# Patient Record
Sex: MALE | Race: WHITE | NOT HISPANIC OR LATINO | Employment: FULL TIME | URBAN - METROPOLITAN AREA
[De-identification: names, ages, dates, MRNs, and addresses within clinical notes are randomized per-mention and may not be internally consistent; named-entity substitution may affect disease eponyms.]

---

## 2017-07-21 ENCOUNTER — THERAPY VISIT (OUTPATIENT)
Dept: PHYSICAL THERAPY | Facility: CLINIC | Age: 77
End: 2017-07-21
Payer: COMMERCIAL

## 2017-07-21 DIAGNOSIS — M25.512 LEFT SHOULDER PAIN, UNSPECIFIED CHRONICITY: Primary | ICD-10-CM

## 2017-07-21 PROCEDURE — 97161 PT EVAL LOW COMPLEX 20 MIN: CPT | Mod: GP | Performed by: PHYSICAL THERAPIST

## 2017-07-21 PROCEDURE — 97110 THERAPEUTIC EXERCISES: CPT | Mod: GP | Performed by: PHYSICAL THERAPIST

## 2017-07-21 PROCEDURE — 97140 MANUAL THERAPY 1/> REGIONS: CPT | Mod: GP | Performed by: PHYSICAL THERAPIST

## 2017-07-21 NOTE — PROGRESS NOTES
[facility] Initial Evaluation      Subjective:    Patient is a 76 year old male presenting with rehab left shoulder hpi.   Bon Michael is a 76 year old male with a left shoulder condition.  Condition occurred with:  Unknown cause.    This is a new condition  Patient saw MD on 7/14/17 for left shoulder pain that began ~2 months ago.    Patient reports pain:  Anterior.  Radiates to:  Upper arm.  Pain is described as sharp and is intermittent and reported as 4/10.  Associated symptoms:  Painful arc. Pain is the same all the time (activity dependent).  Symptoms are exacerbated by using arm overhead and lifting and relieved by rest.  Since onset symptoms are gradually improving.  Special testing: none.      General health as reported by patient is good.  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  Other surgeries include:  None reported.  Medication history: Calcium.  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    Barriers include:  Lives alone.    Red flags:  None as reported by the patient.                        Objective:    Standing Alignment:    Cervical/Thoracic:  Forward head and thoracic kyphosis increased  Shoulder/UE:  Rounded shoulders                  Flexibility/Screens:     Upper Extremity:    Decreased left upper extremity flexibility at:  Pectoralis Major and Pectoralis Minor                             Shoulder Evaluation:  ROM:  AROM:    Flexion:  Left:  170    Right:  170    Abduction:  Left: 160   Right:  165    Internal Rotation:  Left:  90    Right:  90  External Rotation:  Left:  82    Right:  90      Elbow Flexion:  Left:  WNL    Right:  WNL  Elbow Extension:  Left:  WNL   Right:  WNL    Extension/Internal Rotation:  Left:  To T9    Right:  To T7      Pain: free AROM in clinic today  Endfeel: soft  Strength:     Flexion: Left:4-/5    Pain: +    Right: 5/5      Pain:  -  Extension:  Left: 5/5     Pain:-    Right: 5/5     Pain:-  Abduction:  Left: 4+/5   Pain:-/+    Right: 5/5      Pain:-  Adduction:  Left: 5/5     Pain:-    Right: 5/5      Pain:-  Internal Rotation:  Left:5/5      Pain:-    Right: 5/5      Pain:-  External Rotation:   Left:4/5      Pain:-/+   Right:5/5      Pain:-      Horizontal Adduction:  Right:/5     Pain:-  Elbow Flexion:  Left:5/5      Pain:-    Right:5/5      Pain:-  Elbow Extension:  Left:5/5      Pain:-    Right:5/5      Pain:-    Special Tests:    Left shoulder positive for the following special tests:  Impingement  Left shoulder negative for the following special tests:  Rotator cuff tear    Palpation:    Left shoulder tenderness present at:  Biceps; Supraspinatus and Subscapularis    Mobility Tests:      Glenohumeral posterior left:  Hypomobile    Glenohumeral inferior left:  Hypomobile                                             General     ROS    Assessment/Plan:      Patient is a 76 year old male with left side shoulder complaints.    Patient has the following significant findings with corresponding treatment plan.                Diagnosis 1:  Left shoulder pain   Pain -  manual therapy, self management, education, directional preference exercise and home program  Decreased ROM/flexibility - manual therapy, therapeutic exercise and home program  Decreased joint mobility - manual therapy, therapeutic exercise and home program  Decreased strength - therapeutic exercise, therapeutic activities and home program  Decreased function - therapeutic activities and home program  Impaired posture - neuro re-education, therapeutic activities and home program    Therapy Evaluation Codes:   1) History comprised of:   Personal factors that impact the plan of care:      None.    Comorbidity factors that impact the plan of care are:      None.     Medications impacting care: None.  2) Examination of Body  Systems comprised of:   Body structures and functions that impact the plan of care:      Shoulder.   Activity limitations that impact the plan of care are:      Cooking, Lifting and exercising.  3) Clinical presentation characteristics are:   Stable/Uncomplicated.  4) Decision-Making    Low complexity using standardized patient assessment instrument and/or measureable assessment of functional outcome.  Cumulative Therapy Evaluation is: Low complexity.    Previous and current functional limitations:  (See Goal Flow Sheet for this information)    Short term and Long term goals: (See Goal Flow Sheet for this information)     Communication ability:  Patient appears to be able to clearly communicate and understand verbal and written communication and follow directions correctly.  Treatment Explanation - The following has been discussed with the patient:   RX ordered/plan of care  Anticipated outcomes  Possible risks and side effects  This patient would benefit from PT intervention to resume normal activities.   Rehab potential is good.    Frequency:  1 X week, once daily  Duration:  for 10 weeks  Discharge Plan:  Achieve all LTG.  Independent in home treatment program.  Reach maximal therapeutic benefit.    Please refer to the daily flowsheet for treatment today, total treatment time and time spent performing 1:1 timed codes.

## 2017-07-21 NOTE — MR AVS SNAPSHOT
After Visit Summary   7/21/2017    Bon Michael    MRN: [id number]           Patient Information     Date Of Birth          1940        Visit Information        Provider Department      7/21/2017 4:10 PM Barb Welch, PT [facility] Physical [facility]        Today's Diagnoses     Left shoulder pain, unspecified chronicity    -  1       Follow-ups after your visit        Your next 10 appointments already scheduled     Jul 27, 2017  8:20 AM CDT   TRACI Extremity with Barb Gonzalez PT   [facility] Physical Therapy ([facility])    [address] [place] [address] [address] [place] [address]   [phone]            Aug 01, 2017  3:30 PM CDT   TRACI Extremity with Barb Gonzalez PT   [facility] Physical Therapy ([facility])    [address] [place] [phone]   [phone]            Aug 08, 2017  3:30 PM CDT   TRACI Extremity with Barb Gonzalez PT   [facility] Physical Therapy ([facility])    [address] [place] [address]   [phone]            Aug 11, 2017  4:10 PM CDT   TRACI Extremity with Barb Gonzalez PT   [facility] Physical Therapy ([facility])    [address] [place] [address]   [phone]            Aug 15, 2017  3:30 PM CDT   TRACI Extremity with Barb Gonzalez PT   [facility] Therapy ([facility])    [address] [place] [id number]   [phone]            Aug 18, 2017  4:10 PM CDT   TRACI Extremity with Barb Gonzalez PT   [facility] Physical Therapy ([facility])  "   [address] [place] [address]   [phone]            Aug 23, 2017 11:40 AM CDT   TRACI Extremity with Barb Gonzalez PT   [facility] Physical [facility] ([facility])    [address] [place] [address]   [phone]            Aug 25, 2017  4:10 PM CDT   TRACI Extremity with Barb Gonzalez PT   [facility] Physical Therapy ([facility])    [address] [place] [address]   [phone]            Aug 30, 2017 11:40 AM CDT   TRACI Extremity with Barb Gonzalez PT   [facility] Physical [facility] ([facility])    [address] [place] [address]   [phone]              Who to contact     If you have questions or need follow up information about today's clinic visit or your schedule please contact [facility] PHYSICAL [facility] directly at [phone].  Normal or non-critical lab and imaging results will be communicated to you by MyChart, letter or phone within 4 business days after the clinic has received the results. If you do not hear from us within 7 days, please contact the clinic through [employer] or phone. If you have a critical or abnormal lab result, we will notify you by phone as soon as possible.  Submit refill requests through [employer] or call your pharmacy and they will forward the refill request to us. Please allow 3 business days for your refill to be completed.          Additional Information About Your Visit        [employer] Information     [employer] lets you send messages to your doctor, view your test results, renew your prescriptions, schedule appointments and more. To sign up, go to [email or web address] . Click on \"Log in\" on the left side of the screen, which will take you to " "the Welcome page. Then click on \"Sign up Now\" on the right side of the page.     You will be asked to enter the access code listed below, as well as some personal information. Please follow the directions to create your username and password.     Your access code is: [email or web address]  Expires: 10/22/2017 10:19 PM     Your access code will  in 90 days. If you need help or a new code, please call your [place] clinic or [phone].        Care EveryWhere ID     This is your Care EveryWhere ID. This could be used by other organizations to access your [place] medical records  [id number]         Blood Pressure from Last 3 Encounters:   No data found for BP    Weight from Last 3 Encounters:   No data found for Wt              We Performed the Following     HC PT EVAL, LOW COMPLEXITY     TRACI INITIAL EVAL REPORT     MANUAL THER TECH,1+REGIONS,EA 15 MIN     THERAPEUTIC EXERCISES        Primary Care Provider    None Specified       No primary provider on file.        Equal Access to Services     JASON EPSTEIN : Hadii rashad Raza, waaxda lucornelladaha, qaybta kaalmada adejose carlos, lucila hernandez . So [facility] [phone].    ATENCIÓN: Si habla español, tiene a rebolledo disposición servicios gratuitos de asistencia lingüística. Llame al [phone].    We comply with applicable federal civil rights laws and [place] laws. We do not discriminate on the basis of race, color, national origin, age, disability sex, sexual orientation or gender identity.            Thank you!     Thank you for choosing [facility] PHYSICAL THERAPY  for your care. Our goal is always to provide you with excellent care. Hearing back from our patients is one way we can continue to improve our services. Please take a few minutes to complete the written survey that you may receive in the mail after your visit with us. Thank you!             Your Updated Medication List - Protect others around " you: Learn how to safely use, store and throw away your medicines at www.disposemymeds.org.      Notice  As of 7/21/2017 11:59 PM    You have not been prescribed any medications.

## 2017-07-21 NOTE — LETTER
[facility] PHYSICAL THERAPY  [address]  [facility] [place] [address]  [phone]    2017    Re: Bon Michael   :   1940  MRN:  [id number]   REFERRING PHYSICIAN:   Robinson Lopez    [facility] PHYSICAL [facility]    Date of Initial Evaluation:  2017  Visits:  Rxs Used: 1  Reason for Referral:  Left shoulder pain, unspecified chronicity    EVALUATION SUMMARY    Subjective:  Patient is a 76 year old male presenting with rehab left shoulder hpi.   Bon Michael is a 76 year old male with a left shoulder condition.  Condition occurred with:  Unknown cause. This is a new condition  Patient saw MD on 17 for left shoulder pain that began ~2 months ago.    Patient reports pain:  Anterior.  Radiates to:  Upper arm.  Pain is described as sharp and is intermittent and reported as 4/10.  Associated symptoms:  Painful arc. Pain is the same all the time (activity dependent).  Symptoms are exacerbated by using arm overhead and lifting and relieved by rest.  Since onset symptoms are gradually improving.  Special testing: none. General health as reported by patient is good.  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  Other surgeries include:  None reported.  Medication history: Calcium.  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  Barriers include:  Lives alone.  Red flags:  None as reported by the patient.                  Objective:  Standing Alignment:    Cervical/Thoracic:  Forward head and thoracic kyphosis increased  Shoulder/UE:  Rounded shoulders  Flexibility/Screens:   Upper Extremity:    Decreased left upper extremity flexibility at:  Pectoralis Major and Pectoralis Minor    Shoulder Evaluation:  ROM:  AROM:    Flexion:  Left:  170     Right:  170  Abduction:  Left: 160   Right:  165  Internal Rotation:  Left:  90    Right:  90  External Rotation:  Left:  82    Right:  90  Elbow Flexion:  Left:  WNL    Right:  WNL  Re: Bon Michael   :   1940    Elbow Extension:  Left:  WNL   Right:  WNL  Extension/Internal Rotation:  Left:  To T9    Right:  To T7    Pain: free AROM in clinic today  Endfeel: soft  Strength:    Flexion: Left:4-/5    Pain: +    Right: 5/5      Pain:  -  Extension:  Left: 5/5     Pain:-    Right: 5/5     Pain:-  Abduction:  Left: 4+/5   Pain:-/+    Right: 5/5      Pain:-  Adduction:  Left: 5/5     Pain:-    Right: 5/5      Pain:-  Internal Rotation:  Left:5/5      Pain:-    Right: 5/5      Pain:-  External Rotation:   Left:4/5      Pain:-/+   Right:5/5      Pain:-    Horizontal Adduction:  Right:/5     Pain:-  Elbow Flexion:  Left:5/5      Pain:-    Right:5/5      Pain:-  Elbow Extension:  Left:5/5      Pain:-    Right:5/5      Pain:-  Special Tests:    Left shoulder positive for the following special tests:  Impingement  Left shoulder negative for the following special tests:  Rotator cuff tear  Palpation:    Left shoulder tenderness present at:  Biceps; Supraspinatus and Subscapularis  Mobility Tests:    Glenohumeral posterior left:  Hypomobile    Glenohumeral inferior left:  Hypomobile      Assessment/Plan:    Patient is a 76 year old male with left side shoulder complaints.    Patient has the following significant findings with corresponding treatment plan.                Diagnosis 1:  Left shoulder pain   Pain -  manual therapy, self management, education, directional preference exercise and home program  Decreased ROM/flexibility - manual therapy, therapeutic exercise and home program  Decreased joint mobility - manual therapy, therapeutic exercise and home program  Decreased strength - therapeutic exercise, therapeutic activities and home program  Decreased function - therapeutic activities and home  program  Impaired posture - neuro re-education, therapeutic activities and home program    Therapy Evaluation Codes:   1) History comprised of:   Personal factors that impact the plan of care:      None.    Comorbidity factors that impact the plan of care are:      None.     Medications impacting care: None.  2) Examination of Body Systems comprised of:   Body structures and functions that impact the plan of care:      Shoulder.   Activity limitations that impact the plan of care are:      Cooking, Lifting and exercising.  3) Clinical presentation characteristics are:   Stable/Uncomplicated.      Re: Bon Michael   :   1940    4) Decision-Making    Low complexity using standardized patient assessment instrument and/or measureable assessment of functional outcome.  Cumulative Therapy Evaluation is: Low complexity.    Previous and current functional limitations:  (See Goal Flow Sheet for this information)    Short term and Long term goals: (See Goal Flow Sheet for this information)     Communication ability:  Patient appears to be able to clearly communicate and understand verbal and written communication and follow directions correctly.  Treatment Explanation - The following has been discussed with the patient:   RX ordered/plan of care  Anticipated outcomes  Possible risks and side effects  This patient would benefit from PT intervention to resume normal activities.   Rehab potential is good.    Frequency:  1 X week, once daily  Duration:  for 10 weeks  Discharge Plan:  Achieve all LTG.  Independent in home treatment program.  Reach maximal therapeutic benefit.    Thank you for your referral.    INQUIRIES  Therapist: Barb Ojeda, PT   [facility]  [address] [address]  Phone: [phone]  Fax: [phone]

## 2017-07-27 ENCOUNTER — THERAPY VISIT (OUTPATIENT)
Dept: PHYSICAL THERAPY | Facility: CLINIC | Age: 77
End: 2017-07-27
Payer: COMMERCIAL

## 2017-07-27 DIAGNOSIS — M25.512 LEFT SHOULDER PAIN, UNSPECIFIED CHRONICITY: Primary | ICD-10-CM

## 2017-07-27 PROCEDURE — 97110 THERAPEUTIC EXERCISES: CPT | Mod: GP | Performed by: PHYSICAL THERAPIST

## 2017-07-27 PROCEDURE — 97112 NEUROMUSCULAR REEDUCATION: CPT | Mod: GP | Performed by: PHYSICAL THERAPIST

## 2017-08-01 ENCOUNTER — THERAPY VISIT (OUTPATIENT)
Dept: PHYSICAL THERAPY | Facility: CLINIC | Age: 77
End: 2017-08-01
Payer: COMMERCIAL

## 2017-08-01 DIAGNOSIS — M25.512 LEFT SHOULDER PAIN, UNSPECIFIED CHRONICITY: ICD-10-CM

## 2017-08-01 PROCEDURE — 97112 NEUROMUSCULAR REEDUCATION: CPT | Mod: GP | Performed by: PHYSICAL THERAPIST

## 2017-08-01 PROCEDURE — 97110 THERAPEUTIC EXERCISES: CPT | Mod: GP | Performed by: PHYSICAL THERAPIST

## 2017-08-17 ENCOUNTER — THERAPY VISIT (OUTPATIENT)
Dept: PHYSICAL THERAPY | Facility: CLINIC | Age: 77
End: 2017-08-17
Payer: COMMERCIAL

## 2017-08-17 DIAGNOSIS — M25.512 LEFT SHOULDER PAIN, UNSPECIFIED CHRONICITY: ICD-10-CM

## 2017-08-17 PROCEDURE — 97140 MANUAL THERAPY 1/> REGIONS: CPT | Mod: GP | Performed by: PHYSICAL THERAPIST

## 2017-08-17 PROCEDURE — 97110 THERAPEUTIC EXERCISES: CPT | Mod: GP | Performed by: PHYSICAL THERAPIST

## 2017-08-31 ENCOUNTER — THERAPY VISIT (OUTPATIENT)
Dept: PHYSICAL THERAPY | Facility: CLINIC | Age: 77
End: 2017-08-31
Payer: COMMERCIAL

## 2017-08-31 DIAGNOSIS — M25.512 LEFT SHOULDER PAIN, UNSPECIFIED CHRONICITY: ICD-10-CM

## 2017-08-31 PROCEDURE — 97110 THERAPEUTIC EXERCISES: CPT | Mod: GP | Performed by: PHYSICAL THERAPIST

## 2017-08-31 PROCEDURE — 97140 MANUAL THERAPY 1/> REGIONS: CPT | Mod: GP | Performed by: PHYSICAL THERAPIST

## 2017-09-21 ENCOUNTER — THERAPY VISIT (OUTPATIENT)
Dept: PHYSICAL THERAPY | Facility: CLINIC | Age: 77
End: 2017-09-21
Payer: COMMERCIAL

## 2017-09-21 DIAGNOSIS — M25.512 LEFT SHOULDER PAIN, UNSPECIFIED CHRONICITY: ICD-10-CM

## 2017-09-21 PROCEDURE — 97110 THERAPEUTIC EXERCISES: CPT | Mod: GP | Performed by: PHYSICAL THERAPIST

## 2017-09-21 NOTE — MR AVS SNAPSHOT
"              After Visit Summary   2017    Bon Michael    MRN: [id number]           Patient Information     Date Of Birth          1940        Visit Information        Provider Department      2017 9:40 AM [facility] PT [facility] Physical [facility]        Today's Diagnoses     Left shoulder pain, unspecified chronicity           Follow-ups after your visit        Who to contact     If you have questions or need follow up information about today's clinic visit or your schedule please contact [facility]ICAL [facility] directly at [phone].  Normal or non-critical lab and imaging results will be communicated to you by [employer], letter or phone within 4 business days after the clinic has received the results. If you do not hear from us within 7 days, please contact the clinic through [employer] or phone. If you have a critical or abnormal lab result, we will notify you by phone as soon as possible.  Submit refill requests through [employer] or call your pharmacy and they will forward the refill request to us. Please allow 3 business days for your refill to be completed.          Additional Information About Your Visit        MyChart Information     [employer] lets you send messages to your doctor, view your test results, renew your prescriptions, schedule appointments and more. To sign up, go to [email or web address] . Click on \"Log in\" on the left side of the screen, which will take you to the Welcome page. Then click on \"Sign up Now\" on the right side of the page.     You will be asked to enter the access code listed below, as well as some personal information. Please follow the directions to create your username and password.     Your access code is: [email or web address]  Expires: 10/22/2017 10:19 PM     Your access code will  in 90 days. If you need help or a new code, please call your [place] clinic or " [phone].        Care EveryWhere ID     This is your Care EveryWhere ID. This could be used by other organizations to access your [place] medical records  [id number]         Blood Pressure from Last 3 Encounters:   No data found for BP    Weight from Last 3 Encounters:   No data found for Wt              We Performed the Following     TRACI PROGRESS NOTES REPORT     THERAPEUTIC EXERCISES        Primary Care Provider    None Specified       No primary provider on file.        Equal Access to Services     [facility]: Hadii aad ku hadasho Soomaali, waaxda luqadaha, qaybta kaalmada adeegyada, waxay idiin hayaan adeeg rangelemiliaamanda ladamion . So [facility] [phone].    ATENCIÓN: Si habla español, tiene a rebolledo disposición servicios gratuitos de asistencia lingüística. Llame al [phone].    We comply with applicable federal civil rights laws and [place] laws. We do not discriminate on the basis of race, color, national origin, age, disability sex, sexual orientation or gender identity.            Thank you!     Thank you for choosing [facility] PHYSICAL THERAPY  for your care. Our goal is always to provide you with excellent care. Hearing back from our patients is one way we can continue to improve our services. Please take a few minutes to complete the written survey that you may receive in the mail after your visit with us. Thank you!             Your Updated Medication List - Protect others around you: Learn how to safely use, store and throw away your medicines at www.disposemymeds.org.      Notice  As of 9/21/2017 11:59 PM    You have not been prescribed any medications.

## 2017-09-21 NOTE — LETTER
[facility], located within [facility] PH[facility] THERAPY  [address]  [facility] [address]  [phone]    2017    Re: Bon Michael   :   1940  MRN:  [id number]   REFERRING PHYSICIAN:   Robinson Lopez    [facility] located [facility]AP[facility]    Date of Initial Evaluation:  2017  Visits:  Rxs Used: 6  Reason for Referral:  Left shoulder pain, unspecified chronicity    PROGRESS  REPORT  Progress reporting period is from 17 to 17.       SUBJECTIVE  Subjective: Today, pt feeling that his shoulder is 80% improved. In the past week, patient has been able to perform some work up at his cabin which involved changing as well as painting window storms and screens. He was able to complete the activity with only slight shoulder pain intermittently. Has been performing his HEP for left UE and continues to go to the health club for cardio and LE strengthening with machines. feeling like he may be able to resume some of the UE machines now after PT instruction of proper mechanics.     Current Pain level: 0/10.     Initial Pain level: 4/10.   Changes in function:  Yes (See Goal flowsheet attached for changes in current functional level)  Adverse reaction to treatment or activity: None    OBJECTIVE  Objective: Overall, poor to fair sitting posture continus to be demonstrated by patient(FH, RS< increased thoracic kyphosis). AROM of left shoulder: flexion 170(unchanged and symmetrical to R), abduction 170(improved from 165)and symmetrical to R), ER 90(improved from 82 and symmetrcal to R), IR/ext to T9(unchanged, R to T7). Crepitus noted during left shoulder abduction. Improved MMT of left shoulder reveals gross strength 5/5 and painfree with focal weakness of flexion 4/5 and ER 4+/5.  Middle(3+/5) and lower trap(3-/5) weakness.    ASSESSMENT/PLAN  Updated problem list and treatment plan: Diagnosis 1:  Left shoulder pain   Decreased ROM/flexibility - therapeutic exercise, therapeutic activity and home program  Decreased strength - therapeutic exercise, therapeutic activities and home program  Decreased function - therapeutic activities and home program  Impaired posture - neuro re-education, therapeutic activities and home program  STG/LTGs have been met or progress has been made towards goals:  Yes (See Goal flow sheet completed today.)  Assessment of Progress: The patient's condition is improving.  Re: Bon Michael   :   1940    The patient's condition has potential to improve.  Self Management Plans:  Patient has been instructed in a home treatment program.  Patient  has been instructed in self management of symptoms.  I have re-evaluated this patient and find that the nature, scope, duration and intensity of the therapy is appropriate for the medical condition of the patient.  Bon continues to require the following intervention to meet STG and LTG's:  PT    Recommendations:  This patient would benefit from continued therapy.     Frequency:  1 X week, once daily  Duration:  Every other week for up to 8 weeks addition(up to 4 more)     Thank you for your referral.    INQUIRIES  Therapist: Barb Ojeda, PT   [facility]  [address] [address]  Phone: [phone]  Fax: [phone]

## 2017-09-22 NOTE — PROGRESS NOTES
Subjective:    HPI                    Objective:    System    Physical Exam    General     ROS    Assessment/Plan:      PROGRESS  REPORT    Progress reporting period is from 7/21/17 to 9/21/17.       SUBJECTIVE  Subjective: Today, pt feeling that his shoulder is 80% improved. In the past week, patient has been able to perform some work up at his cabin which involved changing as well as painting window storms and screens. He was able to complete the activity with only slight shoulder pain intermittently. Has been performing his HEP for left UE and continues to go to the health club for cardio and LE strengthening with machines. feeling like he may be able to resume some of the UE machines now after PT instruction of proper mechanics.     Current Pain level: 0/10.     Initial Pain level: 4/10.   Changes in function:  Yes (See Goal flowsheet attached for changes in current functional level)  Adverse reaction to treatment or activity: None    OBJECTIVE    Objective: Overall, poor to fair sitting posture continus to be demonstrated by patient(FH, RS< increased thoracic kyphosis). AROM of left shoulder: flexion 170(unchanged and symmetrical to R), abduction 170(improved from 165)and symmetrical to R), ER 90(improved from 82 and symmetrcal to R), IR/ext to T9(unchanged, R to T7). Crepitus noted during left shoulder abduction. Improved MMT of left shoulder reveals gross strength 5/5 and painfree with focal weakness of flexion 4/5 and ER 4+/5.  Middle(3+/5) and lower trap(3-/5) weakness.    ASSESSMENT/PLAN  Updated problem list and treatment plan: Diagnosis 1:  Left shoulder pain  Decreased ROM/flexibility - therapeutic exercise, therapeutic activity and home program  Decreased strength - therapeutic exercise, therapeutic activities and home program  Decreased function - therapeutic activities and home program  Impaired posture - neuro re-education, therapeutic activities and home program  STG/LTGs have been met or progress  has been made towards goals:  Yes (See Goal flow sheet completed today.)  Assessment of Progress: The patient's condition is improving.  The patient's condition has potential to improve.  Self Management Plans:  Patient has been instructed in a home treatment program.  Patient  has been instructed in self management of symptoms.  I have re-evaluated this patient and find that the nature, scope, duration and intensity of the therapy is appropriate for the medical condition of the patient.  Bon continues to require the following intervention to meet STG and LTG's:  PT    Recommendations:  This patient would benefit from continued therapy.     Frequency:  1 X week, once daily  Duration:  Every other week for up to 8 weeks addition(up to 4 more)           Please refer to the daily flowsheet for treatment today, total treatment time and time spent performing 1:1 timed codes.

## 2019-07-18 ENCOUNTER — APPOINTMENT (OUTPATIENT)
Age: 79
Setting detail: DERMATOLOGY
End: 2019-07-18

## 2019-07-18 VITALS — HEIGHT: 67 IN | WEIGHT: 140 LBS

## 2019-07-18 DIAGNOSIS — L57.8 OTHER SKIN CHANGES DUE TO CHRONIC EXPOSURE TO NONIONIZING RADIATION: ICD-10-CM

## 2019-07-18 DIAGNOSIS — I78.8 OTHER DISEASES OF CAPILLARIES: ICD-10-CM

## 2019-07-18 DIAGNOSIS — D18.0 HEMANGIOMA: ICD-10-CM

## 2019-07-18 DIAGNOSIS — L82.0 INFLAMED SEBORRHEIC KERATOSIS: ICD-10-CM

## 2019-07-18 DIAGNOSIS — L82.1 OTHER SEBORRHEIC KERATOSIS: ICD-10-CM

## 2019-07-18 DIAGNOSIS — Z85.828 PERSONAL HISTORY OF OTHER MALIGNANT NEOPLASM OF SKIN: ICD-10-CM

## 2019-07-18 DIAGNOSIS — Z41.9 ENCOUNTER FOR PROCEDURE FOR PURPOSES OTHER THAN REMEDYING HEALTH STATE, UNSPECIFIED: ICD-10-CM

## 2019-07-18 PROBLEM — D18.01 HEMANGIOMA OF SKIN AND SUBCUTANEOUS TISSUE: Status: ACTIVE | Noted: 2019-07-18

## 2019-07-18 PROCEDURE — 99214 OFFICE O/P EST MOD 30 MIN: CPT | Mod: 25

## 2019-07-18 PROCEDURE — OTHER REASSURANCE: OTHER

## 2019-07-18 PROCEDURE — OTHER PULSED-DYE LASER: OTHER

## 2019-07-18 PROCEDURE — 17110 DESTRUCT B9 LESION 1-14: CPT

## 2019-07-18 PROCEDURE — OTHER LIQUID NITROGEN: OTHER

## 2019-07-18 PROCEDURE — OTHER SUNSCREEN RECOMMENDATIONS: OTHER

## 2019-07-18 PROCEDURE — OTHER COUNSELING: OTHER

## 2019-07-18 ASSESSMENT — LOCATION DETAILED DESCRIPTION DERM
LOCATION DETAILED: LEFT LATERAL INFERIOR CHEST
LOCATION DETAILED: RIGHT CHIN
LOCATION DETAILED: LEFT CENTRAL POSTAURICULAR SKIN
LOCATION DETAILED: LEFT CENTRAL LATERAL NECK
LOCATION DETAILED: LEFT LATERAL SUPERIOR CHEST
LOCATION DETAILED: RIGHT LATERAL UPPER BACK
LOCATION DETAILED: LEFT LATERAL DISTAL PRETIBIAL REGION
LOCATION DETAILED: NASAL SUPRATIP
LOCATION DETAILED: LEFT NASAL ALA

## 2019-07-18 ASSESSMENT — LOCATION SIMPLE DESCRIPTION DERM
LOCATION SIMPLE: LEFT PRETIBIAL REGION
LOCATION SIMPLE: RIGHT UPPER BACK
LOCATION SIMPLE: SCALP
LOCATION SIMPLE: LEFT NOSE
LOCATION SIMPLE: CHIN
LOCATION SIMPLE: NOSE
LOCATION SIMPLE: NECK
LOCATION SIMPLE: CHEST

## 2019-07-18 ASSESSMENT — LOCATION ZONE DERM
LOCATION ZONE: NOSE
LOCATION ZONE: FACE
LOCATION ZONE: LEG
LOCATION ZONE: SCALP
LOCATION ZONE: NECK
LOCATION ZONE: TRUNK

## 2019-07-18 NOTE — PROCEDURE: REASSURANCE
Hide Include Location In Plan Question?: No
Detail Level: Generalized
Detail Level: Detailed
Detail Level: Zone

## 2019-07-18 NOTE — PROCEDURE: PULSED-DYE LASER
Post-Procedure Care: TREATMENT PERFORMED TODAY - Pulse Dye Vbeam \\n\\nPositive Hx: none. \\n\\nNegative Hx / Denies: having a tan/sunburn today, autoimmune disorders; does not have a pacemaker / defibrillator; is not currently pregnant/breast feeding; HSV; recent h/o infections. \\n\\nPulse dye V-beam is a laser that treats red imperfections of the skin.\\nFor optimal results a series of 1-3 treatments, 3-4 weeks apart was recommended.\\nDiscussed pulse dye V beam, how it works, its benefits and what to expect during the treatment.\\nAdvised to avoid sun exposure and sunless tanners 2-4 weeks prior & advised on SPF / sunscreen post treatment.\\nImmediately after treatment the treated area may feel red, itch and swelling may occur.\\nNormal/expected healing process discussed: temporary and expected *bruising*, redness and or darkening of blood vessels, swelling / welting of skin x 0-5 days with possible bruising x 7-14 days.\\nOther Side Effects / Complications discussed: itching, scabbing/crusting of the skin, changes in pigment/scarring, blisters.\\n\\nPatient verbalized understanding of today’s consultation.\\nPatient had realistic expectations.\\Edie questions answered, patient verbalized understanding and an informed consent was signed.\\n\\nTreatment areas were cleansed.\\nProtective eye wear provided and worn.\\nTreated using the parameters below and documented on treatment diagram.\\n\\nV-Beam SETTINGS: 10ms  8J (forehead/cheeks/chin)  & 9J nose with 7 mm spot size. \\n\\nPatient tolerated the procedure well.\\nPhyto applied and Ice packs were provided.\\n\\nPatient instructed to wear SPF qd.\\nPost treatment advised on gentle skin care as the treated area heals, instructed not to exfoliate, that make-up may be worn and stressed the importance of a daily SPF (30 or higher).\\nExplained Vbeam and treatment of redness / broken blood vessels usually requires a series of 1-3 treatments (plus or minus) 3-4 weeks apart.\\n\\nCONSIDER: \\n\\nMedical Grade SKIN CARE also discussed:  \\n\\nRTC in 3-4 weeks for next treatment.

## 2019-07-18 NOTE — PROCEDURE: LIQUID NITROGEN
Include Z78.9 (Other Specified Conditions Influencing Health Status) As An Associated Diagnosis?: No
Render Post-Care Instructions In Note?: yes
Duration Of Freeze Thaw-Cycle (Seconds): 3
Medical Necessity Information: It is in your best interest to select a reason for this procedure from the list below. All of these items fulfill various CMS LCD requirements except the new and changing color options.
Post-Care Instructions: Avoid picking at any of the treated lesions.
Consent: Verbal and written consent was obtained, and risks were reviewed prior to procedure today. Risks discussed include but are not limited to pain, crusting, scabbing, blistering, scarring, temporary or permanent darker or lighter pigmentary change, recurrence, incomplete resolution, and infection.
Medical Necessity Clause: This procedure was medically necessary because the lesions that were treated were:
Number Of Freeze-Thaw Cycles: 2 freeze-thaw cycles
Detail Level: Detailed

## 2019-07-18 NOTE — PROCEDURE: PULSED-DYE LASER
Price (Use Numbers Only, No Special Characters Or $): 859 Price (Use Numbers Only, No Special Characters Or $): 821

## 2019-08-22 ENCOUNTER — APPOINTMENT (OUTPATIENT)
Age: 79
Setting detail: DERMATOLOGY
End: 2019-08-22

## 2019-08-22 DIAGNOSIS — Z41.9 ENCOUNTER FOR PROCEDURE FOR PURPOSES OTHER THAN REMEDYING HEALTH STATE, UNSPECIFIED: ICD-10-CM

## 2019-08-22 PROCEDURE — OTHER PULSED-DYE LASER: OTHER

## 2019-08-22 NOTE — PROCEDURE: PULSED-DYE LASER
Price (Use Numbers Only, No Special Characters Or $): 880 Price (Use Numbers Only, No Special Characters Or $): 890

## 2019-12-31 ENCOUNTER — APPOINTMENT (OUTPATIENT)
Age: 79
Setting detail: DERMATOLOGY
End: 2019-12-31

## 2019-12-31 VITALS — HEIGHT: 68 IN | WEIGHT: 141 LBS | RESPIRATION RATE: 16 BRPM

## 2019-12-31 DIAGNOSIS — K11.6 MUCOCELE OF SALIVARY GLAND: ICD-10-CM

## 2019-12-31 PROCEDURE — 99213 OFFICE O/P EST LOW 20 MIN: CPT | Mod: 25

## 2019-12-31 PROCEDURE — OTHER COUNSELING: OTHER

## 2019-12-31 PROCEDURE — 17110 DESTRUCT B9 LESION 1-14: CPT

## 2019-12-31 PROCEDURE — OTHER ADDITIONAL NOTES: OTHER

## 2019-12-31 PROCEDURE — OTHER BENIGN DESTRUCTION: OTHER

## 2019-12-31 ASSESSMENT — LOCATION DETAILED DESCRIPTION DERM
LOCATION DETAILED: RIGHT INFERIOR GINGIVAE
LOCATION DETAILED: RIGHT INFERIOR MUCOSAL LIP

## 2019-12-31 ASSESSMENT — LOCATION SIMPLE DESCRIPTION DERM
LOCATION SIMPLE: RIGHT INFERIOR MUCOSAL LIP
LOCATION SIMPLE: RIGHT INFERIOR GINGIVAE

## 2019-12-31 ASSESSMENT — LOCATION ZONE DERM
LOCATION ZONE: MUCOUS_MEMBRANE
LOCATION ZONE: LIP

## 2019-12-31 NOTE — PROCEDURE: BENIGN DESTRUCTION
Anesthesia Volume In Cc: 0.5
Render Note In Bullet Format When Appropriate: No
Post-Care Instructions: I reviewed with the patient in detail post-care instructions. Patient is to wear sunprotection, and avoid picking at any of the treated lesions. Pt may apply Vaseline to crusted or scabbing areas.
Treatment Number (Will Not Render If 0): 0
Medical Necessity Clause: This procedure was medically necessary because the lesions that were treated were:
Medical Necessity Information: It is in your best interest to select a reason for this procedure from the list below. All of these items fulfill various CMS LCD requirements except the new and changing color options.
Anesthesia Type: 2% lidocaine with epinephrine
Consent: The patient's consent was obtained including but not limited to risks of crusting, scabbing, blistering, scarring, darker or lighter pigmentary change, recurrence, incomplete removal and infection.
Detail Level: Detailed

## 2019-12-31 NOTE — PROCEDURE: ADDITIONAL NOTES
Additional Notes: Patient reports that this has been present for a few days. He states that it has decreased in size since he first noticed it.\\nOffered to remove the lesion with a punch biopsy, cautery, or leaving the lesion alone. Explained that the lesion is benign, but that if it is a nuisance to him it can be removed.
Detail Level: Simple

## 2020-01-07 ENCOUNTER — APPOINTMENT (OUTPATIENT)
Age: 80
Setting detail: DERMATOLOGY
End: 2020-01-07

## 2020-01-07 DIAGNOSIS — B00.1 HERPESVIRAL VESICULAR DERMATITIS: ICD-10-CM

## 2020-01-07 PROBLEM — L08.9 LOCAL INFECTION OF THE SKIN AND SUBCUTANEOUS TISSUE, UNSPECIFIED: Status: ACTIVE | Noted: 2020-01-07

## 2020-01-07 PROCEDURE — OTHER ADDITIONAL NOTES: OTHER

## 2020-01-07 PROCEDURE — 99213 OFFICE O/P EST LOW 20 MIN: CPT | Mod: 24

## 2020-01-07 PROCEDURE — OTHER PRESCRIPTION: OTHER

## 2020-01-07 PROCEDURE — OTHER ORDER TESTS: OTHER

## 2020-01-07 PROCEDURE — OTHER COUNSELING: OTHER

## 2020-01-07 ASSESSMENT — LOCATION ZONE DERM: LOCATION ZONE: LIP

## 2020-01-07 ASSESSMENT — LOCATION SIMPLE DESCRIPTION DERM: LOCATION SIMPLE: RIGHT LIP

## 2020-01-07 ASSESSMENT — LOCATION DETAILED DESCRIPTION DERM: LOCATION DETAILED: RIGHT INFERIOR VERMILION LIP

## 2020-01-07 NOTE — PROCEDURE: ADDITIONAL NOTES
Detail Level: Simple
Additional Notes: It appears that the destruction of the mucocele may have stimulated a cold sore.\\nPatient admits to having cold sores in the past.\\nA culture was taken to confirm if viral or not.\\nRecommend treating with anti-viral therapy to see if this resolves.

## 2020-02-06 ENCOUNTER — APPOINTMENT (OUTPATIENT)
Age: 80
Setting detail: DERMATOLOGY
End: 2020-02-06

## 2020-02-06 VITALS — RESPIRATION RATE: 14 BRPM | HEIGHT: 67.5 IN | WEIGHT: 140 LBS

## 2020-02-06 DIAGNOSIS — L81.4 OTHER MELANIN HYPERPIGMENTATION: ICD-10-CM

## 2020-02-06 DIAGNOSIS — Z85.828 PERSONAL HISTORY OF OTHER MALIGNANT NEOPLASM OF SKIN: ICD-10-CM

## 2020-02-06 DIAGNOSIS — L57.8 OTHER SKIN CHANGES DUE TO CHRONIC EXPOSURE TO NONIONIZING RADIATION: ICD-10-CM

## 2020-02-06 DIAGNOSIS — L82.1 OTHER SEBORRHEIC KERATOSIS: ICD-10-CM

## 2020-02-06 DIAGNOSIS — D18.0 HEMANGIOMA: ICD-10-CM

## 2020-02-06 DIAGNOSIS — L98.8 OTHER SPECIFIED DISORDERS OF THE SKIN AND SUBCUTANEOUS TISSUE: ICD-10-CM

## 2020-02-06 DIAGNOSIS — I78.8 OTHER DISEASES OF CAPILLARIES: ICD-10-CM

## 2020-02-06 PROBLEM — D18.01 HEMANGIOMA OF SKIN AND SUBCUTANEOUS TISSUE: Status: ACTIVE | Noted: 2020-02-06

## 2020-02-06 PROCEDURE — 99214 OFFICE O/P EST MOD 30 MIN: CPT

## 2020-02-06 PROCEDURE — OTHER REASSURANCE: OTHER

## 2020-02-06 PROCEDURE — OTHER COUNSELING: OTHER

## 2020-02-06 PROCEDURE — OTHER SUNSCREEN RECOMMENDATIONS: OTHER

## 2020-02-06 ASSESSMENT — LOCATION DETAILED DESCRIPTION DERM
LOCATION DETAILED: LEFT CENTRAL LATERAL NECK
LOCATION DETAILED: RIGHT LATERAL PLANTAR HEEL
LOCATION DETAILED: LEFT LATERAL INFERIOR CHEST
LOCATION DETAILED: LEFT LATERAL SUPERIOR CHEST
LOCATION DETAILED: RIGHT INFERIOR LATERAL UPPER BACK
LOCATION DETAILED: RIGHT CHIN
LOCATION DETAILED: LEFT NASAL ALA
LOCATION DETAILED: RIGHT LATERAL UPPER BACK
LOCATION DETAILED: LEFT MEDIAL PLANTAR HEEL
LOCATION DETAILED: NASAL SUPRATIP

## 2020-02-06 ASSESSMENT — LOCATION SIMPLE DESCRIPTION DERM
LOCATION SIMPLE: RIGHT UPPER BACK
LOCATION SIMPLE: LEFT NOSE
LOCATION SIMPLE: NOSE
LOCATION SIMPLE: CHIN
LOCATION SIMPLE: LEFT PLANTAR SURFACE
LOCATION SIMPLE: CHEST
LOCATION SIMPLE: RIGHT PLANTAR SURFACE
LOCATION SIMPLE: NECK

## 2020-02-06 ASSESSMENT — LOCATION ZONE DERM
LOCATION ZONE: NECK
LOCATION ZONE: FACE
LOCATION ZONE: NOSE
LOCATION ZONE: FEET
LOCATION ZONE: TRUNK

## 2020-09-01 ENCOUNTER — TRANSFERRED RECORDS (OUTPATIENT)
Dept: HEALTH INFORMATION MANAGEMENT | Facility: CLINIC | Age: 80
End: 2020-09-01

## 2020-09-04 ENCOUNTER — TRANSFERRED RECORDS (OUTPATIENT)
Dept: HEALTH INFORMATION MANAGEMENT | Facility: CLINIC | Age: 80
End: 2020-09-04

## 2020-09-09 ENCOUNTER — TELEPHONE (OUTPATIENT)
Dept: OPHTHALMOLOGY | Facility: CLINIC | Age: 80
End: 2020-09-09

## 2020-09-09 ENCOUNTER — OFFICE VISIT (OUTPATIENT)
Dept: OPHTHALMOLOGY | Facility: CLINIC | Age: 80
End: 2020-09-09
Attending: OPHTHALMOLOGY
Payer: MEDICARE

## 2020-09-09 DIAGNOSIS — H53.10 SUBJECTIVE VISUAL DISTURBANCE: ICD-10-CM

## 2020-09-09 DIAGNOSIS — H46.9 OPTIC NEUROPATHY: Primary | ICD-10-CM

## 2020-09-09 DIAGNOSIS — H53.40 VISUAL FIELD DEFECT: ICD-10-CM

## 2020-09-09 PROCEDURE — 92133 CPTRZD OPH DX IMG PST SGM ON: CPT | Mod: ZF | Performed by: OPHTHALMOLOGY

## 2020-09-09 PROCEDURE — G0463 HOSPITAL OUTPT CLINIC VISIT: HCPCS | Mod: ZF | Performed by: TECHNICIAN/TECHNOLOGIST

## 2020-09-09 PROCEDURE — 92083 EXTENDED VISUAL FIELD XM: CPT | Mod: ZF | Performed by: OPHTHALMOLOGY

## 2020-09-09 ASSESSMENT — EXTERNAL EXAM - RIGHT EYE: OD_EXAM: NORMAL

## 2020-09-09 ASSESSMENT — VISUAL ACUITY
OD_CC: 20/20
OD_CC+: -1
METHOD: SNELLEN - LINEAR
OS_CC: 20/25
CORRECTION_TYPE: GLASSES
OS_CC+: -1

## 2020-09-09 ASSESSMENT — SLIT LAMP EXAM - LIDS
COMMENTS: NORMAL
COMMENTS: NORMAL

## 2020-09-09 ASSESSMENT — TONOMETRY
IOP_METHOD: TONOPEN
OS_IOP_MMHG: 17
OD_IOP_MMHG: 15

## 2020-09-09 ASSESSMENT — CONF VISUAL FIELD
METHOD: COUNTING FINGERS
OD_NORMAL: 1
OS_INFERIOR_NASAL_RESTRICTION: 3
OS_INFERIOR_TEMPORAL_RESTRICTION: 3

## 2020-09-09 ASSESSMENT — REFRACTION_WEARINGRX
SPECS_TYPE: PAL
OS_SPHERE: +6.25
OD_AXIS: 134
OD_CYLINDER: +1.25
OS_CYLINDER: +0.50
OS_AXIS: 059
OD_SPHERE: +3.25

## 2020-09-09 ASSESSMENT — CUP TO DISC RATIO
OD_RATIO: 0.3
OS_RATIO: 0.3

## 2020-09-09 ASSESSMENT — EXTERNAL EXAM - LEFT EYE: OS_EXAM: NORMAL

## 2020-09-09 NOTE — NURSING NOTE
Chief Complaint(s) and History of Present Illness(es)     New Patient     In left eye (Consult for Ischemic optic neuropathy).              Comments     Patient states peripheral vision is getting poor LE>RE. Onset since August 23, 2020 - something did not feel right. The following day patient noticed he was not getting much light from his left peripheral visual field. Saw Dr. Lewis at [facility] and ordered MRI/A and blood tests.     ANNETTE Goss 9/9/2020 7:58 AM

## 2020-09-09 NOTE — LETTER
September 10, 2020    RE: Bon Michael  : 1940  MRN: [id number]    Dear Dr. Andino,    Thank you for referring your patient, Bno Michael, to my neuro-ophthalmology clinic recently.  After a thorough neuro-ophthalmic history and examination, I came to the following conclusions:   1. 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  My primary 2 concerns in this case are giant cell arteritis considering the pallid discedema, the patient's age, and the choroidal hypoperfusion seen on fluorescein angiography per Dr. Andino.  Arguing against this diagnosis are findings of an orbital infiltrative process with optic perineural enhancement, relatively intact visual acuity which is unusual in the setting of artery anterior ischemic optic neuropathy, negative elevation of acute phase reactants, and lack of other constitutional symptoms to suggest giant cell arteritis.  Nevertheless the diagnosis must be excluded in this setting.  I will keep the patient on prednisone 60 mg daily and obtain a stat left temporal artery biopsy.  If negative I will taper the patient off steroids.  I will repeat the MRI orbits to look for confirmation that the findings from his prior MR were real and not artifact related to incomplete fat sat on the orbital protocols of the MRI.  If the MRI findings persist then I will proceed with a work-up for orbital inflammation as well as a large-volume lumbar puncture looking for evidence of leukemic cells in the CSF.     Addendum (post visit) 20:  I contacted my colleagues in oculoplastics and requested that we move up the plan for the left temporal artery biopsy so that we can obtain a conclusive answer regarding the diagnosis in this case and expedite treatment for leukemic infiltration of the nerves if that is the diagnosis.  The patient will undergo temporal artery biopsy tomorrow with Dr. Carrington.  I will keep the patient on  "prednisone pending the results.  I also spoke with his oncologist Dr. Wall at [facility].  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  I expressed my concern that if this is leukemic infiltration that I would suggest fractionated orbital radiation in addition to chemotherapy given the extent of vision loss and my concern that it would take chemotherapy too long to take effect.  We will be repeating the MRI of the orbits tomorrow which should provide additional information about whether the orbital findings from the initial MRI were truly present versus artifact.    Bon Michael is a 79 year old male who presents to neuro-ophthalmology clinic today for consultation from Jose Andino MD at [facility] for visual disturbance of left eye. He describes it as \"his left eye not getting enough light\". He describes noticing the change in his left eye's temporal visual field, inferior > superior. He notices it more first thing in the morning. These symptoms began on 8/22/2020 and he describes it as worsening - he is unclear if it the deficit has gotten larger or darker however. He first noticed it while driving at the periphery of his vision.  Patient saw Dr. Lewis on August 28.  He underwent esr / crp testing as well as mri (see below).    Labs: 9/2/20- WBC- 50.2, ESR- 28, CRP < 5    On August 31 he developed symptoms of waviness in his peripheral vision in the right eye.  He went to Dr. Andino on 9/4/2020 who wanted him to go to the ED but he was unable.  Dr. Andino reviewed the patient's fluorescein angiography and felt there was choroidal filling delay in the right eye and pallid edema in the right eye concerning for giant cell arteritis.  We discussed the case via phone. "     Patient received 100 mg prednisone starting 20 and has been on 80 mg prednisone daily since. When he started prednisone, he stopped taking daily baby aspirin out of concern for potential GI upset side effects after discussing this with the pharmacist. He has no history of GI upset or ulcers.    Denies headache, jaw pain, temporal tenderness, issues with chewing, swallowing, eye pain, sleep apnea,  He denies polymyalgia rheumatica symptoms but has had 6-8 lbs of fluctuation in the past couple of months.    PRIOR IMAGING:  BRAIN MRI WITH/WO GADOLINIUM, MRA OF THE [place] AND MRA OF THE CAROTID ARTERIES - [facility] (2020)  IMPRESSION:    1.  No acute intracranial abnormality.  2.  No evidence of an orbital abnormalities.  3.  Mild generalized cerebral atrophy.  4.  Unremarkable MRA of the [place] of [place].  5.  Unremarkable MRA of the cervical vessels.    On my re-review I disagree with the radiology read.  There is evidence of patchy enhancement diffusely within the bilateral orbits and along the bilateral intraorbital optic nerve sheaths.    POH: None  PMH: chronic lymphocytic leukemia- diagnosed 3 years ago, Deviated septum surgery 20+ years ago  Follows with oncology for CLL.  No hypertension.  No diabetes mellitus. No obstructive sleep apnea.   No erectile dysfunction medication use    FH: Brother - 2 Strokes starting from age 75  Father -  from CHF    Meds:   Prednisone (started 2020)  Baby aspirin  Sudafed for chronic nasal congestion  Nasacort (Triamcinolone)  Calcium/Magnesium supplement  Vitamin B  Omega III Fish Oil supplement    No bisphosphonates (no osteoporosis medications for 7-8 years per patient).     Visual acuity is 20/20 -1 right eye and 20/25 -1 left eye. Intraocular pressure is 15/17. Pupils brisk and no rAPD. Color plates 11/11 each eye. Confrontational visual fields full right eye with inferior altitudinal defect left eye. Motility full each eye.  "Slit lamp exam unremarakble with no cell or flare in anterior chamber. Dilated fundus exam right eye demonstrates nasal pallid edema of optic discs bilaterally with peripapillary hemorrhage observed in both eyes.    Octopus automated 30 degree visual fields reveal right eye inferior arcuate scotoma and left eye generalized VF constrictino with inferior more than superior arcuate scotomas.  OCT rNFL demonstrates thickening of the retinal nerve fiber layer superonasally in the right eye and nasally and superiorly in the left eye.    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 Additional consideration for non-arteritic anterior ischemic optic neuropathy (NAION) or orbital inflammation / optic perineuritis of numerous potential etiologies. Index of suspicion for GCA is slightly lowered given the lack of systemic signs and symptoms such as polymyalgia rheumatica (proximal muscle ache, stiffness, and arthralgias), headache, scalp tenderness, jaw claudication when chewing or talking, fever, or malaise; although patient did endorse weight fluctuation. However, given that roughly a quarter of patients with visual complaints due to GCA may present with an \"occult\" form with ocular complaints but no systemic symptoms, further workup is necessary to definitively rule it out. Patients with \"occult\" temporal arteritis will typically have elevated ESR/CRP but previous ESR/CRP labs done on 9/2/2020 were found to be normal (ESR = 28, CRP <5). Due to the emergent time sensitivity of vision loss, STAT temporal artery biopsy of LEFT side ordered, as this was the eye " "which initially developed symptoms. Will also repeat MRI Orbits STAT to assess whether the prior orbital findings were truly present versus artifact. In the interim, have recommended patient restart aspirin 81mg daily and decrease Prednisone from 80mg to 60mg daily pending results of the biopsy.    If biopsy comes back negative and repeat MRI Orbit confirms the presence of patchy enhancement diffusely within the bilateral orbits and along the optic nerve sheaths, index of suspicion would be raised for leukemic optic neuropathy as direct infiltration of optic nerves via the pial septae is known to cause significant swelling and can cause associated retinal infiltration with hemorrhages, which would be supported by the bilateral peripapillary hemorrhages observed on today's fundus exam. Emergent fractionated orbital radiation would be the mainstay treatment for progressive vision loss due to leukemic optic nerve involvement, in addition to chemotherapy given the extent of vision loss and concern for chemotherapy taking too long to take effect as monotherapy.    NAION is less likely as patient's vision loss is not monocular, was not sudden in onset and was not maximal at onset, but has progressed. Lack of dyschromatopsia, afferent pupillary defect, or signs of sectoral swelling, splinter hemorrhages and attenuation of peripapillary arterioles on fundus exam also lower index of suspicion.    Restart aspirin 81 mg daily.  Obtain stat temporal artery biopsy on left side- moderate risk  Repeat MRI orbits stat  May need lumbar puncture   Prednisone 60 mg daily (decrease from 80) pending results of temporal artery biopsy.    Oncologist name is:   Dr. Wall.    Most recent note from Dr. Wall was 6/2/20:  \"1) Chronic lymphocytic leukemia, Stage IA, del (13q)     - He does not have any B symptoms and no new abn on exam.  - Stable small cervical adenopathy and no palpable hepatosplenomegaly.  - His LDH is normal and his ALC " "is stable.  -The indications to treat would be worsening cytopenias, onset of B symptoms, autoimmune hemolysis, hyperviscosity (rarely seen below WBC count of 200K), recurrent sinopulmonary infns and adenopathy/organomegaly threatening the function of an adjacent organ.  - Observation alone is recommended at this time.  - He will RTC in 6 mths time for continued monitoring.\"       Addendum (post visit) 9/9/20:  I contacted my colleagues in oculoplastics and requested that we move up the plan for the left temporal artery biopsy so that we can obtain a conclusive answer regarding the diagnosis in this case and expedite treatment for leukemic infiltration of the nerves if that is the diagnosis.  The patient will undergo temporal artery biopsy tomorrow with Dr. Carrington.  I will keep the patient on prednisone pending the results.  I also spoke with his oncologist Dr. Wall at [facility].  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  I expressed my concern that if this is leukemic infiltration that I would suggest fractionated orbital radiation in addition to chemotherapy given the extent of vision loss and my concern that it would take chemotherapy too long to take effect.  We will be repeating the MRI of the orbits tomorrow which should provide additional information about whether the orbital findings from the initial MRI were truly present versus artifact.      Again, thank you for trusting me with the care of your patient.  For further exam details, please feel free to contact our office for additional records.  If you wish to contact me regarding this patient please email me at [email or web address] or give my clinic a call to arrange a phone conversation.    Sincerely,    Guille " MD Radha  , Neuro-Ophthalmology and Adult Strabismus Surgery  [facility] and Roxanne Castano Chair in Neuro-Ophthalmology  Department of Ophthalmology and Visual Neurosciences  [facility]    DX: bilateral optic neuropathy

## 2020-09-10 ENCOUNTER — OFFICE VISIT (OUTPATIENT)
Dept: OPHTHALMOLOGY | Facility: CLINIC | Age: 80
End: 2020-09-10

## 2020-09-10 ENCOUNTER — TELEPHONE (OUTPATIENT)
Dept: OPHTHALMOLOGY | Facility: CLINIC | Age: 80
End: 2020-09-10

## 2020-09-10 ENCOUNTER — HOSPITAL ENCOUNTER (OUTPATIENT)
Dept: MRI IMAGING | Facility: CLINIC | Age: 80
Discharge: HOME | End: 2020-09-10
Attending: OPHTHALMOLOGY | Admitting: OPHTHALMOLOGY
Payer: MEDICARE

## 2020-09-10 DIAGNOSIS — M31.6 GIANT CELL ARTERITIS (H): Primary | ICD-10-CM

## 2020-09-10 DIAGNOSIS — H46.9 OPTIC NEUROPATHY: Primary | ICD-10-CM

## 2020-09-10 DIAGNOSIS — H46.9 OPTIC NEUROPATHY: ICD-10-CM

## 2020-09-10 PROCEDURE — 70553 MRI BRAIN STEM W/O & W/DYE: CPT

## 2020-09-10 PROCEDURE — 25500064 ZZH RX 255 OP 636: Performed by: OPHTHALMOLOGY

## 2020-09-10 PROCEDURE — A9585 GADOBUTROL INJECTION: HCPCS | Performed by: OPHTHALMOLOGY

## 2020-09-10 RX ORDER — GADOBUTROL 604.72 MG/ML
7.5 INJECTION INTRAVENOUS ONCE
Status: COMPLETED | OUTPATIENT
Start: 2020-09-10 | End: 2020-09-10

## 2020-09-10 RX ORDER — LIDOCAINE HYDROCHLORIDE AND EPINEPHRINE 10; 10 MG/ML; UG/ML
1 INJECTION, SOLUTION INFILTRATION; PERINEURAL ONCE
Status: COMPLETED | OUTPATIENT
Start: 2020-09-10 | End: 2020-09-10

## 2020-09-10 RX ADMIN — GADOBUTROL 6 ML: 604.72 INJECTION INTRAVENOUS at 19:24

## 2020-09-10 RX ADMIN — LIDOCAINE HYDROCHLORIDE AND EPINEPHRINE 1 ML: 10; 10 INJECTION, SOLUTION INFILTRATION; PERINEURAL at 13:31

## 2020-09-10 NOTE — PROGRESS NOTES
1. 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  My primary 2 concerns in this case are giant cell arteritis considering the pallid discedema, the patient's age, and the choroidal hypoperfusion seen on fluorescein angiography per Dr. Andino.  Arguing against this diagnosis are findings of an orbital infiltrative process with optic perineural enhancement, relatively intact visual acuity which is unusual in the setting of artery anterior ischemic optic neuropathy, negative elevation of acute phase reactants, and lack of other constitutional symptoms to suggest giant cell arteritis.  Nevertheless the diagnosis must be excluded in this setting.  I will keep the patient on prednisone 60 mg daily and obtain a stat left temporal artery biopsy.  If negative I will taper the patient off steroids.  I will repeat the MRI orbits to look for confirmation that the findings from his prior MR were real and not artifact related to incomplete fat sat on the orbital protocols of the MRI.  If the MRI findings persist then I will proceed with a work-up for orbital inflammation as well as a large-volume lumbar puncture looking for evidence of leukemic cells in the CSF.     Addendum (post visit) 9/9/20:  I contacted my colleagues in oculoplastics and requested that we move up the plan for the left temporal artery biopsy so that we can obtain a conclusive answer regarding the diagnosis in this case and expedite treatment for leukemic infiltration of the nerves if that is the diagnosis.  The patient will undergo temporal artery biopsy tomorrow with Dr. Carrington.  I will keep the patient on prednisone pending the results.  I also spoke with his oncologist Dr. Wall at [facility].  He was very helpful and informed me that if we have excluded alternative etiologies such as giant cell arteritis and the diagnosis appears to be leukemic infiltration of the orbits and optic nerves  "that he would be happy to take over treatment of the patient and any additional work-up.  I expressed my concern that if this is leukemic infiltration that I would suggest fractionated orbital radiation in addition to chemotherapy given the extent of vision loss and my concern that it would take chemotherapy too long to take effect.  We will be repeating the MRI of the orbits tomorrow which should provide additional information about whether the orbital findings from the initial MRI were truly present versus artifact.    Bon Michael is a 79 year old male who presents to neuro-ophthalmology clinic today for consultation from Jose Andino MD at [facility] for visual disturbance of left eye. He describes it as \"his left eye not getting enough light\". He describes noticing the change in his left eye's temporal visual field, inferior > superior. He notices it more first thing in the morning. These symptoms began on 8/22/2020 and he describes it as worsening - he is unclear if it the deficit has gotten larger or darker however. He first noticed it while driving at the periphery of his vision.  Patient saw Dr. Lewis on August 28.  He underwent esr / crp testing as well as mri (see below).    Labs: 9/2/20- WBC- 50.2, ESR- 28, CRP < 5    On August 31 he developed symptoms of waviness in his peripheral vision in the right eye.  He went to Dr. Andino on 9/4/2020 who wanted him to go to the ED but he was unable.  Dr. Andino reviewed the patient's fluorescein angiography and felt there was choroidal filling delay in the right eye and pallid edema in the right eye concerning for giant cell arteritis.  We discussed the case via phone.     Patient received 100 mg prednisone starting 9/4/20 and has been on 80 mg prednisone daily since. When he started prednisone, he stopped taking daily baby aspirin out of concern for potential GI upset side effects after discussing this with the pharmacist. He has no history of GI upset or " ulcers.    Denies headache, jaw pain, temporal tenderness, issues with chewing, swallowing, eye pain, sleep apnea,  He denies polymyalgia rheumatica symptoms but has had 6-8 lbs of fluctuation in the past couple of months.    PRIOR IMAGING:  BRAIN MRI WITH/WO GADOLINIUM, MRA OF THE [place] OF [place] AND MRA OF THE CAROTID ARTERIES - [facility] (2020)  IMPRESSION:    1.  No acute intracranial abnormality.  2.  No evidence of an orbital abnormalities.  3.  Mild generalized cerebral atrophy.  4.  Unremarkable MRA of the [place].  5.  Unremarkable MRA of the cervical vessels.    On my re-review I disagree with the radiology read.  There is evidence of patchy enhancement diffusely within the bilateral orbits and along the bilateral intraorbital optic nerve sheaths.    POH: None  PMH: chronic lymphocytic leukemia- diagnosed 3 years ago, Deviated septum surgery 20+ years ago  Follows with oncology for CLL.  No hypertension.  No diabetes mellitus. No obstructive sleep apnea.   No erectile dysfunction medication use    FH: Brother - 2 Strokes starting from age 75  Father -  from CHF    Meds:   Prednisone (started 2020)  Baby aspirin  Sudafed for chronic nasal congestion  Nasacort (Triamcinolone)  Calcium/Magnesium supplement  Vitamin B  Omega III Fish Oil supplement    No bisphosphonates (no osteoporosis medications for 7-8 years per patient).     Visual acuity is 20/20 -1 right eye and 20/25 -1 left eye. Intraocular pressure is 15/17. Pupils brisk and no rAPD. Color plates 11/11 each eye. Confrontational visual fields full right eye with inferior altitudinal defect left eye. Motility full each eye. Slit lamp exam unremarakble with no cell or flare in anterior chamber. Dilated fundus exam right eye demonstrates nasal pallid edema of optic discs bilaterally with peripapillary hemorrhage observed in both eyes.    Octopus automated 30 degree visual fields reveal right eye inferior arcuate  "scotoma and left eye generalized VF constrictino with inferior more than superior arcuate scotomas.  OCT rNFL demonstrates thickening of the retinal nerve fiber layer superonasally in the right eye and nasally and superiorly in the left eye.    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 Additional consideration for non-arteritic anterior ischemic optic neuropathy (NAION) or orbital inflammation / optic perineuritis of numerous potential etiologies. Index of suspicion for GCA is slightly lowered given the lack of systemic signs and symptoms such as polymyalgia rheumatica (proximal muscle ache, stiffness, and arthralgias), headache, scalp tenderness, jaw claudication when chewing or talking, fever, or malaise; although patient did endorse weight fluctuation. However, given that roughly a quarter of patients with visual complaints due to GCA may present with an \"occult\" form with ocular complaints but no systemic symptoms, further workup is necessary to definitively rule it out. Patients with \"occult\" temporal arteritis will typically have elevated ESR/CRP but previous ESR/CRP labs done on 9/2/2020 were found to be normal (ESR = 28, CRP <5). Due to the emergent time sensitivity of vision loss, STAT temporal artery biopsy of LEFT side ordered, as this was the eye which initially developed symptoms. Will also repeat MRI Orbits STAT to assess whether the prior orbital findings were truly present versus artifact. In the interim, have recommended patient restart aspirin 81mg daily and decrease Prednisone from 80mg to 60mg daily pending results of the " "biopsy.    If biopsy comes back negative and repeat MRI Orbit confirms the presence of patchy enhancement diffusely within the bilateral orbits and along the optic nerve sheaths, index of suspicion would be raised for leukemic optic neuropathy as direct infiltration of optic nerves via the pial septae is known to cause significant swelling and can cause associated retinal infiltration with hemorrhages, which would be supported by the bilateral peripapillary hemorrhages observed on today's fundus exam. Emergent fractionated orbital radiation would be the mainstay treatment for progressive vision loss due to leukemic optic nerve involvement, in addition to chemotherapy given the extent of vision loss and concern for chemotherapy taking too long to take effect as monotherapy.    NAION is less likely as patient's vision loss is not monocular, was not sudden in onset and was not maximal at onset, but has progressed. Lack of dyschromatopsia, afferent pupillary defect, or signs of sectoral swelling, splinter hemorrhages and attenuation of peripapillary arterioles on fundus exam also lower index of suspicion.    Restart aspirin 81 mg daily.  Obtain stat temporal artery biopsy on left side- moderate risk  Repeat MRI orbits stat  May need lumbar puncture   Prednisone 60 mg daily (decrease from 80) pending results of temporal artery biopsy.    Oncologist name is:   Dr. Wall.    Most recent note from Dr. Wall was 6/2/20:  \"1) Chronic lymphocytic leukemia, Stage IA, del (13q)     - He does not have any B symptoms and no new abn on exam.  - Stable small cervical adenopathy and no palpable hepatosplenomegaly.  - His LDH is normal and his ALC is stable.  -The indications to treat would be worsening cytopenias, onset of B symptoms, autoimmune hemolysis, hyperviscosity (rarely seen below WBC count of 200K), recurrent sinopulmonary infns and adenopathy/organomegaly threatening the function of an adjacent organ.  - Observation " "alone is recommended at this time.  - He will RTC in 6 mths time for continued monitoring.\"       Addendum (post visit) 9/9/20:  I contacted my colleagues in oculoplastics and requested that we move up the plan for the left temporal artery biopsy so that we can obtain a conclusive answer regarding the diagnosis in this case and expedite treatment for leukemic infiltration of the nerves if that is the diagnosis.  The patient will undergo temporal artery biopsy tomorrow with Dr. Carrington.  I will keep the patient on prednisone pending the results.  I also spoke with his oncologist Dr. Wall at [facility].  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  I expressed my concern that if this is leukemic infiltration that I would suggest fractionated orbital radiation in addition to chemotherapy given the extent of vision loss and my concern that it would take chemotherapy too long to take effect.  We will be repeating the MRI of the orbits tomorrow which should provide additional information about whether the orbital findings from the initial MRI were truly present versus artifact.         I spent a total of 60 minutes face to face with Bon Michael during today's office visit.  Over 50% of this time was spent counseling the patient and/or coordinating care regarding  His bilateral optic neuropathy.    Complete documentation of historical and exam elements from today's encounter can be found in the full encounter summary report (not reduplicated in this progress note).  I personally re-obtained the chief complaint(s) and history of present illness.  I confirmed and edited as necessary the review of systems, past medical/surgical history, family history, social history, and " examination findings as documented by others; and I examined the patient myself.  I personally reviewed the relevant tests, images, and reports as documented above.  I formulated and edited as necessary the assessment and plan and discussed the findings and management plan with the patient and family     A medical student was involved in the care of the patient. I was present with the medical student who participated in the service and in the documentation of the note. I have  verified the history and personally performed the physical exam and medical decision making. I extensively reviewed and edited when necessary the assessment and plan. I agree with the assessment and plan of care as documented in the note    MD Stone Gomez, MS4  [facility] [employer]

## 2020-09-10 NOTE — PROGRESS NOTES
PREOPERATIVE DIAGNOSIS:  ischemic optic neuropathy, rule out temporal arteritis.      POSTOPERATIVE DIAGNOSIS: ischemic optic neuropathy, rule out temporal arteritis.      PROCEDURE:  Left temporal artery biopsy.      SURGEON:  Valerie Carrington MD, MD     ASSISTANTS:  Catie Donovan MD     ANESTHESIA:  1% lidocaine with epinephrine.      COMPLICATIONS:  None.      ESTIMATED BLOOD LOSS:  Less than 5 mL.     SPECIMEN: Temporal artery in formalin - left     HISTORY OF PRESENT ILLNESS:  Bon Michael  presented with bilateral sequential ischemic optic neuropathy and was found to have choroidal hypoperfusion on fluorescein angiography. He was referred for a temporal artery biopsy to rule out temporal arteritis.  After the risks, benefits and alternatives were explained, informed consent was obtained.      DESCRIPTION OF PROCEDURE:  The patient was brought to the minor room and placed supine on the operating table. The temporal artery was palpated on the left side, a marking made extending from the ear superotemporally over the artery. The area was infiltrated with local anesthetic and the area was prepped and draped in the typical sterile fashion for oculoplastic surgery.  Attention was directed to the left side.  An incision was made with a 15 blade through the skin.  Subcutaneous tissue was dissected with the Bess scissors. The artery was identified.  I dissected out the artery and  tagged the proximal and distal ends with 4-0 silk suture. The artery was cut with the Tayla scissors.  Hemostasis was obtained with monopolar cautery.  The incision was closed with interrupted buried 5-0 Vicryl sutures deep and 6-0 chromic sutures on the skin edges. Antibiotic ointment was applied. The specimen was placed in formalin and sent for pathologic evaluation.  A 3.8-cm in situ biopsy was obtained.   Valerie Carrington MD

## 2020-09-10 NOTE — TELEPHONE ENCOUNTER
09/10/2020 01:29 PM Phone (Outgoing) Bon Michael (Self) [phone] (H)    Talked with Patient - Spoke with patient who forgot to mention previous dianogses - records are with [facility]. Patient wanting to make sure that we received those records.        Iritis - has had this year, reoccurs every so often ([facility] has records)

## 2020-09-10 NOTE — PATIENT INSTRUCTIONS
Post-operative Instructions  Ophthalmic Plastic and Reconstructive Surgery    Valerie Carrington M.D.     All instructions apply to the operated eye(s) or eyelid(s).    Wound care and personal care    ? Apply ice compresses 15 minutes of every hour while awake for 2 days. As long as there is no further bleeding, after two days, switch to warm water compresses for five minutes, four times a day until seen by your physician.   ? You may shower or wash your hair the day after surgery. Do not go swimming for at least 2 weeks to prevent contamination of your wounds.  ? You may go for walks and light activity is ok, but no heavy (over 15 pounds) lifting, bending or excessive straining for one week.   ? Do not apply make-up to the eyes or eyelids for 2 weeks after surgery.  ? Expect some swelling, bruising, black eye (even into the lower eyelids and cheeks). Also expect serum caking, crusting and discharge from the eye and/or incisions. A small amount of surface bleeding, and depending on the type of surgery, bleeding from the inside of the eyelid, is normal for the first 48 hours.  ? Avoid straining, bending at the waist, or lifting more than 15 pounds for 10 days. Activities that raise your blood pressure can worsen swelling, cause bleeding, and breaking of sutures. Like wise, sleeping with your head slightly elevated for the first several days can help swelling resolve more quickly.   ? Do continue to ambulate (walk) as you normally would - being sedentary after surgery can cause blood clots.   ? Your eye(s) and eyelid(s) may be painful and tender. This is normal after surgery.      Contact information and follow-up  ? Return to the Eye Clinic for a follow-up appointment with your physician as scheduled. If no appointment has been scheduled:   - [facility] eye clinic: [phone] for an appointment with Dr. Carrington within 1 to 2 weeks from your date of surgery. If you are scheduled for a phone or video  visit for your first postoperative appointment, please e-mail pictures to [email or web address] 1-2 days before your appointment.   -  [facility] eye clinic: [phone] for an appointment with Dr. Carrington within 1 to 2 weeks from your date of surgery.     ? For severe pain, bleeding, or loss of vision, call the [facility] at [phone] or [facility] at [phone].     After hours or on weekends and holidays, call [phone] and ask to speak with the ophthalmologist on call.    An on call person can be reached after hours for concerns. The on call doctor should not call in medication refill requests after hours or on weekends, so please plan accordingly. An effort has been made to provide adequate pain medications following every surgery, and refills will not be provided in most instances.     Activity restrictions and driving  ? Avoid heavy lifting, bending, exercise or strenuous activity for 1 week after surgery.  You may resume other activities and return to work as tolerated.  ? You may not resume driving if you are using narcotic pain medications (such as Oxycodone, Norco, Percocet, Tylenol #3).    Medications  ? Restart all regular home medications and eye drops. If you take Plavix or  Aspirin on a regular basis, wait for 72 hours after your surgery before restarting these in order to decrease the risk of bleeding complications.  ? Avoid aspirin and aspirin-like medications (Motrin, Aleve, Ibuprofen, [place] etc) for 72 hours to reduce the risk of bleeding. You may take Tylenol (acetaminophen) for pain.  ? In addition to your home medications, take the following post-operative medications as prescribed by your physician.    ? Apply antibiotic ointment to all sutures three times a day for 2 weeks.

## 2020-09-11 ENCOUNTER — TELEPHONE (OUTPATIENT)
Dept: OPHTHALMOLOGY | Facility: CLINIC | Age: 80
End: 2020-09-11

## 2020-09-15 ENCOUNTER — TELEPHONE (OUTPATIENT)
Dept: OPHTHALMOLOGY | Facility: CLINIC | Age: 80
End: 2020-09-15

## 2020-09-15 DIAGNOSIS — M31.6 GIANT CELL ARTERITIS (H): Primary | ICD-10-CM

## 2020-09-15 RX ORDER — PREDNISONE 20 MG/1
60 TABLET ORAL DAILY
Qty: 90 TABLET | Refills: 6 | Status: SHIPPED | OUTPATIENT
Start: 2020-09-15 | End: 2023-11-02

## 2020-09-15 NOTE — TELEPHONE ENCOUNTER
I called this patient this morning and we discussed updates in his clinical care.  His temporal artery biopsy came back showing definitive evidence of giant cell arteritis including the presence of giant cells.  The pathologist is going to order additional stains to look for any evidence of leukemia however she states that the findings on the biopsy are typical of giant cell arteritis with no indication of an alternative diagnosis.  I advised the patient to continue taking prednisone 60 mg daily.  I recommended that he take a once daily over-the-counter stomach protection antacid pill such as Prilosec, Nexium, or Zantac.  The patient reports he is never had a bleeding stomach ulcer in the past.  He said he will start this pill.  He indicated that he is about to run out of prednisone and I will give him a new prescription for 60 mg daily sent to [facility] [place].  I indicated to the patient that I will refer him onto rheumatology and I will request that he be seen in the next month.  I recommended the patient to follow-up soon with his primary care provider to discuss monitoring of blood sugar and prevention of osteoporosis in the context of his new diagnosis of giant cell arteritis and the likelihood that he will require at least 6 months of prednisone but potentially a year or longer.  The patient also will keep his appointment with me next week.    Approximately 30 minutes before speaking to the patient I called his oncologist Dr. Wall.  I informed his oncologist of our confirmation of his diagnosis.  I discussed with the oncologist the findings of his MRI showing bone marrow changes within the skull.  His oncologist indicated to me that these are typical findings of chronic lymphocytic leukemia and are present indefinitely in those patients.  This is not an indication of transformation of his leukemia to an acute form for of any additional pathologic state beyond his known chronic  leukemia.  Because the patient's clinical presentation is entirely compatible with giant cell arteritis there is no need to further evaluate for leukemic infiltration.

## 2020-09-16 LAB — COPATH REPORT: NORMAL

## 2020-09-23 ENCOUNTER — OFFICE VISIT (OUTPATIENT)
Dept: OPHTHALMOLOGY | Facility: CLINIC | Age: 80
End: 2020-09-23
Attending: OPHTHALMOLOGY
Payer: MEDICARE

## 2020-09-23 ENCOUNTER — TELEPHONE (OUTPATIENT)
Dept: OPHTHALMOLOGY | Facility: CLINIC | Age: 80
End: 2020-09-23

## 2020-09-23 DIAGNOSIS — M31.6 GIANT CELL ARTERITIS (H): Primary | ICD-10-CM

## 2020-09-23 DIAGNOSIS — H53.10 SUBJECTIVE VISUAL DISTURBANCE: ICD-10-CM

## 2020-09-23 DIAGNOSIS — H53.40 VISUAL FIELD DEFECT: ICD-10-CM

## 2020-09-23 PROCEDURE — G0463 HOSPITAL OUTPT CLINIC VISIT: HCPCS | Mod: ZF | Performed by: TECHNICIAN/TECHNOLOGIST

## 2020-09-23 PROCEDURE — 92083 EXTENDED VISUAL FIELD XM: CPT | Mod: ZF | Performed by: OPHTHALMOLOGY

## 2020-09-23 PROCEDURE — 92133 CPTRZD OPH DX IMG PST SGM ON: CPT | Mod: ZF | Performed by: OPHTHALMOLOGY

## 2020-09-23 ASSESSMENT — TONOMETRY
OS_IOP_MMHG: 12
OD_IOP_MMHG: 15
IOP_METHOD: ICARE

## 2020-09-23 ASSESSMENT — VISUAL ACUITY
OS_CC: 20/20
CORRECTION_TYPE: GLASSES
OD_CC: 20/20
METHOD: SNELLEN - LINEAR
OS_CC+: -1

## 2020-09-23 ASSESSMENT — REFRACTION_WEARINGRX
OD_CYLINDER: +1.25
OD_AXIS: 134
OS_AXIS: 059
OS_SPHERE: +6.25
OS_CYLINDER: +0.50
SPECS_TYPE: PAL
OD_SPHERE: +3.25

## 2020-09-23 ASSESSMENT — CUP TO DISC RATIO
OS_RATIO: 0.3
OD_RATIO: 0.3

## 2020-09-23 ASSESSMENT — SLIT LAMP EXAM - LIDS
COMMENTS: NORMAL
COMMENTS: NORMAL

## 2020-09-23 ASSESSMENT — CONF VISUAL FIELD
OD_NORMAL: 1
OS_INFERIOR_NASAL_RESTRICTION: 3
METHOD: COUNTING FINGERS

## 2020-09-23 ASSESSMENT — EXTERNAL EXAM - RIGHT EYE: OD_EXAM: NORMAL

## 2020-09-23 ASSESSMENT — EXTERNAL EXAM - LEFT EYE: OS_EXAM: NORMAL

## 2020-09-23 NOTE — TELEPHONE ENCOUNTER
----- Message from Guille Garcia MD sent at 9/23/2020 12:53 PM CDT -----  Got it.  Thank you for the message about the drug.    He should continue 60 mg of Prednisone daily and then follow-up with me in 1 month.  Rheumatology may change his dose once they see him.  Otherwise I would like him to reduce to 50 mg daily (2.5 tablets daily) in 2 weeks from today.  I will chart this in his note.    Thank you. - Guille   ----- Message -----  From: Angle Aljeandro  Sent: 9/23/2020  12:25 PM CDT  To: Guille Garcia MD    Patient calling with the drug information . He spelled the drug twice and spelled it differently during both spellings - The drug: Alendronate 70mg Alendroisate 70 mg  Drug for Osteopenia   (has not taken yet, but physician did prescribe).     Patient has questions: You had mentioned dosage of prednisone needing to be lowered. He thought you said 15 or 20 days. He wanted to clarify. Is there a specific date he is supposed to reduce his Prednisone TID schedule? Wondering when he should reduce or if he should change dosage?

## 2020-09-23 NOTE — PROGRESS NOTES
"     1. 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  visual acuity and color vision today remains normal however his peripheral vision loss is essentially unchanged from diagnosis.  Patient on 60 mg prednisone (started 9/4/20) until establishing care with rheumatology at which time they will manage prednisone as well as consideration for Actemra as a corticosteroid-sparing medication.  If rheumatology appointment delayed then patient instructed to reduce to 50 mg daily of prednisone in 2 weeks.  Patient to check blood sugars (arranged through primary care physician) and has started a bisphosphonate.  Patient on GI prophylaxis given corticosteroids.    2. Chronic lymphocytic leukemia- after some initial concern for possible leukemic infiltration of the skull / optic nerves / orbit, his giant cell arteritis explains all of his clinical sequelae and discussions with neuro-radiology and oncology (Dr. Wall) indicate that his radiographic findings in the skull marrow are simply indications of his untreated (and asymptomatic) chronic lymphocytic leukemia. Patient has follow-up with oncology in November.    - For a more thorough description of the disease presentation, please see my letter from the patient's initial visit with me    Presenting history (9/9/2020 visit )    Bon Michael is a 79 year old male who presents to neuro-ophthalmology clinic today for consultation from Jose Andino MD at [facility] for visual disturbance of left eye. He describes it as \"his left eye not getting enough light\". He describes noticing the change in his left eye's temporal visual field, inferior > superior. He notices it more first thing in the morning. These symptoms began on 8/22/2020 and he describes it as worsening - he is unclear if it the deficit has gotten larger or darker however. He first noticed it while driving at the periphery of his vision.  Patient saw Dr. Lewis on August 28.  He " underwent esr / crp testing as well as mri (see below).    Labs: 9/2/20- WBC- 50.2, ESR- 28, CRP < 5    On August 31 he developed symptoms of waviness in his peripheral vision in the right eye.  He went to Dr. Andino on 9/4/2020 who wanted him to go to the ED but he was unable.  Dr. Andino reviewed the patient's fluorescein angiography and felt there was choroidal filling delay in the right eye and pallid edema in the right eye concerning for giant cell arteritis.  We discussed the case via phone.     Patient received 100 mg prednisone starting 9/4/20 and has been on 80 mg prednisone daily since. When he started prednisone, he stopped taking daily baby aspirin out of concern for potential GI upset side effects after discussing this with the pharmacist. He has no history of GI upset or ulcers.    Denies headache, jaw pain, temporal tenderness, issues with chewing, swallowing, eye pain, sleep apnea,  He denies polymyalgia rheumatica symptoms but has had 6-8 lbs of fluctuation in the past couple of months.    PRIOR IMAGING:  BRAIN MRI WITH/WO GADOLINIUM, MRA OF THE [place] OF [place] AND MRA OF THE CAROTID ARTERIES - [facility] (9/1/2020)  IMPRESSION:    1.  No acute intracranial abnormality.  2.  No evidence of an orbital abnormalities.  3.  Mild generalized cerebral atrophy.  4.  Unremarkable MRA of the [place] of [place].  5.  Unremarkable MRA of the cervical vessels.    On my re-review I disagree with the radiology read.  There is evidence of patchy enhancement diffusely within the bilateral orbits and along the bilateral intraorbital optic nerve sheaths.  Will discuss with neuro-radiology regarding finding and repeat scan.    No bisphosphonates in the past (no osteoporosis medications for 7-8 years per patient).     INTERIM HISTORY  Bon Michael is a pleasant 79 year old White male who presents to neuro-ophthalmology clinic today in follow-up for biopsy-confirmed giant cell temporal arteritis of the  left eye. Since we last saw the patient on 9/9/2020, we confirmed his diagnosis with a positive left temporal artery biopsy.    he has been taking his prednisone as prescribed 60 mg daily).  Patient taking prilosec once daily.  Patient recently started on alendronate for osteopenia and because of longterm steroids required.    Patient has not been contacted by rheumatology.     Patient feels that his visual field is improving in both eyes.    Patient denies any side-effects from the Prednisone.    Meds:   Prednisone (started 9/4/2020)  Baby aspirin (recently stopped)  Sudafed for chronic nasal congestion  Nasacort (Triamcinolone)  Calcium/Magnesium supplement  Vitamin B  Omega III Fish Oil supplement    Visual acuity is 20/20 each eye. Intraocular pressure is 15/12. Pupils brisk with no afferent pupillary defect. Color plates full each eye. Confrontational visual fields full. Motility full. Slit lamp exam unremarakble with no cell or flare in anterior chamber. Dilated fundus exam right eye demonstrates trace nasal pallid edema in both eyes- there is still peripapillary hemorrhage at 7 oclock in the left eye.    Octopus automated 30 degree visual field today show stable peripheral constriction. OCT rNFL reveals resolution of prior general thickening with measurements of 114, decreased from 135 RIGHT eye and 104, decrease from 142.    Plan 9/23/20:  1. Check ANCA panel (his orbital MRI findings are reported to occur with giant cell arteritis but are unusual as were other features of his presentation).  ANCA vasculitides can mimic giant cell arteritis and have identical temporal artery biopsy histopathological findings so will check titres.  I suspect these will be negative but if positive may alter rheum's choice of long-term treatment.  2. Patient will remain on Prednisone 60 mg daily for 2 more weeks then decrease to 50 mg daily.  In the future (once established) rheumatology will manage his prednisone and evaluate  for candidacy for Actemra  3. Start enteric coated 81 mg aspirin (patient had stopped recently)  4. Patient working with primary care physician to monitor blood sugar and to avoid osteoporosis.  He was started on bisphosphonate.   5. We will check again on [facility] rheumatology referral.  We have contacted them twice now but patient still not scheduled.    Letter to Oncologist   Dr. Wall.    Follow-up in 1 month with neuro-ophthalmology     I spent a total of 45 minutes face to face with Bon Michael during today's office visit.  Over 50% of this time was spent counseling the patient and/or coordinating care regarding his giant cell arteritis and vision loss.    Complete documentation of historical and exam elements from today's encounter can be found in the full encounter summary report (not reduplicated in this progress note).  I personally re-obtained the chief complaint(s) and history of present illness.  I confirmed and edited as necessary the review of systems, past medical/surgical history, family history, social history, and examination findings as documented by others; and I examined the patient myself.  I personally reviewed the relevant tests, images, and reports as documented above.  I formulated and edited as necessary the assessment and plan and discussed the findings and management plan with the patient and family     A medical student was involved in the care of the patient. I was present with the medical student who participated in the service and in the documentation of the note. I have  verified the history and personally performed the physical exam and medical decision making. I extensively reviewed and edited when necessary the assessment and plan. I agree with the assessment and plan of care as documented in the note    MD Stoen Gomez, MS4  [facility]

## 2020-09-23 NOTE — NURSING NOTE
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              Comments     Currently on prednisone 60 mg daily.    No vision changes each eye. No eye pain or headaches.   No double vision.     ANNETTE Goss 9/23/2020 7:54 AM

## 2020-09-23 NOTE — LETTER
"2020    RE: Bon Michael  : 1940  MRN: [id number]    Dear Providers,    I saw our mutual patient, Bon Michael, in follow-up in my clinic recently.  After a thorough neuro-ophthalmic history and examination, I came to the following conclusions:     1. 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  visual acuity and color vision today remains normal however his peripheral vision loss is essentially unchanged from diagnosis.  Patient on 60 mg prednisone (started 20) until establishing care with rheumatology at which time they will manage prednisone as well as consideration for Actemra as a corticosteroid-sparing medication.  If rheumatology appointment delayed then patient instructed to reduce to 50 mg daily of prednisone in 2 weeks.  Patient to check blood sugars (arranged through primary care physician) and has started a bisphosphonate.  Patient on GI prophylaxis given corticosteroids.    2. Chronic lymphocytic leukemia- after some initial concern for possible leukemic infiltration of the skull / optic nerves / orbit, his giant cell arteritis explains all of his clinical sequelae and discussions with neuro-radiology and oncology (Dr. Wall) indicate that his radiographic findings in the skull marrow are simply indications of his untreated (and asymptomatic) chronic lymphocytic leukemia. Patient has follow-up with oncology in November.    - For a more thorough description of the disease presentation, please see my letter from the patient's initial visit with me    Presenting history (2020 visit )    Bon Michael is a 79 year old male who presents to neuro-ophthalmology clinic today for consultation from Jose Andino MD at [facility] for visual disturbance of left eye. He describes it as \"his left eye not getting enough light\". He describes noticing the change in his left eye's temporal visual field, inferior > superior. He notices it more first " thing in the morning. These symptoms began on 8/22/2020 and he describes it as worsening - he is unclear if it the deficit has gotten larger or darker however. He first noticed it while driving at the periphery of his vision.  Patient saw Dr. Lewis on August 28.  He underwent esr / crp testing as well as mri (see below).    Labs: 9/2/20- WBC- 50.2, ESR- 28, CRP < 5    On August 31 he developed symptoms of waviness in his peripheral vision in the right eye.  He went to Dr. Andino on 9/4/2020 who wanted him to go to the ED but he was unable.  Dr. Andino reviewed the patient's fluorescein angiography and felt there was choroidal filling delay in the right eye and pallid edema in the right eye concerning for giant cell arteritis.  We discussed the case via phone.     Patient received 100 mg prednisone starting 9/4/20 and has been on 80 mg prednisone daily since. When he started prednisone, he stopped taking daily baby aspirin out of concern for potential GI upset side effects after discussing this with the pharmacist. He has no history of GI upset or ulcers.    Denies headache, jaw pain, temporal tenderness, issues with chewing, swallowing, eye pain, sleep apnea,  He denies polymyalgia rheumatica symptoms but has had 6-8 lbs of fluctuation in the past couple of months.    PRIOR IMAGING:  BRAIN MRI WITH/WO GADOLINIUM, MRA OF THE [place] OF [place] AND MRA OF THE CAROTID ARTERIES - [facility] (9/1/2020)  IMPRESSION:    1.  No acute intracranial abnormality.  2.  No evidence of an orbital abnormalities.  3.  Mild generalized cerebral atrophy.  4.  Unremarkable MRA of the [place] of [place].  5.  Unremarkable MRA of the cervical vessels.    On my re-review I disagree with the radiology read.  There is evidence of patchy enhancement diffusely within the bilateral orbits and along the bilateral intraorbital optic nerve sheaths.  Will discuss with neuro-radiology regarding finding and repeat scan.    No bisphosphonates  in the past (no osteoporosis medications for 7-8 years per patient).     INTERIM HISTORY  Bon Michael is a pleasant 79 year old White male who presents to neuro-ophthalmology clinic today in follow-up for biopsy-confirmed giant cell temporal arteritis of the left eye. Since we last saw the patient on 9/9/2020, we confirmed his diagnosis with a positive left temporal artery biopsy.    he has been taking his prednisone as prescribed 60 mg daily).  Patient taking prilosec once daily.  Patient recently started on alendronate for osteopenia and because of longterm steroids required.    Patient has not been contacted by rheumatology.     Patient feels that his visual field is improving in both eyes.    Patient denies any side-effects from the Prednisone.    Meds:   Prednisone (started 9/4/2020)  Baby aspirin (recently stopped)  Sudafed for chronic nasal congestion  Nasacort (Triamcinolone)  Calcium/Magnesium supplement  Vitamin B  Omega III Fish Oil supplement    Visual acuity is 20/20 each eye. Intraocular pressure is 15/12. Pupils brisk with no afferent pupillary defect. Color plates full each eye. Confrontational visual fields full. Motility full. Slit lamp exam unremarakble with no cell or flare in anterior chamber. Dilated fundus exam right eye demonstrates trace nasal pallid edema in both eyes- there is still peripapillary hemorrhage at 7 oclock in the left eye.    Octopus automated 30 degree visual field today show stable peripheral constriction. OCT rNFL reveals resolution of prior general thickening with measurements of 114, decreased from 135 RIGHT eye and 104, decrease from 142.    Plan 9/23/20:  1. Check ANCA panel (his orbital MRI findings are reported to occur with giant cell arteritis but are unusual as were other features of his presentation).  ANCA vasculitides can mimic giant cell arteritis and have identical temporal artery biopsy histopathological findings so will check titres.  I suspect these will  be negative but if positive may alter rheum's choice of long-term treatment.  2. Patient will remain on Prednisone 60 mg daily for 2 more weeks then decrease to 50 mg daily.  In the future (once established) rheumatology will manage his prednisone and evaluate for candidacy for Actemra  3. Start enteric coated 81 mg aspirin (patient had stopped recently)  4. Patient working with primary care physician to monitor blood sugar and to avoid osteoporosis.  He was started on bisphosphonate.   5. We will check again on [facility] rheumatology referral.  We have contacted them twice now but patient still not scheduled.    Letter to Oncologist   Dr. Wall.    Follow-up in 1 month with neuro-ophthalmology      For further exam details, please feel free to contact our office for additional records.  If you wish to contact me regarding this patient please email me at [email or web address] or give my clinic a call to arrange a phone conversation.    Sincerely,    Guille Garcia MD  , Neuro-Ophthalmology and Adult Strabismus Surgery  The Lucy Castano Chair in Neuro-Ophthalmology  Department of Ophthalmology and Visual Neurosciences  [facility]       Adjustment disorder

## 2020-09-24 ENCOUNTER — TELEPHONE (OUTPATIENT)
Dept: RHEUMATOLOGY | Facility: CLINIC | Age: 80
End: 2020-09-24

## 2020-09-24 NOTE — TELEPHONE ENCOUNTER
----- Message from Angle Alejandro sent at 9/15/2020  1:47 PM CDT -----  Regarding: FW: GCA  Hello,     We are referring a patient to adult rheumatology for an evaluation (biopsy-proven Giant Cell Arteritis with bilateral vision loss). The clinician was hoping this appointment could be within a month. We weren't sure who to contact regarding this referral. Please let me know if I should send this referral message to an alternate pool for scheduling.   Angle Talavera  Neuro-Ophthalmology Clinical Facilitator  [phone]  1:51 PM September 15, 2020   ----- Message -----  From: Guille Garcia MD  Sent: 9/15/2020   1:35 PM CDT  To: Angle Muro  Subject: GCA                                              Keith Hernadez / Karina,    This patient has a new diagnosis of biopsy-proven giant cell arteritis with bilateral vision loss.  I have called the patient and updated him.  There is a detailed telephone note in EPIC.  I want to refer him to [facility] rheumatology.  I would like to request that they see him within 30 days.  I have placed a referral in epic.    Can you please reach out to their schedulers and request that he obtain an appointment?  I think a virtual visit in this situation would be completely fine if that is preferable to them.    Thank you. - Guille

## 2020-09-24 NOTE — TELEPHONE ENCOUNTER
Spoke with patient and offered an appointment 9/25/2020 at 8am with Dr. Alcantara, patient accepted. Patient is scheduled.     Aster Segovia CMA   9/24/2020 3:49 PM

## 2020-09-25 ENCOUNTER — TELEPHONE (OUTPATIENT)
Dept: RHEUMATOLOGY | Facility: CLINIC | Age: 80
End: 2020-09-25

## 2020-09-25 ENCOUNTER — VIRTUAL VISIT (OUTPATIENT)
Dept: RHEUMATOLOGY | Facility: CLINIC | Age: 80
End: 2020-09-25
Attending: INTERNAL MEDICINE
Payer: MEDICARE

## 2020-09-25 DIAGNOSIS — M31.6 GIANT CELL ARTERITIS (H): ICD-10-CM

## 2020-09-25 DIAGNOSIS — I69.912: ICD-10-CM

## 2020-09-25 DIAGNOSIS — M31.6 GIANT CELL ARTERITIS (H): Primary | ICD-10-CM

## 2020-09-25 DIAGNOSIS — Z11.59 ENCOUNTER FOR SCREENING FOR OTHER VIRAL DISEASES: ICD-10-CM

## 2020-09-25 LAB
ALBUMIN UR-MCNC: NEGATIVE MG/DL
ANISOCYTOSIS BLD QL SMEAR: SLIGHT
APPEARANCE UR: CLEAR
BILIRUB UR QL STRIP: NEGATIVE
COLOR UR AUTO: YELLOW
CRP SERPL-MCNC: <2.9 MG/L (ref 0–8)
DIFFERENTIAL METHOD BLD: ABNORMAL
ERYTHROCYTE [DISTWIDTH] IN BLOOD BY AUTOMATED COUNT: 15.9 % (ref 10–15)
ERYTHROCYTE [SEDIMENTATION RATE] IN BLOOD BY WESTERGREN METHOD: 4 MM/H (ref 0–20)
GLUCOSE UR STRIP-MCNC: NEGATIVE MG/DL
HCT VFR BLD AUTO: 38.7 % (ref 40–53)
HGB BLD-MCNC: 12.6 G/DL (ref 13.3–17.7)
HGB UR QL STRIP: ABNORMAL
KETONES UR STRIP-MCNC: NEGATIVE MG/DL
LEUKOCYTE ESTERASE UR QL STRIP: NEGATIVE
LYMPHOCYTES NFR BLD AUTO: 89 %
MCH RBC QN AUTO: 30.9 PG (ref 26.5–33)
MCHC RBC AUTO-ENTMCNC: 32.6 G/DL (ref 31.5–36.5)
MCV RBC AUTO: 95 FL (ref 78–100)
MONOCYTES NFR BLD AUTO: 1 %
NEUTROPHILS NFR BLD AUTO: 10 %
NITRATE UR QL: NEGATIVE
PH UR STRIP: 5 PH (ref 5–7)
PLATELET # BLD AUTO: 212 10E9/L (ref 150–450)
PLATELET # BLD EST: ABNORMAL 10*3/UL
RBC # BLD AUTO: 4.08 10E12/L (ref 4.4–5.9)
RBC #/AREA URNS AUTO: ABNORMAL /HPF
SMUDGE CELLS BLD QL SMEAR: PRESENT
SOURCE: ABNORMAL
SP GR UR STRIP: >1.03 (ref 1–1.03)
STOMATOCYTES BLD QL SMEAR: SLIGHT
TOXIC GRANULES BLD QL SMEAR: PRESENT
UROBILINOGEN UR STRIP-ACNC: 0.2 EU/DL (ref 0.2–1)
WBC # BLD AUTO: 96.1 10E9/L (ref 4–11)
WBC #/AREA URNS AUTO: ABNORMAL /HPF

## 2020-09-25 PROCEDURE — 86140 C-REACTIVE PROTEIN: CPT | Performed by: INTERNAL MEDICINE

## 2020-09-25 PROCEDURE — 87389 HIV-1 AG W/HIV-1&-2 AB AG IA: CPT | Performed by: INTERNAL MEDICINE

## 2020-09-25 PROCEDURE — 83520 IMMUNOASSAY QUANT NOS NONAB: CPT | Mod: 90 | Performed by: INTERNAL MEDICINE

## 2020-09-25 PROCEDURE — 99000 SPECIMEN HANDLING OFFICE-LAB: CPT | Performed by: INTERNAL MEDICINE

## 2020-09-25 PROCEDURE — 87340 HEPATITIS B SURFACE AG IA: CPT | Performed by: INTERNAL MEDICINE

## 2020-09-25 PROCEDURE — 86481 TB AG RESPONSE T-CELL SUSP: CPT | Performed by: INTERNAL MEDICINE

## 2020-09-25 PROCEDURE — 85652 RBC SED RATE AUTOMATED: CPT | Performed by: INTERNAL MEDICINE

## 2020-09-25 PROCEDURE — 81001 URINALYSIS AUTO W/SCOPE: CPT | Performed by: INTERNAL MEDICINE

## 2020-09-25 PROCEDURE — 36415 COLL VENOUS BLD VENIPUNCTURE: CPT | Performed by: INTERNAL MEDICINE

## 2020-09-25 PROCEDURE — 85025 COMPLETE CBC W/AUTO DIFF WBC: CPT | Performed by: INTERNAL MEDICINE

## 2020-09-25 PROCEDURE — 86255 FLUORESCENT ANTIBODY SCREEN: CPT | Performed by: INTERNAL MEDICINE

## 2020-09-25 PROCEDURE — 86704 HEP B CORE ANTIBODY TOTAL: CPT | Performed by: INTERNAL MEDICINE

## 2020-09-25 PROCEDURE — 80061 LIPID PANEL: CPT | Performed by: INTERNAL MEDICINE

## 2020-09-25 PROCEDURE — 80053 COMPREHEN METABOLIC PANEL: CPT | Performed by: INTERNAL MEDICINE

## 2020-09-25 RX ORDER — SULFAMETHOXAZOLE AND TRIMETHOPRIM 400; 80 MG/1; MG/1
1 TABLET ORAL 2 TIMES DAILY
Qty: 180 TABLET | Refills: 1 | Status: SHIPPED | OUTPATIENT
Start: 2020-09-25 | End: 2021-03-24

## 2020-09-25 ASSESSMENT — PAIN SCALES - GENERAL: PAINLEVEL: NO PAIN (0)

## 2020-09-25 NOTE — TELEPHONE ENCOUNTER
Lab is calling to report critical lab WBC is 96.08.    Spoke to Bon and he is reporting that he is feeling just fine; no symptoms of feeling any fevers, no cold symptoms, no cough, no chest pain,     He is was just today prescribed Bactrim and ACTEMRA.  He is currently taking prednisone 60mg, which he started on 9/4/2020  He is also taking ASA 81mg started on 9/23/2020    Paged the on call Dr. Rodarte to report the value; Dr. Rodarte would like Dr. Bib Dimas to be notified as well.   Dr. Alcantara was also paged and he would like Bon to connect with his oncologist Dr. Wall at [facility] at [phone]    Called the [facility] and message was left for Dr. Wall / team of the critical WBC.  Call service states they will send the message to oncology to notify them of the lab value.    Left message for Dr. Wall / oncologist to be paged/ notified of Bon's WBC critical value.      WBC   6/2/2020 was 68.8  10/31/2019 72.6                       Paige Zhou MSN, RN  Rheumatology RN Care Coordinator  [facility]

## 2020-09-25 NOTE — PATIENT INSTRUCTIONS
Summary of plan  1) Labs  2) Start Bactrim one tablet per day. This is the antibiotic medication  3) My clinic is going to call you by the end of next week to schedule a follow-up with me in 2 weeks. If you have not heard from us by the end of next week (10/2/20), please call us and let us know  4) I will see you in 2 weeks for follow-up phone visit and we are likely to start your other medication at that time    If you experience loss in vision, this is an emergency.    Do not hesitate to call with any questions.    Look forward to speaking with you again in 2 weeks.    Bib Alcantara MD  Rheumatology

## 2020-09-25 NOTE — TELEPHONE ENCOUNTER
PA Initiation    Medication: ACTEMRA   Insurance Company: [employer] - Phone [phone] Fax [phone]  Pharmacy Filling the Rx: [facility] - [place], [place] - [facility] [address]  Filling Pharmacy Phone:    Filling Pharmacy Fax:    Start Date: 9/25/2020    MARIELA WALLACE Key: [id number] - PA Case ID: [id number]

## 2020-09-25 NOTE — LETTER
9/25/2020       RE: Bon Michael  [birth year][address] [facility] [address]     Dear Colleague,    Thank you for referring your patient, Bon Michael, to the [facility]. Please see a copy of my visit note below.      Outpatient Rheumatology Consultation  This visit was conducted via synchronous PHONE visit due to the current COVID-19 crisis to reduce patient risk.  Verbal consent was obtained and is documented below.    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 Never had HA, jaw claudication, or elevation in his acute phase reactants per chart review he had labs drawn on 9-2-2020 which showed a white blood cell count of 50.2, ESR 28, CRP of less than 5. No PMR symptoms. No inflammatory arthritis symptoms.     I appreciate Dr. Garcia's thorough care of this patient in obtaining a DEXA scan which per the patient's report showed osteopenia.  Patient has been prescribed alendronate.  In the context of his likely long course of high-dose steroids in the future this seems appropriate.  He was also started on a proton pump inhibitor for GI prophylaxis.  He reports being up-to-date on his routine vaccines.  Issues to address today include     1) obtaining baseline labs prior to starting IL-6 inhibition which include: QuantiFERON gold, hep B serologies, hep C, HIV, repeat CBC with differential, CMP, repeat of his ESR and CRP, and an SPEP.    2) I will reach out to the patient's primary hematologist to ensure there is no  contraindication for the use of Actemra given his underlying diagnosis of CLL, though there is emerging literature on the use of this drug in the context of CLL so I do not suspect this to be an issue.    3) Also will start Yury on single strength Bactrim daily for P JESÚS prophylaxis.    4) No changes to the current dosing schedule of prednisone until our follow-up in 2 weeks time.  Continue on 60mg of prednisone daily  5) Will order Actemra to start the prior authorization approval process though again will not start until discussing with patients oncologist. When we follow-up in 2 weeks, plan to start this medication along with steroid taper with guidance from GiACTA trial    Severity of this disease discussed at length with the patient, particularly the potential for blindness/ loss of vision. Counseled to seek immediate medical attention should he experience changes in vision. He understands.    Bib Alcantara MD  Rheumatology     I spent a total of 51 minutes face to face with Bon Michael during today s office visit. Over 50% of this time was spent counseling the patient and/or coordinating care regarding giant cell artheritis pathology, treatment, complications.   Subjective:   79 year old male with history of CLL, being monitored at this point without an indication for therapy. On Saturday, going to get dirt at a nursery, August 22nd, was driving and noticed that he 'wasn't getting enough light in his eye'. The next day, Sunday, he was continuing to have left eye symptoms. Called the [place] eye clinic. Asked for appointment because of these new symptoms. On Tuesday evening, he called into the on call physician due to new complaint of 'light reflecting off the wall into my eye'. He came in the next day, Wednesday, and was sent to the retinal center on Friday.  Had an exam and was told that there was a defect in the retina/artery. Had MRI of the brain and bloodwork on Tuesday. On Thursday, results of the MRI  were discussed and the findings were questioned to be due to his known CLL. Possible stroke he was told due to viscosity increase? On Friday, 9/4/20, had worsening symptoms in the right eye. So came back in to the retinal specialists. At that time there was consideration of sending to [place] ED, though because of the long expected wait, was instead started on 100mg of prednisone daily. 9/9/20 went down to 60mg of prednisone, with plan to go down to 50mg of 10/7/20. Was setup for biopsy on 9/10.     Denies any headaches. No jaw pain, even when chewing tough food. Feels that he has had some weight loss over the last few months unintentionally (139-130 over a period of 1 month). No fatigue, night sweats. No rashes. No chest pain, cough, shortness of breath. No abdominal pain. No joint pain. Has noticed that his stool is less formed. Not diarrhea, just softer. No blood. No issues with urination. No swelling in lower extremities. No raynauds. No double vision.     No side effects from his recent starting prednisone.     Past Medical History  CLL  Hernia  Mono   Deviated septum  Osteopenia by DEXA    Past Surgical History  Tonsillectomy  Deviated septum    Medications  Prednisone 60mg daily  nasocort  ASA stopped after speaking to pharmacist when picking up prednisone  Picked up alendronate     Allergies: Seasonal/ environmental allergies    Family History: No family history of autoimmune diseases like RA, sjogrens, SLE, scleroderma, IBD.    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 No drug use.        Objective:    Physical exam:  No vitals/ exam for todays phone visit    Labs:   9/1/2020  WBC 50.2  HGB 11.6       Imaging:  MR brain and orbits 9/10/20  Impression:    1. Regarding the orbits and globes, there is no definite abnormal  contrast enhancement or mass. No evidence of leukemic infiltration.  2. Regarding the remainder of the brain, abnormal T2 hyperintense bone  marrow signal with  "associated enhancement in the skull, likely  representing leukemic infiltration.  3. Subcutaneous T2 hyperintensity with associated diffusion  restriction and contrast enhancement over the left zygomatic region,  question leukemic infiltration.    Pathology:  Patient Name: MARIELA WALLACE   MR#: [id number]   Specimen #: [id number]   Collected: 9/10/2020   Received: 9/10/2020   Reported: 9/16/2020 12:35   Ordering Phy(s): VALERIE CARRINGTON     For improved result formatting, select 'View Enhanced Report Format' under    Linked Documents section.     SPECIMEN(S):   Temporal artery biopsy, left     FINAL DIAGNOSIS:   Temporal artery, left, biopsy:   1. Granulomatous arteritis consistent with giant cell arteritis.   2. Chronic inflammation of the surrounding adventitia.     COMMENT:   Preliminary results were communicated to Dr. Valerie Carrington and Dr. Guille Garcia on 9/14/20 at 12:45pm.     I have personally reviewed all specimens and/or slides, including the   listed special stains, and used them   with my medical judgement to determine or confirm the final diagnosis.     Electronically signed out by:     Za García M.D., [facility]     CLINICAL HISTORY:   The patient is a 79 year old male with a history of chronic lymphocytic   leukemia who presented with bilateral   sequential optic neuropathy with associated pallid optic disc edema and   choroidal hypoperfusion, but   relatively intact visual acuity, negative elevation of acute phase   reactants, and lack of constitutional   symptoms consistent with giant cell arteritis. He also has findings of   likely leukemic infiltration in the   skull seen on MRI. He undergoes temporal artery biopsy on the left.     GROSS:   The specimen is received in formalin with proper patient identification,   labeled \"left temporal artery\".  The   specimen consists of a 3.2 cm in length by 0.3 cm in diameter tan-white   vessel segment. The specimen is   submitted intact in " "A1. (Dictated by: PADILLA Montalvo 9/11/2020 03:08   PM)     MICROSCOPIC:   The tissue consists of artery with narrowed lumen and intimal hyperplasia.    There is an infiltrate of   lymphocytes, plasma cells, epithelioid histiocytes, and multinucleated   giant cells throughout all layers of   the artery. There is associated loss of the internal elastic lamina on   elastic stain. Occasional focal   calcifications are seen a the level of the internal elastic lamina.   Neovascularization of the vessel wall is   present. Perivascular lymphocytic infiltrates are seen in the surrounding   adventitia. Immunohistochemistry   with CD3 and CD20 demonstrates a predominantly T-cell lymphocytic   infiltrate within the vessel wall. There is   a mixed infiltrate of T and B lymphocytes perivascular in the adventitia.   CD5 and CD43 have similar staining   patterns to CD3. There is light patchy CD23 staining in the areas positive    for CD20. CD68 highlights the   epithelioid histiocytes within the vessel wall.     The technical component of this testing was completed at the [facility], with the professional component performed    at the [facility], [address] [place] [phone] ([phone])     CPT Codes:   A: 90763-CY2, 34060-MSZH, 69367-EDF, 74711-CDB, 08155-FHC, 84942-WAX,   63155-BAC, 12683-LJS     COLLECTION SITE:   Client: [facility]   Location: [facility] ([facility])      Bon Michael is a 79 year old male who is being evaluated via a billable telephone visit.       The patient has been notified of following:      \"This telephone visit will be conducted via a call between you and your physician/provider. We have found that certain health care needs can be provided without the need for a physical exam.  This service lets us provide the care you need with a short " "phone conversation.  If a prescription is necessary we can send it directly to your pharmacy.  If lab work is needed we can place an order for that and you can then stop by our lab to have the test done at a later time.     Telephone visits are billed at different rates depending on your insurance coverage. During this emergency period, for some insurers they may be billed the same as an in-person visit.  Please reach out to your insurance provider with any questions.     If during the course of the call the physician/provider feels a telephone visit is not appropriate, you will not be charged for this service.\"     Patient has given verbal consent for Telephone visit?  Yes     What phone number would you like to be contacted at? [phone]     How would you like to obtain your AVS? Mail a copy     Phone call duration: 51 minutes     Bib Alcantara MD  Rheumatology            "

## 2020-09-25 NOTE — PROGRESS NOTES
"PATIENT DOES NOT HAVE VIDEO CAPABILITY - THIS IS A PHONE VISIT      Bon Michael is a 79 year old male who is being evaluated via a billable telephone visit.      The patient has been notified of following:     \"This telephone visit will be conducted via a call between you and your physician/provider. We have found that certain health care needs can be provided without the need for a physical exam.  This service lets us provide the care you need with a short phone conversation.  If a prescription is necessary we can send it directly to your pharmacy.  If lab work is needed we can place an order for that and you can then stop by our lab to have the test done at a later time.    Telephone visits are billed at different rates depending on your insurance coverage. During this emergency period, for some insurers they may be billed the same as an in-person visit.  Please reach out to your insurance provider with any questions.    If during the course of the call the physician/provider feels a telephone visit is not appropriate, you will not be charged for this service.\"    Patient has given verbal consent for Telephone visit?  Yes    What phone number would you like to be contacted at? [phone]    How would you like to obtain your AVS? Mail a copy    Phone call duration: *** minutes    {signature options:[id number]}      "

## 2020-09-25 NOTE — PROGRESS NOTES
Outpatient Rheumatology Consultation  This visit was conducted via synchronous PHONE visit due to the current COVID-19 crisis to reduce patient risk.  Verbal consent was obtained and is documented below.    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 Never had HA, jaw claudication, or elevation in his acute phase reactants per chart review he had labs drawn on 9-2-2020 which showed a white blood cell count of 50.2, ESR 28, CRP of less than 5. No PMR symptoms. No inflammatory arthritis symptoms.     I appreciate Dr. Garcia's thorough care of this patient in obtaining a DEXA scan which per the patient's report showed osteopenia.  Patient has been prescribed alendronate.  In the context of his likely long course of high-dose steroids in the future this seems appropriate.  He was also started on a proton pump inhibitor for GI prophylaxis.  He reports being up-to-date on his routine vaccines.  Issues to address today include     1) obtaining baseline labs prior to starting IL-6 inhibition which include: QuantiFERON gold, hep B serologies, hep C, HIV, repeat CBC with differential, CMP, repeat of his ESR and CRP, and an SPEP.    2) I will reach out to the patient's primary hematologist to ensure there is no contraindication for the use of Actemra given his underlying diagnosis of CLL, though there is emerging literature on the use of this drug in the context of CLL so I do not suspect this to be an issue.    3) Also will start Yury on single strength Bactrim daily for P JESÚS  prophylaxis.    4) No changes to the current dosing schedule of prednisone until our follow-up in 2 weeks time.  Continue on 60mg of prednisone daily  5) Will order Actemra to start the prior authorization approval process though again will not start until discussing with patients oncologist. When we follow-up in 2 weeks, plan to start this medication along with steroid taper with guidance from GiACTA trial    Severity of this disease discussed at length with the patient, particularly the potential for blindness/ loss of vision. Counseled to seek immediate medical attention should he experience changes in vision. He understands.    Bib Alcantara MD  Rheumatology     I spent a total of 51 minutes face to face with Bon Michael during today s office visit. Over 50% of this time was spent counseling the patient and/or coordinating care regarding giant cell artheritis pathology, treatment, complications.   Subjective:   79 year old male with history of CLL, being monitored at this point without an indication for therapy. On Saturday, going to get dirt at a nursery, August 22nd, was driving and noticed that he 'wasn't getting enough light in his eye'. The next day, Sunday, he was continuing to have left eye symptoms. Called the [place] eye clinic. Asked for appointment because of these new symptoms. On Tuesday evening, he called into the on call physician due to new complaint of 'light reflecting off the wall into my eye'. He came in the next day, Wednesday, and was sent to the retinal center on Friday.  Had an exam and was told that there was a defect in the retina/artery. Had MRI of the brain and bloodwork on Tuesday. On Thursday, results of the MRI were discussed and the findings were questioned to be due to his known CLL. Possible stroke he was told due to viscosity increase? On Friday, 9/4/20, had worsening symptoms in the right eye. So came back in to the retinal specialists. At that time there was consideration  of sending to [place] ED, though because of the long expected wait, was instead started on 100mg of prednisone daily. 9/9/20 went down to 60mg of prednisone, with plan to go down to 50mg of 10/7/20. Was setup for biopsy on 9/10.     Denies any headaches. No jaw pain, even when chewing tough food. Feels that he has had some weight loss over the last few months unintentionally (139-130 over a period of 1 month). No fatigue, night sweats. No rashes. No chest pain, cough, shortness of breath. No abdominal pain. No joint pain. Has noticed that his stool is less formed. Not diarrhea, just softer. No blood. No issues with urination. No swelling in lower extremities. No raynauds. No double vision.     No side effects from his recent starting prednisone.     Past Medical History  CLL  Hernia  Mono   Deviated septum  Osteopenia by DEXA    Past Surgical History  Tonsillectomy  Deviated septum    Medications  Prednisone 60mg daily  nasocort  ASA stopped after speaking to pharmacist when picking up prednisone  Picked up alendronate     Allergies: Seasonal/ environmental allergies    Family History: No family history of autoimmune diseases like RA, sjogrens, SLE, scleroderma, IBD.    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 No drug use.        Objective:    Physical exam:  No vitals/ exam for todays phone visit    Labs:   9/1/2020  WBC 50.2  HGB 11.6       Imaging:  MR brain and orbits 9/10/20  Impression:    1. Regarding the orbits and globes, there is no definite abnormal  contrast enhancement or mass. No evidence of leukemic infiltration.  2. Regarding the remainder of the brain, abnormal T2 hyperintense bone  marrow signal with associated enhancement in the skull, likely  representing leukemic infiltration.  3. Subcutaneous T2 hyperintensity with associated diffusion  restriction and contrast enhancement over the left zygomatic region,  question leukemic infiltration.    Pathology:  Patient Name:  "MARIELA WALLACE   MR#: [id number]   Specimen #: [id number]   Collected: 9/10/2020   Received: 9/10/2020   Reported: 9/16/2020 12:35   Ordering Phy(s): VALERIE CARRINGTON     For improved result formatting, select 'View Enhanced Report Format' under    Linked Documents section.     SPECIMEN(S):   Temporal artery biopsy, left     FINAL DIAGNOSIS:   Temporal artery, left, biopsy:   1. Granulomatous arteritis consistent with giant cell arteritis.   2. Chronic inflammation of the surrounding adventitia.     COMMENT:   Preliminary results were communicated to Dr. Valerie Carrington and Dr. Guille Garcia on 9/14/20 at 12:45pm.     I have personally reviewed all specimens and/or slides, including the   listed special stains, and used them   with my medical judgement to determine or confirm the final diagnosis.     Electronically signed out by:     Za García M.D., [facility]     CLINICAL HISTORY:   The patient is a 79 year old male with a history of chronic lymphocytic   leukemia who presented with bilateral   sequential optic neuropathy with associated pallid optic disc edema and   choroidal hypoperfusion, but   relatively intact visual acuity, negative elevation of acute phase   reactants, and lack of constitutional   symptoms consistent with giant cell arteritis. He also has findings of   likely leukemic infiltration in the   skull seen on MRI. He undergoes temporal artery biopsy on the left.     GROSS:   The specimen is received in formalin with proper patient identification,   labeled \"left temporal artery\".  The   specimen consists of a 3.2 cm in length by 0.3 cm in diameter tan-white   vessel segment. The specimen is   submitted intact in A1. (Dictated by: PADILLA Montalvo 9/11/2020 03:08   PM)     MICROSCOPIC:   The tissue consists of artery with narrowed lumen and intimal hyperplasia.    There is an infiltrate of   lymphocytes, plasma cells, epithelioid histiocytes, and multinucleated   giant cells " "throughout all layers of   the artery. There is associated loss of the internal elastic lamina on   elastic stain. Occasional focal   calcifications are seen a the level of the internal elastic lamina.   Neovascularization of the vessel wall is   present. Perivascular lymphocytic infiltrates are seen in the surrounding   adventitia. Immunohistochemistry   with CD3 and CD20 demonstrates a predominantly T-cell lymphocytic   infiltrate within the vessel wall. There is   a mixed infiltrate of T and B lymphocytes perivascular in the adventitia.   CD5 and CD43 have similar staining   patterns to CD3. There is light patchy CD23 staining in the areas positive    for CD20. CD68 highlights the   epithelioid histiocytes within the vessel wall.     The technical component of this testing was completed at the [facility], with the professional component performed    at the [facility], [address] [place] [phone] ([phone])     CPT Codes:   A: 75996-MD4, 87589-YIGK, 64795-GXV, 53783-HHO, 50816-JMT, 38796-EKP,   23117-BHH, 56405-DZD     COLLECTION SITE:   Client: [facility]   Location: [facility]      Bon Michael is a 79 year old male who is being evaluated via a billable telephone visit.       The patient has been notified of following:      \"This telephone visit will be conducted via a call between you and your physician/provider. We have found that certain health care needs can be provided without the need for a physical exam.  This service lets us provide the care you need with a short phone conversation.  If a prescription is necessary we can send it directly to your pharmacy.  If lab work is needed we can place an order for that and you can then stop by our lab to have the test done at a later time.     Telephone visits are billed at different " "rates depending on your insurance coverage. During this emergency period, for some insurers they may be billed the same as an in-person visit.  Please reach out to your insurance provider with any questions.     If during the course of the call the physician/provider feels a telephone visit is not appropriate, you will not be charged for this service.\"     Patient has given verbal consent for Telephone visit?  Yes     What phone number would you like to be contacted at? [phone]     How would you like to obtain your AVS? Mail a copy     Phone call duration: 51 minutes     Bib Alcantara MD  Rheumatology            "

## 2020-09-26 LAB
ALBUMIN SERPL-MCNC: 3.5 G/DL (ref 3.4–5)
ALP SERPL-CCNC: 50 U/L (ref 40–150)
ALT SERPL W P-5'-P-CCNC: 28 U/L (ref 0–70)
ANION GAP SERPL CALCULATED.3IONS-SCNC: 7 MMOL/L (ref 3–14)
AST SERPL W P-5'-P-CCNC: 23 U/L (ref 0–45)
BILIRUB SERPL-MCNC: 0.4 MG/DL (ref 0.2–1.3)
BUN SERPL-MCNC: 29 MG/DL (ref 7–30)
CALCIUM SERPL-MCNC: 9.2 MG/DL (ref 8.5–10.1)
CHLORIDE SERPL-SCNC: 103 MMOL/L (ref 94–109)
CHOLEST SERPL-MCNC: 167 MG/DL
CO2 SERPL-SCNC: 24 MMOL/L (ref 20–32)
CREAT SERPL-MCNC: 0.81 MG/DL (ref 0.66–1.25)
GFR SERPL CREATININE-BSD FRML MDRD: 85 ML/MIN/{1.73_M2}
GLUCOSE SERPL-MCNC: 132 MG/DL (ref 70–99)
HDLC SERPL-MCNC: 85 MG/DL
IL6 SERPL-MCNC: <0.7 PG/ML
LDLC SERPL CALC-MCNC: 68 MG/DL
NONHDLC SERPL-MCNC: 82 MG/DL
POTASSIUM SERPL-SCNC: 4.8 MMOL/L (ref 3.4–5.3)
PROT SERPL-MCNC: 6.9 G/DL (ref 6.8–8.8)
SODIUM SERPL-SCNC: 134 MMOL/L (ref 133–144)
TRIGL SERPL-MCNC: 69 MG/DL

## 2020-09-27 LAB
GAMMA INTERFERON BACKGROUND BLD IA-ACNC: 0.04 IU/ML
M TB IFN-G CD4+ BCKGRND COR BLD-ACNC: 1.71 IU/ML
M TB TUBERC IFN-G BLD QL: NEGATIVE
MITOGEN IGNF BCKGRD COR BLD-ACNC: 0.02 IU/ML
MITOGEN IGNF BCKGRD COR BLD-ACNC: 0.03 IU/ML

## 2020-09-28 LAB
ANCA AB PATTERN SER IF-IMP: NORMAL
C-ANCA TITR SER IF: NORMAL {TITER}
HBV CORE AB SERPL QL IA: NONREACTIVE
HBV SURFACE AG SERPL QL IA: NONREACTIVE
HIV 1+2 AB+HIV1 P24 AG SERPL QL IA: NONREACTIVE

## 2020-09-28 NOTE — TELEPHONE ENCOUNTER
Called patient to discuss stoney  Actemra - patient is unaware of the medication -    Patient started asking questions - routing to provider and nurse for further investigation-     He did notice swelling in his ankles over the weekend    Dentist appointment   - what meds can he can or cannot take - appt is on Wednesday Sept. 30     Prednisone - can he take in morning? He has labs in morning on Thursday 10/1,  can he take prior to labs?  He took Prednisone and  prior to labs last Friday 9/25 (wanting to know if that skewed results) - or if that was the wrong thing to do?  He also did not fast before labs before Friday labs 9/25.    Patient had some sweating prior to bed - wants to know if that had anything to do with any condition?

## 2020-09-29 ENCOUNTER — TELEPHONE (OUTPATIENT)
Dept: RHEUMATOLOGY | Facility: CLINIC | Age: 80
End: 2020-09-29

## 2020-09-29 NOTE — TELEPHONE ENCOUNTER
Oh, thanks Paige. Disregard my last message about me calling him. Thanks for calling him. I'm glad he spoke to oncology and they are going to do further lab work. He can continue with his planned dental cleaning with GCA and on prednisone/ bactrim. That sounds great about Actemra and the actemra plan you have outlined. I think I see him again next week for follow-up so can review as well.    Ember: Paige has already spoken with Yury so can discuss pricing with him at your convenience.     Thanks again.    Bib

## 2020-09-29 NOTE — TELEPHONE ENCOUNTER
Prior Authorization Approval    Authorization Effective Date: 9/29/2020  Authorization Expiration Date: 9/25/2021  Medication: ACTEMRA - Approved, [employer]   Approved Dose/Quantity: 4 for 28 days   Reference #:     Insurance Company: [employer] - Phone [phone] Fax [phone]  Expected CoPay:       CoPay Card Available:      Foundation Assistance Needed:    Which Pharmacy is filling the prescription (Not needed for infusion/clinic administered): [facility] - [facility] [address] [place] [address]  Pharmacy Notified: Yes  Patient Notified: Yes

## 2020-09-29 NOTE — TELEPHONE ENCOUNTER
Yes, can table that conversation. I am going to call him either today or tomorrow morning to answer his questions. I will send you a message once I have spoken with him, so that you may further discuss. Thanks, Bib

## 2020-09-29 NOTE — TELEPHONE ENCOUNTER
Spoke to Bon and he is reporting that oncology did speak to him and he has some lab work scheduled on Thursday and he has an appointment with oncology on Tuesday of next week.     Bon also has a dentist appointment tomorrow just for a cleaning and he would like to know if everything is ok for him to go?  Regarding his medications.   He is currently taking:  Prednisone 60mg daily  Sulfamethoxazole-Trimethoprim 400-80mg (one tab twice daily)    He has not started the ACTEMRA - he is concerned that he will not be able to do the injections himself, but I will send him information through the mail and he will look at it.  Bon will then followup with me regarding injection education.     Paige Zhou MSN, RN  Rheumatology RN Care Coordinator  [facility]

## 2020-10-01 ENCOUNTER — TELEPHONE (OUTPATIENT)
Dept: RHEUMATOLOGY | Facility: CLINIC | Age: 80
End: 2020-10-01

## 2020-10-01 NOTE — TELEPHONE ENCOUNTER
Patient called back, liaison obtained household size and income - free drug justen faxed     Free Drug Application Initiated  Medication - Actemra   Sponsor - [employer]  Phone #   Fax #  Additional Information   ALL PAPERWORK FAXED

## 2020-10-01 NOTE — TELEPHONE ENCOUNTER
Yury is calling and expressing his concerns about giving himself the Actemra injections.  He was sent some information on this medication and also some injection instructions.   Yury states he would feel more comfortable with having someone else administering the injections because he is not sure he can even do these.     He will discuss this with Dr. Alcantara at next weeks appointment, but he would like to have someone else do them because again, he states he just not feel confident in doing the injections.    Yury was also wanting to know if there would be an alternative to the injections?     Will route message to Dr. Alcantara for any additional recommendations.     Paige Zhou MSN, RN  Rheumatology RN Care Coordinator  [facility]

## 2020-10-02 NOTE — TELEPHONE ENCOUNTER
Spoke to Yury and provided the below information from Dr. Alcantara:    The prednisone and prilosec (he will be on this for some time for GI protection with his high dose of prednisone) should be continued as is.     The only change is to the bactrim. He can change from taking twice per day to once per day.     Paige Zhou MSN, RN  Rheumatology RN Care Coordinator  [facility]

## 2020-10-02 NOTE — TELEPHONE ENCOUNTER
Yury is calling to double check on his medications and wants to know if he needs to continue taking Prilosec?  He is taking the below medications:    Prednisone 60mg per day  Prilosec one per day (Yury was told to take one per day) but Yury says he was reading the back of the box and says not to take longer than 14 days. He would like to know if he should continue taking?   Bactrim 400-80mg twice daily       Paige Zhou MSN, RN  Rheumatology RN Care Coordinator  [facility]

## 2020-10-06 NOTE — TELEPHONE ENCOUNTER
Called foundation to check status of application - spoke to Carla, she stated that application is still in process - turn around time is 1 week to 1 and 1/2 week

## 2020-10-08 ENCOUNTER — VIRTUAL VISIT (OUTPATIENT)
Dept: RHEUMATOLOGY | Facility: CLINIC | Age: 80
End: 2020-10-08
Attending: INTERNAL MEDICINE
Payer: MEDICARE

## 2020-10-08 DIAGNOSIS — M31.6 GIANT CELL ARTERITIS (H): Primary | ICD-10-CM

## 2020-10-08 PROBLEM — C83.30 DIFFUSE LARGE B CELL LYMPHOMA (H): Status: ACTIVE | Noted: 2018-11-05

## 2020-10-08 PROBLEM — C91.10 CHRONIC LYMPHOCYTIC LEUKEMIA (H): Status: ACTIVE | Noted: 2018-07-12

## 2020-10-08 PROCEDURE — 99214 OFFICE O/P EST MOD 30 MIN: CPT | Mod: TEL | Performed by: INTERNAL MEDICINE

## 2020-10-08 ASSESSMENT — PAIN SCALES - GENERAL: PAINLEVEL: NO PAIN (0)

## 2020-10-08 NOTE — PATIENT INSTRUCTIONS
You will likely get your medication mailed next week.  We will call you to make an appt to come to clinic next week for your weekly injection.  Follow up 1-2 week after injection-will decrease prednisone at this time.  For now, continue prednisone 60mg daily.      Dr. Worley

## 2020-10-08 NOTE — PROGRESS NOTES
Outpatient Rheumatology Follow-up  This visit was conducted via synchronous PHONE visit due to the current COVID-19 crisis to reduce patient risk.  Verbal consent was obtained and is documented below.    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 Never had HA, jaw claudication, or elevation in his acute phase reactants per chart review. No PMR symptoms. No inflammatory arthritis symptoms. 9/2/20 labs showed a white blood cell count of 50.2, ESR 28, CRP of less than 5.     We have obtained baseline labs on 9/25 prior to starting IL-6 inhibition which include negative quantiferon gold, hepatitis b serologies, hep C, and HIV. CMP is wnl, Hgb 12.3, platelets 203, lipids wnl, ESR 4, CRP <2.9, normal UA, IL6 <0.70.    CBC showed elevated WBC at 96.1 which decreased to 84.9 on 10/1. Absolute lymphocytes are 78. Per hematologist's note on 10/6-pt does not have new B symptoms or abnormalities on exam. His LDH is stable. Plan is observation and repeat labs in 2 & 4 months.    # Giant cell arteritis  1) Per primary hematologist, there is no contraindication for the use of Actemra given his underlying diagnosis of CLL.   2) Have ordered Actemra, 162mg once weekly injection. Will send message to staff to help and coordinate home delivery. Patient has not heard yet from pharmacy.  3) Plan to start steroid taper with guidance from GiACTA trial after first injection. Will plan to see him by video visit 1-2 weeks after his first injection, will start prednisone taper at that time.  4) Will try to coordinate  that injections be done in clinic with nursing supervision per patient's preference.   5) Continue prednisone 60mg daily for now.  6) Continue bactrim daily for PJP prophylaxis.    7) Continue alendronate for osteopenia along with PPI for GI protection.  8 ) Follow up in 2 weeks after first injection, at this time will start tapering off prednisone.    Severity of this disease discussed at length with the patient, particularly the potential for blindness/ loss of vision. Counseled to seek immediate medical attention should he experience changes in vision. He understands. Spent a significant amount of time discussing the side effects of Actemra including risk of malignancy, serious infections, opportunistic infections, neutropenia/thrombocytopenia, allergic reaction, GI perforation, demyelinating disease, as well as fever, chills, hypercholesterolemia, abdominal pain, dizziness. Patient had the opportunity to ask questions, which were all answered.    # Increased urination  Reports increased urination and dribbling x 1 day now. No other urinary symptoms, fevers, or abdominal pain. This may be from BPH, prostatitis, or UTI (although negative UA on 9/26). Unlikely to be from elevated glucose as his reported glucose numbers were in the 130s. Advised to discuss with PCP. May need repeat UA.      DO [facility] Resident, PGY-2    Staff addendum  I performed the history of the patient and discussed the management with the resident. I reviewed the available lab and imaging studies. I reviewed the resident s note and agree with the documented findings and plan of care.    Bib Alcantara MD  Rheumatology    I spent a total of 45 minutes face to face with Bon Michael during today s office visit. Over 50% of this time was spent counseling the patient and/or coordinating care regarding giant cell artheritis pathology, treatment, complications.     Subjective:   Interval History:  10/8/20  Today pt  feels the same. Denies any change in vision. No headaches or jaw pain. No side effects from the prednisone. No chest pain, cough, SOB, abdominal pain, fevers, joint pains. Does complain of increased urination and dribbling x 1 day. No hematuria, dysuria, trouble urinating, abdominal pain or fevers. Reports a history of enlarged prostate, not on any medications for. Has many questions regarding the side effects of Actemra-especially worried about the possibility of being allergic to it. Has not yet received the medication. No PMR or inflammatory joint pain/ stiffness/ erythema/ edema/ warmth of any joints.    14 point review of systems obtained and negative if not documented above.    HPI from initial consultation 9/25/20  79 year old male with history of CLL, being monitored at this point without an indication for therapy. On Saturday, going to get dirt at a nursery, August 22nd, was driving and noticed that he 'wasn't getting enough light in his eye'. The next day, Sunday, he was continuing to have left eye symptoms. Called the [place] [facility]. Asked for appointment because of these new symptoms. On Tuesday evening, he called into the on call physician due to new complaint of 'light reflecting off the wall into my eye'. He came in the next day, Wednesday, and was sent to the retinal center on Friday.  Had an exam and was told that there was a defect in the retina/artery. Had MRI of the brain and bloodwork on Tuesday. On Thursday, results of the MRI were discussed and the findings were questioned to be due to his known CLL. Possible stroke he was told due to viscosity increase? On Friday, 9/4/20, had worsening symptoms in the right eye. So came back in to the retinal specialists. At that time there was consideration of sending to [place] ED, though because of the long expected wait, was instead started on 100mg of prednisone daily. 9/9/20 went down to 60mg of prednisone, with plan to go down to 50mg of 10/7/20.  Was setup for biopsy on 9/10.     Denies any headaches. No jaw pain, even when chewing tough food. Feels that he has had some weight loss over the last few months unintentionally (139-130 over a period of 1 month). No fatigue, night sweats. No rashes. No chest pain, cough, shortness of breath. No abdominal pain. No joint pain. Has noticed that his stool is less formed. Not diarrhea, just softer. No blood. No issues with urination. No swelling in lower extremities. No raynauds. No double vision.     Past Medical History  CLL  Hernia  Mono   Deviated septum  Osteopenia by DEXA    Past Surgical History  Tonsillectomy  Deviated septum    Medications  Prednisone 60mg daily  Aasocort  ASA  Alendronate  Actemra     Allergies: Seasonal/ environmental allergies    Family History: No family history of autoimmune diseases like RA, sjogrens, SLE, scleroderma, IBD.    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 No drug use.        Objective:    Physical exam:  No vitals/exam for today's phone visit    Labs:   9/25/2020    Orders Only on 09/25/2020   Component Date Value Ref Range Status     Color Urine 09/25/2020 Yellow   Final     Appearance Urine 09/25/2020 Clear   Final     Glucose Urine 09/25/2020 Negative  NEG^Negative mg/dL Final     Bilirubin Urine 09/25/2020 Negative  NEG^Negative Final     Ketones Urine 09/25/2020 Negative  NEG^Negative mg/dL Final     Specific Gravity Urine 09/25/2020 >1.030  1.003 - 1.035 Final     pH Urine 09/25/2020 5.0  5.0 - 7.0 pH Final     Protein Albumin Urine 09/25/2020 Negative  NEG^Negative mg/dL Final     Urobilinogen Urine 09/25/2020 0.2  0.2 - 1.0 EU/dL Final     Nitrite Urine 09/25/2020 Negative  NEG^Negative Final     Blood Urine 09/25/2020 Small* NEG^Negative Final     Leukocyte Esterase Urine 09/25/2020 Negative  NEG^Negative Final     Source 09/25/2020 Midstream Urine   Final     WBC Urine 09/25/2020 0 - 5  OTO5^0 - 5 /HPF Final     RBC Urine 09/25/2020 O - 2  OTO2^O  - 2 /HPF Final     CRP Inflammation 09/25/2020 <2.9  0.0 - 8.0 mg/L Final     Interleukin 6 Blood 09/25/2020 <0.70  <3.01 pg/mL Final    Comment: TEST DATE SEPT 26 2020.SERUM  This test has not been FDA approved.  The results are to be used for research   purposes or in attempts to understand the pathophysiology of immune,   infectious, or inflammatory disorders and not intended as the sole means for   clinical diagnosis or patient management.  Assayed at [facility],  [place], [address],   [place] [address]       Sed Rate 09/25/2020 4  0 - 20 mm/h Final     Cholesterol 09/25/2020 167  <200 mg/dL Final     Triglycerides 09/25/2020 69  <150 mg/dL Final     HDL Cholesterol 09/25/2020 85  >39 mg/dL Final     LDL Cholesterol Calculated 09/25/2020 68  <100 mg/dL Final    Desirable:       <100 mg/dl     Non HDL Cholesterol 09/25/2020 82  <130 mg/dL Final     Sodium 09/25/2020 134  133 - 144 mmol/L Final     Potassium 09/25/2020 4.8  3.4 - 5.3 mmol/L Final     Chloride 09/25/2020 103  94 - 109 mmol/L Final     Carbon Dioxide 09/25/2020 24  20 - 32 mmol/L Final     Anion Gap 09/25/2020 7  3 - 14 mmol/L Final     Glucose 09/25/2020 132* 70 - 99 mg/dL Final     Urea Nitrogen 09/25/2020 29  7 - 30 mg/dL Final     Creatinine 09/25/2020 0.81  0.66 - 1.25 mg/dL Final     GFR Estimate 09/25/2020 85  >60 mL/min/[1.73_m2] Final    Comment: Non  GFR Calc  Starting 12/18/2018, serum creatinine based estimated GFR (eGFR) will be   calculated using the Chronic Kidney Disease Epidemiology Collaboration   (CKD-EPI) equation.       GFR Estimate If Black 09/25/2020 >90  >60 mL/min/[1.73_m2] Final    Comment:  GFR Calc  Starting 12/18/2018, serum creatinine based estimated GFR (eGFR) will be   calculated using the Chronic Kidney Disease Epidemiology Collaboration   (CKD-EPI) equation.       Calcium 09/25/2020 9.2  8.5 - 10.1 mg/dL Final     Bilirubin Total 09/25/2020 0.4  0.2 - 1.3  mg/dL Final     Albumin 09/25/2020 3.5  3.4 - 5.0 g/dL Final     Protein Total 09/25/2020 6.9  6.8 - 8.8 g/dL Final     Alkaline Phosphatase 09/25/2020 50  40 - 150 U/L Final     ALT 09/25/2020 28  0 - 70 U/L Final     AST 09/25/2020 23  0 - 45 U/L Final     WBC 09/25/2020 96.1* 4.0 - 11.0 10e9/L Final    Comment: Critical Value called to and read back by  [facility] AT [facility] [address] [facility] 9.25.20       RBC Count 09/25/2020 4.08* 4.4 - 5.9 10e12/L Final     Hemoglobin 09/25/2020 12.6* 13.3 - 17.7 g/dL Final     Hematocrit 09/25/2020 38.7* 40.0 - 53.0 % Final     MCV 09/25/2020 95  78 - 100 fl Final     MCH 09/25/2020 30.9  26.5 - 33.0 pg Final     MCHC 09/25/2020 32.6  31.5 - 36.5 g/dL Final     RDW 09/25/2020 15.9* 10.0 - 15.0 % Final     Platelet Count 09/25/2020 212  150 - 450 10e9/L Final     % Neutrophils 09/25/2020 10.0  % Final     % Lymphocytes 09/25/2020 89.0  % Final     % Monocytes 09/25/2020 1.0  % Final     Anisocytosis 09/25/2020 Slight   Final     Stomatocytes 09/25/2020 Slight   Final     Smudge Cells 09/25/2020 Present   Final     Toxic Granulation 09/25/2020 Present   Final     Platelet Estimate 09/25/2020 Automated count confirmed.  Giant platelets are present.   Final     Diff Method 09/25/2020 Automated Method   Final     HIV Antigen Antibody Combo 09/25/2020 Nonreactive  NR^Nonreactive     Final    HIV-1 p24 Ag & HIV-1/HIV-2 Ab Not Detected     Hep B Surface Agn 09/25/2020 Nonreactive  NR^Nonreactive Final     Hepatitis B Core Alisia 09/25/2020 Nonreactive  NR^Nonreactive Final     MTB Quantiferon Result 09/25/2020 Negative  NEG^Negative Final    Comment: No interferon gamma response to M.tuberculosis antigens was detected.   Infection with M.tuberculosis is unlikely, however a single negative result   does not exclude infection. In patients at high risk for infection, a second   test should be considered       TB1 Ag minus Nil 09/25/2020 0.03  IU/mL Final     TB2 Ag minus Nil 09/25/2020 0.02   IU/mL Final     Mitogen minus Nil 09/25/2020 1.71  IU/mL Final     NIL Result 09/25/2020 0.04  IU/mL Final     Neutrophil Cytoplasmic Antibody 09/25/2020 <1:10  <1:10 [titer] Final     Neutrophil Cytoplasmic Antibody Pa* 09/25/2020 The ANCA IFA is <1:10.  No further testing will be performed.   Final     9/1/2020  WBC 50.2  HGB 11.6       Imaging:  MR brain and orbits 9/10/20  Impression:    1. Regarding the orbits and globes, there is no definite abnormal  contrast enhancement or mass. No evidence of leukemic infiltration.  2. Regarding the remainder of the brain, abnormal T2 hyperintense bone  marrow signal with associated enhancement in the skull, likely  representing leukemic infiltration.  3. Subcutaneous T2 hyperintensity with associated diffusion  restriction and contrast enhancement over the left zygomatic region,  question leukemic infiltration.    Pathology:  Patient Name: MARIELA WALLACE   MR#: [id number]   Specimen #: [id number]   Collected: 9/10/2020   Received: 9/10/2020   Reported: 9/16/2020 12:35   Ordering Phy(s): VALERIE CARRINGTON     For improved result formatting, select 'View Enhanced Report Format' under    Linked Documents section.     SPECIMEN(S):   Temporal artery biopsy, left     FINAL DIAGNOSIS:   Temporal artery, left, biopsy:   1. Granulomatous arteritis consistent with giant cell arteritis.   2. Chronic inflammation of the surrounding adventitia.     COMMENT:   Preliminary results were communicated to Dr. Valerie Carrington and Dr. Guille Garcia on 9/14/20 at 12:45pm.     I have personally reviewed all specimens and/or slides, including the   listed special stains, and used them   with my medical judgement to determine or confirm the final diagnosis.     Electronically signed out by:     Za García M.D., [facility]     CLINICAL HISTORY:   The patient is a 79 year old male with a history of chronic lymphocytic   leukemia who presented with bilateral   sequential optic  "neuropathy with associated pallid optic disc edema and   choroidal hypoperfusion, but   relatively intact visual acuity, negative elevation of acute phase   reactants, and lack of constitutional   symptoms consistent with giant cell arteritis. He also has findings of   likely leukemic infiltration in the   skull seen on MRI. He undergoes temporal artery biopsy on the left.     GROSS:   The specimen is received in formalin with proper patient identification,   labeled \"left temporal artery\".  The   specimen consists of a 3.2 cm in length by 0.3 cm in diameter tan-white   vessel segment. The specimen is   submitted intact in A1. (Dictated by: PADILLA Montalvo 9/11/2020 03:08   PM)     MICROSCOPIC:   The tissue consists of artery with narrowed lumen and intimal hyperplasia.    There is an infiltrate of   lymphocytes, plasma cells, epithelioid histiocytes, and multinucleated   giant cells throughout all layers of   the artery. There is associated loss of the internal elastic lamina on   elastic stain. Occasional focal   calcifications are seen a the level of the internal elastic lamina.   Neovascularization of the vessel wall is   present. Perivascular lymphocytic infiltrates are seen in the surrounding   adventitia. Immunohistochemistry   with CD3 and CD20 demonstrates a predominantly T-cell lymphocytic   infiltrate within the vessel wall. There is   a mixed infiltrate of T and B lymphocytes perivascular in the adventitia.   CD5 and CD43 have similar staining   patterns to CD3. There is light patchy CD23 staining in the areas positive    for CD20. CD68 highlights the   epithelioid histiocytes within the vessel wall.     The technical component of this testing was completed at the [facility], with the professional component performed    at the [facility], [address] [place] [address],   [place] [address] " [phone])     CPT Codes:   A: 86444-ML4, 08488-WFFS, 43057-CFE, 06287-EBW, 40616-FOA, 56825-DNC,   25089-FTL, 78126-WKU     COLLECTION SITE:   Client: [facility] [place]   Location: [place] ([place])      Bon Michael is a 79 year old male who is being evaluated via a billable telephone visit.       The patient has been notified of following:      \"This telephone visit will be conducted via a call between you and your physician/provider. We have found that certain health care needs can be provided without the need for a physical exam.  This service lets us provide the care you need with a short phone conversation.  If a prescription is necessary we can send it directly to your pharmacy.  If lab work is needed we can place an order for that and you can then stop by our lab to have the test done at a later time.     Telephone visits are billed at different rates depending on your insurance coverage. During this emergency period, for some insurers they may be billed the same as an in-person visit.  Please reach out to your insurance provider with any questions.     If during the course of the call the physician/provider feels a telephone visit is not appropriate, you will not be charged for this service.\"     Patient has given verbal consent for Telephone visit?  Yes     What phone number would you like to be contacted at? [phone]     How would you like to obtain your AVS? Mail a copy     Phone call duration: 45 minutes in total                 "

## 2020-10-08 NOTE — LETTER
10/8/2020       RE: Bon Michael  1923 [facility] [address]     Dear Colleague,    Thank you for referring your patient, Bon Michael, to the [facility]OLOG[facility] [place] at [facility]. Please see a copy of my visit note below.      Outpatient Rheumatology Follow-up  This visit was conducted via synchronous PHONE visit due to the current COVID-19 crisis to reduce patient risk.  Verbal consent was obtained and is documented below.    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 Never had HA, jaw claudication, or elevation in his acute phase reactants per chart review. No PMR symptoms. No inflammatory arthritis symptoms. 9/2/20 labs showed a white blood cell count of 50.2, ESR 28, CRP of less than 5.     We have obtained baseline labs on 9/25 prior to starting IL-6 inhibition which include negative quantiferon gold, hepatitis b serologies, hep C, and HIV. CMP is wnl, Hgb 12.3, platelets 203, lipids wnl, ESR 4, CRP <2.9, normal UA, IL6 <0.70.    CBC showed elevated WBC at 96.1 which decreased to 84.9 on 10/1. Absolute lymphocytes are 78. Per hematologist's note on 10/6-pt does not have new B symptoms or abnormalities on exam. His LDH is stable. Plan is observation and repeat labs in 2 & 4 months.    # Giant cell arteritis  1) Per primary hematologist, there is no contraindication for the use of Actemra given his underlying diagnosis of CLL.   2) Have ordered Actemra, 162mg once weekly injection. Will send message to staff  to help and coordinate home delivery. Patient has not heard yet from pharmacy.  3) Plan to start steroid taper with guidance from GiACTA trial after first injection. Will plan to see him by video visit 1-2 weeks after his first injection, will start prednisone taper at that time.  4) Will try to coordinate that injections be done in clinic with nursing supervision per patient's preference.   5) Continue prednisone 60mg daily for now.  6) Continue bactrim daily for PJP prophylaxis.    7) Continue alendronate for osteopenia along with PPI for GI protection.  8 ) Follow up in 2 weeks after first injection, at this time will start tapering off prednisone.    Severity of this disease discussed at length with the patient, particularly the potential for blindness/ loss of vision. Counseled to seek immediate medical attention should he experience changes in vision. He understands. Spent a significant amount of time discussing the side effects of Actemra including risk of malignancy, serious infections, opportunistic infections, neutropenia/thrombocytopenia, allergic reaction, GI perforation, demyelinating disease, as well as fever, chills, hypercholesterolemia, abdominal pain, dizziness. Patient had the opportunity to ask questions, which were all answered.    # Increased urination  Reports increased urination and dribbling x 1 day now. No other urinary symptoms, fevers, or abdominal pain. This may be from BPH, prostatitis, or UTI (although negative UA on 9/26). Unlikely to be from elevated glucose as his reported glucose numbers were in the 130s. Advised to discuss with PCP. May need repeat UA.      DO [facility] Resident, PGY-2    Staff addendum  I performed the history of the patient and discussed the management with the resident. I reviewed the available lab and imaging studies. I reviewed the resident s note and agree with the documented findings and plan of care.    Bib Alcantara  MD  Rheumatology    I spent a total of 45 minutes face to face with Bon Michael during today s office visit. Over 50% of this time was spent counseling the patient and/or coordinating care regarding giant cell artheritis pathology, treatment, complications.     Subjective:   Interval History:  10/8/20  Today pt feels the same. Denies any change in vision. No headaches or jaw pain. No side effects from the prednisone. No chest pain, cough, SOB, abdominal pain, fevers, joint pains. Does complain of increased urination and dribbling x 1 day. No hematuria, dysuria, trouble urinating, abdominal pain or fevers. Reports a history of enlarged prostate, not on any medications for. Has many questions regarding the side effects of Actemra-especially worried about the possibility of being allergic to it. Has not yet received the medication. No PMR or inflammatory joint pain/ stiffness/ erythema/ edema/ warmth of any joints.    14 point review of systems obtained and negative if not documented above.    HPI from initial consultation 9/25/20  79 year old male with history of CLL, being monitored at this point without an indication for therapy. On Saturday, going to get dirt at a nursery, August 22nd, was driving and noticed that he 'wasn't getting enough light in his eye'. The next day, Sunday, he was continuing to have left eye symptoms. Called the [place] [facility]. Asked for appointment because of these new symptoms. On Tuesday evening, he called into the on call physician due to new complaint of 'light reflecting off the wall into my eye'. He came in the next day, Wednesday, and was sent to the retinal center on Friday.  Had an exam and was told that there was a defect in the retina/artery. Had MRI of the brain and bloodwork on Tuesday. On Thursday, results of the MRI were discussed and the findings were questioned to be due to his known CLL. Possible stroke he was told due to viscosity increase? On Friday, 9/4/20, had  worsening symptoms in the right eye. So came back in to the retinal specialists. At that time there was consideration of sending to [facility], though because of the long expected wait, was instead started on 100mg of prednisone daily. 9/9/20 went down to 60mg of prednisone, with plan to go down to 50mg of 10/7/20. Was setup for biopsy on 9/10.     Denies any headaches. No jaw pain, even when chewing tough food. Feels that he has had some weight loss over the last few months unintentionally (139-130 over a period of 1 month). No fatigue, night sweats. No rashes. No chest pain, cough, shortness of breath. No abdominal pain. No joint pain. Has noticed that his stool is less formed. Not diarrhea, just softer. No blood. No issues with urination. No swelling in lower extremities. No raynauds. No double vision.     Past Medical History  CLL  Hernia  Mono   Deviated septum  Osteopenia by DEXA    Past Surgical History  Tonsillectomy  Deviated septum    Medications  Prednisone 60mg daily  Aasocort  ASA  Alendronate  Actemra     Allergies: Seasonal/ environmental allergies    Family History: No family history of autoimmune diseases like RA, sjogrens, SLE, scleroderma, IBD.    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 No drug use.        Objective:    Physical exam:  No vitals/exam for today's phone visit    Labs:   9/25/2020    Orders Only on 09/25/2020   Component Date Value Ref Range Status     Color Urine 09/25/2020 Yellow   Final     Appearance Urine 09/25/2020 Clear   Final     Glucose Urine 09/25/2020 Negative  NEG^Negative mg/dL Final     Bilirubin Urine 09/25/2020 Negative  NEG^Negative Final     Ketones Urine 09/25/2020 Negative  NEG^Negative mg/dL Final     Specific Gravity Urine 09/25/2020 >1.030  1.003 - 1.035 Final     pH Urine 09/25/2020 5.0  5.0 - 7.0 pH Final     Protein Albumin Urine 09/25/2020 Negative  NEG^Negative mg/dL Final     Urobilinogen Urine 09/25/2020 0.2  0.2 - 1.0 EU/dL Final      Nitrite Urine 09/25/2020 Negative  NEG^Negative Final     Blood Urine 09/25/2020 Small* NEG^Negative Final     Leukocyte Esterase Urine 09/25/2020 Negative  NEG^Negative Final     Source 09/25/2020 Midstream Urine   Final     WBC Urine 09/25/2020 0 - 5  OTO5^0 - 5 /HPF Final     RBC Urine 09/25/2020 O - 2  OTO2^O - 2 /HPF Final     CRP Inflammation 09/25/2020 <2.9  0.0 - 8.0 mg/L Final     Interleukin 6 Blood 09/25/2020 <0.70  <3.01 pg/mL Final    Comment: TEST DATE SEPT 26 2020.SERUM  This test has not been FDA approved.  The results are to be used for research   purposes or in attempts to understand the pathophysiology of immune,   infectious, or inflammatory disorders and not intended as the sole means for   clinical diagnosis or patient management.  Assayed at [facility]  [facility], [address] [place] [address],   [place] [address]       Sed Rate 09/25/2020 4  0 - 20 mm/h Final     Cholesterol 09/25/2020 167  <200 mg/dL Final     Triglycerides 09/25/2020 69  <150 mg/dL Final     HDL Cholesterol 09/25/2020 85  >39 mg/dL Final     LDL Cholesterol Calculated 09/25/2020 68  <100 mg/dL Final    Desirable:       <100 mg/dl     Non HDL Cholesterol 09/25/2020 82  <130 mg/dL Final     Sodium 09/25/2020 134  133 - 144 mmol/L Final     Potassium 09/25/2020 4.8  3.4 - 5.3 mmol/L Final     Chloride 09/25/2020 103  94 - 109 mmol/L Final     Carbon Dioxide 09/25/2020 24  20 - 32 mmol/L Final     Anion Gap 09/25/2020 7  3 - 14 mmol/L Final     Glucose 09/25/2020 132* 70 - 99 mg/dL Final     Urea Nitrogen 09/25/2020 29  7 - 30 mg/dL Final     Creatinine 09/25/2020 0.81  0.66 - 1.25 mg/dL Final     GFR Estimate 09/25/2020 85  >60 mL/min/[1.73_m2] Final    Comment: Non  GFR Calc  Starting 12/18/2018, serum creatinine based estimated GFR (eGFR) will be   calculated using the Chronic Kidney Disease Epidemiology Collaboration   (CKD-EPI) equation.       GFR Estimate If Black 09/25/2020 >90  >60  mL/min/[1.73_m2] Final    Comment:  GFR Calc  Starting 12/18/2018, serum creatinine based estimated GFR (eGFR) will be   calculated using the Chronic Kidney Disease Epidemiology Collaboration   (CKD-EPI) equation.       Calcium 09/25/2020 9.2  8.5 - 10.1 mg/dL Final     Bilirubin Total 09/25/2020 0.4  0.2 - 1.3 mg/dL Final     Albumin 09/25/2020 3.5  3.4 - 5.0 g/dL Final     Protein Total 09/25/2020 6.9  6.8 - 8.8 g/dL Final     Alkaline Phosphatase 09/25/2020 50  40 - 150 U/L Final     ALT 09/25/2020 28  0 - 70 U/L Final     AST 09/25/2020 23  0 - 45 U/L Final     WBC 09/25/2020 96.1* 4.0 - 11.0 10e9/L Final    Comment: Critical Value [facility] to and read back [facility]  [facility] 9.25.20       RBC Count 09/25/2020 4.08* 4.4 - 5.9 10e12/L Final     Hemoglobin 09/25/2020 12.6* 13.3 - 17.7 g/dL Final     Hematocrit 09/25/2020 38.7* 40.0 - 53.0 % Final     MCV 09/25/2020 95  78 - 100 fl Final     MCH 09/25/2020 30.9  26.5 - 33.0 pg Final     MCHC 09/25/2020 32.6  31.5 - 36.5 g/dL Final     RDW 09/25/2020 15.9* 10.0 - 15.0 % Final     Platelet Count 09/25/2020 212  150 - 450 10e9/L Final     % Neutrophils 09/25/2020 10.0  % Final     % Lymphocytes 09/25/2020 89.0  % Final     % Monocytes 09/25/2020 1.0  % Final     Anisocytosis 09/25/2020 Slight   Final     Stomatocytes 09/25/2020 Slight   Final     Smudge Cells 09/25/2020 Present   Final     Toxic Granulation 09/25/2020 Present   Final     Platelet Estimate 09/25/2020 Automated count confirmed.  Giant platelets are present.   Final     Diff Method 09/25/2020 Automated Method   Final     HIV Antigen Antibody Combo 09/25/2020 Nonreactive  NR^Nonreactive     Final    HIV-1 p24 Ag & HIV-1/HIV-2 Ab Not Detected     Hep B Surface Agn 09/25/2020 Nonreactive  NR^Nonreactive Final     Hepatitis B Core Alisia 09/25/2020 Nonreactive  NR^Nonreactive Final     MTB Quantiferon Result 09/25/2020 Negative  NEG^Negative Final    Comment: No interferon gamma  response to M.tuberculosis antigens was detected.   Infection with M.tuberculosis is unlikely, however a single negative result   does not exclude infection. In patients at high risk for infection, a second   test should be considered       TB1 Ag minus Nil 09/25/2020 0.03  IU/mL Final     TB2 Ag minus Nil 09/25/2020 0.02  IU/mL Final     Mitogen minus Nil 09/25/2020 1.71  IU/mL Final     NIL Result 09/25/2020 0.04  IU/mL Final     Neutrophil Cytoplasmic Antibody 09/25/2020 <1:10  <1:10 [titer] Final     Neutrophil Cytoplasmic Antibody Pa* 09/25/2020 The ANCA IFA is <1:10.  No further testing will be performed.   Final     9/1/2020  WBC 50.2  HGB 11.6       Imaging:  MR brain and orbits 9/10/20  Impression:    1. Regarding the orbits and globes, there is no definite abnormal  contrast enhancement or mass. No evidence of leukemic infiltration.  2. Regarding the remainder of the brain, abnormal T2 hyperintense bone  marrow signal with associated enhancement in the skull, likely  representing leukemic infiltration.  3. Subcutaneous T2 hyperintensity with associated diffusion  restriction and contrast enhancement over the left zygomatic region,  question leukemic infiltration.    Pathology:  Patient Name: MARIELA WALLACE   MR#: [id number]   Specimen #: [id number]   Collected: 9/10/2020   Received: 9/10/2020   Reported: 9/16/2020 12:35   Ordering Phy(s): AGUS RETANA     For improved result formatting, select 'View Enhanced Report Format' under    Linked Documents section.     SPECIMEN(S):   Temporal artery biopsy, left     FINAL DIAGNOSIS:   Temporal artery, left, biopsy:   1. Granulomatous arteritis consistent with giant cell arteritis.   2. Chronic inflammation of the surrounding adventitia.     COMMENT:   Preliminary results were communicated to Dr. Agus Retana and Dr. Guille Garcia on 9/14/20 at 12:45pm.     I have personally reviewed all specimens and/or slides, including the   listed special  "stains, and used them   with my medical judgement to determine or confirm the final diagnosis.     Electronically signed out by:     Za García M.D., [facility]     CLINICAL HISTORY:   The patient is a 79 year old male with a history of chronic lymphocytic   leukemia who presented with bilateral   sequential optic neuropathy with associated pallid optic disc edema and   choroidal hypoperfusion, but   relatively intact visual acuity, negative elevation of acute phase   reactants, and lack of constitutional   symptoms consistent with giant cell arteritis. He also has findings of   likely leukemic infiltration in the   skull seen on MRI. He undergoes temporal artery biopsy on the left.     GROSS:   The specimen is received in formalin with proper patient identification,   labeled \"left temporal artery\".  The   specimen consists of a 3.2 cm in length by 0.3 cm in diameter tan-white   vessel segment. The specimen is   submitted intact in A1. (Dictated by: PADILLA Montalvo 9/11/2020 03:08   PM)     MICROSCOPIC:   The tissue consists of artery with narrowed lumen and intimal hyperplasia.    There is an infiltrate of   lymphocytes, plasma cells, epithelioid histiocytes, and multinucleated   giant cells throughout all layers of   the artery. There is associated loss of the internal elastic lamina on   elastic stain. Occasional focal   calcifications are seen a the level of the internal elastic lamina.   Neovascularization of the vessel wall is   present. Perivascular lymphocytic infiltrates are seen in the surrounding   adventitia. Immunohistochemistry   with CD3 and CD20 demonstrates a predominantly T-cell lymphocytic   infiltrate within the vessel wall. There is   a mixed infiltrate of T and B lymphocytes perivascular in the adventitia.   CD5 and CD43 have similar staining   patterns to CD3. There is light patchy CD23 staining in the areas positive    for CD20. CD68 highlights the   epithelioid histiocytes within " "the vessel wall.     The technical component of this testing was completed at the [facility], with the professional component performed    at the [facility], [address] [place] [phone] ([phone])     CPT Codes:   A: 31873-LA1, 58597-XWQH, 29516-PTI, 95484-WVT, 31451-HIQ, 13272-YJO,   22831-DOO, 52163-FRA     COLLECTION SITE:   Client: [facility]   Location: [facility]      Bon Michael is a 79 year old male who is being evaluated via a billable telephone visit.       The patient has been notified of following:      \"This telephone visit will be conducted via a call between you and your physician/provider. We have found that certain health care needs can be provided without the need for a physical exam.  This service lets us provide the care you need with a short phone conversation.  If a prescription is necessary we can send it directly to your pharmacy.  If lab work is needed we can place an order for that and you can then stop by our lab to have the test done at a later time.     Telephone visits are billed at different rates depending on your insurance coverage. During this emergency period, for some insurers they may be billed the same as an in-person visit.  Please reach out to your insurance provider with any questions.     If during the course of the call the physician/provider feels a telephone visit is not appropriate, you will not be charged for this service.\"     Patient has given verbal consent for Telephone visit?  Yes     What phone number would you like to be contacted at? [phone]     How would you like to obtain your AVS? Mail a copy     Phone call duration: 45 minutes in total         "

## 2020-10-08 NOTE — PROGRESS NOTES
"Patient will try DOXIMITY, however he is not confident he will be able to do this.    Text to [phone]      Bon Michael is a 79 year old male who is being evaluated via a billable video visit.      The patient has been notified of following:     \"This video visit will be conducted via a call between you and your physician/provider. We have found that certain health care needs can be provided without the need for an in-person physical exam.  This service lets us provide the care you need with a video conversation.  If a prescription is necessary we can send it directly to your pharmacy.  If lab work is needed we can place an order for that and you can then stop by our lab to have the test done at a later time.    Video visits are billed at different rates depending on your insurance coverage.  Please reach out to your insurance provider with any questions.    If during the course of the call the physician/provider feels a video visit is not appropriate, you will not be charged for this service.\"    Patient has given verbal consent for Video visit? Yes  How would you like to obtain your AVS? MyChart    Will anyone else be joining your video visit? No  {If patient encounters technical issues they should call [phone] :[phone]}      Video-Visit Details    Type of service:  Video Visit    Video Start Time: 830am  Video End Time: 9:04 AM    Originating Location (pt. Location): Home    Distant Location (provider location):  [facility] [place]     Platform used for Video Visit: Doximity    Silverio Worley, DO        "

## 2020-10-09 ENCOUNTER — TELEPHONE (OUTPATIENT)
Dept: RHEUMATOLOGY | Facility: CLINIC | Age: 80
End: 2020-10-09

## 2020-10-09 NOTE — TELEPHONE ENCOUNTER
Bon states he still has not heard if Actemra has been approved.  Had questions regarding Actemra / antibiotics.    Told Bon if or when he has to start taking any antibiotics he would need to let us know.    Paige Zhou MSN, RN  Rheumatology RN Care Coordinator   [facility]

## 2020-10-14 NOTE — TELEPHONE ENCOUNTER
Free Drug Application Approved  Effective Dates - until further notice  Patient notified? - Yes  Additional Information See below

## 2020-10-21 ENCOUNTER — OFFICE VISIT (OUTPATIENT)
Dept: OPHTHALMOLOGY | Facility: CLINIC | Age: 80
End: 2020-10-21
Attending: OPHTHALMOLOGY
Payer: MEDICARE

## 2020-10-21 DIAGNOSIS — H53.40 VISUAL FIELD DEFECT: ICD-10-CM

## 2020-10-21 DIAGNOSIS — H46.9 OPTIC NEUROPATHY: Primary | ICD-10-CM

## 2020-10-21 PROCEDURE — 92083 EXTENDED VISUAL FIELD XM: CPT | Performed by: OPHTHALMOLOGY

## 2020-10-21 PROCEDURE — 92014 COMPRE OPH EXAM EST PT 1/>: CPT | Performed by: OPHTHALMOLOGY

## 2020-10-21 PROCEDURE — G0463 HOSPITAL OUTPT CLINIC VISIT: HCPCS | Performed by: TECHNICIAN/TECHNOLOGIST

## 2020-10-21 PROCEDURE — 92133 CPTRZD OPH DX IMG PST SGM ON: CPT | Performed by: OPHTHALMOLOGY

## 2020-10-21 ASSESSMENT — EXTERNAL EXAM - LEFT EYE: OS_EXAM: NORMAL

## 2020-10-21 ASSESSMENT — REFRACTION_WEARINGRX
OD_SPHERE: +3.25
OD_AXIS: 134
OD_CYLINDER: +1.25
OS_SPHERE: +6.25
OS_CYLINDER: +0.50
SPECS_TYPE: PAL
OS_AXIS: 059

## 2020-10-21 ASSESSMENT — TONOMETRY
OS_IOP_MMHG: 15
OD_IOP_MMHG: 17
IOP_METHOD: ICARE

## 2020-10-21 ASSESSMENT — VISUAL ACUITY
CORRECTION_TYPE: GLASSES
OS_CC: 20/20
OD_CC+: -1
METHOD: SNELLEN - LINEAR
OD_CC: 20/20
OS_CC+: -1

## 2020-10-21 ASSESSMENT — CONF VISUAL FIELD
OS_INFERIOR_NASAL_RESTRICTION: 3
OD_INFERIOR_TEMPORAL_RESTRICTION: 3
METHOD: COUNTING FINGERS

## 2020-10-21 ASSESSMENT — CUP TO DISC RATIO
OS_RATIO: 0.3
OD_RATIO: 0.3

## 2020-10-21 ASSESSMENT — SLIT LAMP EXAM - LIDS
COMMENTS: NORMAL
COMMENTS: NORMAL

## 2020-10-21 ASSESSMENT — EXTERNAL EXAM - RIGHT EYE: OD_EXAM: NORMAL

## 2020-10-21 NOTE — LETTER
"2020    RE: Bon Michael  : 1940  MRN: [id number]    Dear Providers,    I saw our mutual patient, Bon Michael, in follow-up in my clinic recently.  After a thorough neuro-ophthalmic history and examination, I came to the following conclusions:     1. 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  visual acuity and color vision today remains normal however his peripheral vision loss is improved but persistent and probably permanent.  Optic disc edema transitioning to mild optic atrophy now as attack resolved.  Immunosuppression now managed by rheumatology.  Patient to start Actemra soon.  Patient is being treated for osteopenia as well as having blood sugars monitored daily.  Follow-up with me in 2 months.  When his dose of prednisone is reduced below 20 mg daily then I will see him more frequently.    2. Chronic lymphocytic leukemia- after some initial concern for possible leukemic infiltration of the skull / optic nerves / orbit, his giant cell arteritis explains all of his clinical sequelae and discussions with neuro-radiology and oncology (Dr. Wall) indicate that his radiographic findings in the skull marrow are simply indications of his untreated (and asymptomatic) chronic lymphocytic leukemia.  At recent virtual follow-up with his oncologist the patient was not felt to require treatment for his asymptomatic chronic lymphocytic leukemia.     - For a more thorough description of the disease presentation, please see my letter from the patient's initial visit with me    Presenting history (2020 visit )    Bon Michael is a 79 year old male who presents to neuro-ophthalmology   clinic today for consultation from Jose Andino MD at [facility] for   visual disturbance of left eye. He describes it as \"his left eye not   getting enough light\". He describes noticing the change in his left eye's   temporal visual field, inferior > superior. He notices " it more first thing   in the morning. These symptoms began on 8/22/2020 and he describes it as   worsening - he is unclear if it the deficit has gotten larger or darker   however. He first noticed it while driving at the periphery of his vision.    Patient saw Dr. Lewis on August 28.  He underwent esr / crp testing as   well as mri (see below).    Labs: 9/2/20- WBC- 50.2, ESR- 28, CRP < 5    On August 31 he developed symptoms of waviness in his peripheral vision in   the right eye.  He went to Dr. Andino on 9/4/2020 who wanted him to go to   the ED but he was unable.  Dr. Andino reviewed the patient's fluorescein   angiography and felt there was choroidal filling delay in the right eye   and pallid edema in the right eye concerning for giant cell arteritis.  We   discussed the case via phone.     Patient received 100 mg prednisone starting 9/4/20 and has been on 80 mg   prednisone daily since. When he started prednisone, he stopped taking   daily baby aspirin out of concern for potential GI upset side effects   after discussing this with the pharmacist. He has no history of GI upset   or ulcers.    Denies headache, jaw pain, temporal tenderness, issues with chewing,   swallowing, eye pain, sleep apnea,  He denies polymyalgia rheumatica   symptoms but has had 6-8 lbs of fluctuation in the past couple of months.    PRIOR IMAGING:  BRAIN MRI WITH/WO GADOLINIUM, MRA OF THE [place] OF [place]ANCHE[place] AND MRA OF THE   CAROTID ARTERIES - [facility] (9/1/2020)  IMPRESSION:    1.  No acute intracranial abnormality.  2.  No evidence of an orbital abnormalities.  3.  Mild generalized cerebral atrophy.  4.  Unremarkable MRA of the [place] of [place].  5.  Unremarkable MRA of the cervical vessels.    On my re-review I disagree with the radiology read.  There is evidence of   patchy enhancement diffusely within the bilateral orbits and along the   bilateral intraorbital optic nerve sheaths.  Will discuss with   neuro-radiology  regarding finding and repeat scan.    No bisphosphonates in the past (no osteoporosis medications for 7-8 years   per patient).     Left temporal artery biopsy 9/9/20- positive for giant cell arteritis   Prednisone (started 9/4/2020)    INTERIM HISTORY    Last seen- 9/23/2020     Medications: Prednisone   , aspirin 81 mg daily    ANCA panel negative 9/25/20.    Patient saw Dr. Ingram with rheumatology virtually on 10/8/20.  Started on Actemra after confirmation with oncology that not contraindicated in CLL. Started 162 mg once weekly injection.  Prednisone continued on 60 mg daily for now.  Started on bactrim for Pjp prophylaxis.  On alendronate for osteopenia and ppi for GI protection.     Saw Dr. Wall with oncology on 10/6/20 who felt that his CLL still did not require treatment.  He had no concerns with using Actemra.      Patient to start first infusion of Actemra next Wednesday.    Today the patient reports vision is the same.    Visual function today centrally (color vision and visual acuity) remains normal in both eyes.  Octopus automated 30 degree visual fields today show persistent but slightly improved field defects as compared to last visit (more improvement in the left eye than in the right eye).  OCT of the optic nerve head revealed interval resolution of thickening with development of retinal nerve fiber layer thinning / atrophy more in the left eye than in the right eye.    Plan:  1. Continue prednisone as per rheumatology  2. Start Actemra as per rheumatology  3. Follow-up with me in 2 months or sooner as needed  4.  I will follow the patient more closely when his daily dose of prednisone is reduced below 20 mg.    Letter to Oncologist and rheumatologist  Dr. Wall and Dr. Alcantara      For further exam details, please feel free to contact our office for additional records.  If you wish to contact me regarding this patient please email me at [email or web address] or give my clinic a call to arrange a  phone conversation.    Sincerely,    Guille Garcia MD  , Neuro-Ophthalmology and Adult Strabismus Surgery  The Lucy Castano Chair in Neuro-Ophthalmology  Department of Ophthalmology and Visual Neurosciences  [facility]

## 2020-10-21 NOTE — PROGRESS NOTES
"     1. 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  visual acuity and color vision today remains normal however his peripheral vision loss is improved but persistent and probably permanent.  Optic disc edema transitioning to mild optic atrophy now as attack resolved.  Immunosuppression now managed by rheumatology.  Patient to start Actemra soon.  Patient is being treated for osteopenia as well as having blood sugars monitored daily.  Follow-up with me in 2 months.  When his dose of prednisone is reduced below 20 mg daily then I will see him more frequently.    2. Chronic lymphocytic leukemia- after some initial concern for possible leukemic infiltration of the skull / optic nerves / orbit, his giant cell arteritis explains all of his clinical sequelae and discussions with neuro-radiology and oncology (Dr. Wall) indicate that his radiographic findings in the skull marrow are simply indications of his untreated (and asymptomatic) chronic lymphocytic leukemia.  At recent virtual follow-up with his oncologist the patient was not felt to require treatment for his asymptomatic chronic lymphocytic leukemia.     - For a more thorough description of the disease presentation, please see my letter from the patient's initial visit with me    Presenting history (9/9/2020 visit )    Bon Michael is a 79 year old male who presents to neuro-ophthalmology   clinic today for consultation from Jose Andino MD at [facility] for   visual disturbance of left eye. He describes it as \"his left eye not   getting enough light\". He describes noticing the change in his left eye's   temporal visual field, inferior > superior. He notices it more first thing   in the morning. These symptoms began on 8/22/2020 and he describes it as   worsening - he is unclear if it the deficit has gotten larger or darker   however. He first noticed it while driving at the periphery of his vision.    Patient saw Dr. Lewis " on August 28.  He underwent esr / crp testing as   well as mri (see below).    Labs: 9/2/20- WBC- 50.2, ESR- 28, CRP < 5    On August 31 he developed symptoms of waviness in his peripheral vision in   the right eye.  He went to Dr. Andino on 9/4/2020 who wanted him to go to   the ED but he was unable.  Dr. Andino reviewed the patient's fluorescein   angiography and felt there was choroidal filling delay in the right eye   and pallid edema in the right eye concerning for giant cell arteritis.  We   discussed the case via phone.     Patient received 100 mg prednisone starting 9/4/20 and has been on 80 mg   prednisone daily since. When he started prednisone, he stopped taking   daily baby aspirin out of concern for potential GI upset side effects   after discussing this with the pharmacist. He has no history of GI upset   or ulcers.    Denies headache, jaw pain, temporal tenderness, issues with chewing,   swallowing, eye pain, sleep apnea,  He denies polymyalgia rheumatica   symptoms but has had 6-8 lbs of fluctuation in the past couple of months.    PRIOR IMAGING:  BRAIN MRI WITH/WO GADOLINIUM, MRA OF THE [place] OF [place]ANCHE[place] AND MRA OF THE   CAROTID ARTERIES - [facility] (9/1/2020)  IMPRESSION:    1.  No acute intracranial abnormality.  2.  No evidence of an orbital abnormalities.  3.  Mild generalized cerebral atrophy.  4.  Unremarkable MRA of the [place] of [place].  5.  Unremarkable MRA of the cervical vessels.    On my re-review I disagree with the radiology read.  There is evidence of   patchy enhancement diffusely within the bilateral orbits and along the   bilateral intraorbital optic nerve sheaths.  Will discuss with   neuro-radiology regarding finding and repeat scan.    No bisphosphonates in the past (no osteoporosis medications for 7-8 years   per patient).     Left temporal artery biopsy 9/9/20- positive for giant cell arteritis   Prednisone (started 9/4/2020)    INTERIM HISTORY    Last seen-  9/23/2020     Medications: Prednisone   , aspirin 81 mg daily    ANCA panel negative 9/25/20.    Patient saw Dr. Ingram with rheumatology virtually on 10/8/20.  Started on Actemra after confirmation with oncology that not contraindicated in CLL. Started 162 mg once weekly injection.  Prednisone continued on 60 mg daily for now.  Started on bactrim for Pjp prophylaxis.  On alendronate for osteopenia and ppi for GI protection.     Saw Dr. Wall with oncology on 10/6/20 who felt that his CLL still did not require treatment.  He had no concerns with using Actemra.      Patient to start first infusion of Actemra next Wednesday.    Today the patient reports vision is the same.    Visual function today centrally (color vision and visual acuity) remains normal in both eyes.  Octopus automated 30 degree visual fields today show persistent but slightly improved field defects as compared to last visit (more improvement in the left eye than in the right eye).  OCT of the optic nerve head revealed interval resolution of thickening with development of retinal nerve fiber layer thinning / atrophy more in the left eye than in the right eye.    Plan:  1. Continue prednisone as per rheumatology  2. Start Actemra as per rheumatology  3. Follow-up with me in 2 months or sooner as needed  4.  I will follow the patient more closely when his daily dose of prednisone is reduced below 20 mg.    Letter to Oncologist and rheumatologist  Dr. Wall and Dr. Alcantara       Complete documentation of historical and exam elements from today's encounter can be found in the full encounter summary report (not reduplicated in this progress note).  I personally obtained the chief complaint(s) and history of present illness.  I confirmed and edited as necessary the review of systems, past medical/surgical history, family history, social history, and examination findings as documented by others; and I examined the patient myself.  I personally  reviewed the relevant tests, images, and reports as documented above.  I formulated and edited as necessary the assessment and plan and discussed the findings and management plan with the patient and family     Guille Garcia MD

## 2020-10-28 ENCOUNTER — ALLIED HEALTH/NURSE VISIT (OUTPATIENT)
Dept: TRANSPLANT | Facility: CLINIC | Age: 80
End: 2020-10-28
Payer: MEDICARE

## 2020-10-28 VITALS
SYSTOLIC BLOOD PRESSURE: 146 MMHG | OXYGEN SATURATION: 98 % | HEART RATE: 92 BPM | DIASTOLIC BLOOD PRESSURE: 79 MMHG | TEMPERATURE: 98.4 F

## 2020-10-28 DIAGNOSIS — M31.6 GIANT CELL ARTERITIS (H): Primary | ICD-10-CM

## 2020-10-28 ASSESSMENT — PAIN SCALES - GENERAL: PAINLEVEL: NO PAIN (0)

## 2020-10-28 NOTE — PROGRESS NOTES
Relevant Diagnosis:  GCA    Teaching Topic:  Self Injection of Actemra    Person(s) involved in teaching:  Patient    Motivation Level:   Asks Questions:   Yes  Eager to Learn:  Yes  Cooperative:  Yes  Receptive (willing/able to accept information):  Yes  Comments: Reviewed injection protocol with patient    Patient demonstrates understanding of the following:  Reason for the appointment, diagnosis and treatment plan:  Yes  Knowledge of proper use of medications and conditions for which they are ordered (with special attention to potential side effects or drug interactions):  Yes  Which situations necessitate calling provider and whom to contact:  Yes (signs of infection, temp > 101, recurrent bouts of illness or infection or if patient has been put on an antibiotic)    Teaching Concerns:  No.  Patient completed self injection of Actemra using good technique with minimal coaching using his own supply of medication.     Proper use and care of Medication:  Yes, Syringes:  Yes and Sharps container disposal:  Yes  Nutritional needs and diet plan:  N/A  Pain management techniques:  N/A  Patient instructed on hand hygiene:  Yes  How and/when to access community resources:  Yes      Infection Prevention:  Patient demonstrates understanding of the following:  Signs and symptoms of infection taught:  Yes      Instructional Materials Used/Given: None      Time spent teaching with patient:  Spent 60 minutes with patient teaching proper technique and observation of patient performing self injection. Answered all of the patient s questions to his satisfaction.    Candis Montero RN  Rheumatology Clinic

## 2020-10-30 ENCOUNTER — TELEPHONE (OUTPATIENT)
Dept: RHEUMATOLOGY | Facility: CLINIC | Age: 80
End: 2020-10-30

## 2020-10-30 NOTE — TELEPHONE ENCOUNTER
Pt called into clinic and left message to report that he does not have an injection site reaction. He is slightly concerned due to this.  He also is reporting that he has a history or iritis.  He has some other aches and pains that he has not had before, reports that they are slight.      Called and left message letting pt know that he should not have any injection site reaction and the fact that he has nothing is good.      Will update Dr. Alcantara that he has a history of iritis.    Also can discuss aches on Monday, should not be from Actemra.    Candis Montero RN  Rheumatology Clinic

## 2020-11-04 ENCOUNTER — OFFICE VISIT (OUTPATIENT)
Dept: RHEUMATOLOGY | Facility: CLINIC | Age: 80
End: 2020-11-04
Attending: INTERNAL MEDICINE
Payer: MEDICARE

## 2020-11-04 VITALS
DIASTOLIC BLOOD PRESSURE: 78 MMHG | HEIGHT: 67 IN | WEIGHT: 146.1 LBS | HEART RATE: 96 BPM | BODY MASS INDEX: 22.93 KG/M2 | SYSTOLIC BLOOD PRESSURE: 148 MMHG | TEMPERATURE: 98.2 F | OXYGEN SATURATION: 97 %

## 2020-11-04 DIAGNOSIS — M31.6 GIANT CELL ARTERITIS (H): ICD-10-CM

## 2020-11-04 DIAGNOSIS — R79.9 ABNORMAL FINDING OF BLOOD CHEMISTRY, UNSPECIFIED: ICD-10-CM

## 2020-11-04 DIAGNOSIS — M31.6 GIANT CELL ARTERITIS (H): Primary | ICD-10-CM

## 2020-11-04 LAB
ALBUMIN SERPL-MCNC: 3.4 G/DL (ref 3.4–5)
ALBUMIN UR-MCNC: NEGATIVE MG/DL
ALP SERPL-CCNC: 48 U/L (ref 40–150)
ALT SERPL W P-5'-P-CCNC: 33 U/L (ref 0–70)
ANION GAP SERPL CALCULATED.3IONS-SCNC: 3 MMOL/L (ref 3–14)
APPEARANCE UR: ABNORMAL
AST SERPL W P-5'-P-CCNC: 24 U/L (ref 0–45)
BASOPHILS # BLD AUTO: 0 10E9/L (ref 0–0.2)
BASOPHILS NFR BLD AUTO: 0 %
BILIRUB SERPL-MCNC: 0.4 MG/DL (ref 0.2–1.3)
BILIRUB UR QL STRIP: NEGATIVE
BUN SERPL-MCNC: 26 MG/DL (ref 7–30)
CALCIUM SERPL-MCNC: 8.8 MG/DL (ref 8.5–10.1)
CAOX CRY #/AREA URNS HPF: ABNORMAL /HPF
CHLORIDE SERPL-SCNC: 102 MMOL/L (ref 94–109)
CHOLEST SERPL-MCNC: 165 MG/DL
CO2 SERPL-SCNC: 30 MMOL/L (ref 20–32)
COLOR UR AUTO: YELLOW
CREAT SERPL-MCNC: 0.94 MG/DL (ref 0.66–1.25)
CRP SERPL-MCNC: <2.9 MG/L (ref 0–8)
DIFFERENTIAL METHOD BLD: ABNORMAL
EOSINOPHIL # BLD AUTO: 0 10E9/L (ref 0–0.7)
EOSINOPHIL NFR BLD AUTO: 0 %
ERYTHROCYTE [DISTWIDTH] IN BLOOD BY AUTOMATED COUNT: 14.3 % (ref 10–15)
ERYTHROCYTE [SEDIMENTATION RATE] IN BLOOD BY WESTERGREN METHOD: 4 MM/H (ref 0–20)
GFR SERPL CREATININE-BSD FRML MDRD: 77 ML/MIN/{1.73_M2}
GLUCOSE SERPL-MCNC: 122 MG/DL (ref 70–99)
GLUCOSE UR STRIP-MCNC: NEGATIVE MG/DL
HCT VFR BLD AUTO: 39.2 % (ref 40–53)
HDLC SERPL-MCNC: 100 MG/DL
HGB BLD-MCNC: 12.1 G/DL (ref 13.3–17.7)
HGB UR QL STRIP: ABNORMAL
KETONES UR STRIP-MCNC: NEGATIVE MG/DL
LDLC SERPL CALC-MCNC: 55 MG/DL
LEUKOCYTE ESTERASE UR QL STRIP: NEGATIVE
LYMPHOCYTES # BLD AUTO: 53.5 10E9/L (ref 0.8–5.3)
LYMPHOCYTES NFR BLD AUTO: 81 %
MCH RBC QN AUTO: 29 PG (ref 26.5–33)
MCHC RBC AUTO-ENTMCNC: 30.9 G/DL (ref 31.5–36.5)
MCV RBC AUTO: 94 FL (ref 78–100)
MONOCYTES # BLD AUTO: 0 10E9/L (ref 0–1.3)
MONOCYTES NFR BLD AUTO: 0 %
MUCOUS THREADS #/AREA URNS LPF: PRESENT /LPF
NEUTROPHILS # BLD AUTO: 12.6 10E9/L (ref 1.6–8.3)
NEUTROPHILS NFR BLD AUTO: 19 %
NITRATE UR QL: NEGATIVE
NONHDLC SERPL-MCNC: 65 MG/DL
PH UR STRIP: 5 PH (ref 5–7)
PLATELET # BLD AUTO: 220 10E9/L (ref 150–450)
POTASSIUM SERPL-SCNC: 5 MMOL/L (ref 3.4–5.3)
PROT SERPL-MCNC: 6.2 G/DL (ref 6.8–8.8)
RBC # BLD AUTO: 4.17 10E12/L (ref 4.4–5.9)
RBC #/AREA URNS AUTO: 7 /HPF (ref 0–2)
SODIUM SERPL-SCNC: 136 MMOL/L (ref 133–144)
SOURCE: ABNORMAL
SP GR UR STRIP: 1.02 (ref 1–1.03)
SQUAMOUS #/AREA URNS AUTO: <1 /HPF (ref 0–1)
TRIGL SERPL-MCNC: 52 MG/DL
UROBILINOGEN UR STRIP-MCNC: 0 MG/DL (ref 0–2)
WBC # BLD AUTO: 66.1 10E9/L (ref 4–11)
WBC #/AREA URNS AUTO: 2 /HPF (ref 0–5)

## 2020-11-04 PROCEDURE — 80061 LIPID PANEL: CPT | Performed by: PATHOLOGY

## 2020-11-04 PROCEDURE — 80053 COMPREHEN METABOLIC PANEL: CPT | Performed by: PATHOLOGY

## 2020-11-04 PROCEDURE — 85025 COMPLETE CBC W/AUTO DIFF WBC: CPT | Performed by: PATHOLOGY

## 2020-11-04 PROCEDURE — G0463 HOSPITAL OUTPT CLINIC VISIT: HCPCS

## 2020-11-04 PROCEDURE — 85652 RBC SED RATE AUTOMATED: CPT | Performed by: PATHOLOGY

## 2020-11-04 PROCEDURE — 86140 C-REACTIVE PROTEIN: CPT | Performed by: PATHOLOGY

## 2020-11-04 PROCEDURE — 81001 URINALYSIS AUTO W/SCOPE: CPT | Performed by: PATHOLOGY

## 2020-11-04 PROCEDURE — 99214 OFFICE O/P EST MOD 30 MIN: CPT | Performed by: INTERNAL MEDICINE

## 2020-11-04 PROCEDURE — 36415 COLL VENOUS BLD VENIPUNCTURE: CPT | Performed by: PATHOLOGY

## 2020-11-04 RX ORDER — PREDNISONE 10 MG/1
TABLET ORAL
Qty: 154 TABLET | Refills: 0 | Status: SHIPPED | OUTPATIENT
Start: 2020-11-04 | End: 2020-12-23

## 2020-11-04 RX ORDER — ALENDRONATE SODIUM 70 MG/1
TABLET ORAL
COMMUNITY
Start: 2020-09-22 | End: 2023-11-02

## 2020-11-04 ASSESSMENT — PAIN SCALES - GENERAL: PAINLEVEL: NO PAIN (0)

## 2020-11-04 ASSESSMENT — MIFFLIN-ST. JEOR: SCORE: 1346.34

## 2020-11-04 NOTE — PATIENT INSTRUCTIONS
1) Continue on actemra once weekly  2) this is an outline of the prednisone plan  Weeks Daily prednisone dose (mg) 26-week taper Weekly 162mg Actemra      1 60    2 50    3 40    4 35    5 30    6 25    7 20    8 15    9 12.5    10 12.5    11 10    12 9    13 8    14 7    15 6    16 6    17 5    18 5    19 4    20 4    21 3    22 3    23 2    24 2    25 1    26 1      Continue bactrim once per day    Labs today    I will see you for phone follow-up in 3 weeks. Please have labs done a few days prior.    Do not hesitate to reach out with any questions.    Thank you  Bib Alcantara MD  Rheumatology

## 2020-11-04 NOTE — LETTER
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 Never had HA, jaw claudication, PMR symptoms. No inflammatory arthritis symptoms. Disease currently in remission by history, exam, labs on 60mg prednisone and actemra x2 injections. Will start taper with close phone and lab follow-up.    # Giant cell arteritis  1) continue 162mg once weekly injection  2) Prednisone plan:  -Decrease to 50mg daily starting tomorrow. Continue on this dose x1 week, then decrease to 40mg daily x1 week, then decrease to 35mg daily x1 week, then 30mg daily x1 week, then 25mg daily x1 week, then 20mg daily. Further taper plan to follow  3) Continue bactrim daily for PJP prophylaxis.    4) Continue alendronate for osteopenia along with PPI for GI protection  5) Labs today. Follow-up in 3 weeks by phone visit with labs a few days prior    Severity of this disease discussed at length with the patient, particularly the potential for blindness/ loss of vision. Counseled to seek immediate medical attention should he experience changes in vision. He understands. Spent a significant amount of time discussing the side effects of Actemra including risk of malignancy, serious infections resulting in death, opportunistic infections, neutropenia/thrombocytopenia, allergic reaction, GI perforation, demyelinating disease, as well as fever, chills, hypercholesterolemia, abdominal pain, dizziness. Patient had the  opportunity to ask questions, which were all answered.    Bib Alcantara MD  Rheumatology    I spent a total of 30 minutes face to face with Bon Michael during today s office visit. Over 50% of this time was spent counseling the patient and/or coordinating care regarding giant cell artheritis pathology, treatment, complications.     Subjective:   Interval History:  11/4/2020  No interval change in vision from his last appointment. No HA or jaw claudication symptoms. No stiffness or pain in the proximal muscle groups. No jaw pain/erythema/edema/warmth. Tolerating the prednisone without recognizable side effects. Has been checking his glucose at home which has been <150 on all values shown to me. No fever, chills, weight loss. No interval infection. No injection site reactions. Has had injection done under nurse supervision today and last week, though plan on doing I himself moving forward as he has not had any issues. No new rash. No chest pain, shortness of breath. No abdominal pain.     14 point review of systems obtained and negative if not documented above.    HPI from initial consultation 9/25/20  79 year old male with history of CLL, being monitored at this point without an indication for therapy. On Saturday, going to get dirt at a nursery, August 22nd, was driving and noticed that he 'wasn't getting enough light in his eye'. The next day, Sunday, he was continuing to have left eye symptoms. Called the [facility]. Asked for appointment because of these new symptoms. On Tuesday evening, he called into the on call physician due to new complaint of 'light reflecting off the wall into my eye'. He came in the next day, Wednesday, and was sent to the retinal center on Friday.  Had an exam and was told that there was a defect in the retina/artery. Had MRI of the brain and bloodwork on Tuesday. On Thursday, results of the MRI were discussed and the findings were questioned to be due to his known CLL.  "Possible stroke he was told due to viscosity increase? On Friday, 9/4/20, had worsening symptoms in the right eye. So came back in to the retinal specialists. At that time there was consideration of sending to [place] ED, though because of the long expected wait, was instead started on 100mg of prednisone daily. 9/9/20 went down to 60mg of prednisone, with plan to go down to 50mg of 10/7/20. Was setup for biopsy on 9/10.     Denies any headaches. No jaw pain, even when chewing tough food. Feels that he has had some weight loss over the last few months unintentionally (139-130 over a period of 1 month). No fatigue, night sweats. No rashes. No chest pain, cough, shortness of breath. No abdominal pain. No joint pain. Has noticed that his stool is less formed. Not diarrhea, just softer. No blood. No issues with urination. No swelling in lower extremities. No raynauds. No double vision.     Past Medical History  CLL  Hernia  Mono   Deviated septum  Osteopenia by DEXA    Past Surgical History  Tonsillectomy  Deviated septum    Medications  Prednisone 60mg daily  Aasocort  ASA  Alendronate  Actemra  PPI    Weeks Daily prednisone dose (mg) 26-week taper Weekly 162mg Actemra      1 60    2 50 11/5/2020    3 40    4 35    5 30    6 25    7 20    8 15    9 12.5    10 12.5    11 10    12 9    13 8    14 7    15 6    16 6    17 5    18 5    19 4    20 4    21 3    22 3    23 2    24 2    25 1    26 1      Allergies: Seasonal/ environmental allergies    Family History: No family history of autoimmune diseases like RA, sjogrens, SLE, scleroderma, IBD.    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 No drug use.        Objective:    Physical exam:  BP (!) 148/78   Pulse 96   Temp 98.2  F (36.8  C) (Oral)   Ht 1.702 m (5' 7\")   Wt 66.3 kg (146 lb 1.6 oz)   SpO2 97%   BMI 22.88 kg/m    GEN: NAD sitting up unassisted  HEENT: no facial rash, sclera clear, wearing N95 and glasses. No temporal tenderness. No jaw " pain with opening/ closing mouth  CV: rrr no m/r/g  Pulm: CTAB no crackles wheezing rhonchi  Abdomen: no tenderness to palpation  MSK: full active and passive ROM without pain. No synovitis appreciated.    Labs:   10/1/2020  WBC 84.9  HGB 12.3    IgM 36 low  IgA 124 normal  IgG 625    Cr 0.98    9/25/2020  Orders Only on 09/25/2020   Component Date Value Ref Range Status     Color Urine 09/25/2020 Yellow   Final     Appearance Urine 09/25/2020 Clear   Final     Glucose Urine 09/25/2020 Negative  NEG^Negative mg/dL Final     Bilirubin Urine 09/25/2020 Negative  NEG^Negative Final     Ketones Urine 09/25/2020 Negative  NEG^Negative mg/dL Final     Specific Gravity Urine 09/25/2020 >1.030  1.003 - 1.035 Final     pH Urine 09/25/2020 5.0  5.0 - 7.0 pH Final     Protein Albumin Urine 09/25/2020 Negative  NEG^Negative mg/dL Final     Urobilinogen Urine 09/25/2020 0.2  0.2 - 1.0 EU/dL Final     Nitrite Urine 09/25/2020 Negative  NEG^Negative Final     Blood Urine 09/25/2020 Small* NEG^Negative Final     Leukocyte Esterase Urine 09/25/2020 Negative  NEG^Negative Final     Source 09/25/2020 Midstream Urine   Final     WBC Urine 09/25/2020 0 - 5  OTO5^0 - 5 /HPF Final     RBC Urine 09/25/2020 O - 2  OTO2^O - 2 /HPF Final     CRP Inflammation 09/25/2020 <2.9  0.0 - 8.0 mg/L Final     Interleukin 6 Blood 09/25/2020 <0.70  <3.01 pg/mL Final    Comment: TEST DATE SEPT 26 2020.SERUM  This test has not been FDA approved.  The results are to be used for research   purposes or in attempts to understand the pathophysiology of immune,   infectious, or inflammatory disorders and not intended as the sole means for   clinical diagnosis or patient management.  Assayed at [facility],  [place], [address],   [place] [address]       Sed Rate 09/25/2020 4  0 - 20 mm/h Final     Cholesterol 09/25/2020 167  <200 mg/dL Final     Triglycerides 09/25/2020 69  <150 mg/dL Final     HDL Cholesterol 09/25/2020 85   >39 mg/dL Final     LDL Cholesterol Calculated 09/25/2020 68  <100 mg/dL Final    Desirable:       <100 mg/dl     Non HDL Cholesterol 09/25/2020 82  <130 mg/dL Final     Sodium 09/25/2020 134  133 - 144 mmol/L Final     Potassium 09/25/2020 4.8  3.4 - 5.3 mmol/L Final     Chloride 09/25/2020 103  94 - 109 mmol/L Final     Carbon Dioxide 09/25/2020 24  20 - 32 mmol/L Final     Anion Gap 09/25/2020 7  3 - 14 mmol/L Final     Glucose 09/25/2020 132* 70 - 99 mg/dL Final     Urea Nitrogen 09/25/2020 29  7 - 30 mg/dL Final     Creatinine 09/25/2020 0.81  0.66 - 1.25 mg/dL Final     GFR Estimate 09/25/2020 85  >60 mL/min/[1.73_m2] Final    Comment: Non  GFR Calc  Starting 12/18/2018, serum creatinine based estimated GFR (eGFR) will be   calculated using the Chronic Kidney Disease Epidemiology Collaboration   (CKD-EPI) equation.       GFR Estimate If Black 09/25/2020 >90  >60 mL/min/[1.73_m2] Final    Comment:  GFR Calc  Starting 12/18/2018, serum creatinine based estimated GFR (eGFR) will be   calculated using the Chronic Kidney Disease Epidemiology Collaboration   (CKD-EPI) equation.       Calcium 09/25/2020 9.2  8.5 - 10.1 mg/dL Final     Bilirubin Total 09/25/2020 0.4  0.2 - 1.3 mg/dL Final     Albumin 09/25/2020 3.5  3.4 - 5.0 g/dL Final     Protein Total 09/25/2020 6.9  6.8 - 8.8 g/dL Final     Alkaline Phosphatase 09/25/2020 50  40 - 150 U/L Final     ALT 09/25/2020 28  0 - 70 U/L Final     AST 09/25/2020 23  0 - 45 U/L Final     WBC 09/25/2020 96.1* 4.0 - 11.0 10e9/L Final    Comment: Critical Value called to and read back by  TRAVIS AT [facility] [address] 9.25.20       RBC Count 09/25/2020 4.08* 4.4 - 5.9 10e12/L Final     Hemoglobin 09/25/2020 12.6* 13.3 - 17.7 g/dL Final     Hematocrit 09/25/2020 38.7* 40.0 - 53.0 % Final     MCV 09/25/2020 95  78 - 100 fl Final     MCH 09/25/2020 30.9  26.5 - 33.0 pg Final     MCHC 09/25/2020 32.6  31.5 - 36.5 g/dL Final     RDW 09/25/2020  15.9* 10.0 - 15.0 % Final     Platelet Count 09/25/2020 212  150 - 450 10e9/L Final     % Neutrophils 09/25/2020 10.0  % Final     % Lymphocytes 09/25/2020 89.0  % Final     % Monocytes 09/25/2020 1.0  % Final     Anisocytosis 09/25/2020 Slight   Final     Stomatocytes 09/25/2020 Slight   Final     Smudge Cells 09/25/2020 Present   Final     Toxic Granulation 09/25/2020 Present   Final     Platelet Estimate 09/25/2020 Automated count confirmed.  Giant platelets are present.   Final     Diff Method 09/25/2020 Automated Method   Final     HIV Antigen Antibody Combo 09/25/2020 Nonreactive  NR^Nonreactive     Final    HIV-1 p24 Ag & HIV-1/HIV-2 Ab Not Detected     Hep B Surface Agn 09/25/2020 Nonreactive  NR^Nonreactive Final     Hepatitis B Core Alisia 09/25/2020 Nonreactive  NR^Nonreactive Final     MTB Quantiferon Result 09/25/2020 Negative  NEG^Negative Final    Comment: No interferon gamma response to M.tuberculosis antigens was detected.   Infection with M.tuberculosis is unlikely, however a single negative result   does not exclude infection. In patients at high risk for infection, a second   test should be considered       TB1 Ag minus Nil 09/25/2020 0.03  IU/mL Final     TB2 Ag minus Nil 09/25/2020 0.02  IU/mL Final     Mitogen minus Nil 09/25/2020 1.71  IU/mL Final     NIL Result 09/25/2020 0.04  IU/mL Final     Neutrophil Cytoplasmic Antibody 09/25/2020 <1:10  <1:10 [titer] Final     Neutrophil Cytoplasmic Antibody Pa* 09/25/2020 The ANCA IFA is <1:10.  No further testing will be performed.   Final     9/1/2020  WBC 50.2  HGB 11.6       Imaging:  MR brain and orbits 9/10/20  Impression:    1. Regarding the orbits and globes, there is no definite abnormal  contrast enhancement or mass. No evidence of leukemic infiltration.  2. Regarding the remainder of the brain, abnormal T2 hyperintense bone  marrow signal with associated enhancement in the skull, likely  representing leukemic infiltration.  3.  "Subcutaneous T2 hyperintensity with associated diffusion  restriction and contrast enhancement over the left zygomatic region,  question leukemic infiltration.    Pathology:  Patient Name: MARIELA WALLACE   MR#: [id number]   Specimen #: [id number]   Collected: 9/10/2020   Received: 9/10/2020   Reported: 9/16/2020 12:35   Ordering Phy(s): VALERIE CARRINGTON     For improved result formatting, select 'View Enhanced Report Format' under    Linked Documents section.     SPECIMEN(S):   Temporal artery biopsy, left     FINAL DIAGNOSIS:   Temporal artery, left, biopsy:   1. Granulomatous arteritis consistent with giant cell arteritis.   2. Chronic inflammation of the surrounding adventitia.     COMMENT:   Preliminary results were communicated to Dr. Valerie Carrington and Dr. Guille Garcia on 9/14/20 at 12:45pm.     I have personally reviewed all specimens and/or slides, including the   listed special stains, and used them   with my medical judgement to determine or confirm the final diagnosis.     Electronically signed out by:     Za García M.D., [facility]     CLINICAL HISTORY:   The patient is a 79 year old male with a history of chronic lymphocytic   leukemia who presented with bilateral   sequential optic neuropathy with associated pallid optic disc edema and   choroidal hypoperfusion, but   relatively intact visual acuity, negative elevation of acute phase   reactants, and lack of constitutional   symptoms consistent with giant cell arteritis. He also has findings of   likely leukemic infiltration in the   skull seen on MRI. He undergoes temporal artery biopsy on the left.     GROSS:   The specimen is received in formalin with proper patient identification,   labeled \"left temporal artery\".  The   specimen consists of a 3.2 cm in length by 0.3 cm in diameter tan-white   vessel segment. The specimen is   submitted intact in A1. (Dictated by: PADILLA Montalvo 9/11/2020 03:08   PM)     MICROSCOPIC:   The " tissue consists of artery with narrowed lumen and intimal hyperplasia.    There is an infiltrate of   lymphocytes, plasma cells, epithelioid histiocytes, and multinucleated   giant cells throughout all layers of   the artery. There is associated loss of the internal elastic lamina on   elastic stain. Occasional focal   calcifications are seen a the level of the internal elastic lamina.   Neovascularization of the vessel wall is   present. Perivascular lymphocytic infiltrates are seen in the surrounding   adventitia. Immunohistochemistry   with CD3 and CD20 demonstrates a predominantly T-cell lymphocytic   infiltrate within the vessel wall. There is   a mixed infiltrate of T and B lymphocytes perivascular in the adventitia.   CD5 and CD43 have similar staining   patterns to CD3. There is light patchy CD23 staining in the areas positive    for CD20. CD68 highlights the   epithelioid histiocytes within the vessel wall.     The technical component of this testing was completed at the [facility] with the professional component performed    at the [facility], [address] [place] [phone] ([phone])     CPT Codes:   A: 90854-FM3, 30499-VMWR, 94463-DEG, 07738-DWL, 01195-ZUK, 29715-VSP,   06560-BFC, 38386-NHR     COLLECTION SITE:   Client: [facility]   Location: [facility] (TOY)          Bib Alcantara MD

## 2020-11-04 NOTE — TELEPHONE ENCOUNTER
Pt was in clinic today for visit with Dr. Alcantara. Second injection done in clinic by pt, minimal coaching by me.  Pt will do next injection from home.    Candis Montero RN  Rheumatology Clinic

## 2020-11-04 NOTE — LETTER
11/4/2020       RE: Bon Michael  1923 [facility] [address]     Dear Colleague,    Thank you for referring your patient, Bon Michael, to the [facility] RHEUMATOLOGY [facility]INIC [place] at [facility]. Please see a copy of my visit note below.      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 Never had HA, jaw claudication, PMR symptoms. No inflammatory arthritis symptoms. Disease currently in remission by history, exam, labs on 60mg prednisone and actemra x2 injections. Will start taper with close phone and lab follow-up.    # Giant cell arteritis  1) continue 162mg once weekly injection  2) Prednisone plan:  -Decrease to 50mg daily starting tomorrow. Continue on this dose x1 week, then decrease to 40mg daily x1 week, then decrease to 35mg daily x1 week, then 30mg daily x1 week, then 25mg daily x1 week, then 20mg daily. Further taper plan to follow  3) Continue bactrim daily for PJP prophylaxis.    4) Continue alendronate for osteopenia along with PPI for GI protection  5) Labs today. Follow-up in 3 weeks by phone visit with labs a few days prior    Severity of this disease discussed at length with the patient, particularly the potential for blindness/ loss of vision. Counseled to seek immediate medical attention should he experience changes in vision. He understands. Spent a significant amount of time discussing the side effects of Actemra including risk of malignancy, serious  infections resulting in death, opportunistic infections, neutropenia/thrombocytopenia, allergic reaction, GI perforation, demyelinating disease, as well as fever, chills, hypercholesterolemia, abdominal pain, dizziness. Patient had the opportunity to ask questions, which were all answered.    Bib Alcantara MD  Rheumatology    I spent a total of 30 minutes face to face with Bon Michael during today s office visit. Over 50% of this time was spent counseling the patient and/or coordinating care regarding giant cell artheritis pathology, treatment, complications.     Subjective:   Interval History:  11/4/2020  No interval change in vision from his last appointment. No HA or jaw claudication symptoms. No stiffness or pain in the proximal muscle groups. No jaw pain/erythema/edema/warmth. Tolerating the prednisone without recognizable side effects. Has been checking his glucose at home which has been <150 on all values shown to me. No fever, chills, weight loss. No interval infection. No injection site reactions. Has had injection done under nurse supervision today and last week, though plan on doing I himself moving forward as he has not had any issues. No new rash. No chest pain, shortness of breath. No abdominal pain.     14 point review of systems obtained and negative if not documented above.    HPI from initial consultation 9/25/20  79 year old male with history of CLL, being monitored at this point without an indication for therapy. On Saturday, going to get dirt at a nursery, August 22nd, was driving and noticed that he 'wasn't getting enough light in his eye'. The next day, Sunday, he was continuing to have left eye symptoms. Called the [place] [facility]. Asked for appointment because of these new symptoms. On Tuesday evening, he called into the on call physician due to new complaint of 'light reflecting off the wall into my eye'. He came in the next day, Wednesday, and was sent to the retinal center on  Friday.  Had an exam and was told that there was a defect in the retina/artery. Had MRI of the brain and bloodwork on Tuesday. On Thursday, results of the MRI were discussed and the findings were questioned to be due to his known CLL. Possible stroke he was told due to viscosity increase? On Friday, 9/4/20, had worsening symptoms in the right eye. So came back in to the retinal specialists. At that time there was consideration of sending to [place] ED, though because of the long expected wait, was instead started on 100mg of prednisone daily. 9/9/20 went down to 60mg of prednisone, with plan to go down to 50mg of 10/7/20. Was setup for biopsy on 9/10.     Denies any headaches. No jaw pain, even when chewing tough food. Feels that he has had some weight loss over the last few months unintentionally (139-130 over a period of 1 month). No fatigue, night sweats. No rashes. No chest pain, cough, shortness of breath. No abdominal pain. No joint pain. Has noticed that his stool is less formed. Not diarrhea, just softer. No blood. No issues with urination. No swelling in lower extremities. No raynauds. No double vision.     Past Medical History  CLL  Hernia  Mono   Deviated septum  Osteopenia by DEXA    Past Surgical History  Tonsillectomy  Deviated septum    Medications  Prednisone 60mg daily  Aasocort  ASA  Alendronate  Actemra  PPI    Weeks Daily prednisone dose (mg) 26-week taper Weekly 162mg Actemra      1 60    2 50 11/5/2020    3 40    4 35    5 30    6 25    7 20    8 15    9 12.5    10 12.5    11 10    12 9    13 8    14 7    15 6    16 6    17 5    18 5    19 4    20 4    21 3    22 3    23 2    24 2    25 1    26 1      Allergies: Seasonal/ environmental allergies    Family History: No family history of autoimmune diseases like RA, sjogrens, SLE, scleroderma, IBD.    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 No drug use.        Objective:    Physical exam:  BP (!) 148/78   Pulse 96   Temp  "98.2  F (36.8  C) (Oral)   Ht 1.702 m (5' 7\")   Wt 66.3 kg (146 lb 1.6 oz)   SpO2 97%   BMI 22.88 kg/m    GEN: NAD sitting up unassisted  HEENT: no facial rash, sclera clear, wearing N95 and glasses. No temporal tenderness. No jaw pain with opening/ closing mouth  CV: rrr no m/r/g  Pulm: CTAB no crackles wheezing rhonchi  Abdomen: no tenderness to palpation  MSK: full active and passive ROM without pain. No synovitis appreciated.    Labs:   10/1/2020  WBC 84.9  HGB 12.3    IgM 36 low  IgA 124 normal  IgG 625    Cr 0.98    9/25/2020  Orders Only on 09/25/2020   Component Date Value Ref Range Status     Color Urine 09/25/2020 Yellow   Final     Appearance Urine 09/25/2020 Clear   Final     Glucose Urine 09/25/2020 Negative  NEG^Negative mg/dL Final     Bilirubin Urine 09/25/2020 Negative  NEG^Negative Final     Ketones Urine 09/25/2020 Negative  NEG^Negative mg/dL Final     Specific Gravity Urine 09/25/2020 >1.030  1.003 - 1.035 Final     pH Urine 09/25/2020 5.0  5.0 - 7.0 pH Final     Protein Albumin Urine 09/25/2020 Negative  NEG^Negative mg/dL Final     Urobilinogen Urine 09/25/2020 0.2  0.2 - 1.0 EU/dL Final     Nitrite Urine 09/25/2020 Negative  NEG^Negative Final     Blood Urine 09/25/2020 Small* NEG^Negative Final     Leukocyte Esterase Urine 09/25/2020 Negative  NEG^Negative Final     Source 09/25/2020 Midstream Urine   Final     WBC Urine 09/25/2020 0 - 5  OTO5^0 - 5 /HPF Final     RBC Urine 09/25/2020 O - 2  OTO2^O - 2 /HPF Final     CRP Inflammation 09/25/2020 <2.9  0.0 - 8.0 mg/L Final     Interleukin 6 Blood 09/25/2020 <0.70  <3.01 pg/mL Final    Comment: TEST DATE SEPT 26 2020.SERUM  This test has not been FDA approved.  The results are to be used for research   purposes or in attempts to understand the pathophysiology of immune,   infectious, or inflammatory disorders and not intended as the sole means for   clinical diagnosis or patient management.  Assayed at [facility],  [place], [address] [address],   [place] [address]       Sed Rate 09/25/2020 4  0 - 20 mm/h Final     Cholesterol 09/25/2020 167  <200 mg/dL Final     Triglycerides 09/25/2020 69  <150 mg/dL Final     HDL Cholesterol 09/25/2020 85  >39 mg/dL Final     LDL Cholesterol Calculated 09/25/2020 68  <100 mg/dL Final    Desirable:       <100 mg/dl     Non HDL Cholesterol 09/25/2020 82  <130 mg/dL Final     Sodium 09/25/2020 134  133 - 144 mmol/L Final     Potassium 09/25/2020 4.8  3.4 - 5.3 mmol/L Final     Chloride 09/25/2020 103  94 - 109 mmol/L Final     Carbon Dioxide 09/25/2020 24  20 - 32 mmol/L Final     Anion Gap 09/25/2020 7  3 - 14 mmol/L Final     Glucose 09/25/2020 132* 70 - 99 mg/dL Final     Urea Nitrogen 09/25/2020 29  7 - 30 mg/dL Final     Creatinine 09/25/2020 0.81  0.66 - 1.25 mg/dL Final     GFR Estimate 09/25/2020 85  >60 mL/min/[1.73_m2] Final    Comment: Non  GFR Calc  Starting 12/18/2018, serum creatinine based estimated GFR (eGFR) will be   calculated using the Chronic Kidney Disease Epidemiology Collaboration   (CKD-EPI) equation.       GFR Estimate If Black 09/25/2020 >90  >60 mL/min/[1.73_m2] Final    Comment:  GFR Calc  Starting 12/18/2018, serum creatinine based estimated GFR (eGFR) will be   calculated using the Chronic Kidney Disease Epidemiology Collaboration   (CKD-EPI) equation.       Calcium 09/25/2020 9.2  8.5 - 10.1 mg/dL Final     Bilirubin Total 09/25/2020 0.4  0.2 - 1.3 mg/dL Final     Albumin 09/25/2020 3.5  3.4 - 5.0 g/dL Final     Protein Total 09/25/2020 6.9  6.8 - 8.8 g/dL Final     Alkaline Phosphatase 09/25/2020 50  40 - 150 U/L Final     ALT 09/25/2020 28  0 - 70 U/L Final     AST 09/25/2020 23  0 - 45 U/L Final     WBC 09/25/2020 96.1* 4.0 - 11.0 10e9/L Final    Comment: [facility] and [facility] back [facility]  [facility] BY AKBENJI 9.25.20       RBC Count 09/25/2020 4.08* 4.4 - 5.9 10e12/L Final     Hemoglobin  09/25/2020 12.6* 13.3 - 17.7 g/dL Final     Hematocrit 09/25/2020 38.7* 40.0 - 53.0 % Final     MCV 09/25/2020 95  78 - 100 fl Final     MCH 09/25/2020 30.9  26.5 - 33.0 pg Final     MCHC 09/25/2020 32.6  31.5 - 36.5 g/dL Final     RDW 09/25/2020 15.9* 10.0 - 15.0 % Final     Platelet Count 09/25/2020 212  150 - 450 10e9/L Final     % Neutrophils 09/25/2020 10.0  % Final     % Lymphocytes 09/25/2020 89.0  % Final     % Monocytes 09/25/2020 1.0  % Final     Anisocytosis 09/25/2020 Slight   Final     Stomatocytes 09/25/2020 Slight   Final     Smudge Cells 09/25/2020 Present   Final     Toxic Granulation 09/25/2020 Present   Final     Platelet Estimate 09/25/2020 Automated count confirmed.  Giant platelets are present.   Final     Diff Method 09/25/2020 Automated Method   Final     HIV Antigen Antibody Combo 09/25/2020 Nonreactive  NR^Nonreactive     Final    HIV-1 p24 Ag & HIV-1/HIV-2 Ab Not Detected     Hep B Surface Agn 09/25/2020 Nonreactive  NR^Nonreactive Final     Hepatitis B Core Alisia 09/25/2020 Nonreactive  NR^Nonreactive Final     MTB Quantiferon Result 09/25/2020 Negative  NEG^Negative Final    Comment: No interferon gamma response to M.tuberculosis antigens was detected.   Infection with M.tuberculosis is unlikely, however a single negative result   does not exclude infection. In patients at high risk for infection, a second   test should be considered       TB1 Ag minus Nil 09/25/2020 0.03  IU/mL Final     TB2 Ag minus Nil 09/25/2020 0.02  IU/mL Final     Mitogen minus Nil 09/25/2020 1.71  IU/mL Final     NIL Result 09/25/2020 0.04  IU/mL Final     Neutrophil Cytoplasmic Antibody 09/25/2020 <1:10  <1:10 [titer] Final     Neutrophil Cytoplasmic Antibody Pa* 09/25/2020 The ANCA IFA is <1:10.  No further testing will be performed.   Final     9/1/2020  WBC 50.2  HGB 11.6       Imaging:  MR brain and orbits 9/10/20  Impression:    1. Regarding the orbits and globes, there is no definite  abnormal  contrast enhancement or mass. No evidence of leukemic infiltration.  2. Regarding the remainder of the brain, abnormal T2 hyperintense bone  marrow signal with associated enhancement in the skull, likely  representing leukemic infiltration.  3. Subcutaneous T2 hyperintensity with associated diffusion  restriction and contrast enhancement over the left zygomatic region,  question leukemic infiltration.    Pathology:  Patient Name: MARIELA WALLACE   MR#: [id number]   Specimen #: [id number]   Collected: 9/10/2020   Received: 9/10/2020   Reported: 9/16/2020 12:35   Ordering Phy(s): VALERIE CARRINGTON     For improved result formatting, select 'View Enhanced Report Format' under    Linked Documents section.     SPECIMEN(S):   Temporal artery biopsy, left     FINAL DIAGNOSIS:   Temporal artery, left, biopsy:   1. Granulomatous arteritis consistent with giant cell arteritis.   2. Chronic inflammation of the surrounding adventitia.     COMMENT:   Preliminary results were communicated to Dr. Valerie Carrington and Dr. Guille Garcia on 9/14/20 at 12:45pm.     I have personally reviewed all specimens and/or slides, including the   listed special stains, and used them   with my medical judgement to determine or confirm the final diagnosis.     Electronically signed out by:     Za García M.D., [facility]     CLINICAL HISTORY:   The patient is a 79 year old male with a history of chronic lymphocytic   leukemia who presented with bilateral   sequential optic neuropathy with associated pallid optic disc edema and   choroidal hypoperfusion, but   relatively intact visual acuity, negative elevation of acute phase   reactants, and lack of constitutional   symptoms consistent with giant cell arteritis. He also has findings of   likely leukemic infiltration in the   skull seen on MRI. He undergoes temporal artery biopsy on the left.     GROSS:   The specimen is received in formalin with proper patient identification,  "  labeled \"left temporal artery\".  The   specimen consists of a 3.2 cm in length by 0.3 cm in diameter tan-white   vessel segment. The specimen is   submitted intact in A1. (Dictated by: PADILLA Montalvo 9/11/2020 03:08   PM)     MICROSCOPIC:   The tissue consists of artery with narrowed lumen and intimal hyperplasia.    There is an infiltrate of   lymphocytes, plasma cells, epithelioid histiocytes, and multinucleated   giant cells throughout all layers of   the artery. There is associated loss of the internal elastic lamina on   elastic stain. Occasional focal   calcifications are seen a the level of the internal elastic lamina.   Neovascularization of the vessel wall is   present. Perivascular lymphocytic infiltrates are seen in the surrounding   adventitia. Immunohistochemistry   with CD3 and CD20 demonstrates a predominantly T-cell lymphocytic   infiltrate within the vessel wall. There is   a mixed infiltrate of T and B lymphocytes perivascular in the adventitia.   CD5 and CD43 have similar staining   patterns to CD3. There is light patchy CD23 staining in the areas positive    for CD20. CD68 highlights the   epithelioid histiocytes within the vessel wall.     The technical component of this testing was completed at the [facility], with the professional component performed    at the [facility], [address] [place] [id number] ([phone])     CPT Codes:   A: 67740-JA9, 44285-MEFM, 48927-PTN, 86476-PDS, 39667-TBC, 86992-SZT,   18833-SKB, 28389-LMH     COLLECTION SITE:   Client: [facility]   [place] [facility] [place]        Bib Alcantara MD      "

## 2020-11-04 NOTE — PROGRESS NOTES
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 Never had HA, jaw claudication, PMR symptoms. No inflammatory arthritis symptoms. Disease currently in remission by history, exam, labs on 60mg prednisone and actemra x2 injections. Will start taper with close phone and lab follow-up.    # Giant cell arteritis  1) continue 162mg once weekly injection  2) Prednisone plan:  -Decrease to 50mg daily starting tomorrow. Continue on this dose x1 week, then decrease to 40mg daily x1 week, then decrease to 35mg daily x1 week, then 30mg daily x1 week, then 25mg daily x1 week, then 20mg daily. Further taper plan to follow  3) Continue bactrim daily for PJP prophylaxis.    4) Continue alendronate for osteopenia along with PPI for GI protection  5) Labs today. Follow-up in 3 weeks by phone visit with labs a few days prior    Severity of this disease discussed at length with the patient, particularly the potential for blindness/ loss of vision. Counseled to seek immediate medical attention should he experience changes in vision. He understands. Spent a significant amount of time discussing the side effects of Actemra including risk of malignancy, serious infections resulting in death, opportunistic infections, neutropenia/thrombocytopenia, allergic reaction, GI perforation, demyelinating disease, as well as fever, chills, hypercholesterolemia, abdominal pain, dizziness. Patient had the opportunity to ask questions, which were all answered.    Bib Alcantara  MD  Rheumatology    I spent a total of 30 minutes face to face with Bon Michael during today s office visit. Over 50% of this time was spent counseling the patient and/or coordinating care regarding giant cell artheritis pathology, treatment, complications.     Subjective:   Interval History:  11/4/2020  No interval change in vision from his last appointment. No HA or jaw claudication symptoms. No stiffness or pain in the proximal muscle groups. No jaw pain/erythema/edema/warmth. Tolerating the prednisone without recognizable side effects. Has been checking his glucose at home which has been <150 on all values shown to me. No fever, chills, weight loss. No interval infection. No injection site reactions. Has had injection done under nurse supervision today and last week, though plan on doing I himself moving forward as he has not had any issues. No new rash. No chest pain, shortness of breath. No abdominal pain.     14 point review of systems obtained and negative if not documented above.    HPI from initial consultation 9/25/20  79 year old male with history of CLL, being monitored at this point without an indication for therapy. On Saturday, going to get dirt at a nursery, August 22nd, was driving and noticed that he 'wasn't getting enough light in his eye'. The next day, Sunday, he was continuing to have left eye symptoms. Called the [facility]. Asked for appointment because of these new symptoms. On Tuesday evening, he called into the on call physician due to new complaint of 'light reflecting off the wall into my eye'. He came in the next day, Wednesday, and was sent to the retinal center on Friday.  Had an exam and was told that there was a defect in the retina/artery. Had MRI of the brain and bloodwork on Tuesday. On Thursday, results of the MRI were discussed and the findings were questioned to be due to his known CLL. Possible stroke he was told due to viscosity increase? On Friday, 9/4/20, had  "worsening symptoms in the right eye. So came back in to the retinal specialists. At that time there was consideration of sending to [facility], though because of the long expected wait, was instead started on 100mg of prednisone daily. 9/9/20 went down to 60mg of prednisone, with plan to go down to 50mg of 10/7/20. Was setup for biopsy on 9/10.     Denies any headaches. No jaw pain, even when chewing tough food. Feels that he has had some weight loss over the last few months unintentionally (139-130 over a period of 1 month). No fatigue, night sweats. No rashes. No chest pain, cough, shortness of breath. No abdominal pain. No joint pain. Has noticed that his stool is less formed. Not diarrhea, just softer. No blood. No issues with urination. No swelling in lower extremities. No raynauds. No double vision.     Past Medical History  CLL  Hernia  Mono   Deviated septum  Osteopenia by DEXA    Past Surgical History  Tonsillectomy  Deviated septum    Medications  Prednisone 60mg daily  Aasocort  ASA  Alendronate  Actemra  PPI    Weeks Daily prednisone dose (mg) 26-week taper Weekly 162mg Actemra      1 60    2 50 11/5/2020    3 40    4 35    5 30    6 25    7 20    8 15    9 12.5    10 12.5    11 10    12 9    13 8    14 7    15 6    16 6    17 5    18 5    19 4    20 4    21 3    22 3    23 2    24 2    25 1    26 1      Allergies: Seasonal/ environmental allergies    Family History: No family history of autoimmune diseases like RA, sjogrens, SLE, scleroderma, IBD.    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 No drug use.        Objective:    Physical exam:  BP (!) 148/78   Pulse 96   Temp 98.2  F (36.8  C) (Oral)   Ht 1.702 m (5' 7\")   Wt 66.3 kg (146 lb 1.6 oz)   SpO2 97%   BMI 22.88 kg/m    GEN: NAD sitting up unassisted  HEENT: no facial rash, sclera clear, wearing N95 and glasses. No temporal tenderness. No jaw pain with opening/ closing mouth  CV: rrr no m/r/g  Pulm: CTAB no crackles " wheezing rhonchi  Abdomen: no tenderness to palpation  MSK: full active and passive ROM without pain. No synovitis appreciated.    Labs:   10/1/2020  WBC 84.9  HGB 12.3    IgM 36 low  IgA 124 normal  IgG 625    Cr 0.98    9/25/2020  Orders Only on 09/25/2020   Component Date Value Ref Range Status     Color Urine 09/25/2020 Yellow   Final     Appearance Urine 09/25/2020 Clear   Final     Glucose Urine 09/25/2020 Negative  NEG^Negative mg/dL Final     Bilirubin Urine 09/25/2020 Negative  NEG^Negative Final     Ketones Urine 09/25/2020 Negative  NEG^Negative mg/dL Final     Specific Gravity Urine 09/25/2020 >1.030  1.003 - 1.035 Final     pH Urine 09/25/2020 5.0  5.0 - 7.0 pH Final     Protein Albumin Urine 09/25/2020 Negative  NEG^Negative mg/dL Final     Urobilinogen Urine 09/25/2020 0.2  0.2 - 1.0 EU/dL Final     Nitrite Urine 09/25/2020 Negative  NEG^Negative Final     Blood Urine 09/25/2020 Small* NEG^Negative Final     Leukocyte Esterase Urine 09/25/2020 Negative  NEG^Negative Final     Source 09/25/2020 Midstream Urine   Final     WBC Urine 09/25/2020 0 - 5  OTO5^0 - 5 /HPF Final     RBC Urine 09/25/2020 O - 2  OTO2^O - 2 /HPF Final     CRP Inflammation 09/25/2020 <2.9  0.0 - 8.0 mg/L Final     Interleukin 6 Blood 09/25/2020 <0.70  <3.01 pg/mL Final    Comment: TEST DATE SEPT 26 2020.SERUM  This test has not been FDA approved.  The results are to be used for research   purposes or in attempts to understand the pathophysiology of immune,   infectious, or inflammatory disorders and not intended as the sole means for   clinical diagnosis or patient management.  Assayed at [facility],  [facility], [address] ,   [place] [address]       Sed Rate 09/25/2020 4  0 - 20 mm/h Final     Cholesterol 09/25/2020 167  <200 mg/dL Final     Triglycerides 09/25/2020 69  <150 mg/dL Final     HDL Cholesterol 09/25/2020 85  >39 mg/dL Final     LDL Cholesterol Calculated 09/25/2020 68  <100 mg/dL  Final    Desirable:       <100 mg/dl     Non HDL Cholesterol 09/25/2020 82  <130 mg/dL Final     Sodium 09/25/2020 134  133 - 144 mmol/L Final     Potassium 09/25/2020 4.8  3.4 - 5.3 mmol/L Final     Chloride 09/25/2020 103  94 - 109 mmol/L Final     Carbon Dioxide 09/25/2020 24  20 - 32 mmol/L Final     Anion Gap 09/25/2020 7  3 - 14 mmol/L Final     Glucose 09/25/2020 132* 70 - 99 mg/dL Final     Urea Nitrogen 09/25/2020 29  7 - 30 mg/dL Final     Creatinine 09/25/2020 0.81  0.66 - 1.25 mg/dL Final     GFR Estimate 09/25/2020 85  >60 mL/min/[1.73_m2] Final    Comment: Non  GFR Calc  Starting 12/18/2018, serum creatinine based estimated GFR (eGFR) will be   calculated using the Chronic Kidney Disease Epidemiology Collaboration   (CKD-EPI) equation.       GFR Estimate If Black 09/25/2020 >90  >60 mL/min/[1.73_m2] Final    Comment:  GFR Calc  Starting 12/18/2018, serum creatinine based estimated GFR (eGFR) will be   calculated using the Chronic Kidney Disease Epidemiology Collaboration   (CKD-EPI) equation.       Calcium 09/25/2020 9.2  8.5 - 10.1 mg/dL Final     Bilirubin Total 09/25/2020 0.4  0.2 - 1.3 mg/dL Final     Albumin 09/25/2020 3.5  3.4 - 5.0 g/dL Final     Protein Total 09/25/2020 6.9  6.8 - 8.8 g/dL Final     Alkaline Phosphatase 09/25/2020 50  40 - 150 U/L Final     ALT 09/25/2020 28  0 - 70 U/L Final     AST 09/25/2020 23  0 - 45 U/L Final     WBC 09/25/2020 96.1* 4.0 - 11.0 10e9/L Final    Comment: Critical Value called to and read back by  TRAVIS AT [facility] [address] 9.25.20       RBC Count 09/25/2020 4.08* 4.4 - 5.9 10e12/L Final     Hemoglobin 09/25/2020 12.6* 13.3 - 17.7 g/dL Final     Hematocrit 09/25/2020 38.7* 40.0 - 53.0 % Final     MCV 09/25/2020 95  78 - 100 fl Final     MCH 09/25/2020 30.9  26.5 - 33.0 pg Final     MCHC 09/25/2020 32.6  31.5 - 36.5 g/dL Final     RDW 09/25/2020 15.9* 10.0 - 15.0 % Final     Platelet Count 09/25/2020 212  150 - 450  10e9/L Final     % Neutrophils 09/25/2020 10.0  % Final     % Lymphocytes 09/25/2020 89.0  % Final     % Monocytes 09/25/2020 1.0  % Final     Anisocytosis 09/25/2020 Slight   Final     Stomatocytes 09/25/2020 Slight   Final     Smudge Cells 09/25/2020 Present   Final     Toxic Granulation 09/25/2020 Present   Final     Platelet Estimate 09/25/2020 Automated count confirmed.  Giant platelets are present.   Final     Diff Method 09/25/2020 Automated Method   Final     HIV Antigen Antibody Combo 09/25/2020 Nonreactive  NR^Nonreactive     Final    HIV-1 p24 Ag & HIV-1/HIV-2 Ab Not Detected     Hep B Surface Agn 09/25/2020 Nonreactive  NR^Nonreactive Final     Hepatitis B Core Alisia 09/25/2020 Nonreactive  NR^Nonreactive Final     MTB Quantiferon Result 09/25/2020 Negative  NEG^Negative Final    Comment: No interferon gamma response to M.tuberculosis antigens was detected.   Infection with M.tuberculosis is unlikely, however a single negative result   does not exclude infection. In patients at high risk for infection, a second   test should be considered       TB1 Ag minus Nil 09/25/2020 0.03  IU/mL Final     TB2 Ag minus Nil 09/25/2020 0.02  IU/mL Final     Mitogen minus Nil 09/25/2020 1.71  IU/mL Final     NIL Result 09/25/2020 0.04  IU/mL Final     Neutrophil Cytoplasmic Antibody 09/25/2020 <1:10  <1:10 [titer] Final     Neutrophil Cytoplasmic Antibody Pa* 09/25/2020 The ANCA IFA is <1:10.  No further testing will be performed.   Final     9/1/2020  WBC 50.2  HGB 11.6       Imaging:  MR brain and orbits 9/10/20  Impression:    1. Regarding the orbits and globes, there is no definite abnormal  contrast enhancement or mass. No evidence of leukemic infiltration.  2. Regarding the remainder of the brain, abnormal T2 hyperintense bone  marrow signal with associated enhancement in the skull, likely  representing leukemic infiltration.  3. Subcutaneous T2 hyperintensity with associated diffusion  restriction and  "contrast enhancement over the left zygomatic region,  question leukemic infiltration.    Pathology:  Patient Name: MARIELA WALLACE   MR#: [id number]   Specimen #: [id number]   Collected: 9/10/2020   Received: 9/10/2020   Reported: 9/16/2020 12:35   Ordering Phy(s): VALERIE CARRINGTON     For improved result formatting, select 'View Enhanced Report Format' under    Linked Documents section.     SPECIMEN(S):   Temporal artery biopsy, left     FINAL DIAGNOSIS:   Temporal artery, left, biopsy:   1. Granulomatous arteritis consistent with giant cell arteritis.   2. Chronic inflammation of the surrounding adventitia.     COMMENT:   Preliminary results were communicated to Dr. Valerie Carrington and Dr. Guille Garcia on 9/14/20 at 12:45pm.     I have personally reviewed all specimens and/or slides, including the   listed special stains, and used them   with my medical judgement to determine or confirm the final diagnosis.     Electronically signed out by:     Za García M.D., [facility]     CLINICAL HISTORY:   The patient is a 79 year old male with a history of chronic lymphocytic   leukemia who presented with bilateral   sequential optic neuropathy with associated pallid optic disc edema and   choroidal hypoperfusion, but   relatively intact visual acuity, negative elevation of acute phase   reactants, and lack of constitutional   symptoms consistent with giant cell arteritis. He also has findings of   likely leukemic infiltration in the   skull seen on MRI. He undergoes temporal artery biopsy on the left.     GROSS:   The specimen is received in formalin with proper patient identification,   labeled \"left temporal artery\".  The   specimen consists of a 3.2 cm in length by 0.3 cm in diameter tan-white   vessel segment. The specimen is   submitted intact in A1. (Dictated by: PADILLA Montalvo 9/11/2020 03:08   PM)     MICROSCOPIC:   The tissue consists of artery with narrowed lumen and intimal hyperplasia.    There " is an infiltrate of   lymphocytes, plasma cells, epithelioid histiocytes, and multinucleated   giant cells throughout all layers of   the artery. There is associated loss of the internal elastic lamina on   elastic stain. Occasional focal   calcifications are seen a the level of the internal elastic lamina.   Neovascularization of the vessel wall is   present. Perivascular lymphocytic infiltrates are seen in the surrounding   adventitia. Immunohistochemistry   with CD3 and CD20 demonstrates a predominantly T-cell lymphocytic   infiltrate within the vessel wall. There is   a mixed infiltrate of T and B lymphocytes perivascular in the adventitia.   CD5 and CD43 have similar staining   patterns to CD3. There is light patchy CD23 staining in the areas positive    for CD20. CD68 highlights the   epithelioid histiocytes within the vessel wall.     The technical component of this testing was completed at the [facility], with the professional component performed    at the [facility], [address] [place] [phone] ([phone])     CPT Codes:   A: 79437-IH9, 09690-TJSP, 37150-HGZ, 64288-BJE, 33702-CJT, 72823-PNY,   37848-YKF, 36483-RGV     COLLECTION SITE:   Client: [facility]   Location: BERNADETTE FUENTES)

## 2020-11-10 ENCOUNTER — TELEPHONE (OUTPATIENT)
Dept: RHEUMATOLOGY | Facility: CLINIC | Age: 80
End: 2020-11-10

## 2020-11-10 NOTE — TELEPHONE ENCOUNTER
Pt called in with questions about prednisone taper, omeprazole, bactrim. He had some concerns about the sun appearing brighter than normal. He was concerned about vision, as he has not noticed any differences in vision otherwise.     All questions answered to pt's satisfaction.    Candis Montero RN  Rheumatology Clinic

## 2020-11-12 ENCOUNTER — TELEPHONE (OUTPATIENT)
Dept: OPHTHALMOLOGY | Facility: CLINIC | Age: 80
End: 2020-11-12

## 2020-11-12 NOTE — TELEPHONE ENCOUNTER
Spoke to pt at 1500    Reviewed by Dr. Haines    Ok to schedule week of December 7th     Scheduled December 9th at 0800 at B location    Pt aware of date/time/location at [facility] and aware to call for any sudden blurring or vision/vision loss/symptoms    Pt verbally demonstrated understanding and has [phone] option 4 for information to page on call provider if would occur on weekend    Samy Wolf RN 3:10 PM 11/13/20    ------      Spoke to pt at 0800    Pt recently tapered to 40 mg of oral prednisone yesterday and will tapering to 35 mg next week (Rx with taper instructions in epic medication list)     Pt states was recommended to come in to recheck after when gets to around 20 mg of oral prednisone for 2 weeks-- currently scheduled December 23rd when will likely be at 20 mg since 12-    No new vision changes/symptoms  Pt had episode on Sunday morning while looking at sun/visual disturbance.    Pt states would like to be monitored more frequently during the taper to provide assurance everything ok-- states would be able to come anytime and frequently and ok with another provider to see to assist in monitoring during the taper.    Stated would forward to Dr. Haines to review plan for f/u care during taper and call pt after review    Samy Wolf RN 8:28 AM 11/13/20            [facility] Call Center    Phone Message    May a detailed message be left on voicemail: yes     Reason for Call: Patient states he has been calling [phone] and not reaching anyone and not getting a call back from messages left. Patient asking for a call back to discuss monitoring that Dr. Tanner is doing for predniSONE medication. Patient is currently scheduled on 12/23/2020 to see Dr. Haines, but states that he needs to be seen sooner. I wasn't able to schedule anything sooner. Please call patient to discuss.     Action Taken: Message routed to:  [facility]    Travel Screening: Not  Applicable

## 2020-11-13 ENCOUNTER — TELEPHONE (OUTPATIENT)
Dept: OPHTHALMOLOGY | Facility: CLINIC | Age: 80
End: 2020-11-13

## 2020-11-13 NOTE — TELEPHONE ENCOUNTER
Called patient to discuss his predisone taper as well as his my chart. Asked patient to return call.     Karina Rob

## 2020-11-17 ENCOUNTER — NURSE TRIAGE (OUTPATIENT)
Dept: NURSING | Facility: CLINIC | Age: 80
End: 2020-11-17

## 2020-11-17 DIAGNOSIS — R79.9 ABNORMAL FINDING OF BLOOD CHEMISTRY, UNSPECIFIED: ICD-10-CM

## 2020-11-17 DIAGNOSIS — M31.6 GIANT CELL ARTERITIS (H): ICD-10-CM

## 2020-11-17 DIAGNOSIS — R82.90 NONSPECIFIC FINDING ON EXAMINATION OF URINE: Primary | ICD-10-CM

## 2020-11-17 LAB
ALBUMIN UR-MCNC: 30 MG/DL
APPEARANCE UR: CLEAR
BACTERIA #/AREA URNS HPF: ABNORMAL /HPF
BILIRUB UR QL STRIP: NEGATIVE
COLOR UR AUTO: YELLOW
CRP SERPL-MCNC: <2.9 MG/L (ref 0–8)
ERYTHROCYTE [SEDIMENTATION RATE] IN BLOOD BY WESTERGREN METHOD: 3 MM/H (ref 0–20)
GLUCOSE UR STRIP-MCNC: NEGATIVE MG/DL
HGB UR QL STRIP: ABNORMAL
KETONES UR STRIP-MCNC: NEGATIVE MG/DL
LEUKOCYTE ESTERASE UR QL STRIP: NEGATIVE
NITRATE UR QL: NEGATIVE
PH UR STRIP: 5.5 PH (ref 5–7)
RBC #/AREA URNS AUTO: ABNORMAL /HPF
SOURCE: ABNORMAL
SP GR UR STRIP: >1.03 (ref 1–1.03)
UROBILINOGEN UR STRIP-ACNC: 0.2 EU/DL (ref 0.2–1)
WBC #/AREA URNS AUTO: ABNORMAL /HPF

## 2020-11-17 PROCEDURE — 80053 COMPREHEN METABOLIC PANEL: CPT | Performed by: INTERNAL MEDICINE

## 2020-11-17 PROCEDURE — 81001 URINALYSIS AUTO W/SCOPE: CPT | Performed by: INTERNAL MEDICINE

## 2020-11-17 PROCEDURE — 85025 COMPLETE CBC W/AUTO DIFF WBC: CPT | Performed by: INTERNAL MEDICINE

## 2020-11-17 PROCEDURE — 86140 C-REACTIVE PROTEIN: CPT | Performed by: INTERNAL MEDICINE

## 2020-11-17 PROCEDURE — 36415 COLL VENOUS BLD VENIPUNCTURE: CPT | Performed by: INTERNAL MEDICINE

## 2020-11-17 PROCEDURE — 85652 RBC SED RATE AUTOMATED: CPT | Performed by: INTERNAL MEDICINE

## 2020-11-17 PROCEDURE — 80061 LIPID PANEL: CPT | Performed by: INTERNAL MEDICINE

## 2020-11-17 NOTE — TELEPHONE ENCOUNTER
Hi Jo, thanks for the message. This is consistent with his know malignancy. No additional work-up required at this time.    Bib Alcantara  Rheumatology

## 2020-11-17 NOTE — TELEPHONE ENCOUNTER
Today 11/17/2020 the WBC count was 63.6 down from 66.1 on 11/4/2020.      Reason for Disposition    Lab or radiology calling with CRITICAL test results    Protocols used: PCP CALL - NO TRIAGE-A-

## 2020-11-18 LAB
ALBUMIN SERPL-MCNC: 3.4 G/DL (ref 3.4–5)
ALP SERPL-CCNC: 48 U/L (ref 40–150)
ALT SERPL W P-5'-P-CCNC: 31 U/L (ref 0–70)
ANION GAP SERPL CALCULATED.3IONS-SCNC: 5 MMOL/L (ref 3–14)
AST SERPL W P-5'-P-CCNC: 30 U/L (ref 0–45)
BASOPHILS # BLD AUTO: 0 10E9/L (ref 0–0.2)
BASOPHILS NFR BLD AUTO: 0 %
BILIRUB SERPL-MCNC: 0.3 MG/DL (ref 0.2–1.3)
BUN SERPL-MCNC: 33 MG/DL (ref 7–30)
CALCIUM SERPL-MCNC: 9.1 MG/DL (ref 8.5–10.1)
CHLORIDE SERPL-SCNC: 103 MMOL/L (ref 94–109)
CHOLEST SERPL-MCNC: 169 MG/DL
CO2 SERPL-SCNC: 28 MMOL/L (ref 20–32)
CREAT SERPL-MCNC: 0.98 MG/DL (ref 0.66–1.25)
DIFFERENTIAL METHOD BLD: ABNORMAL
EOSINOPHIL # BLD AUTO: 0 10E9/L (ref 0–0.7)
EOSINOPHIL NFR BLD AUTO: 0 %
ERYTHROCYTE [DISTWIDTH] IN BLOOD BY AUTOMATED COUNT: 15 % (ref 10–15)
GFR SERPL CREATININE-BSD FRML MDRD: 74 ML/MIN/{1.73_M2}
GLUCOSE SERPL-MCNC: 137 MG/DL (ref 70–99)
HCT VFR BLD AUTO: 38 % (ref 40–53)
HDLC SERPL-MCNC: 83 MG/DL
HGB BLD-MCNC: 12 G/DL (ref 13.3–17.7)
LDLC SERPL CALC-MCNC: 77 MG/DL
LYMPHOCYTES # BLD AUTO: 55.1 10E9/L (ref 0.8–5.3)
LYMPHOCYTES NFR BLD AUTO: 87 %
MCH RBC QN AUTO: 29.9 PG (ref 26.5–33)
MCHC RBC AUTO-ENTMCNC: 31.6 G/DL (ref 31.5–36.5)
MCV RBC AUTO: 95 FL (ref 78–100)
NEUTROPHILS # BLD AUTO: 8.2 10E9/L (ref 1.6–8.3)
NEUTROPHILS NFR BLD AUTO: 13 %
NONHDLC SERPL-MCNC: 86 MG/DL
PLATELET # BLD AUTO: 181 10E9/L (ref 150–450)
PLATELET # BLD EST: ABNORMAL 10*3/UL
POTASSIUM SERPL-SCNC: 4.6 MMOL/L (ref 3.4–5.3)
PROT SERPL-MCNC: 5.9 G/DL (ref 6.8–8.8)
RBC # BLD AUTO: 4.02 10E12/L (ref 4.4–5.9)
RBC MORPH BLD: NORMAL
SODIUM SERPL-SCNC: 136 MMOL/L (ref 133–144)
TRIGL SERPL-MCNC: 45 MG/DL
WBC # BLD AUTO: 63.3 10E9/L (ref 4–11)

## 2020-11-20 ENCOUNTER — VIRTUAL VISIT (OUTPATIENT)
Dept: RHEUMATOLOGY | Facility: CLINIC | Age: 80
End: 2020-11-20
Attending: INTERNAL MEDICINE
Payer: MEDICARE

## 2020-11-20 DIAGNOSIS — M31.6 GIANT CELL ARTERITIS (H): Primary | ICD-10-CM

## 2020-11-20 PROCEDURE — 99442 PR PHYSICIAN TELEPHONE EVALUATION 11-20 MIN: CPT | Mod: 95 | Performed by: INTERNAL MEDICINE

## 2020-11-20 ASSESSMENT — PAIN SCALES - GENERAL: PAINLEVEL: NO PAIN (0)

## 2020-11-20 NOTE — PATIENT INSTRUCTIONS
1) continue Actemra once weekly injection  2) Prednisone plan:  - 35mg daily x1 week, then 30mg daily x1 week, then 25mg daily x1 week, then 20mg daily x1 week, then 15mg daily until follow-up with me. Further taper plan to follow    I will see you for follow-up in 1 month. Please have labs done a few days prior to this appointment.     Do not hesitate to reach out if anything comes up prior to that appointment.    Thanks  Bib Alcantara MD  Rheumatology

## 2020-11-20 NOTE — LETTER
11/20/2020       RE: Bon Michael  [birth year][address] [facility] [address]     Dear Colleague,    Thank you for referring your patient, Bon Michael, to the [facility] RHEUMATOLOGY [facility]INIC [place] at [facility]. Please see a copy of my visit note below.      TELEPHONE Rheumatology Follow-up    Name: Bon Michael    MRN [id number]   Today's date: 11/20/2020          Reason for Follow-up:  GCA   Requesting physician: Guille Garcia MD        Assessment & Plan:   Assessment:  80 year old male with history of CLL referred to rheumatology for evaluation and treatment of biopsy proven giant cell arteritis with bilateral ischemic optic neuropathy clinically manifested by now stable peripheral vision loss in left>right eye. Never had HA, jaw claudication, PMR symptoms. No inflammatory arthritis symptoms. Disease currently in remission by history, exam, labs on 35mg prednisone and actemra 162mg weekly. Taper ongoing guided by GiACTA trial.    # Giant cell arteritis  1) continue 162mg once weekly injection  2) Prednisone plan:  - 35mg daily x1 week (dropped down to this dose on 11/19/2020), then 30mg daily x1 week, then 25mg daily x1 week, then 20mg daily x1 week, then 15mg daily until follow-up with me. Further taper plan to follow  3) Continue bactrim daily for PJP prophylaxis until less than 15mg prednisone daily  4) Continue alendronate for osteopenia along with PPI for GI protection  5) Follow-up in 4 weeks by phone visit with labs a few days prior  6) Has ophthalmology appointment scheduled for 12/9    Severity of this disease discussed at length again with the patient today, particularly the potential for blindness/ loss of vision. Counseled to seek immediate medical attention should he experience changes in vision. He understands. Spent a significant amount of time discussing the side effects of Actemra including risk of malignancy, serious  infections resulting in death, opportunistic infections, neutropenia/thrombocytopenia, allergic reaction, GI perforation, demyelinating disease, as well as fever, chills, hypercholesterolemia, abdominal pain, dizziness. Patient had the opportunity to ask questions, which were all answered.    Bib Alcantara MD  Rheumatology     Subjective:   Interval history:  11/20/2020  Has been tolerating injections without issues. No injection site reactions, which he experienced during his first injection of some burning and redness at the site. No systemic side effects. No new HA. No new change in his vision. No Jaw pain. No stiffness in shoulders or hips. No difficulty putting shirt on in the morning or getting up out of a chair. Has chronic swelling in his feet that is unchanged. Has been doing exercises, which he continues. Walks 1-2 times per week. Has cut down to 35mg daily of prednisone. No return of any symptoms, though has noticed some interruption of sleep which is new since started. Continues to check his finger sticks which he says are in the range he was told. Checking his BP daily and is in the normal range now.     14 point review of systems obtained and negative if not documented above.    HPI from initial consultation 9/25/20  79 year old male with history of CLL, being monitored at this point without an indication for therapy. On Saturday, going to get dirt at a nursery, August 22nd, was driving and noticed that he 'wasn't getting enough light in his eye'. The next day, Sunday, he was continuing to have left eye symptoms. Called the [place] eye clinic. Asked for appointment because of these new symptoms. On Tuesday evening, he called into the on call physician due to new complaint of 'light reflecting off the wall into my eye'. He came in the next day, Wednesday, and was sent to the retinal center on Friday.  Had an exam and was told that there was a defect in the retina/artery. Had MRI of the brain and  bloodwork on Tuesday. On Thursday, results of the MRI were discussed and the findings were questioned to be due to his known CLL. Possible stroke he was told due to viscosity increase? On Friday, 9/4/20, had worsening symptoms in the right eye. So came back in to the retinal specialists. At that time there was consideration of sending to [facility] ED, though because of the long expected wait, was instead started on 100mg of prednisone daily. 9/9/20 went down to 60mg of prednisone, with plan to go down to 50mg of 10/7/20. Was setup for biopsy on 9/10.     Denies any headaches. No jaw pain, even when chewing tough food. Feels that he has had some weight loss over the last few months unintentionally (139-130 over a period of 1 month). No fatigue, night sweats. No rashes. No chest pain, cough, shortness of breath. No abdominal pain. No joint pain. Has noticed that his stool is less formed. Not diarrhea, just softer. No blood. No issues with urination. No swelling in lower extremities. No raynauds. No double vision.     Past Medical History  CLL  Hernia  Mono   Deviated septum  Osteopenia by DEXA    Past Surgical History  Tonsillectomy  Deviated septum    Medications  Prednisone 35mg daily  Aasocort  ASA  Alendronate  Actemra  PPI    Weeks Daily prednisone dose (mg) 26-week taper Weekly 162mg Actemra      1 60    2 50 11/5/2020    3 40    4 35 11/20/2020   5 30    6 25    7 20    8 15    9 12.5    10 12.5    11 10    12 9    13 8    14 7    15 6    16 6    17 5    18 5    19 4    20 4    21 3    22 3    23 2    24 2    25 1    26 1      Allergies: Seasonal/ environmental allergies    Family History: No family history of autoimmune diseases like RA, sjogrens, SLE, scleroderma, IBD.    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 No drug use.        Objective:    Physical exam:  No vitals or exam for telephone visit    Labs:   11-  ESR 3  CRP less than 2.9  WBC 63.3  Hemoglobin 12  Platelets  181  Creatinine 0.98  Total protein low at 5.9  Alk phos 48  ALT 31  AST 30  UA with 30 of albumin  Cholesterol 169  Triglycerides 45  HDL 83  LDL 77    10/1/2020  WBC 84.9  HGB 12.3    IgM 36 low  IgA 124 normal  IgG 625    Cr 0.98    9/1/2020  WBC 50.2  HGB 11.6       Imaging:  MR brain and orbits 9/10/20  Impression:    1. Regarding the orbits and globes, there is no definite abnormal  contrast enhancement or mass. No evidence of leukemic infiltration.  2. Regarding the remainder of the brain, abnormal T2 hyperintense bone  marrow signal with associated enhancement in the skull, likely  representing leukemic infiltration.  3. Subcutaneous T2 hyperintensity with associated diffusion  restriction and contrast enhancement over the left zygomatic region,  question leukemic infiltration.    Pathology:  Patient Name: MARIELA WALLACE   MR#: [id number]   Specimen #: [id number]   Collected: 9/10/2020   Received: 9/10/2020   Reported: 9/16/2020 12:35   Ordering Phy(s): VALERIE CARRINGTON     For improved result formatting, select 'View Enhanced Report Format' under    Linked Documents section.     SPECIMEN(S):   Temporal artery biopsy, left     FINAL DIAGNOSIS:   Temporal artery, left, biopsy:   1. Granulomatous arteritis consistent with giant cell arteritis.   2. Chronic inflammation of the surrounding adventitia.     COMMENT:   Preliminary results were communicated to Dr. Valerie Carrington and Dr. Guille Garcia on 9/14/20 at 12:45pm.     I have personally reviewed all specimens and/or slides, including the   listed special stains, and used them   with my medical judgement to determine or confirm the final diagnosis.     Electronically signed out by:     Za García M.D., [facility]     CLINICAL HISTORY:   The patient is a 79 year old male with a history of chronic lymphocytic   leukemia who presented with bilateral   sequential optic neuropathy with associated pallid optic disc edema and  "  choroidal hypoperfusion, but   relatively intact visual acuity, negative elevation of acute phase   reactants, and lack of constitutional   symptoms consistent with giant cell arteritis. He also has findings of   likely leukemic infiltration in the   skull seen on MRI. He undergoes temporal artery biopsy on the left.     GROSS:   The specimen is received in formalin with proper patient identification,   labeled \"left temporal artery\".  The   specimen consists of a 3.2 cm in length by 0.3 cm in diameter tan-white   vessel segment. The specimen is   submitted intact in A1. (Dictated by: PADILLA Montalvo 9/11/2020 03:08   PM)     MICROSCOPIC:   The tissue consists of artery with narrowed lumen and intimal hyperplasia.    There is an infiltrate of   lymphocytes, plasma cells, epithelioid histiocytes, and multinucleated   giant cells throughout all layers of   the artery. There is associated loss of the internal elastic lamina on   elastic stain. Occasional focal   calcifications are seen a the level of the internal elastic lamina.   Neovascularization of the vessel wall is   present. Perivascular lymphocytic infiltrates are seen in the surrounding   adventitia. Immunohistochemistry   with CD3 and CD20 demonstrates a predominantly T-cell lymphocytic   infiltrate within the vessel wall. There is   a mixed infiltrate of T and B lymphocytes perivascular in the adventitia.   CD5 and CD43 have similar staining   patterns to CD3. There is light patchy CD23 staining in the areas positive    for CD20. CD68 highlights the   epithelioid histiocytes within the vessel wall.     The technical component of this testing was completed at the [facility], with the professional component performed    at the [facility], [address] [place] [phone] ([phone])     CPT Codes:   A: 92553-PH1, " "70048-GCVW, 22195-TRN, 43175-UAE, 16132-OSE, 89050-MVH,   18189-UMV, 72660-LSO     COLLECTION SITE:   Client: [facility], [place]   Location: BERNADETTE Michael (B) is a 80 year old male who is being evaluated via a billable telephone visit.      The patient has been notified of following:     \"This telephone visit will be conducted via a call between you and your physician/provider. We have found that certain health care needs can be provided without the need for a physical exam.  This service lets us provide the care you need with a short phone conversation.  If a prescription is necessary we can send it directly to your pharmacy.  If lab work is needed we can place an order for that and you can then stop by our lab to have the test done at a later time.    Telephone visits are billed at different rates depending on your insurance coverage. During this emergency period, for some insurers they may be billed the same as an in-person visit.  Please reach out to your insurance provider with any questions.    If during the course of the call the physician/provider feels a telephone visit is not appropriate, you will not be charged for this service.\"    Patient has given verbal consent for Telephone visit?  Yes    What phone number would you like to be contacted at? [phone]    How would you like to obtain your AVS? Mail a copy    Phone call duration: 17 minutes  Bib Alcantara MD  Rheumatology          "

## 2020-11-20 NOTE — PROGRESS NOTES
TELEPHONE Rheumatology Follow-up    Name: Bon Michael    MRN [id number]   Today's date: 11/20/2020          Reason for Follow-up:  GCA   Requesting physician: Guille Garcia MD        Assessment & Plan:   Assessment:  80 year old male with history of CLL referred to rheumatology for evaluation and treatment of biopsy proven giant cell arteritis with bilateral ischemic optic neuropathy clinically manifested by now stable peripheral vision loss in left>right eye. Never had HA, jaw claudication, PMR symptoms. No inflammatory arthritis symptoms. Disease currently in remission by history, exam, labs on 35mg prednisone and actemra 162mg weekly. Taper ongoing guided by GiACTA trial.    # Giant cell arteritis  1) continue 162mg once weekly injection  2) Prednisone plan:  - 35mg daily x1 week (dropped down to this dose on 11/19/2020), then 30mg daily x1 week, then 25mg daily x1 week, then 20mg daily x1 week, then 15mg daily until follow-up with me. Further taper plan to follow  3) Continue bactrim daily for PJP prophylaxis until less than 15mg prednisone daily  4) Continue alendronate for osteopenia along with PPI for GI protection  5) Follow-up in 4 weeks by phone visit with labs a few days prior  6) Has ophthalmology appointment scheduled for 12/9    Severity of this disease discussed at length again with the patient today, particularly the potential for blindness/ loss of vision. Counseled to seek immediate medical attention should he experience changes in vision. He understands. Spent a significant amount of time discussing the side effects of Actemra including risk of malignancy, serious infections resulting in death, opportunistic infections, neutropenia/thrombocytopenia, allergic reaction, GI perforation, demyelinating disease, as well as fever, chills, hypercholesterolemia, abdominal pain, dizziness. Patient had the opportunity to ask questions, which were all answered.    Bib Alcantara  MD  Rheumatology     Subjective:   Interval history:  11/20/2020  Has been tolerating injections without issues. No injection site reactions, which he experienced during his first injection of some burning and redness at the site. No systemic side effects. No new HA. No new change in his vision. No Jaw pain. No stiffness in shoulders or hips. No difficulty putting shirt on in the morning or getting up out of a chair. Has chronic swelling in his feet that is unchanged. Has been doing exercises, which he continues. Walks 1-2 times per week. Has cut down to 35mg daily of prednisone. No return of any symptoms, though has noticed some interruption of sleep which is new since started. Continues to check his finger sticks which he says are in the range he was told. Checking his BP daily and is in the normal range now.     14 point review of systems obtained and negative if not documented above.    HPI from initial consultation 9/25/20  79 year old male with history of CLL, being monitored at this point without an indication for therapy. On Saturday, going to get dirt at a nursery, August 22nd, was driving and noticed that he 'wasn't getting enough light in his eye'. The next day, Sunday, he was continuing to have left eye symptoms. Called the [facility] eye clinic. Asked for appointment because of these new symptoms. On Tuesday evening, he called into the on call physician due to new complaint of 'light reflecting off the wall into my eye'. He came in the next day, Wednesday, and was sent to the retinal center on Friday.  Had an exam and was told that there was a defect in the retina/artery. Had MRI of the brain and bloodwork on Tuesday. On Thursday, results of the MRI were discussed and the findings were questioned to be due to his known CLL. Possible stroke he was told due to viscosity increase? On Friday, 9/4/20, had worsening symptoms in the right eye. So came back in to the retinal specialists. At that time there was  consideration of sending to [facility] ED, though because of the long expected wait, was instead started on 100mg of prednisone daily. 9/9/20 went down to 60mg of prednisone, with plan to go down to 50mg of 10/7/20. Was setup for biopsy on 9/10.     Denies any headaches. No jaw pain, even when chewing tough food. Feels that he has had some weight loss over the last few months unintentionally (139-130 over a period of 1 month). No fatigue, night sweats. No rashes. No chest pain, cough, shortness of breath. No abdominal pain. No joint pain. Has noticed that his stool is less formed. Not diarrhea, just softer. No blood. No issues with urination. No swelling in lower extremities. No raynauds. No double vision.     Past Medical History  CLL  Hernia  Mono   Deviated septum  Osteopenia by DEXA    Past Surgical History  Tonsillectomy  Deviated septum    Medications  Prednisone 35mg daily  Aasocort  ASA  Alendronate  Actemra  PPI    Weeks Daily prednisone dose (mg) 26-week taper Weekly 162mg Actemra      1 60    2 50 11/5/2020    3 40    4 35 11/20/2020   5 30    6 25    7 20    8 15    9 12.5    10 12.5    11 10    12 9    13 8    14 7    15 6    16 6    17 5    18 5    19 4    20 4    21 3    22 3    23 2    24 2    25 1    26 1      Allergies: Seasonal/ environmental allergies    Family History: No family history of autoimmune diseases like RA, sjogrens, SLE, scleroderma, IBD.    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 No drug use.        Objective:    Physical exam:  No vitals or exam for telephone visit    Labs:   11-  ESR 3  CRP less than 2.9  WBC 63.3  Hemoglobin 12  Platelets 181  Creatinine 0.98  Total protein low at 5.9  Alk phos 48  ALT 31  AST 30  UA with 30 of albumin  Cholesterol 169  Triglycerides 45  HDL 83  LDL 77    10/1/2020  WBC 84.9  HGB 12.3    IgM 36 low  IgA 124 normal  IgG 625    Cr 0.98    9/1/2020  WBC 50.2  HGB 11.6       Imaging:  MR brain and orbits  9/10/20  Impression:    1. Regarding the orbits and globes, there is no definite abnormal  contrast enhancement or mass. No evidence of leukemic infiltration.  2. Regarding the remainder of the brain, abnormal T2 hyperintense bone  marrow signal with associated enhancement in the skull, likely  representing leukemic infiltration.  3. Subcutaneous T2 hyperintensity with associated diffusion  restriction and contrast enhancement over the left zygomatic region,  question leukemic infiltration.    Pathology:  Patient Name: MARIELA WALLACE   MR#: [id number]   Specimen #: [id number]   Collected: 9/10/2020   Received: 9/10/2020   Reported: 9/16/2020 12:35   Ordering Phy(s): VALERIE CARRINGTON     For improved result formatting, select 'View Enhanced Report Format' under    Linked Documents section.     SPECIMEN(S):   Temporal artery biopsy, left     FINAL DIAGNOSIS:   Temporal artery, left, biopsy:   1. Granulomatous arteritis consistent with giant cell arteritis.   2. Chronic inflammation of the surrounding adventitia.     COMMENT:   Preliminary results were communicated to Dr. Valerie Carrington and Dr. Guille Garcia on 9/14/20 at 12:45pm.     I have personally reviewed all specimens and/or slides, including the   listed special stains, and used them   with my medical judgement to determine or confirm the final diagnosis.     Electronically signed out by:     Za García M.D., [facility]     CLINICAL HISTORY:   The patient is a 79 year old male with a history of chronic lymphocytic   leukemia who presented with bilateral   sequential optic neuropathy with associated pallid optic disc edema and   choroidal hypoperfusion, but   relatively intact visual acuity, negative elevation of acute phase   reactants, and lack of constitutional   symptoms consistent with giant cell arteritis. He also has findings of   likely leukemic infiltration in the   skull seen on MRI. He undergoes temporal artery biopsy on the left.  "    GROSS:   The specimen is received in formalin with proper patient identification,   labeled \"left temporal artery\".  The   specimen consists of a 3.2 cm in length by 0.3 cm in diameter tan-white   vessel segment. The specimen is   submitted intact in A1. (Dictated by: PADILLA Montalvo 9/11/2020 03:08   PM)     MICROSCOPIC:   The tissue consists of artery with narrowed lumen and intimal hyperplasia.    There is an infiltrate of   lymphocytes, plasma cells, epithelioid histiocytes, and multinucleated   giant cells throughout all layers of   the artery. There is associated loss of the internal elastic lamina on   elastic stain. Occasional focal   calcifications are seen a the level of the internal elastic lamina.   Neovascularization of the vessel wall is   present. Perivascular lymphocytic infiltrates are seen in the surrounding   adventitia. Immunohistochemistry   with CD3 and CD20 demonstrates a predominantly T-cell lymphocytic   infiltrate within the vessel wall. There is   a mixed infiltrate of T and B lymphocytes perivascular in the adventitia.   CD5 and CD43 have similar staining   patterns to CD3. There is light patchy CD23 staining in the areas positive    for CD20. CD68 highlights the   epithelioid histiocytes within the vessel wall.     The technical component of this testing was completed at the [facility], with the professional component performed    at the [facility], [address] [place] [phone] ([phone])     CPT Codes:   A: 86192-HY3, 83457-TDLK, 91515-HFL, 25355-ZJE, 92802-MWH, 53307-YNQ,   04217-QSN, 03116-PFG     COLLECTION SITE:   Client: [facility]   Location: MARY ANNE Michael (B) is a 80 year old male who is being evaluated via a billable telephone visit.      The patient has been notified of following: " "    \"This telephone visit will be conducted via a call between you and your physician/provider. We have found that certain health care needs can be provided without the need for a physical exam.  This service lets us provide the care you need with a short phone conversation.  If a prescription is necessary we can send it directly to your pharmacy.  If lab work is needed we can place an order for that and you can then stop by our lab to have the test done at a later time.    Telephone visits are billed at different rates depending on your insurance coverage. During this emergency period, for some insurers they may be billed the same as an in-person visit.  Please reach out to your insurance provider with any questions.    If during the course of the call the physician/provider feels a telephone visit is not appropriate, you will not be charged for this service.\"    Patient has given verbal consent for Telephone visit?  Yes    What phone number would you like to be contacted at? [phone]    How would you like to obtain your AVS? Mail a copy    Phone call duration: 17 minutes  Bib Alcantara MD  Rheumatology      "

## 2020-11-23 ENCOUNTER — TELEPHONE (OUTPATIENT)
Dept: RHEUMATOLOGY | Facility: CLINIC | Age: 80
End: 2020-11-23

## 2020-11-23 NOTE — TELEPHONE ENCOUNTER
Pain was in heel, after walking. Discussed what he can do with ice and heat.  Discussed not using NSAIDs due to prednisone use. He is going to see PCP tomorrow to have his foot evaluated and make sure it is not serious. Discussed Tylenol, no kidney issues, ok to use otc dose, up to 3 grams per day, should not be on a regular basis. Pt understands.    Candis Montero RN  Rheumatology Clinic

## 2020-11-30 ENCOUNTER — TELEPHONE (OUTPATIENT)
Dept: OPHTHALMOLOGY | Facility: CLINIC | Age: 80
End: 2020-11-30
Payer: MEDICARE

## 2020-11-30 NOTE — TELEPHONE ENCOUNTER
The patient called and notes he has vision change that he notices it in sunlight.    He states the sun if very bright and his vision is bothersome.  He notes that his normal vision seems fine like for reading and seeing indoors.    He has an appointment next week.  I told him I would review with the neurophthalmology team.

## 2020-12-01 ENCOUNTER — TELEPHONE (OUTPATIENT)
Dept: OPHTHALMOLOGY | Facility: CLINIC | Age: 80
End: 2020-12-01

## 2020-12-01 NOTE — TELEPHONE ENCOUNTER
Patient called and left a voicemail to call him back because he had questions. I spoke to him and it looks like he also was able to leave a message with someone yesterday as well.  Patient wants to know if his vision is changing or not. When he goes out into the sun, he states that his vision is brilliant in the sun. I told him we will double check with Dr. Garcia and reach out to him once I find out.       PREMA MILLER COT on 12/1/2020 at 3:32 PM

## 2020-12-02 NOTE — TELEPHONE ENCOUNTER
Per Dr. Garcia:   Please call the patient and tell him that perceived increased brightness in the sun is not an expected symptom of giant cell arteritis.  If he had giant cell arteritis vision loss I would expect his vision to be down at all times not just in certain lighting conditions.     His symptoms are however consistent with progression of cataracts.  Cataracts can evolve / develop more rapidly when on corticosteroids (like the prednisone he is on).  I will discuss with him in more detail at our upcoming visit in about 1 week.     Thank you. - Guille     I spoke with patient and informed him of the above.     PREMA MILLER, COT on 12/2/2020 at 11:34 AM

## 2020-12-09 ENCOUNTER — OFFICE VISIT (OUTPATIENT)
Dept: OPHTHALMOLOGY | Facility: CLINIC | Age: 80
End: 2020-12-09
Attending: OPHTHALMOLOGY
Payer: MEDICARE

## 2020-12-09 DIAGNOSIS — H53.40 VISUAL FIELD DEFECT: ICD-10-CM

## 2020-12-09 DIAGNOSIS — H46.9 OPTIC NEUROPATHY: ICD-10-CM

## 2020-12-09 PROCEDURE — 92014 COMPRE OPH EXAM EST PT 1/>: CPT | Performed by: OPHTHALMOLOGY

## 2020-12-09 PROCEDURE — G0463 HOSPITAL OUTPT CLINIC VISIT: HCPCS

## 2020-12-09 PROCEDURE — 92083 EXTENDED VISUAL FIELD XM: CPT | Performed by: OPHTHALMOLOGY

## 2020-12-09 PROCEDURE — 92133 CPTRZD OPH DX IMG PST SGM ON: CPT | Performed by: OPHTHALMOLOGY

## 2020-12-09 ASSESSMENT — TONOMETRY
OD_IOP_MMHG: 16
OS_IOP_MMHG: 13
IOP_METHOD: ICARE

## 2020-12-09 ASSESSMENT — CUP TO DISC RATIO
OD_RATIO: 0.3
OS_RATIO: 0.3

## 2020-12-09 ASSESSMENT — REFRACTION_WEARINGRX
OS_AXIS: 059
OD_SPHERE: +3.25
OD_AXIS: 134
SPECS_TYPE: PAL
OD_CYLINDER: +1.25
OS_SPHERE: +6.25
OS_CYLINDER: +0.50

## 2020-12-09 ASSESSMENT — VISUAL ACUITY
OS_CC: 20/20
CORRECTION_TYPE: GLASSES
OD_CC: 20/20
METHOD: SNELLEN - LINEAR

## 2020-12-09 ASSESSMENT — CONF VISUAL FIELD
OS_NORMAL: 1
OD_NORMAL: 1

## 2020-12-09 ASSESSMENT — SLIT LAMP EXAM - LIDS
COMMENTS: NORMAL
COMMENTS: NORMAL

## 2020-12-09 ASSESSMENT — EXTERNAL EXAM - LEFT EYE: OS_EXAM: NORMAL

## 2020-12-09 ASSESSMENT — EXTERNAL EXAM - RIGHT EYE: OD_EXAM: NORMAL

## 2020-12-09 NOTE — NURSING NOTE
Chief Complaints and History of Present Illnesses   Patient presents with     Blurred Vision Follow-Up     Chief Complaint(s) and History of Present Illness(es)     Blurred Vision Follow-Up               Comments     Bon Michael is a 80 year old male who presents today for    1. 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  2. Chronic lymphocytic leukemia    No significant change in vision.

## 2020-12-09 NOTE — PROGRESS NOTES
"     1. 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  Vision stable in both eyes with intact / normal central vision but persistent (probably permanent) peripheral field defects in both eyes.  No exam or history indicators to suggest recurrence.  Of note patient had none of the typical giant cell arteritis symptoms at diagnosis.  His recent symptoms of increasing trouble seeing in bright lights I believe is related to increasing cataract progression on corticosteroids.  We can address this surgically in the future but for now his visual acuity remains 20/20.    Continue prednisone taper as per rheumatology and actemra.    Follow-up with me in 1 month.    2. Chronic lymphocytic leukemia- after some initial concern for possible leukemic infiltration of the skull / optic nerves / orbit, his giant cell arteritis explains all of his clinical sequelae and discussions with neuro-radiology and oncology (Dr. Wall) indicate that his radiographic   findings in the skull marrow are simply indications of his untreated (and asymptomatic) chronic lymphocytic leukemia.  Patient will continue following with Dr. Wall.    - For a more thorough description of the disease presentation, please see my letter from the patient's initial visit with me     history from presentation (9/9/2020 visit)    Bon Michael is a 79 year old male who presents to neuro-ophthalmology   clinic today for consultation from Jose Andino MD at [facility] for   visual disturbance of left eye. He describes it as \"his left eye not   getting enough light\". He describes noticing the change in his left eye's   temporal visual field, inferior > superior. He notices it more first thing     in the morning. These symptoms began on 8/22/2020 and he describes it as   worsening - he is unclear if it the deficit has gotten larger or darker   however. He first noticed it while driving at the periphery of his vision.      Patient saw " Dr. Lewis on August 28.  He underwent esr / crp testing as   well as mri (see below).    Labs: 9/2/20- WBC- 50.2, ESR- 28, CRP < 5    On August 31 he developed symptoms of waviness in his peripheral vision in     the right eye.  He went to Dr. Andino on 9/4/2020 who wanted him to go to     the ED but he was unable.  Dr. Andino reviewed the patient's fluorescein   angiography and felt there was choroidal filling delay in the right eye   and pallid edema in the right eye concerning for giant cell arteritis.  We     discussed the case via phone.     Patient received 100 mg prednisone starting 9/4/20 and has been on 80 mg   prednisone daily since. When he started prednisone, he stopped taking   daily baby aspirin out of concern for potential GI upset side effects   after discussing this with the pharmacist. He has no history of GI upset   or ulcers.    Denies headache, jaw pain, temporal tenderness, issues with chewing,   swallowing, eye pain, sleep apnea,  He denies polymyalgia rheumatica   symptoms but has had 6-8 lbs of fluctuation in the past couple of months.    PRIOR IMAGING:  BRAIN MRI WITH/WO GADOLINIUM, MRA OF THE [place] OF [place]ANCHE[place] AND MRA OF THE   CAROTID ARTERIES - [facility] (9/1/2020)  IMPRESSION:    1.  No acute intracranial abnormality.  2.  No evidence of an orbital abnormalities.  3.  Mild generalized cerebral atrophy.  4.  Unremarkable MRA of the [place] of [place].  5.  Unremarkable MRA of the cervical vessels.    On my re-review I disagreed with the radiology read.  There is evidence of   patchy enhancement diffusely within the bilateral orbits and along the   bilateral intraorbital optic nerve sheaths.  Will discuss with   neuro-radiology regarding finding and repeat scan.    No bisphosphonates in the past (no osteoporosis medications for 7-8 years   per patient).     Left temporal artery biopsy 9/9/20- positive for giant cell arteritis   Prednisone (started 9/4/2020)    Patient started  Actemra 10/28/20    INTERIM HISTORY    Last seen 10/21/2020.    Patient started Actemra 10/28/20.  His 7th infusion was today.  Patient currenlty on 25 mg of prednisone and reducing to 20 mg tomorrow.       Last saw rheumatology on 11/20/20- Dr. Alcantara.  The plan outlined was:    1) continue 162mg once weekly injection  2) Prednisone plan:  - 35mg daily x1 week (dropped down to this dose on 11/19/2020), then 30mg daily x1 week, then 25mg daily x1 week, then 20mg daily x1 week, then 15mg daily until follow-up with me. Further taper plan to follow  3) Continue bactrim daily for PJP prophylaxis until less than 15mg prednisone daily  4) Continue alendronate for osteopenia along with PPI for GI protection  5) Follow-up in 4 weeks by phone visit with labs a few days prior  6) Has ophthalmology appointment scheduled for 12/9    Patient reports that in last several weeks having increasing difficulty with vision in bright lights. Patient has not noticed any peripheral vision worsening.     Today the patient's exam is stable.  No optic disc edema.  No indication of giant cell arteritis recurrence.  I suspect his recent trouble in bright light environment due to evolving cataracts.    Octopus automated 30 degree visual fields show constriction which is stable from prior fields.  OCT of the optic nerve head revealed resolving edema with revelation of underlying atrophy.    Follow-up in 1 month.  Continue taper.       Complete documentation of historical and exam elements from today's encounter can be found in the full encounter summary report (not reduplicated in this progress note).  I personally obtained the chief complaint(s) and history of present illness.  I confirmed and edited as necessary the review of systems, past medical/surgical history, family history, social history, and examination findings as documented by others; and I examined the patient myself.  I personally reviewed the relevant tests, images, and  reports as documented above.  I formulated and edited as necessary the assessment and plan and discussed the findings and management plan with the patient and family     Guille Garcia MD

## 2020-12-14 ENCOUNTER — TELEPHONE (OUTPATIENT)
Dept: RHEUMATOLOGY | Facility: CLINIC | Age: 80
End: 2020-12-14

## 2020-12-14 DIAGNOSIS — R79.9 ABNORMAL FINDING OF BLOOD CHEMISTRY, UNSPECIFIED: ICD-10-CM

## 2020-12-14 DIAGNOSIS — M31.6 GIANT CELL ARTERITIS (H): ICD-10-CM

## 2020-12-14 LAB
ALBUMIN UR-MCNC: NEGATIVE MG/DL
APPEARANCE UR: CLEAR
BASOPHILS # BLD AUTO: 0 10E9/L (ref 0–0.2)
BASOPHILS NFR BLD AUTO: 0 %
BILIRUB UR QL STRIP: NEGATIVE
COLOR UR AUTO: YELLOW
CRP SERPL-MCNC: <2.9 MG/L (ref 0–8)
DIFFERENTIAL METHOD BLD: ABNORMAL
EOSINOPHIL # BLD AUTO: 0 10E9/L (ref 0–0.7)
EOSINOPHIL NFR BLD AUTO: 0 %
ERYTHROCYTE [DISTWIDTH] IN BLOOD BY AUTOMATED COUNT: 14.9 % (ref 10–15)
ERYTHROCYTE [SEDIMENTATION RATE] IN BLOOD BY WESTERGREN METHOD: 4 MM/H (ref 0–20)
GLUCOSE UR STRIP-MCNC: NEGATIVE MG/DL
HCT VFR BLD AUTO: 38.1 % (ref 40–53)
HGB BLD-MCNC: 11.8 G/DL (ref 13.3–17.7)
HGB UR QL STRIP: NEGATIVE
KETONES UR STRIP-MCNC: NEGATIVE MG/DL
LEUKOCYTE ESTERASE UR QL STRIP: NEGATIVE
LYMPHOCYTES # BLD AUTO: 44.3 10E9/L (ref 0.8–5.3)
LYMPHOCYTES NFR BLD AUTO: 82 %
MCH RBC QN AUTO: 29 PG (ref 26.5–33)
MCHC RBC AUTO-ENTMCNC: 31 G/DL (ref 31.5–36.5)
MCV RBC AUTO: 94 FL (ref 78–100)
MONOCYTES # BLD AUTO: 0.5 10E9/L (ref 0–1.3)
MONOCYTES NFR BLD AUTO: 1 %
NEUTROPHILS # BLD AUTO: 9.2 10E9/L (ref 1.6–8.3)
NEUTROPHILS NFR BLD AUTO: 17 %
NITRATE UR QL: NEGATIVE
PH UR STRIP: 5 PH (ref 5–7)
PLATELET # BLD AUTO: 212 10E9/L (ref 150–450)
PLATELET # BLD EST: ABNORMAL 10*3/UL
RBC # BLD AUTO: 4.07 10E12/L (ref 4.4–5.9)
RBC #/AREA URNS AUTO: NORMAL /HPF
RBC MORPH BLD: NORMAL
SOURCE: NORMAL
SP GR UR STRIP: 1.01 (ref 1–1.03)
UROBILINOGEN UR STRIP-ACNC: 0.2 EU/DL (ref 0.2–1)
WBC # BLD AUTO: 54 10E9/L (ref 4–11)
WBC #/AREA URNS AUTO: NORMAL /HPF

## 2020-12-14 PROCEDURE — 36415 COLL VENOUS BLD VENIPUNCTURE: CPT | Performed by: INTERNAL MEDICINE

## 2020-12-14 PROCEDURE — 85652 RBC SED RATE AUTOMATED: CPT | Performed by: INTERNAL MEDICINE

## 2020-12-14 PROCEDURE — 86140 C-REACTIVE PROTEIN: CPT | Performed by: INTERNAL MEDICINE

## 2020-12-14 PROCEDURE — 81001 URINALYSIS AUTO W/SCOPE: CPT | Performed by: INTERNAL MEDICINE

## 2020-12-14 PROCEDURE — 80053 COMPREHEN METABOLIC PANEL: CPT | Performed by: INTERNAL MEDICINE

## 2020-12-14 PROCEDURE — 85025 COMPLETE CBC W/AUTO DIFF WBC: CPT | Performed by: INTERNAL MEDICINE

## 2020-12-14 PROCEDURE — 80061 LIPID PANEL: CPT | Performed by: INTERNAL MEDICINE

## 2020-12-14 NOTE — TELEPHONE ENCOUNTER
Lab is calling to report a critical lab with WBC which is 54.03.  Spoke to Yury and he is feeling just fine; no SOB, no CP or any difficulties breathing.    Yury states that this level is not alarming to him because it has been in the 50's in the past and that is about what is called normal for him    He is currently taking Prednisone 20mg.  Oncology told Yury that they could start seeing the WBC start to decrease as his prednisone dosing starts to decrease.    Dr. Alcantara was reported the critical value and he responded as nothing else to do at this time.     Paige Zhou MSN, RN  Rheumatology RN Care Coordinator  [facility]

## 2020-12-15 LAB
ALBUMIN SERPL-MCNC: 3.6 G/DL (ref 3.4–5)
ALP SERPL-CCNC: 49 U/L (ref 40–150)
ALT SERPL W P-5'-P-CCNC: 29 U/L (ref 0–70)
ANION GAP SERPL CALCULATED.3IONS-SCNC: 7 MMOL/L (ref 3–14)
AST SERPL W P-5'-P-CCNC: 29 U/L (ref 0–45)
BILIRUB SERPL-MCNC: 0.4 MG/DL (ref 0.2–1.3)
BUN SERPL-MCNC: 28 MG/DL (ref 7–30)
CALCIUM SERPL-MCNC: 9.1 MG/DL (ref 8.5–10.1)
CHLORIDE SERPL-SCNC: 105 MMOL/L (ref 94–109)
CHOLEST SERPL-MCNC: 168 MG/DL
CO2 SERPL-SCNC: 25 MMOL/L (ref 20–32)
CREAT SERPL-MCNC: 0.78 MG/DL (ref 0.66–1.25)
GFR SERPL CREATININE-BSD FRML MDRD: 87 ML/MIN/{1.73_M2}
GLUCOSE SERPL-MCNC: 157 MG/DL (ref 70–99)
HDLC SERPL-MCNC: 70 MG/DL
LDLC SERPL CALC-MCNC: 82 MG/DL
NONHDLC SERPL-MCNC: 98 MG/DL
POTASSIUM SERPL-SCNC: 4.9 MMOL/L (ref 3.4–5.3)
PROT SERPL-MCNC: 6.2 G/DL (ref 6.8–8.8)
SODIUM SERPL-SCNC: 137 MMOL/L (ref 133–144)
TRIGL SERPL-MCNC: 82 MG/DL

## 2020-12-17 ENCOUNTER — VIRTUAL VISIT (OUTPATIENT)
Dept: RHEUMATOLOGY | Facility: CLINIC | Age: 80
End: 2020-12-17
Attending: INTERNAL MEDICINE
Payer: MEDICARE

## 2020-12-17 DIAGNOSIS — M31.6 GIANT CELL ARTERITIS (H): Primary | ICD-10-CM

## 2020-12-17 PROCEDURE — 99443 PR PHYSICIAN TELEPHONE EVALUATION 21-30 MIN: CPT | Mod: 95 | Performed by: INTERNAL MEDICINE

## 2020-12-17 RX ORDER — PREDNISONE 5 MG/1
TABLET ORAL
Qty: 90 TABLET | Refills: 0 | Status: SHIPPED | OUTPATIENT
Start: 2020-12-17 | End: 2021-02-16

## 2020-12-17 RX ORDER — PREDNISONE 1 MG/1
TABLET ORAL
Qty: 90 TABLET | Refills: 0 | Status: SHIPPED | OUTPATIENT
Start: 2020-12-17 | End: 2021-01-11

## 2020-12-17 RX ORDER — PREDNISONE 2.5 MG/1
TABLET ORAL
Qty: 90 TABLET | Refills: 0 | Status: SHIPPED | OUTPATIENT
Start: 2020-12-17 | End: 2023-11-02

## 2020-12-17 ASSESSMENT — PAIN SCALES - GENERAL: PAINLEVEL: NO PAIN (0)

## 2020-12-17 NOTE — PROGRESS NOTES
"Bon Michael is a 80 year old male who is being evaluated via a billable telephone visit.      The patient has been notified of following:     \"This telephone visit will be conducted via a call between you and your physician/provider. We have found that certain health care needs can be provided without the need for a physical exam.  This service lets us provide the care you need with a short phone conversation.  If a prescription is necessary we can send it directly to your pharmacy.  If lab work is needed we can place an order for that and you can then stop by our lab to have the test done at a later time.    Telephone visits are billed at different rates depending on your insurance coverage. During this emergency period, for some insurers they may be billed the same as an in-person visit.  Please reach out to your insurance provider with any questions.    If during the course of the call the physician/provider feels a telephone visit is not appropriate, you will not be charged for this service.\"    Patient has given verbal consent for Telephone visit?  Yes    What phone number would you like to be contacted at? [phone]    How would you like to obtain your AVS? Mail a copy    Phone call duration: 26 minutes    Bib Alcantara MD  Rheumatology      TELEPHONE Rheumatology Follow-up    Name: Bon Michael    MRN [id number]   Today's date: 11/20/2020          Reason for Follow-up:  GCA   Requesting physician: Guille Garcia MD        Assessment & Plan:   Assessment:  80 year old male with history of CLL referred to rheumatology for evaluation and treatment of biopsy proven giant cell arteritis with bilateral ischemic optic neuropathy clinically manifested by now stable peripheral vision loss in left>right eye. Never had HA, jaw claudication, PMR symptoms. No inflammatory arthritis symptoms. Disease currently in remission by history, exam, labs on 20mg prednisone and actemra 162mg weekly. Taper " ongoing guided by GiACTA trial.    # Giant cell arteritis  1) continue 162mg once weekly injection  2) Prednisone plan:  - starting today decrease to 15mg x1 week, then 12.5mg daily x2 weeks, then 10mg daily x1 week. Further taper plan to follow. 5mg, 2.5mg, and 1mg tabs ordered  3) Stop bactrim daily and omeprazole  4) Continue alendronate for osteopenia   5) Follow-up in 3 weeks by phone visit with labs a few days prior  6) Has ophthalmology appointment scheduled for 1/14/2021    Severity of this disease discussed at length again with the patient today, particularly the potential for blindness/ loss of vision. Counseled to seek immediate medical attention should he experience changes in vision. He understands. Spent a significant amount of time discussing the side effects of Actemra including risk of malignancy, serious infections resulting in death, opportunistic infections, neutropenia/thrombocytopenia, allergic reaction, GI perforation, demyelinating disease, as well as fever, chills, hypercholesterolemia, abdominal pain, dizziness. Patient had the opportunity to ask questions, which were all answered.    Bib Alcantara MD  Rheumatology     Subjective:   Interval history  12/17/2020  No HA, no jaw pain, no new change in vision. Has had continued sensitivity to sun, which is chronic. Brought up with ophthalmology, not issue from GCA, possible cataract. No joint pain except for chronic back discomfort. No stiffness in shoulders or hips. No issues with giving himself the injection, no rash or burning. Wednesday morning takes the injection. Last week Thursday decreased the dose from 25mg daily down to 20mg daily. Has continued on this. No ongoing side effects from prednisone. No weight gain. Feels that he is having nocturnal bowel movements, which is new. He thinks this is from having bowl of oatmeal. Not increased in frequency, previously just in the AM. Now just at night. Thinks that it is for oatmeal. Seeing  ophthalmology regularly. No interval infections or fevers.     14 point review of systems collected and negative if not documented above.    11/20/2020  Has been tolerating injections without issues. No injection site reactions, which he experienced during his first injection of some burning and redness at the site. No systemic side effects. No new HA. No new change in his vision. No Jaw pain. No stiffness in shoulders or hips. No difficulty putting shirt on in the morning or getting up out of a chair. Has chronic swelling in his feet that is unchanged. Has been doing exercises, which he continues. Walks 1-2 times per week. Has cut down to 35mg daily of prednisone. No return of any symptoms, though has noticed some interruption of sleep which is new since started. Continues to check his finger sticks which he says are in the range he was told. Checking his BP daily and is in the normal range now.     14 point review of systems obtained and negative if not documented above.    HPI from initial consultation 9/25/20  79 year old male with history of CLL, being monitored at this point without an indication for therapy. On Saturday, going to get dirt at a nursery, August 22nd, was driving and noticed that he 'wasn't getting enough light in his eye'. The next day, Sunday, he was continuing to have left eye symptoms. Called the [facility]. Asked for appointment because of these new symptoms. On Tuesday evening, he called into the on call physician due to new complaint of 'light reflecting off the wall into my eye'. He came in the next day, Wednesday, and was sent to the retinal center on Friday.  Had an exam and was told that there was a defect in the retina/artery. Had MRI of the brain and bloodwork on Tuesday. On Thursday, results of the MRI were discussed and the findings were questioned to be due to his known CLL. Possible stroke he was told due to viscosity increase? On Friday, 9/4/20, had worsening symptoms in  the right eye. So came back in to the retinal specialists. At that time there was consideration of sending to [facility] ED, though because of the long expected wait, was instead started on 100mg of prednisone daily. 9/9/20 went down to 60mg of prednisone, with plan to go down to 50mg of 10/7/20. Was setup for biopsy on 9/10.     Denies any headaches. No jaw pain, even when chewing tough food. Feels that he has had some weight loss over the last few months unintentionally (139-130 over a period of 1 month). No fatigue, night sweats. No rashes. No chest pain, cough, shortness of breath. No abdominal pain. No joint pain. Has noticed that his stool is less formed. Not diarrhea, just softer. No blood. No issues with urination. No swelling in lower extremities. No raynauds. No double vision.     Past Medical History  CLL  Hernia  Mono   Deviated septum  Osteopenia by DEXA    Past Surgical History  Tonsillectomy  Deviated septum    Medications  Prednisone 15mg daily  Current Outpatient Medications   Medication     alendronate (FOSAMAX) 70 MG tablet     Aspirin-Calcium Carbonate  MG TABS     omeprazole (PRILOSEC) 20 MG DR capsule     predniSONE (DELTASONE) 20 MG tablet     sulfamethoxazole-trimethoprim (BACTRIM) 400-80 MG tablet     tocilizumab (ACTEMRA) 162 MG/0.9ML subcutaneous injection     predniSONE (DELTASONE) 10 MG tablet     No current facility-administered medications for this visit.          Weeks Daily prednisone dose (mg) 26-week taper Weekly 162mg Actemra      1 60    2 50 11/5/2020    3 40    4 35 11/20/2020   5 30    6 25    7 20 12/10/2020   8 15 12/17/2020   9 12.5    10 12.5    11 10    12 9    13 8    14 7    15 6    16 6    17 5    18 5    19 4    20 4    21 3    22 3    23 2    24 2    25 1    26 1      Allergies: Seasonal/ environmental allergies    Family History: No family history of autoimmune diseases like RA, sjogrens, SLE, scleroderma, IBD.    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  No ETOH use. No drug use.        Objective:    Physical exam:  No vitals or exam for telephone visit    Labs:   12/14/2020   CRP <2.9  ESR 4  WBC 54  HGB 11.8    Cr 0.78  Ua unremarkable  Cholesterol 168  Trig 82  HDL 70  LDL 81    11-  ESR 3  CRP less than 2.9  WBC 63.3  Hemoglobin 12  Platelets 181  Creatinine 0.98  Total protein low at 5.9  Alk phos 48  ALT 31  AST 30  UA with 30 of albumin  Cholesterol 169  Triglycerides 45  HDL 83  LDL 77    10/1/2020  WBC 84.9  HGB 12.3    IgM 36 low  IgA 124 normal  IgG 625    Cr 0.98    9/1/2020  WBC 50.2  HGB 11.6       Imaging:  MR brain and orbits 9/10/20  Impression:    1. Regarding the orbits and globes, there is no definite abnormal  contrast enhancement or mass. No evidence of leukemic infiltration.  2. Regarding the remainder of the brain, abnormal T2 hyperintense bone  marrow signal with associated enhancement in the skull, likely  representing leukemic infiltration.  3. Subcutaneous T2 hyperintensity with associated diffusion  restriction and contrast enhancement over the left zygomatic region,  question leukemic infiltration.    Pathology:  Patient Name: MARIELA WALLACE   MR#: [id number]   Specimen #: [id number]   Collected: 9/10/2020   Received: 9/10/2020   Reported: 9/16/2020 12:35   Ordering Phy(s): AGUS RETANA     For improved result formatting, select 'View Enhanced Report Format' under    Linked Documents section.     SPECIMEN(S):   Temporal artery biopsy, left     FINAL DIAGNOSIS:   Temporal artery, left, biopsy:   1. Granulomatous arteritis consistent with giant cell arteritis.   2. Chronic inflammation of the surrounding adventitia.     COMMENT:   Preliminary results were communicated to Dr. Agus Retana and Dr. Guille Garcia on 9/14/20 at 12:45pm.     I have personally reviewed all specimens and/or slides, including the   listed special stains, and used them   with my medical judgement to determine or  "confirm the final diagnosis.     Electronically signed out by:     Za García M.D., [facility]     CLINICAL HISTORY:   The patient is a 79 year old male with a history of chronic lymphocytic   leukemia who presented with bilateral   sequential optic neuropathy with associated pallid optic disc edema and   choroidal hypoperfusion, but   relatively intact visual acuity, negative elevation of acute phase   reactants, and lack of constitutional   symptoms consistent with giant cell arteritis. He also has findings of   likely leukemic infiltration in the   skull seen on MRI. He undergoes temporal artery biopsy on the left.     GROSS:   The specimen is received in formalin with proper patient identification,   labeled \"left temporal artery\".  The   specimen consists of a 3.2 cm in length by 0.3 cm in diameter tan-white   vessel segment. The specimen is   submitted intact in A1. (Dictated by: PADILLA Montalvo 9/11/2020 03:08   PM)     MICROSCOPIC:   The tissue consists of artery with narrowed lumen and intimal hyperplasia.    There is an infiltrate of   lymphocytes, plasma cells, epithelioid histiocytes, and multinucleated   giant cells throughout all layers of   the artery. There is associated loss of the internal elastic lamina on   elastic stain. Occasional focal   calcifications are seen a the level of the internal elastic lamina.   Neovascularization of the vessel wall is   present. Perivascular lymphocytic infiltrates are seen in the surrounding   adventitia. Immunohistochemistry   with CD3 and CD20 demonstrates a predominantly T-cell lymphocytic   infiltrate within the vessel wall. There is   a mixed infiltrate of T and B lymphocytes perivascular in the adventitia.   CD5 and CD43 have similar staining   patterns to CD3. There is light patchy CD23 staining in the areas positive    for CD20. CD68 highlights the   epithelioid histiocytes within the vessel wall.     The technical component of this testing was " completed at the [facility], with the professional component performed    at the [facility], [address] [place] [address],   [place] [address] ([phone])     CPT Codes:   A: 99943-MN8, 67137-EADO, 61312-QLS, 60777-ZAA, 57669-FGG, 77278-SRZ,   98614-XDH, 78819-HSU     COLLECTION SITE:   Client: [facility]   Location: BERNADETTE FUENTES)

## 2020-12-17 NOTE — LETTER
"12/17/2020       RE: Bon Michael  [address] [facility] [address]     Dear Colleague,    Thank you for referring your patient, Bon Michael, to the [facility] [place] [facility]. Please see a copy of my visit note below.    Bon Michael is a 80 year old male who is being evaluated via a billable telephone visit.      The patient has been notified of following:     \"This telephone visit will be conducted via a call between you and your physician/provider. We have found that certain health care needs can be provided without the need for a physical exam.  This service lets us provide the care you need with a short phone conversation.  If a prescription is necessary we can send it directly to your pharmacy.  If lab work is needed we can place an order for that and you can then stop by our lab to have the test done at a later time.    Telephone visits are billed at different rates depending on your insurance coverage. During this emergency period, for some insurers they may be billed the same as an in-person visit.  Please reach out to your insurance provider with any questions.    If during the course of the call the physician/provider feels a telephone visit is not appropriate, you will not be charged for this service.\"    Patient has given verbal consent for Telephone visit?  Yes    What phone number would you like to be contacted at? [phone]    How would you like to obtain your AVS? 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 Never had HA, jaw claudication, PMR symptoms. No inflammatory arthritis symptoms. Disease currently in remission by history, exam, labs on 20mg prednisone and actemra 162mg weekly. Taper ongoing guided by GiACTA trial.    # Giant cell arteritis  1) continue 162mg once weekly injection  2) Prednisone plan:  - starting today decrease to 15mg x1 week, then 12.5mg daily x2 weeks, then 10mg daily x1 week. Further taper plan to follow. 5mg, 2.5mg, and 1mg tabs ordered  3) Stop bactrim daily and omeprazole  4) Continue alendronate for osteopenia   5) Follow-up in 3 weeks by phone visit with labs a few days prior  6) Has ophthalmology appointment scheduled for 1/14/2021    Severity of this disease discussed at length again with the patient today, particularly the potential for blindness/ loss of vision. Counseled to seek immediate medical attention should he experience changes in vision. He understands. Spent a significant amount of time discussing the side effects of Actemra including risk of malignancy, serious infections resulting in death, opportunistic infections, neutropenia/thrombocytopenia, allergic reaction, GI perforation, demyelinating disease, as well as fever, chills, hypercholesterolemia, abdominal pain, dizziness. Patient had the opportunity to ask questions, which were all answered.    Bib Alcantara MD  Rheumatology     Subjective:   Interval history  12/17/2020  No HA, no jaw pain, no new change in vision. Has had continued sensitivity to sun, which is chronic. Brought up with ophthalmology, not issue from GCA, possible cataract. No joint pain except for chronic back discomfort. No stiffness in shoulders or hips. No issues with giving himself the injection, no rash or burning. Wednesday morning takes the injection. Last week Thursday decreased the dose from 25mg daily down to  20mg daily. Has continued on this. No ongoing side effects from prednisone. No weight gain. Feels that he is having nocturnal bowel movements, which is new. He thinks this is from having bowl of oatmeal. Not increased in frequency, previously just in the AM. Now just at night. Thinks that it is for oatmeal. Seeing ophthalmology regularly. No interval infections or fevers.     14 point review of systems collected and negative if not documented above.    11/20/2020  Has been tolerating injections without issues. No injection site reactions, which he experienced during his first injection of some burning and redness at the site. No systemic side effects. No new HA. No new change in his vision. No Jaw pain. No stiffness in shoulders or hips. No difficulty putting shirt on in the morning or getting up out of a chair. Has chronic swelling in his feet that is unchanged. Has been doing exercises, which he continues. Walks 1-2 times per week. Has cut down to 35mg daily of prednisone. No return of any symptoms, though has noticed some interruption of sleep which is new since started. Continues to check his finger sticks which he says are in the range he was told. Checking his BP daily and is in the normal range now.     14 point review of systems obtained and negative if not documented above.    HPI from initial consultation 9/25/20  79 year old male with history of CLL, being monitored at this point without an indication for therapy. On Saturday, going to get dirt at a nursery, August 22nd, was driving and noticed that he 'wasn't getting enough light in his eye'. The next day, Sunday, he was continuing to have left eye symptoms. Called the [place] [facility]. Asked for appointment because of these new symptoms. On Tuesday evening, he called into the on call physician due to new complaint of 'light reflecting off the wall into my eye'. He came in the next day, Wednesday, and was sent to the retinal center on Friday.  Had an  exam and was told that there was a defect in the retina/artery. Had MRI of the brain and bloodwork on Tuesday. On Thursday, results of the MRI were discussed and the findings were questioned to be due to his known CLL. Possible stroke he was told due to viscosity increase? On Friday, 9/4/20, had worsening symptoms in the right eye. So came back in to the retinal specialists. At that time there was consideration of sending to [place] [facility], though because of the long expected wait, was instead started on 100mg of prednisone daily. 9/9/20 went down to 60mg of prednisone, with plan to go down to 50mg of 10/7/20. Was setup for biopsy on 9/10.     Denies any headaches. No jaw pain, even when chewing tough food. Feels that he has had some weight loss over the last few months unintentionally (139-130 over a period of 1 month). No fatigue, night sweats. No rashes. No chest pain, cough, shortness of breath. No abdominal pain. No joint pain. Has noticed that his stool is less formed. Not diarrhea, just softer. No blood. No issues with urination. No swelling in lower extremities. No raynauds. No double vision.     Past Medical History  CLL  Hernia  Mono   Deviated septum  Osteopenia by DEXA    Past Surgical History  Tonsillectomy  Deviated septum    Medications  Prednisone 15mg daily  Current Outpatient Medications   Medication     alendronate (FOSAMAX) 70 MG tablet     Aspirin-Calcium Carbonate  MG TABS     omeprazole (PRILOSEC) 20 MG DR capsule     predniSONE (DELTASONE) 20 MG tablet     sulfamethoxazole-trimethoprim (BACTRIM) 400-80 MG tablet     tocilizumab (ACTEMRA) 162 MG/0.9ML subcutaneous injection     predniSONE (DELTASONE) 10 MG tablet     No current facility-administered medications for this visit.          Weeks Daily prednisone dose (mg) 26-week taper Weekly 162mg Actemra      1 60    2 50 11/5/2020    3 40    4 35 11/20/2020   5 30    6 25    7 20 12/10/2020   8 15 12/17/2020   9 12.5    10 12.5    11 10     12 9    13 8    14 7    15 6    16 6    17 5    18 5    19 4    20 4    21 3    22 3    23 2    24 2    25 1    26 1      Allergies: Seasonal/ environmental allergies    Family History: No family history of autoimmune diseases like RA, sjogrens, SLE, scleroderma, IBD.    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 No drug use.        Objective:    Physical exam:  No vitals or exam for telephone visit    Labs:   12/14/2020   CRP <2.9  ESR 4  WBC 54  HGB 11.8    Cr 0.78  Ua unremarkable  Cholesterol 168  Trig 82  HDL 70  LDL 81    11-  ESR 3  CRP less than 2.9  WBC 63.3  Hemoglobin 12  Platelets 181  Creatinine 0.98  Total protein low at 5.9  Alk phos 48  ALT 31  AST 30  UA with 30 of albumin  Cholesterol 169  Triglycerides 45  HDL 83  LDL 77    10/1/2020  WBC 84.9  HGB 12.3    IgM 36 low  IgA 124 normal  IgG 625    Cr 0.98    9/1/2020  WBC 50.2  HGB 11.6       Imaging:  MR brain and orbits 9/10/20  Impression:    1. Regarding the orbits and globes, there is no definite abnormal  contrast enhancement or mass. No evidence of leukemic infiltration.  2. Regarding the remainder of the brain, abnormal T2 hyperintense bone  marrow signal with associated enhancement in the skull, likely  representing leukemic infiltration.  3. Subcutaneous T2 hyperintensity with associated diffusion  restriction and contrast enhancement over the left zygomatic region,  question leukemic infiltration.    Pathology:  Patient Name: MARIELA WALLACE   MR#: [id number]   Specimen #: [id number]   Collected: 9/10/2020   Received: 9/10/2020   Reported: 9/16/2020 12:35   Ordering Phy(s): AGUS RETANA     For improved result formatting, select 'View Enhanced Report Format' under    Linked Documents section.     SPECIMEN(S):   Temporal artery biopsy, left     FINAL DIAGNOSIS:   Temporal artery, left, biopsy:   1. Granulomatous arteritis consistent with giant cell arteritis.   2. Chronic inflammation  "of the surrounding adventitia.     COMMENT:   Preliminary results were communicated to Dr. Valerie Carrington and Dr. Guille Garcia on 9/14/20 at 12:45pm.     I have personally reviewed all specimens and/or slides, including the   listed special stains, and used them   with my medical judgement to determine or confirm the final diagnosis.     Electronically signed out by:     Za García M.D., [facility]     CLINICAL HISTORY:   The patient is a 79 year old male with a history of chronic lymphocytic   leukemia who presented with bilateral   sequential optic neuropathy with associated pallid optic disc edema and   choroidal hypoperfusion, but   relatively intact visual acuity, negative elevation of acute phase   reactants, and lack of constitutional   symptoms consistent with giant cell arteritis. He also has findings of   likely leukemic infiltration in the   skull seen on MRI. He undergoes temporal artery biopsy on the left.     GROSS:   The specimen is received in formalin with proper patient identification,   labeled \"left temporal artery\".  The   specimen consists of a 3.2 cm in length by 0.3 cm in diameter tan-white   vessel segment. The specimen is   submitted intact in A1. (Dictated by: PADILLA Montalvo 9/11/2020 03:08   PM)     MICROSCOPIC:   The tissue consists of artery with narrowed lumen and intimal hyperplasia.    There is an infiltrate of   lymphocytes, plasma cells, epithelioid histiocytes, and multinucleated   giant cells throughout all layers of   the artery. There is associated loss of the internal elastic lamina on   elastic stain. Occasional focal   calcifications are seen a the level of the internal elastic lamina.   Neovascularization of the vessel wall is   present. Perivascular lymphocytic infiltrates are seen in the surrounding   adventitia. Immunohistochemistry   with CD3 and CD20 demonstrates a predominantly T-cell lymphocytic   infiltrate within the vessel wall. There is   a " mixed infiltrate of T and B lymphocytes perivascular in the adventitia.   CD5 and CD43 have similar staining   patterns to CD3. There is light patchy CD23 staining in the areas positive    for CD20. CD68 highlights the   epithelioid histiocytes within the vessel wall.     The technical component of this testing was completed at the [facility], with the professional component performed    at the [facility], [address] [place] [phone] ([phone])     CPT Codes:   A: 61754-IO5, 08073-JIRX, 77481-BGA, 25941-GLA, 46462-GGZ, 01154-YHB,   11697-CHQ, 39720-JLM     COLLECTION SITE:   Client: [facility]   Location: [place])

## 2020-12-17 NOTE — PATIENT INSTRUCTIONS
PLAN:  1) continue 162mg once weekly injection  2) Prednisone plan:  - starting today decrease to 15mg x1 week, then 12.5mg daily x2 weeks, then 10mg daily x1 week. Further taper plan to follow. 5mg, 2.5mg, and 1mg tabs ordered  3) Stop bactrim and omeprazole  4) Continue alendronate for osteopenia   5) Follow-up in 3 weeks by phone visit with labs a few days prior

## 2021-01-05 ENCOUNTER — TELEPHONE (OUTPATIENT)
Dept: RHEUMATOLOGY | Facility: CLINIC | Age: 81
End: 2021-01-05

## 2021-01-05 DIAGNOSIS — M31.6 GIANT CELL ARTERITIS (H): ICD-10-CM

## 2021-01-05 DIAGNOSIS — R79.9 ABNORMAL FINDING OF BLOOD CHEMISTRY, UNSPECIFIED: ICD-10-CM

## 2021-01-05 LAB
ALBUMIN UR-MCNC: NEGATIVE MG/DL
APPEARANCE UR: CLEAR
BASOPHILS # BLD AUTO: 0 10E9/L (ref 0–0.2)
BASOPHILS NFR BLD AUTO: 0 %
BILIRUB UR QL STRIP: NEGATIVE
COLOR UR AUTO: YELLOW
CRP SERPL-MCNC: <2.9 MG/L (ref 0–8)
DIFFERENTIAL METHOD BLD: ABNORMAL
EOSINOPHIL # BLD AUTO: 0.4 10E9/L (ref 0–0.7)
ERYTHROCYTE [DISTWIDTH] IN BLOOD BY AUTOMATED COUNT: 15.5 % (ref 10–15)
ERYTHROCYTE [SEDIMENTATION RATE] IN BLOOD BY WESTERGREN METHOD: 5 MM/H (ref 0–20)
GLUCOSE UR STRIP-MCNC: NEGATIVE MG/DL
HCT VFR BLD AUTO: 38.1 % (ref 40–53)
HGB BLD-MCNC: 11.6 G/DL (ref 13.3–17.7)
HGB UR QL STRIP: NEGATIVE
KETONES UR STRIP-MCNC: NEGATIVE MG/DL
LEUKOCYTE ESTERASE UR QL STRIP: NEGATIVE
LYMPHOCYTES # BLD AUTO: 34.1 10E9/L (ref 0.8–5.3)
LYMPHOCYTES NFR BLD AUTO: 84 %
MCH RBC QN AUTO: 28.7 PG (ref 26.5–33)
MCHC RBC AUTO-ENTMCNC: 30.4 G/DL (ref 31.5–36.5)
MCV RBC AUTO: 94 FL (ref 78–100)
MONOCYTES # BLD AUTO: 0.8 10E9/L (ref 0–1.3)
MONOCYTES NFR BLD AUTO: 2 %
NEUTROPHILS # BLD AUTO: 5.3 10E9/L (ref 1.6–8.3)
NEUTROPHILS NFR BLD AUTO: 13 %
NITRATE UR QL: NEGATIVE
PH UR STRIP: 7 PH (ref 5–7)
PLATELET # BLD AUTO: 175 10E9/L (ref 150–450)
PLATELET # BLD EST: ABNORMAL 10*3/UL
RBC # BLD AUTO: 4.04 10E12/L (ref 4.4–5.9)
RBC #/AREA URNS AUTO: NORMAL /HPF
RBC MORPH BLD: NORMAL
SOURCE: NORMAL
SP GR UR STRIP: 1.01 (ref 1–1.03)
UROBILINOGEN UR STRIP-ACNC: 0.2 EU/DL (ref 0.2–1)
WBC # BLD AUTO: 40.6 10E9/L (ref 4–11)
WBC #/AREA URNS AUTO: NORMAL /HPF

## 2021-01-05 PROCEDURE — 85652 RBC SED RATE AUTOMATED: CPT | Performed by: INTERNAL MEDICINE

## 2021-01-05 PROCEDURE — 85025 COMPLETE CBC W/AUTO DIFF WBC: CPT | Performed by: INTERNAL MEDICINE

## 2021-01-05 PROCEDURE — 81001 URINALYSIS AUTO W/SCOPE: CPT | Performed by: INTERNAL MEDICINE

## 2021-01-05 PROCEDURE — 80061 LIPID PANEL: CPT | Performed by: INTERNAL MEDICINE

## 2021-01-05 PROCEDURE — 80053 COMPREHEN METABOLIC PANEL: CPT | Performed by: INTERNAL MEDICINE

## 2021-01-05 PROCEDURE — 86140 C-REACTIVE PROTEIN: CPT | Performed by: INTERNAL MEDICINE

## 2021-01-05 PROCEDURE — 36415 COLL VENOUS BLD VENIPUNCTURE: CPT | Performed by: INTERNAL MEDICINE

## 2021-01-05 NOTE — TELEPHONE ENCOUNTER
Time of Call: 1550    Person reporting results: Jo Ann    Depart/facility results are coming from:[facility]    Phone number: [phone]    CRITICAL RESULTS: WBC: 40.77     Results taken by: Candis Montero RN                Diagnosis: GCA/CLL     Ordering provider: Dr. Alcantara    Course of Action: Sent results to provider for review.    Candis Montero RN  Rheumatology Clinic

## 2021-01-06 LAB
ALBUMIN SERPL-MCNC: 3.5 G/DL (ref 3.4–5)
ALP SERPL-CCNC: 44 U/L (ref 40–150)
ALT SERPL W P-5'-P-CCNC: 29 U/L (ref 0–70)
ANION GAP SERPL CALCULATED.3IONS-SCNC: 3 MMOL/L (ref 3–14)
AST SERPL W P-5'-P-CCNC: 31 U/L (ref 0–45)
BILIRUB SERPL-MCNC: 0.4 MG/DL (ref 0.2–1.3)
BUN SERPL-MCNC: 24 MG/DL (ref 7–30)
CALCIUM SERPL-MCNC: 8.7 MG/DL (ref 8.5–10.1)
CHLORIDE SERPL-SCNC: 105 MMOL/L (ref 94–109)
CHOLEST SERPL-MCNC: 166 MG/DL
CO2 SERPL-SCNC: 30 MMOL/L (ref 20–32)
CREAT SERPL-MCNC: 0.81 MG/DL (ref 0.66–1.25)
GFR SERPL CREATININE-BSD FRML MDRD: 85 ML/MIN/{1.73_M2}
GLUCOSE SERPL-MCNC: 112 MG/DL (ref 70–99)
HDLC SERPL-MCNC: 68 MG/DL
LDLC SERPL CALC-MCNC: 81 MG/DL
NONHDLC SERPL-MCNC: 98 MG/DL
POTASSIUM SERPL-SCNC: 4.3 MMOL/L (ref 3.4–5.3)
PROT SERPL-MCNC: 6.3 G/DL (ref 6.8–8.8)
SODIUM SERPL-SCNC: 138 MMOL/L (ref 133–144)
TRIGL SERPL-MCNC: 86 MG/DL

## 2021-01-08 ENCOUNTER — VIRTUAL VISIT (OUTPATIENT)
Dept: RHEUMATOLOGY | Facility: CLINIC | Age: 81
End: 2021-01-08
Attending: INTERNAL MEDICINE
Payer: MEDICARE

## 2021-01-08 DIAGNOSIS — M85.80 OSTEOPENIA, UNSPECIFIED LOCATION: ICD-10-CM

## 2021-01-08 DIAGNOSIS — Z79.899 ENCOUNTER FOR LONG-TERM (CURRENT) USE OF HIGH-RISK MEDICATION: ICD-10-CM

## 2021-01-08 DIAGNOSIS — M31.6 GIANT CELL ARTERITIS (H): Primary | ICD-10-CM

## 2021-01-08 DIAGNOSIS — C91.10 CHRONIC LYMPHOCYTIC LEUKEMIA (H): ICD-10-CM

## 2021-01-08 PROCEDURE — 99443 PR PHYSICIAN TELEPHONE EVALUATION 21-30 MIN: CPT | Mod: 95 | Performed by: INTERNAL MEDICINE

## 2021-01-08 ASSESSMENT — PAIN SCALES - GENERAL: PAINLEVEL: NO PAIN (0)

## 2021-01-08 NOTE — PATIENT INSTRUCTIONS
1) continue 162mg once weekly injection  2) Prednisone plan:  - starting today decrease to 10mg daily x7 days, then 9mg daily x7 days, 8mg daily x7 days, 7mg daily x7days, then 6mg daily until follow-up. Further taper plan to follow  3) Follow-up in 3-4 weeks, with labs a few days before your appointment    Bib Alcantara MD  Rheumatology

## 2021-01-08 NOTE — PROGRESS NOTES
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 Never had HA, jaw claudication, PMR symptoms. No inflammatory arthritis symptoms. Disease currently in remission by history and labs on 10mg prednisone and actemra 162mg weekly. Taper ongoing guided by GiACTA trial.    # Giant cell arteritis  1) continue 162mg once weekly injection  2) Prednisone plan:  - starting today decrease to 10mg daily x7 days, then 9mg daily x7 days, 8mg daily x7 days, 7mg daily x7days, then 6mg daily until follow-up. Further taper plan to follow. 5mg, 2.5mg, and 1mg tabs will , ordered at the time of his last appointment.  3) Continue off bactrim daily and omeprazole  4) Follow-up in 4 weeks by phone visit with labs a few days prior  5) Has ophthalmology appointment scheduled for 1/14/2021    # Long term use of high risk medication  1) Prednisone  2) Actemra    # Osteopenia no interval fractures: Continue alendronate. Managed by PCP. Trying to minimize steroid use with actemra to minimize bone health effects    #CLL  Managed by oncology. Stable. WBC 40.6    Severity of this disease discussed at length again with the patient today, particularly the potential for blindness/ loss of vision. Counseled to seek immediate medical attention should he experience changes in vision. He understands. Spent a significant amount of time discussing the side effects of Actemra including risk of malignancy, serious infections resulting in death, opportunistic infections, neutropenia/thrombocytopenia, allergic reaction, GI  perforation, demyelinating disease, as well as fever, chills, hypercholesterolemia, abdominal pain, dizziness. Patient had the opportunity to ask questions, which were all answered.    Bib Alcantara MD   Rheumatology     Subjective:   1/8/2020  No HA, jaw pain, no vision since last visit. Sleep is improved back to baseline, sleeping 7-730 hours per night with decreasing dose of steroids. No injection site reaction with actemra. No weight gain despite steroids. No other side effects. No longer with bowel movements at night, has returned to morning only, which is baseline. No fevers, chills, infections since last visit. Current dose of prednisone is 10mg daily. No joint pain or stiffness. No difficulty getting in/our of car, climbing stairs, putting on shirt in the AM. No erythema/edema/warmth of any joints. ROS otherwise negative.      12/17/2020  No HA, no jaw pain, no new change in vision. Has had continued sensitivity to sun, which is chronic. Brought up with ophthalmology, not issue from GCA, possible cataract. No joint pain except for chronic back discomfort. No stiffness in shoulders or hips. No issues with giving himself the injection, no rash or burning. Wednesday morning takes the injection. Last week Thursday decreased the dose from 25mg daily down to 20mg daily. Has continued on this. No ongoing side effects from prednisone. No weight gain. Feels that he is having nocturnal bowel movements, which is new. He thinks this is from having bowl of oatmeal. Not increased in frequency, previously just in the AM. Now just at night. Thinks that it is for oatmeal. Seeing ophthalmology regularly. No interval infections or fevers.     14 point review of systems collected and negative if not documented above.    11/20/2020  Has been tolerating injections without issues. No injection site reactions, which he experienced during his first injection of some burning and redness at the site. No systemic side effects. No new  HA. No new change in his vision. No Jaw pain. No stiffness in shoulders or hips. No difficulty putting shirt on in the morning or getting up out of a chair. Has chronic swelling in his feet that is unchanged. Has been doing exercises, which he continues. Walks 1-2 times per week. Has cut down to 35mg daily of prednisone. No return of any symptoms, though has noticed some interruption of sleep which is new since started. Continues to check his finger sticks which he says are in the range he was told. Checking his BP daily and is in the normal range now.     14 point review of systems obtained and negative if not documented above.    HPI from initial consultation 9/25/20  79 year old male with history of CLL, being monitored at this point without an indication for therapy. On Saturday, going to get dirt at a nursery, August 22nd, was driving and noticed that he 'wasn't getting enough light in his eye'. The next day, Sunday, he was continuing to have left eye symptoms. Called the [facility]. Asked for appointment because of these new symptoms. On Tuesday evening, he called into the on call physician due to new complaint of 'light reflecting off the wall into my eye'. He came in the next day, Wednesday, and was sent to the retinal center on Friday.  Had an exam and was told that there was a defect in the retina/artery. Had MRI of the brain and bloodwork on Tuesday. On Thursday, results of the MRI were discussed and the findings were questioned to be due to his known CLL. Possible stroke he was told due to viscosity increase? On Friday, 9/4/20, had worsening symptoms in the right eye. So came back in to the retinal specialists. At that time there was consideration of sending to [facility], though because of the long expected wait, was instead started on 100mg of prednisone daily. 9/9/20 went down to 60mg of prednisone, with plan to go down to 50mg of 10/7/20. Was setup for biopsy on 9/10.     Denies any headaches.  No jaw pain, even when chewing tough food. Feels that he has had some weight loss over the last few months unintentionally (139-130 over a period of 1 month). No fatigue, night sweats. No rashes. No chest pain, cough, shortness of breath. No abdominal pain. No joint pain. Has noticed that his stool is less formed. Not diarrhea, just softer. No blood. No issues with urination. No swelling in lower extremities. No raynauds. No double vision.     Past Medical History  CLL  Hernia  Mono   Deviated septum  Osteopenia by DEXA    Past Surgical History  Tonsillectomy  Deviated septum    Medications  Prednisone 15mg daily  Current Outpatient Medications   Medication     alendronate (FOSAMAX) 70 MG tablet     Aspirin-Calcium Carbonate  MG TABS     omeprazole (PRILOSEC) 20 MG DR capsule     predniSONE (DELTASONE) 1 MG tablet     predniSONE (DELTASONE) 2.5 MG tablet     predniSONE (DELTASONE) 20 MG tablet     predniSONE (DELTASONE) 5 MG tablet     sulfamethoxazole-trimethoprim (BACTRIM) 400-80 MG tablet     tocilizumab (ACTEMRA) 162 MG/0.9ML subcutaneous injection     No current facility-administered medications for this visit.          Weeks Daily prednisone dose (mg) 26-week taper Weekly 162mg Actemra      1 60    2 50 11/5/2020    3 40    4 35 11/20/2020   5 30    6 25    7 20 12/10/2020   8 15 12/17/2020   9 12.5    10 12.5    11 10 1/8/2020   12 9    13 8    14 7    15 6    16 6    17 5    18 5    19 4    20 4    21 3    22 3    23 2    24 2    25 1    26 1      Allergies: Seasonal/ environmental allergies    Family History: No family history of autoimmune diseases like RA, sjogrens, SLE, scleroderma, IBD.    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 No drug use.        Objective:    Physical exam:  No vitals or exam for telephone visit    Labs:   UA without protein, cells or casts  ESR 5  CRP <2.9  WBC 40.6  HGB 11.6    Cr 0.81  Chol 166  Trig 86  HDL 68  LDL 81    12/14/2020   CRP  <2.9  ESR 4  WBC 54  HGB 11.8    Cr 0.78  Ua unremarkable  Cholesterol 168  Trig 82  HDL 70  LDL 81    11-  ESR 3  CRP less than 2.9  WBC 63.3  Hemoglobin 12  Platelets 181  Creatinine 0.98  Total protein low at 5.9  Alk phos 48  ALT 31  AST 30  UA with 30 of albumin  Cholesterol 169  Triglycerides 45  HDL 83  LDL 77    10/1/2020  WBC 84.9  HGB 12.3    IgM 36 low  IgA 124 normal  IgG 625    Cr 0.98    9/1/2020  WBC 50.2  HGB 11.6       Imaging:  MR brain and orbits 9/10/20  Impression:    1. Regarding the orbits and globes, there is no definite abnormal  contrast enhancement or mass. No evidence of leukemic infiltration.  2. Regarding the remainder of the brain, abnormal T2 hyperintense bone  marrow signal with associated enhancement in the skull, likely  representing leukemic infiltration.  3. Subcutaneous T2 hyperintensity with associated diffusion  restriction and contrast enhancement over the left zygomatic region,  question leukemic infiltration.    Pathology:  Patient Name: MARIELA WALLACE   MR#: [id number]   Specimen #: [id number]   Collected: 9/10/2020   Received: 9/10/2020   Reported: 9/16/2020 12:35   Ordering Phy(s): VALERIE CARRINGTON     For improved result formatting, select 'View Enhanced Report Format' under    Linked Documents section.     SPECIMEN(S):   Temporal artery biopsy, left     FINAL DIAGNOSIS:   Temporal artery, left, biopsy:   1. Granulomatous arteritis consistent with giant cell arteritis.   2. Chronic inflammation of the surrounding adventitia.     COMMENT:   Preliminary results were communicated to Dr. Valerie Carrington and Dr. Guille Garcia on 9/14/20 at 12:45pm.     I have personally reviewed all specimens and/or slides, including the   listed special stains, and used them   with my medical judgement to determine or confirm the final diagnosis.     Electronically signed out by:     Za García M.D., [facility]     CLINICAL HISTORY:   The  "patient is a 79 year old male with a history of chronic lymphocytic   leukemia who presented with bilateral   sequential optic neuropathy with associated pallid optic disc edema and   choroidal hypoperfusion, but   relatively intact visual acuity, negative elevation of acute phase   reactants, and lack of constitutional   symptoms consistent with giant cell arteritis. He also has findings of   likely leukemic infiltration in the   skull seen on MRI. He undergoes temporal artery biopsy on the left.     GROSS:   The specimen is received in formalin with proper patient identification,   labeled \"left temporal artery\".  The   specimen consists of a 3.2 cm in length by 0.3 cm in diameter tan-white   vessel segment. The specimen is   submitted intact in A1. (Dictated by: PADILLA Montalvo 9/11/2020 03:08   PM)     MICROSCOPIC:   The tissue consists of artery with narrowed lumen and intimal hyperplasia.    There is an infiltrate of   lymphocytes, plasma cells, epithelioid histiocytes, and multinucleated   giant cells throughout all layers of   the artery. There is associated loss of the internal elastic lamina on   elastic stain. Occasional focal   calcifications are seen a the level of the internal elastic lamina.   Neovascularization of the vessel wall is   present. Perivascular lymphocytic infiltrates are seen in the surrounding   adventitia. Immunohistochemistry   with CD3 and CD20 demonstrates a predominantly T-cell lymphocytic   infiltrate within the vessel wall. There is   a mixed infiltrate of T and B lymphocytes perivascular in the adventitia.   CD5 and CD43 have similar staining   patterns to CD3. There is light patchy CD23 staining in the areas positive    for CD20. CD68 highlights the   epithelioid histiocytes within the vessel wall.     The technical component of this testing was completed at the [facility], with the professional component performed "    at the [facility], [address],   [place] [address] ([phone])     CPT Codes:   A: 65636-AB1, 30998-BDSR, 90178-FMX, 06505-NPA, 51798-CIN, 46146-PTG,   11926-DYK, 26597-WJI     COLLECTION SITE:   Client: [facility]   Location: BERNADETTE Crockett (B) is a 80 year old who is being evaluated via a billable telephone visit.      What phone number would you like to be contacted at? [phone]  How would you like to obtain your AVS? Mail a copy    Phone call duration: 20 minutes    I spent a total of 40 minutes on chart review, patient telephone encounter, documentation.    Bib Alcantara MD  Rheumatology

## 2021-01-08 NOTE — LETTER
1/8/2021       RE: Bon Michael  1923 [facility] [address]     Dear Colleague,    Thank you for referring your patient, Bon Michael, to the [facility] RHEUMATOLOGY CLINIC [place] at [facility]. Please see a copy of my visit note below.        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 Never had HA, jaw claudication, PMR symptoms. No inflammatory arthritis symptoms. Disease currently in remission by history and labs on 10mg prednisone and actemra 162mg weekly. Taper ongoing guided by GiACTA trial.    # Giant cell arteritis  1) continue 162mg once weekly injection  2) Prednisone plan:  - starting today decrease to 10mg daily x7 days, then 9mg daily x7 days, 8mg daily x7 days, 7mg daily x7days, then 6mg daily until follow-up. Further taper plan to follow. 5mg, 2.5mg, and 1mg tabs will , ordered at the time of his last appointment.  3) Continue off bactrim daily and omeprazole  4) Follow-up in 4 weeks by phone visit with labs a few days prior  5) Has ophthalmology appointment scheduled for 1/14/2021    # Long term use of high risk medication  1) Prednisone  2) Actemra    # Osteopenia no interval fractures: Continue alendronate. Managed by PCP. Trying to minimize steroid use with actemra to minimize bone health effects    #CLL  Managed by oncology. Stable. WBC 40.6    Severity of this disease discussed at length again with the patient today, particularly the potential for blindness/ loss of vision. Counseled to seek  immediate medical attention should he experience changes in vision. He understands. Spent a significant amount of time discussing the side effects of Actemra including risk of malignancy, serious infections resulting in death, opportunistic infections, neutropenia/thrombocytopenia, allergic reaction, GI perforation, demyelinating disease, as well as fever, chills, hypercholesterolemia, abdominal pain, dizziness. Patient had the opportunity to ask questions, which were all answered.    Bib Alcantara MD   Rheumatology     Subjective:   1/8/2020  No HA, jaw pain, no vision since last visit. Sleep is improved back to baseline, sleeping 7-730 hours per night with decreasing dose of steroids. No injection site reaction with actemra. No weight gain despite steroids. No other side effects. No longer with bowel movements at night, has returned to morning only, which is baseline. No fevers, chills, infections since last visit. Current dose of prednisone is 10mg daily. No joint pain or stiffness. No difficulty getting in/our of car, climbing stairs, putting on shirt in the AM. No erythema/edema/warmth of any joints. ROS otherwise negative.      12/17/2020  No HA, no jaw pain, no new change in vision. Has had continued sensitivity to sun, which is chronic. Brought up with ophthalmology, not issue from GCA, possible cataract. No joint pain except for chronic back discomfort. No stiffness in shoulders or hips. No issues with giving himself the injection, no rash or burning. Wednesday morning takes the injection. Last week Thursday decreased the dose from 25mg daily down to 20mg daily. Has continued on this. No ongoing side effects from prednisone. No weight gain. Feels that he is having nocturnal bowel movements, which is new. He thinks this is from having bowl of oatmeal. Not increased in frequency, previously just in the AM. Now just at night. Thinks that it is for oatmeal. Seeing ophthalmology regularly. No interval  infections or fevers.     14 point review of systems collected and negative if not documented above.    11/20/2020  Has been tolerating injections without issues. No injection site reactions, which he experienced during his first injection of some burning and redness at the site. No systemic side effects. No new HA. No new change in his vision. No Jaw pain. No stiffness in shoulders or hips. No difficulty putting shirt on in the morning or getting up out of a chair. Has chronic swelling in his feet that is unchanged. Has been doing exercises, which he continues. Walks 1-2 times per week. Has cut down to 35mg daily of prednisone. No return of any symptoms, though has noticed some interruption of sleep which is new since started. Continues to check his finger sticks which he says are in the range he was told. Checking his BP daily and is in the normal range now.     14 point review of systems obtained and negative if not documented above.    HPI from initial consultation 9/25/20  79 year old male with history of CLL, being monitored at this point without an indication for therapy. On Saturday, going to get dirt at a nursery, August 22nd, was driving and noticed that he 'wasn't getting enough light in his eye'. The next day, Sunday, he was continuing to have left eye symptoms. Called the [place] [facility]. Asked for appointment because of these new symptoms. On Tuesday evening, he called into the on call physician due to new complaint of 'light reflecting off the wall into my eye'. He came in the next day, Wednesday, and was sent to the retinal center on Friday.  Had an exam and was told that there was a defect in the retina/artery. Had MRI of the brain and bloodwork on Tuesday. On Thursday, results of the MRI were discussed and the findings were questioned to be due to his known CLL. Possible stroke he was told due to viscosity increase? On Friday, 9/4/20, had worsening symptoms in the right eye. So came back in to the  retinal specialists. At that time there was consideration of sending to [place] ED, though because of the long expected wait, was instead started on 100mg of prednisone daily. 9/9/20 went down to 60mg of prednisone, with plan to go down to 50mg of 10/7/20. Was setup for biopsy on 9/10.     Denies any headaches. No jaw pain, even when chewing tough food. Feels that he has had some weight loss over the last few months unintentionally (139-130 over a period of 1 month). No fatigue, night sweats. No rashes. No chest pain, cough, shortness of breath. No abdominal pain. No joint pain. Has noticed that his stool is less formed. Not diarrhea, just softer. No blood. No issues with urination. No swelling in lower extremities. No raynauds. No double vision.     Past Medical History  CLL  Hernia  Mono   Deviated septum  Osteopenia by DEXA    Past Surgical History  Tonsillectomy  Deviated septum    Medications  Prednisone 15mg daily  Current Outpatient Medications   Medication     alendronate (FOSAMAX) 70 MG tablet     Aspirin-Calcium Carbonate  MG TABS     omeprazole (PRILOSEC) 20 MG DR capsule     predniSONE (DELTASONE) 1 MG tablet     predniSONE (DELTASONE) 2.5 MG tablet     predniSONE (DELTASONE) 20 MG tablet     predniSONE (DELTASONE) 5 MG tablet     sulfamethoxazole-trimethoprim (BACTRIM) 400-80 MG tablet     tocilizumab (ACTEMRA) 162 MG/0.9ML subcutaneous injection     No current facility-administered medications for this visit.          Weeks Daily prednisone dose (mg) 26-week taper Weekly 162mg Actemra      1 60    2 50 11/5/2020    3 40    4 35 11/20/2020   5 30    6 25    7 20 12/10/2020   8 15 12/17/2020   9 12.5    10 12.5    11 10 1/8/2020   12 9    13 8    14 7    15 6    16 6    17 5    18 5    19 4    20 4    21 3    22 3    23 2    24 2    25 1    26 1      Allergies: Seasonal/ environmental allergies    Family History: No family history of autoimmune diseases like RA, sjogrens, SLE, scleroderma,  IBD.    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 No drug use.        Objective:    Physical exam:  No vitals or exam for telephone visit    Labs:   UA without protein, cells or casts  ESR 5  CRP <2.9  WBC 40.6  HGB 11.6    Cr 0.81  Chol 166  Trig 86  HDL 68  LDL 81    12/14/2020   CRP <2.9  ESR 4  WBC 54  HGB 11.8    Cr 0.78  Ua unremarkable  Cholesterol 168  Trig 82  HDL 70  LDL 81    11-  ESR 3  CRP less than 2.9  WBC 63.3  Hemoglobin 12  Platelets 181  Creatinine 0.98  Total protein low at 5.9  Alk phos 48  ALT 31  AST 30  UA with 30 of albumin  Cholesterol 169  Triglycerides 45  HDL 83  LDL 77    10/1/2020  WBC 84.9  HGB 12.3    IgM 36 low  IgA 124 normal  IgG 625    Cr 0.98    9/1/2020  WBC 50.2  HGB 11.6       Imaging:  MR brain and orbits 9/10/20  Impression:    1. Regarding the orbits and globes, there is no definite abnormal  contrast enhancement or mass. No evidence of leukemic infiltration.  2. Regarding the remainder of the brain, abnormal T2 hyperintense bone  marrow signal with associated enhancement in the skull, likely  representing leukemic infiltration.  3. Subcutaneous T2 hyperintensity with associated diffusion  restriction and contrast enhancement over the left zygomatic region,  question leukemic infiltration.    Pathology:  Patient Name: MARIELA WALLACE   MR#: [id number]   Specimen #: [id number]   Collected: 9/10/2020   Received: 9/10/2020   Reported: 9/16/2020 12:35   Ordering Phy(s): AGUS RETANA     For improved result formatting, select 'View Enhanced Report Format' under    Linked Documents section.     SPECIMEN(S):   Temporal artery biopsy, left     FINAL DIAGNOSIS:   Temporal artery, left, biopsy:   1. Granulomatous arteritis consistent with giant cell arteritis.   2. Chronic inflammation of the surrounding adventitia.     COMMENT:   Preliminary results were communicated to Dr. Agus Retana and Dr. Guille Garcia  "on 9/14/20 at 12:45pm.     I have personally reviewed all specimens and/or slides, including the   listed special stains, and used them   with my medical judgement to determine or confirm the final diagnosis.     Electronically signed out by:     Za García M.D., [facility]     CLINICAL HISTORY:   The patient is a 79 year old male with a history of chronic lymphocytic   leukemia who presented with bilateral   sequential optic neuropathy with associated pallid optic disc edema and   choroidal hypoperfusion, but   relatively intact visual acuity, negative elevation of acute phase   reactants, and lack of constitutional   symptoms consistent with giant cell arteritis. He also has findings of   likely leukemic infiltration in the   skull seen on MRI. He undergoes temporal artery biopsy on the left.     GROSS:   The specimen is received in formalin with proper patient identification,   labeled \"left temporal artery\".  The   specimen consists of a 3.2 cm in length by 0.3 cm in diameter tan-white   vessel segment. The specimen is   submitted intact in A1. (Dictated by: PADILLA Montalvo 9/11/2020 03:08   PM)     MICROSCOPIC:   The tissue consists of artery with narrowed lumen and intimal hyperplasia.    There is an infiltrate of   lymphocytes, plasma cells, epithelioid histiocytes, and multinucleated   giant cells throughout all layers of   the artery. There is associated loss of the internal elastic lamina on   elastic stain. Occasional focal   calcifications are seen a the level of the internal elastic lamina.   Neovascularization of the vessel wall is   present. Perivascular lymphocytic infiltrates are seen in the surrounding   adventitia. Immunohistochemistry   with CD3 and CD20 demonstrates a predominantly T-cell lymphocytic   infiltrate within the vessel wall. There is   a mixed infiltrate of T and B lymphocytes perivascular in the adventitia.   CD5 and CD43 have similar staining   patterns to CD3. There is " light patchy CD23 staining in the areas positive    for CD20. CD68 highlights the   epithelioid histiocytes within the vessel wall.     The technical component of this testing was completed at the [facility], with the professional component performed    at the [facility], [address] [place] [phone] ([phone])     CPT Codes:   A: 33827-PT4, 06861-SGKQ, 57291-MGX, 39698-GPS, 63196-URB, 64807-UMJ,   08192-LRD, 20082-QVW     COLLECTION SITE:   Client: [facility]   Location: BERNADETTE Crockett (B) is a 80 year old who is being evaluated via a billable telephone visit.      What phone number would you like to be contacted at? [phone]  How would you like to obtain your AVS? Mail a copy    Phone call duration: 20 minutes    I spent a total of 40 minutes on chart review, patient telephone encounter, documentation.    Bib Alcantara MD  Rheumatology

## 2021-01-10 DIAGNOSIS — M31.6 GIANT CELL ARTERITIS (H): ICD-10-CM

## 2021-01-11 RX ORDER — PREDNISONE 1 MG/1
TABLET ORAL
Qty: 90 TABLET | Refills: 0 | Status: SHIPPED | OUTPATIENT
Start: 2021-01-11 | End: 2021-01-11

## 2021-01-11 RX ORDER — PREDNISONE 1 MG/1
TABLET ORAL
Qty: 90 TABLET | Refills: 0 | Status: SHIPPED | OUTPATIENT
Start: 2021-01-11 | End: 2021-02-16

## 2021-01-11 NOTE — TELEPHONE ENCOUNTER
Pt had 2.5 mg and 1 mg tabs sent on 12/17/2020.  Called and spoke with pharmacist who states they never got prescription.  Requesting new prescription be sent to pharmacy.  Prescription sent to provider for signature.    Candis Montero RN  Rheumatology Clinic

## 2021-01-13 ENCOUNTER — TELEPHONE (OUTPATIENT)
Dept: RHEUMATOLOGY | Facility: CLINIC | Age: 81
End: 2021-01-13

## 2021-01-14 ENCOUNTER — OFFICE VISIT (OUTPATIENT)
Dept: OPHTHALMOLOGY | Facility: CLINIC | Age: 81
End: 2021-01-14
Attending: SURGERY
Payer: MEDICARE

## 2021-01-14 DIAGNOSIS — H53.40 VISUAL FIELD DEFECT: ICD-10-CM

## 2021-01-14 DIAGNOSIS — H46.9 OPTIC NEUROPATHY: ICD-10-CM

## 2021-01-14 PROCEDURE — 92083 EXTENDED VISUAL FIELD XM: CPT | Performed by: OPHTHALMOLOGY

## 2021-01-14 PROCEDURE — 92014 COMPRE OPH EXAM EST PT 1/>: CPT | Mod: GC | Performed by: OPHTHALMOLOGY

## 2021-01-14 PROCEDURE — G0463 HOSPITAL OUTPT CLINIC VISIT: HCPCS

## 2021-01-14 PROCEDURE — 92133 CPTRZD OPH DX IMG PST SGM ON: CPT | Performed by: OPHTHALMOLOGY

## 2021-01-14 ASSESSMENT — VISUAL ACUITY
OS_CC: 20/25
METHOD: SNELLEN - LINEAR
CORRECTION_TYPE: GLASSES
OS_CC+: +2
OD_CC: 20/20

## 2021-01-14 ASSESSMENT — REFRACTION_WEARINGRX
SPECS_TYPE: PAL
OS_SPHERE: +6.25
OD_SPHERE: +3.25
OD_AXIS: 134
OS_CYLINDER: +0.50
OD_CYLINDER: +1.25
OS_AXIS: 059

## 2021-01-14 ASSESSMENT — TONOMETRY
OD_IOP_MMHG: 9
IOP_METHOD: ICARE
OS_IOP_MMHG: 6

## 2021-01-14 ASSESSMENT — SLIT LAMP EXAM - LIDS
COMMENTS: NORMAL
COMMENTS: NORMAL

## 2021-01-14 ASSESSMENT — CUP TO DISC RATIO
OD_RATIO: 0.3
OS_RATIO: 0.3

## 2021-01-14 ASSESSMENT — CONF VISUAL FIELD
OD_NORMAL: 1
OS_NORMAL: 1

## 2021-01-14 ASSESSMENT — EXTERNAL EXAM - RIGHT EYE: OD_EXAM: NORMAL

## 2021-01-14 ASSESSMENT — EXTERNAL EXAM - LEFT EYE: OS_EXAM: NORMAL

## 2021-01-14 NOTE — NURSING NOTE
Chief Complaints and History of Present Illnesses   Patient presents with     Blurred Vision Follow-Up     Chief Complaint(s) and History of Present Illness(es)     Blurred Vision Follow-Up               Comments     Bon Michael is a 80 year old male who presents today for    1. 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     2. Chronic lymphocytic leukemia    No significant change in vision since the last visit.   Currently on 9 mg prednisone every day. Tolerating well.     Belen BRYANT 8:19 AM January 14, 2021

## 2021-01-14 NOTE — LETTER
"2021    RE: Bon Michael  : 1940  MRN: [id number]    Dear Providers,    I saw our mutual patient, Bon Michael, in follow-up in my clinic recently.  After a thorough neuro-ophthalmic history and examination, I came to the following conclusions:     1. 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  Vision stable in both eyes with intact / normal central vision but persistent (probably permanent) peripheral field defects in both eyes.  No exam or history indicators to suggest recurrence.      2.  Recent initiation of glare symptoms likely attributable to developing cataracts worsened with recent corticosteroid use.  These are not yet visually significant and we will observe.  May consider referral to cataract surgeon in the next 6 months to a year.    Follow-up with me in 2 months or sooner for any acute vision changes.  Patient will continue taper as per rheumatology.    - For a more thorough description of the disease presentation, please see my letter from the patient's initial visit with me      history from presentation (2020 visit)     Bon Michael is a 79 year old male who presents to neuro-ophthalmology   clinic today for consultation from Jose Andino MD at [facility] for   visual disturbance of left eye. He describes it as \"his left eye not   getting enough light\". He describes noticing the change in his left eye's   temporal visual field, inferior > superior. He notices it more first thing      in the morning. These symptoms began on 2020 and he describes it as   worsening - he is unclear if it the deficit has gotten larger or darker   however. He first noticed it while driving at the periphery of his vision.       Patient saw Dr. Lewis on .  He underwent esr / crp testing as   well as mri (see below).     Labs: 20- WBC- 50.2, ESR- 28, CRP < 5     On  he developed symptoms of waviness in his peripheral vision in "      the right eye.  He went to Dr. Andino on 9/4/2020 who wanted him to go to      the ED but he was unable.  Dr. Andino reviewed the patient's fluorescein   angiography and felt there was choroidal filling delay in the right eye   and pallid edema in the right eye concerning for giant cell arteritis.  We      discussed the case via phone.      Patient received 100 mg prednisone starting 9/4/20 and has been on 80 mg   prednisone daily since. When he started prednisone, he stopped taking   daily baby aspirin out of concern for potential GI upset side effects   after discussing this with the pharmacist. He has no history of GI upset   or ulcers.     Denies headache, jaw pain, temporal tenderness, issues with chewing,   swallowing, eye pain, sleep apnea,  He denies polymyalgia rheumatica   symptoms but has had 6-8 lbs of fluctuation in the past couple of months.     PRIOR IMAGING:  BRAIN MRI WITH/WO GADOLINIUM, MRA OF THE [place] OF [place] AND MRA OF THE   CAROTID ARTERIES - [facility] (9/1/2020)  IMPRESSION:    1.  No acute intracranial abnormality.  2.  No evidence of an orbital abnormalities.  3.  Mild generalized cerebral atrophy.  4.  Unremarkable MRA of the [place].  5.  Unremarkable MRA of the cervical vessels.     On my re-review I disagreed with the radiology read.  There is evidence of   patchy enhancement diffusely within the bilateral orbits and along the   bilateral intraorbital optic nerve sheaths.  Will discuss with   neuro-radiology regarding finding and repeat scan.     No bisphosphonates in the past (no osteoporosis medications for 7-8 years   per patient).      Left temporal artery biopsy 9/9/20- positive for giant cell arteritis   Prednisone (started 9/4/2020)     Patient started Actemra 10/28/20     INTERIM HISTORY    Last seen 12/09/2020.     Patient started Actemra 10/28/20. His last infusion was yesterday. Currently his is taking prednisone 9 mg since yesterday. He has  aching in eye every once in a while but he has not noticed any change in vision    Last saw rheumatology on 1/8/20- Dr. Alcantara.  The plan outlined was:  1) continue 162mg once weekly injection  2) Prednisone plan:  - starting today decrease to 10mg daily x7 days, then 9mg daily x7 days, 8mg daily x7 days, 7mg daily x7days, then 6mg daily until follow-up. Further taper plan to follow  3) Follow-up in 3-4 weeks, with labs a few days before your appointment    His visual acuity is stable in both eyes.     Automated visual fields showed stable defects in both eyes.     OCT optic nerves showed stable RNFL loss.     Follow-up in 2 months.       For further exam details, please feel free to contact our office for additional records.  If you wish to contact me regarding this patient please email me at [email or web address] or give my clinic a call to arrange a phone conversation.    Sincerely,    Guille Garcia MD  , Neuro-Ophthalmology and Adult Strabismus Surgery  The Lucy Castano Chair in Neuro-Ophthalmology  Department of Ophthalmology and Visual Neurosciences  [facility]

## 2021-01-14 NOTE — PROGRESS NOTES
"   1. 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  Vision stable in both eyes with intact / normal central vision but persistent (probably permanent) peripheral field defects in both eyes.  No exam or history indicators to suggest recurrence.      2.  Recent initiation of glare symptoms likely attributable to developing cataracts worsened with recent corticosteroid use.  These are not yet visually significant and we will observe.  May consider referral to cataract surgeon in the next 6 months to a year.    Follow-up with me in 2 months or sooner for any acute vision changes.  Patient will continue taper as per rheumatology.    - For a more thorough description of the disease presentation, please see my letter from the patient's initial visit with me      history from presentation (9/9/2020 visit)     Bon Michael is a 79 year old male who presents to neuro-ophthalmology   clinic today for consultation from Jose Andino MD at [facility] for   visual disturbance of left eye. He describes it as \"his left eye not   getting enough light\". He describes noticing the change in his left eye's   temporal visual field, inferior > superior. He notices it more first thing      in the morning. These symptoms began on 8/22/2020 and he describes it as   worsening - he is unclear if it the deficit has gotten larger or darker   however. He first noticed it while driving at the periphery of his vision.       Patient saw Dr. Lewis on August 28.  He underwent esr / crp testing as   well as mri (see below).     Labs: 9/2/20- WBC- 50.2, ESR- 28, CRP < 5     On August 31 he developed symptoms of waviness in his peripheral vision in      the right eye.  He went to Dr. Andino on 9/4/2020 who wanted him to go to      the ED but he was unable.  Dr. Andino reviewed the patient's fluorescein   angiography and felt there was choroidal filling delay in the right eye   and pallid edema in the right eye " concerning for giant cell arteritis.  We      discussed the case via phone.      Patient received 100 mg prednisone starting 9/4/20 and has been on 80 mg   prednisone daily since. When he started prednisone, he stopped taking   daily baby aspirin out of concern for potential GI upset side effects   after discussing this with the pharmacist. He has no history of GI upset   or ulcers.     Denies headache, jaw pain, temporal tenderness, issues with chewing,   swallowing, eye pain, sleep apnea,  He denies polymyalgia rheumatica   symptoms but has had 6-8 lbs of fluctuation in the past couple of months.     PRIOR IMAGING:  BRAIN MRI WITH/WO GADOLINIUM, MRA OF THE [place] OF [place] AND MRA OF THE   CAROTID ARTERIES - [facility] (9/1/2020)  IMPRESSION:    1.  No acute intracranial abnormality.  2.  No evidence of an orbital abnormalities.  3.  Mild generalized cerebral atrophy.  4.  Unremarkable MRA of the [place].  5.  Unremarkable MRA of the cervical vessels.     On my re-review I disagreed with the radiology read.  There is evidence of   patchy enhancement diffusely within the bilateral orbits and along the   bilateral intraorbital optic nerve sheaths.  Will discuss with   neuro-radiology regarding finding and repeat scan.     No bisphosphonates in the past (no osteoporosis medications for 7-8 years   per patient).      Left temporal artery biopsy 9/9/20- positive for giant cell arteritis   Prednisone (started 9/4/2020)     Patient started Actemra 10/28/20     INTERIM HISTORY    Last seen 12/09/2020.     Patient started Actemra 10/28/20. His last infusion was yesterday. Currently his is taking prednisone 9 mg since yesterday. He has aching in eye every once in a while but he has not noticed any change in vision    Last saw rheumatology on 1/8/20- Dr. Alcantara.  The plan outlined was:  1) continue 162mg once weekly injection  2) Prednisone plan:  - starting today decrease to 10mg daily x7 days, then  9mg daily x7 days, 8mg daily x7 days, 7mg daily x7days, then 6mg daily until follow-up. Further taper plan to follow  3) Follow-up in 3-4 weeks, with labs a few days before your appointment    His visual acuity is stable in both eyes.     Automated visual fields showed stable defects in both eyes.     OCT optic nerves showed stable RNFL loss.     Follow-up in 2 months.      Complete documentation of historical and exam elements from today's encounter can be found in the full encounter summary report (not reduplicated in this progress note).  I personally obtained the chief complaint(s) and history of present illness.  I confirmed and edited as necessary the review of systems, past medical/surgical history, family history, social history, and examination findings as documented by others; and I examined the patient myself.  I personally reviewed the relevant tests, images, and reports as documented above.  I formulated and edited as necessary the assessment and plan and discussed the findings and management plan with the patient and family.  I personally reviewed the ophthalmic test(s) associated with this encounter, agree with the interpretation(s) as documented by the resident/fellow, and have edited the corresponding report(s) as necessary.     MD Jaclyn Gomez MD: Neuro-ophthalmology fellow

## 2021-01-21 ENCOUNTER — TRANSFERRED RECORDS (OUTPATIENT)
Dept: PHYSICAL THERAPY | Facility: CLINIC | Age: 81
End: 2021-01-21

## 2021-01-26 ENCOUNTER — APPOINTMENT (OUTPATIENT)
Dept: URBAN - METROPOLITAN AREA CLINIC 256 | Age: 81
Setting detail: DERMATOLOGY
End: 2021-01-26

## 2021-01-26 VITALS — RESPIRATION RATE: 16 BRPM | HEIGHT: 67 IN | WEIGHT: 148 LBS

## 2021-01-26 DIAGNOSIS — L82.0 INFLAMED SEBORRHEIC KERATOSIS: ICD-10-CM

## 2021-01-26 DIAGNOSIS — Z85.828 PERSONAL HISTORY OF OTHER MALIGNANT NEOPLASM OF SKIN: ICD-10-CM

## 2021-01-26 DIAGNOSIS — L82.1 OTHER SEBORRHEIC KERATOSIS: ICD-10-CM

## 2021-01-26 DIAGNOSIS — L91.8 OTHER HYPERTROPHIC DISORDERS OF THE SKIN: ICD-10-CM

## 2021-01-26 PROCEDURE — 17110 DESTRUCT B9 LESION 1-14: CPT

## 2021-01-26 PROCEDURE — OTHER BENIGN DESTRUCTION: OTHER

## 2021-01-26 PROCEDURE — OTHER COUNSELING: OTHER

## 2021-01-26 PROCEDURE — OTHER REASSURANCE: OTHER

## 2021-01-26 PROCEDURE — OTHER LIQUID NITROGEN: OTHER

## 2021-01-26 ASSESSMENT — LOCATION DETAILED DESCRIPTION DERM
LOCATION DETAILED: LEFT ANTERIOR PROXIMAL THIGH
LOCATION DETAILED: LEFT CENTRAL LATERAL NECK
LOCATION DETAILED: LEFT LATERAL INFERIOR EYELID
LOCATION DETAILED: LEFT NASAL ALA
LOCATION DETAILED: LEFT SUPERIOR POSTAURICULAR SKIN
LOCATION DETAILED: LEFT SUPERIOR CENTRAL MALAR CHEEK
LOCATION DETAILED: SUPRAPUBIC SKIN
LOCATION DETAILED: RIGHT PROXIMAL PRETIBIAL REGION
LOCATION DETAILED: RIGHT ANTERIOR PROXIMAL THIGH
LOCATION DETAILED: LEFT ANTERIOR DISTAL THIGH
LOCATION DETAILED: LEFT MEDIAL INFERIOR EYELID
LOCATION DETAILED: RIGHT CHIN

## 2021-01-26 ASSESSMENT — LOCATION SIMPLE DESCRIPTION DERM
LOCATION SIMPLE: RIGHT PRETIBIAL REGION
LOCATION SIMPLE: NECK
LOCATION SIMPLE: RIGHT THIGH
LOCATION SIMPLE: LEFT INFERIOR EYELID
LOCATION SIMPLE: CHIN
LOCATION SIMPLE: LEFT CHEEK
LOCATION SIMPLE: SCALP
LOCATION SIMPLE: LEFT THIGH
LOCATION SIMPLE: GROIN
LOCATION SIMPLE: LEFT NOSE

## 2021-01-26 ASSESSMENT — LOCATION ZONE DERM
LOCATION ZONE: FACE
LOCATION ZONE: LEG
LOCATION ZONE: TRUNK
LOCATION ZONE: NOSE
LOCATION ZONE: EYELID
LOCATION ZONE: NECK
LOCATION ZONE: SCALP

## 2021-01-26 NOTE — PROCEDURE: BENIGN DESTRUCTION
Detail Level: Simple
Medical Necessity Information: It is in your best interest to select a reason for this procedure from the list below. All of these items fulfill various CMS LCD requirements except the new and changing color options.
Treatment Number (Will Not Render If 0): 0
Render Note In Bullet Format When Appropriate: No
Medical Necessity Clause: This procedure was medically necessary because the lesions that were treated were:
Consent: The patient's consent was obtained including but not limited to risks of crusting, scabbing, blistering, scarring, darker or lighter pigmentary change, recurrence, incomplete removal and infection.
Anesthesia Volume In Cc: 0.5
Post-Care Instructions: I reviewed with the patient in detail post-care instructions. Patient is to wear sunprotection, and avoid picking at any of the treated lesions. Pt may apply Vaseline to crusted or scabbing areas.
Render Post-Care Instructions In Note?: yes

## 2021-01-26 NOTE — PROCEDURE: LIQUID NITROGEN
Number Of Freeze-Thaw Cycles: 3 freeze-thaw cycles
Add 52 Modifier (Optional): no
Medical Necessity Clause: This procedure was medically necessary because the lesions that were treated were: traumatized when grooming
Medical Necessity Information: It is in your best interest to select a reason for this procedure from the list below. All of these items fulfill various CMS LCD requirements except the new and changing color options.
Consent: The patient's consent was obtained including but not limited to risks of crusting, scabbing, blistering, scarring, darker or lighter pigmentary change, recurrence, incomplete removal and infection.
Duration Of Freeze Thaw-Cycle (Seconds): 2
Post-Care Instructions: I reviewed with the patient in detail post-care instructions. Patient is to wear sunprotection, and avoid picking at any of the treated lesions. Pt may apply Vaseline to crusted or scabbing areas.
Render Post-Care Instructions In Note?: yes
Detail Level: Detailed

## 2021-01-28 ENCOUNTER — TELEPHONE (OUTPATIENT)
Dept: RHEUMATOLOGY | Facility: CLINIC | Age: 81
End: 2021-01-28

## 2021-01-28 NOTE — TELEPHONE ENCOUNTER
Pt called into clinic to discuss need to have infected tooth pulled tomorrow.  He saw dentist today and was told that he needs to have tooth pulled as it is infected.  Pt was not aware that it was infected and was concerned that he administered his Actemra yesterday. Did discuss with him, that it is fine, we will watch him and that he should hold his next dose unless dentist tells him that he is infection free before then. Pt did state that dentist assured him once tooth was gone, infection should clear up with in a couple of days.  Reviewed when to hold medication.  Pt is seeing Dr. Alcantara next Friday.  He will move labs to Thursday next week.    Pt returned call, he did get prescription for 7 days of amoxicillin, he is fine with taking that, as we went over what to do if he was prescribed antibiotics.  He is concerned as he was given a prescription for ibuprofen and he is concerned as Dr. Alcantara told him to not take Ibuprofen.  Will send message to Dr. Alcantara to see what he recommends regarding ibuprofen.  He was told that he can take up to 4 tablets a day, I did not ask strength.    Candis Montero RN  Rheumatology Clinic

## 2021-01-28 NOTE — TELEPHONE ENCOUNTER
Bib Alcantara MD Beard, Madeline RN   Caller: Unspecified (Today, 11:28 AM)             Infection in the mouth should certainly be addressed per dentist as planned. Agree with abx. Short use of ibuprofen for oral pain from his dental procedure is OK from my side, per their instructions. If he is not having pain, of course should not take it.     Look forward to seeing him next week.     Bib      Called and updated pt.     Candis Montero RN  Rheumatology Clinic

## 2021-02-02 NOTE — TELEPHONE ENCOUNTER
Pt is wondering if because he is holding his actemra this week due to antibiotics if he should decrease his prednisone dose this week or wait until next week.    Candis Montero RN  Rheumatology Clinic

## 2021-02-03 ENCOUNTER — TELEPHONE (OUTPATIENT)
Dept: RHEUMATOLOGY | Facility: CLINIC | Age: 81
End: 2021-02-03

## 2021-02-03 DIAGNOSIS — M31.6 GIANT CELL ARTERITIS (H): ICD-10-CM

## 2021-02-03 DIAGNOSIS — R79.9 ABNORMAL FINDING OF BLOOD CHEMISTRY, UNSPECIFIED: ICD-10-CM

## 2021-02-03 LAB
ALBUMIN SERPL-MCNC: 3.6 G/DL (ref 3.4–5)
ALBUMIN UR-MCNC: NEGATIVE MG/DL
ALP SERPL-CCNC: 40 U/L (ref 40–150)
ALT SERPL W P-5'-P-CCNC: 30 U/L (ref 0–70)
ANION GAP SERPL CALCULATED.3IONS-SCNC: 4 MMOL/L (ref 3–14)
APPEARANCE UR: CLEAR
AST SERPL W P-5'-P-CCNC: 29 U/L (ref 0–45)
BACTERIA #/AREA URNS HPF: ABNORMAL /HPF
BASOPHILS # BLD AUTO: 0.2 10E9/L (ref 0–0.2)
BASOPHILS NFR BLD AUTO: 0.7 %
BILIRUB SERPL-MCNC: 0.5 MG/DL (ref 0.2–1.3)
BILIRUB UR QL STRIP: NEGATIVE
BUN SERPL-MCNC: 26 MG/DL (ref 7–30)
CALCIUM SERPL-MCNC: 9.2 MG/DL (ref 8.5–10.1)
CHLORIDE SERPL-SCNC: 108 MMOL/L (ref 94–109)
CHOLEST SERPL-MCNC: 169 MG/DL
CO2 SERPL-SCNC: 29 MMOL/L (ref 20–32)
COLOR UR AUTO: YELLOW
CREAT SERPL-MCNC: 0.83 MG/DL (ref 0.66–1.25)
CRP SERPL-MCNC: <2.9 MG/L (ref 0–8)
DIFFERENTIAL METHOD BLD: ABNORMAL
EOSINOPHIL # BLD AUTO: 0.1 10E9/L (ref 0–0.7)
EOSINOPHIL NFR BLD AUTO: 0.3 %
ERYTHROCYTE [DISTWIDTH] IN BLOOD BY AUTOMATED COUNT: 16.7 % (ref 10–15)
ERYTHROCYTE [SEDIMENTATION RATE] IN BLOOD BY WESTERGREN METHOD: 4 MM/H (ref 0–20)
GFR SERPL CREATININE-BSD FRML MDRD: 84 ML/MIN/{1.73_M2}
GLUCOSE SERPL-MCNC: 91 MG/DL (ref 70–99)
GLUCOSE UR STRIP-MCNC: NEGATIVE MG/DL
HCT VFR BLD AUTO: 38.5 % (ref 40–53)
HDLC SERPL-MCNC: 65 MG/DL
HGB BLD-MCNC: 11.9 G/DL (ref 13.3–17.7)
HGB UR QL STRIP: ABNORMAL
KETONES UR STRIP-MCNC: NEGATIVE MG/DL
LDLC SERPL CALC-MCNC: 77 MG/DL
LEUKOCYTE ESTERASE UR QL STRIP: NEGATIVE
LYMPHOCYTES # BLD AUTO: 26.2 10E9/L (ref 0.8–5.3)
LYMPHOCYTES NFR BLD AUTO: 85.6 %
MCH RBC QN AUTO: 29.2 PG (ref 26.5–33)
MCHC RBC AUTO-ENTMCNC: 30.9 G/DL (ref 31.5–36.5)
MCV RBC AUTO: 95 FL (ref 78–100)
MONOCYTES # BLD AUTO: 0.9 10E9/L (ref 0–1.3)
MONOCYTES NFR BLD AUTO: 3 %
NEUTROPHILS # BLD AUTO: 3.2 10E9/L (ref 1.6–8.3)
NEUTROPHILS NFR BLD AUTO: 10.4 %
NITRATE UR QL: NEGATIVE
NON-SQ EPI CELLS #/AREA URNS LPF: ABNORMAL /LPF
NONHDLC SERPL-MCNC: 104 MG/DL
PH UR STRIP: 5 PH (ref 5–7)
PLATELET # BLD AUTO: 156 10E9/L (ref 150–450)
POTASSIUM SERPL-SCNC: 3.5 MMOL/L (ref 3.4–5.3)
PROT SERPL-MCNC: 6.3 G/DL (ref 6.8–8.8)
RBC # BLD AUTO: 4.07 10E12/L (ref 4.4–5.9)
RBC #/AREA URNS AUTO: ABNORMAL /HPF
SODIUM SERPL-SCNC: 141 MMOL/L (ref 133–144)
SOURCE: ABNORMAL
SP GR UR STRIP: >1.03 (ref 1–1.03)
TRIGL SERPL-MCNC: 133 MG/DL
UROBILINOGEN UR STRIP-ACNC: 0.2 EU/DL (ref 0.2–1)
WBC # BLD AUTO: 30.6 10E9/L (ref 4–11)
WBC #/AREA URNS AUTO: ABNORMAL /HPF

## 2021-02-03 PROCEDURE — 85652 RBC SED RATE AUTOMATED: CPT | Performed by: INTERNAL MEDICINE

## 2021-02-03 PROCEDURE — 80053 COMPREHEN METABOLIC PANEL: CPT | Performed by: INTERNAL MEDICINE

## 2021-02-03 PROCEDURE — 36415 COLL VENOUS BLD VENIPUNCTURE: CPT | Performed by: INTERNAL MEDICINE

## 2021-02-03 PROCEDURE — 86140 C-REACTIVE PROTEIN: CPT | Performed by: INTERNAL MEDICINE

## 2021-02-03 PROCEDURE — 85025 COMPLETE CBC W/AUTO DIFF WBC: CPT | Performed by: INTERNAL MEDICINE

## 2021-02-03 PROCEDURE — 81001 URINALYSIS AUTO W/SCOPE: CPT | Performed by: INTERNAL MEDICINE

## 2021-02-03 PROCEDURE — 80061 LIPID PANEL: CPT | Performed by: INTERNAL MEDICINE

## 2021-02-03 NOTE — TELEPHONE ENCOUNTER
Bib Alcantara MD Beard, Madeline, RN   Caller: Unspecified (6 days ago,  3:38 PM)             Should wait until next week. Thanks   Bib     Called and left message letting pt know to continue current dose of prednisone and to taper next week.    Candis Montero RN  Rheumatology Clinic

## 2021-02-03 NOTE — TELEPHONE ENCOUNTER
DATE:  2/3/2021   TIME OF RECEIPT FROM LAB:  1134  Dept/facility results reported from:  [facility]  CRITICAL RESULTS :  WBC 30.61  Diagnosis: GCA/CLL  Course of Action:  Sent results to provider to review.    Paige Zhou MSN, RN  Rheumatology RN Care Coordinator  [facility]

## 2021-02-05 ENCOUNTER — VIRTUAL VISIT (OUTPATIENT)
Dept: RHEUMATOLOGY | Facility: CLINIC | Age: 81
End: 2021-02-05
Attending: INTERNAL MEDICINE
Payer: MEDICARE

## 2021-02-05 DIAGNOSIS — M31.6 GIANT CELL ARTERITIS (H): Primary | ICD-10-CM

## 2021-02-05 DIAGNOSIS — C91.10 CHRONIC LYMPHOCYTIC LEUKEMIA (H): ICD-10-CM

## 2021-02-05 DIAGNOSIS — Z79.899 ENCOUNTER FOR LONG-TERM (CURRENT) USE OF HIGH-RISK MEDICATION: ICD-10-CM

## 2021-02-05 PROCEDURE — 99442 PR PHYSICIAN TELEPHONE EVALUATION 11-20 MIN: CPT | Mod: 95 | Performed by: INTERNAL MEDICINE

## 2021-02-05 NOTE — PATIENT INSTRUCTIONS
PLAN:  1) On 2/11, decrease prednisone down to 6mg daily. Continue for 2 weeks then decrease to 5mg daily. Continue for 2 weeks then decrease to 4mg daily. Every 2 weeks, decrease the dose by 1 mg.  2) Weekly actemra injection, next due on Wednesday  3) Labs in 3 weeks  4) f/u in 4 weeks    My clinic will call to schedule the labs and follow-up.    Thanks  Bib Alcantara MD  Rheumatology

## 2021-02-05 NOTE — LETTER
2/5/2021       RE: Bon Michael  [address] [address]     Dear Colleague,    Thank you for referring your patient, Bon Michael, to the [facility] [place] [facility]. Please see a copy of my visit note below.    Bon is a 80 year old who is being evaluated via a billable telephone visit.      What phone number would you like to be contacted at? [phone]  How would you like to obtain your AVS? 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 Never had HA, jaw claudication, PMR symptoms. No inflammatory arthritis symptoms. Disease currently in remission by history and labs on 7mg prednisone and actemra 162mg weekly. Taper ongoing guided by GiACTA trial.    # Giant cell arteritis  1) continue 162mg once weekly injection, next dose due on Wednesday  2) Prednisone plan:  - starting 2/11 decrease from 7 to 6mg of prednisone daily. Then go down by 1mg q2 weeks until off of prednisone. Makes prednisone dose changes on thursdays.  3) Continue off bactrim daily and omeprazole  4) Follow-up in 4 weeks by phone visit with labs a few days prior  5) Has ophthalmology appointment scheduled for 3/5    # Long term use of high risk medication  1) Prednisone  2) Actemra    # Osteopenia no interval fractures: Continue alendronate. Managed by PCP. Trying to minimize steroid use with actemra to minimize bone health effects    #CLL  Managed by  oncology. Stable. 30.6 WBC on 2/3. Next appointment with oncology next week.    Severity of this disease discussed at length again with the patient today, particularly the potential for blindness/ loss of vision. Counseled to seek immediate medical attention should he experience changes in vision. He understands. Spent a significant amount of time discussing the side effects of Actemra including risk of malignancy, serious infections resulting in death, opportunistic infections, neutropenia/thrombocytopenia, allergic reaction, GI perforation, demyelinating disease, as well as fever, chills, hypercholesterolemia, abdominal pain, dizziness. Patient had the opportunity to ask questions, which were all answered.    Bib Alcantara MD   Rheumatology     Subjective:   2/5/21  Had his dental procedure 1 week ago. Took only 1 ibuprofen on the first day, otherwise no significant pain. Has been improving. Last dose of antibiotics was yesterday. No fevers or chills. No HA, jaw pain, change in vision. No erythema/edema/warmth of any joints. No stiffness. Able to make a full fist. Went down to 7mg last week. Still on 7, appropriately did not take actemra this week. Due for next dose on Wednesday of actemra. Thursday is normally when he goes down. No stiffness in hips/shoulders. No new rash. Feels well.  No weight loss. Tolerating medications without side effects. ROS otherwise negative.    1/8/2020  No HA, jaw pain, no vision since last visit. Sleep is improved back to baseline, sleeping 7-730 hours per night with decreasing dose of steroids. No injection site reaction with actemra. No weight gain despite steroids. No other side effects. No longer with bowel movements at night, has returned to morning only, which is baseline. No fevers, chills, infections since last visit. Current dose of prednisone is 10mg daily. No joint pain or stiffness. No difficulty getting in/our of car, climbing stairs, putting on shirt in the AM. No  erythema/edema/warmth of any joints. ROS otherwise negative.      12/17/2020  No HA, no jaw pain, no new change in vision. Has had continued sensitivity to sun, which is chronic. Brought up with ophthalmology, not issue from GCA, possible cataract. No joint pain except for chronic back discomfort. No stiffness in shoulders or hips. No issues with giving himself the injection, no rash or burning. Wednesday morning takes the injection. Last week Thursday decreased the dose from 25mg daily down to 20mg daily. Has continued on this. No ongoing side effects from prednisone. No weight gain. Feels that he is having nocturnal bowel movements, which is new. He thinks this is from having bowl of oatmeal. Not increased in frequency, previously just in the AM. Now just at night. Thinks that it is for oatmeal. Seeing ophthalmology regularly. No interval infections or fevers.     14 point review of systems collected and negative if not documented above.    11/20/2020  Has been tolerating injections without issues. No injection site reactions, which he experienced during his first injection of some burning and redness at the site. No systemic side effects. No new HA. No new change in his vision. No Jaw pain. No stiffness in shoulders or hips. No difficulty putting shirt on in the morning or getting up out of a chair. Has chronic swelling in his feet that is unchanged. Has been doing exercises, which he continues. Walks 1-2 times per week. Has cut down to 35mg daily of prednisone. No return of any symptoms, though has noticed some interruption of sleep which is new since started. Continues to check his finger sticks which he says are in the range he was told. Checking his BP daily and is in the normal range now.     14 point review of systems obtained and negative if not documented above.    HPI from initial consultation 9/25/20  79 year old male with history of CLL, being monitored at this point without an indication for therapy.  On Saturday, going to get dirt at a nursery, August 22nd, was driving and noticed that he 'wasn't getting enough light in his eye'. The next day, Sunday, he was continuing to have left eye symptoms. Called the [facility]. Asked for appointment because of these new symptoms. On Tuesday evening, he called into the on call physician due to new complaint of 'light reflecting off the wall into my eye'. He came in the next day, Wednesday, and was sent to the retinal center on Friday.  Had an exam and was told that there was a defect in the retina/artery. Had MRI of the brain and bloodwork on Tuesday. On Thursday, results of the MRI were discussed and the findings were questioned to be due to his known CLL. Possible stroke he was told due to viscosity increase? On Friday, 9/4/20, had worsening symptoms in the right eye. So came back in to the retinal specialists. At that time there was consideration of sending to [place] ED, though because of the long expected wait, was instead started on 100mg of prednisone daily. 9/9/20 went down to 60mg of prednisone, with plan to go down to 50mg of 10/7/20. Was setup for biopsy on 9/10.     Denies any headaches. No jaw pain, even when chewing tough food. Feels that he has had some weight loss over the last few months unintentionally (139-130 over a period of 1 month). No fatigue, night sweats. No rashes. No chest pain, cough, shortness of breath. No abdominal pain. No joint pain. Has noticed that his stool is less formed. Not diarrhea, just softer. No blood. No issues with urination. No swelling in lower extremities. No raynauds. No double vision.     Past Medical History  CLL  Hernia  Mono   Deviated septum  Osteopenia by DEXA    Past Surgical History  Tonsillectomy  Deviated septum    Medications  Prednisone 7mg daily  Current Outpatient Medications   Medication     alendronate (FOSAMAX) 70 MG tablet     Aspirin-Calcium Carbonate  MG TABS     predniSONE (DELTASONE) 1 MG  tablet     predniSONE (DELTASONE) 5 MG tablet     omeprazole (PRILOSEC) 20 MG DR capsule     predniSONE (DELTASONE) 2.5 MG tablet     predniSONE (DELTASONE) 20 MG tablet     sulfamethoxazole-trimethoprim (BACTRIM) 400-80 MG tablet     tocilizumab (ACTEMRA) 162 MG/0.9ML subcutaneous injection     No current facility-administered medications for this visit.          Weeks Daily prednisone dose (mg) 26-week taper Weekly 162mg Actemra      1 60    2 50 11/5/2020    3 40    4 35 11/20/2020   5 30    6 25    7 20 12/10/2020   8 15 12/17/2020   9 12.5    10 12.5    11 10 1/8/2021   12 9    13 8    14 7 2/5/21   15 6 2/11/21   16 6    17 5    18 5    19 4    20 4    21 3    22 3    23 2    24 2    25 1    26 1      Allergies: Seasonal/ environmental allergies    Family History: No family history of autoimmune diseases like RA, sjogrens, SLE, scleroderma, IBD.    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 No drug use.        Objective:    Physical exam:  No vitals or exam for telephone visit    Labs:   2/3/21  WBC 30.6  HGB 11.9    Cr 0.83  Alk phos 40  ALT 30  AST 29    1/5/21  UA without protein, cells or casts  ESR 5  CRP <2.9  WBC 40.6  HGB 11.6    Cr 0.81  Chol 166  Trig 86  HDL 68  LDL 81    12/14/2020   CRP <2.9  ESR 4  WBC 54  HGB 11.8    Cr 0.78  Ua unremarkable  Cholesterol 168  Trig 82  HDL 70  LDL 81    11-  ESR 3  CRP less than 2.9  WBC 63.3  Hemoglobin 12  Platelets 181  Creatinine 0.98  Total protein low at 5.9  Alk phos 48  ALT 31  AST 30  UA with 30 of albumin  Cholesterol 169  Triglycerides 45  HDL 83  LDL 77    10/1/2020  WBC 84.9  HGB 12.3    IgM 36 low  IgA 124 normal  IgG 625    Cr 0.98    9/1/2020  WBC 50.2  HGB 11.6       Imaging:  MR brain and orbits 9/10/20  Impression:    1. Regarding the orbits and globes, there is no definite abnormal  contrast enhancement or mass. No evidence of leukemic infiltration.  2. Regarding the remainder  "of the brain, abnormal T2 hyperintense bone  marrow signal with associated enhancement in the skull, likely  representing leukemic infiltration.  3. Subcutaneous T2 hyperintensity with associated diffusion  restriction and contrast enhancement over the left zygomatic region,  question leukemic infiltration.    Pathology:  Patient Name: MARIELA WALLACE   MR#: [id number]   Specimen #: [id number]   Collected: 9/10/2020   Received: 9/10/2020   Reported: 9/16/2020 12:35   Ordering Phy(s): VALERIE CARRINGTON     For improved result formatting, select 'View Enhanced Report Format' under    Linked Documents section.     SPECIMEN(S):   Temporal artery biopsy, left     FINAL DIAGNOSIS:   Temporal artery, left, biopsy:   1. Granulomatous arteritis consistent with giant cell arteritis.   2. Chronic inflammation of the surrounding adventitia.     COMMENT:   Preliminary results were communicated to Dr. Valerie Carrington and Dr. Guille Garcia on 9/14/20 at 12:45pm.     I have personally reviewed all specimens and/or slides, including the   listed special stains, and used them   with my medical judgement to determine or confirm the final diagnosis.     Electronically signed out by:     Za García M.D., [facility]     CLINICAL HISTORY:   The patient is a 79 year old male with a history of chronic lymphocytic   leukemia who presented with bilateral   sequential optic neuropathy with associated pallid optic disc edema and   choroidal hypoperfusion, but   relatively intact visual acuity, negative elevation of acute phase   reactants, and lack of constitutional   symptoms consistent with giant cell arteritis. He also has findings of   likely leukemic infiltration in the   skull seen on MRI. He undergoes temporal artery biopsy on the left.     GROSS:   The specimen is received in formalin with proper patient identification,   labeled \"left temporal artery\".  The   specimen consists of a 3.2 cm in length by 0.3 cm in diameter " tan-white   vessel segment. The specimen is   submitted intact in A1. (Dictated by: PADILLA Montalvo 9/11/2020 03:08   PM)     MICROSCOPIC:   The tissue consists of artery with narrowed lumen and intimal hyperplasia.    There is an infiltrate of   lymphocytes, plasma cells, epithelioid histiocytes, and multinucleated   giant cells throughout all layers of   the artery. There is associated loss of the internal elastic lamina on   elastic stain. Occasional focal   calcifications are seen a the level of the internal elastic lamina.   Neovascularization of the vessel wall is   present. Perivascular lymphocytic infiltrates are seen in the surrounding   adventitia. Immunohistochemistry   with CD3 and CD20 demonstrates a predominantly T-cell lymphocytic   infiltrate within the vessel wall. There is   a mixed infiltrate of T and B lymphocytes perivascular in the adventitia.   CD5 and CD43 have similar staining   patterns to CD3. There is light patchy CD23 staining in the areas positive    for CD20. CD68 highlights the   epithelioid histiocytes within the vessel wall.     The technical component of this testing was [facility] at [facility], with the professional component performed    at the [facility], [address] [place] [id number] ([phone])     CPT Codes:   A: 72564-KR2, 34774-TYLM, 66434-FMF, 54847-BJG, 56901-JZF, 04747-XWX,   58432-HKP, 87262-TMK     COLLECTION SITE:   Client: [facility]   [facility] [place]

## 2021-02-05 NOTE — PROGRESS NOTES
Bon is a 80 year old who is being evaluated via a billable telephone visit.      What phone number would you like to be contacted at? [phone]  How would you like to obtain your AVS? 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 Never had HA, jaw claudication, PMR symptoms. No inflammatory arthritis symptoms. Disease currently in remission by history and labs on 7mg prednisone and actemra 162mg weekly. Taper ongoing guided by GiACTA trial.    # Giant cell arteritis  1) continue 162mg once weekly injection, next dose due on Wednesday  2) Prednisone plan:  - starting 2/11 decrease from 7 to 6mg of prednisone daily. Then go down by 1mg q2 weeks until off of prednisone. Makes prednisone dose changes on thursdays.  3) Continue off bactrim daily and omeprazole  4) Follow-up in 4 weeks by phone visit with labs a few days prior  5) Has ophthalmology appointment scheduled for 3/5    # Long term use of high risk medication  1) Prednisone  2) Actemra    # Osteopenia no interval fractures: Continue alendronate. Managed by PCP. Trying to minimize steroid use with actemra to minimize bone health effects    #CLL  Managed by oncology. Stable. 30.6 WBC on 2/3. Next appointment with oncology next week.    Severity of this disease discussed at length again with the patient today, particularly the potential for blindness/ loss of vision. Counseled to seek immediate medical attention should he experience changes in vision. He understands. Spent a  significant amount of time discussing the side effects of Actemra including risk of malignancy, serious infections resulting in death, opportunistic infections, neutropenia/thrombocytopenia, allergic reaction, GI perforation, demyelinating disease, as well as fever, chills, hypercholesterolemia, abdominal pain, dizziness. Patient had the opportunity to ask questions, which were all answered.    Bib Alcantara MD   Rheumatology     Subjective:   2/5/21  Had his dental procedure 1 week ago. Took only 1 ibuprofen on the first day, otherwise no significant pain. Has been improving. Last dose of antibiotics was yesterday. No fevers or chills. No HA, jaw pain, change in vision. No erythema/edema/warmth of any joints. No stiffness. Able to make a full fist. Went down to 7mg last week. Still on 7, appropriately did not take actemra this week. Due for next dose on Wednesday of actemra. Thursday is normally when he goes down. No stiffness in hips/shoulders. No new rash. Feels well.  No weight loss. Tolerating medications without side effects. ROS otherwise negative.    1/8/2020  No HA, jaw pain, no vision since last visit. Sleep is improved back to baseline, sleeping 7-730 hours per night with decreasing dose of steroids. No injection site reaction with actemra. No weight gain despite steroids. No other side effects. No longer with bowel movements at night, has returned to morning only, which is baseline. No fevers, chills, infections since last visit. Current dose of prednisone is 10mg daily. No joint pain or stiffness. No difficulty getting in/our of car, climbing stairs, putting on shirt in the AM. No erythema/edema/warmth of any joints. ROS otherwise negative.      12/17/2020  No HA, no jaw pain, no new change in vision. Has had continued sensitivity to sun, which is chronic. Brought up with ophthalmology, not issue from GCA, possible cataract. No joint pain except for chronic back discomfort. No stiffness in shoulders  or hips. No issues with giving himself the injection, no rash or burning. Wednesday morning takes the injection. Last week Thursday decreased the dose from 25mg daily down to 20mg daily. Has continued on this. No ongoing side effects from prednisone. No weight gain. Feels that he is having nocturnal bowel movements, which is new. He thinks this is from having bowl of oatmeal. Not increased in frequency, previously just in the AM. Now just at night. Thinks that it is for oatmeal. Seeing ophthalmology regularly. No interval infections or fevers.     14 point review of systems collected and negative if not documented above.    11/20/2020  Has been tolerating injections without issues. No injection site reactions, which he experienced during his first injection of some burning and redness at the site. No systemic side effects. No new HA. No new change in his vision. No Jaw pain. No stiffness in shoulders or hips. No difficulty putting shirt on in the morning or getting up out of a chair. Has chronic swelling in his feet that is unchanged. Has been doing exercises, which he continues. Walks 1-2 times per week. Has cut down to 35mg daily of prednisone. No return of any symptoms, though has noticed some interruption of sleep which is new since started. Continues to check his finger sticks which he says are in the range he was told. Checking his BP daily and is in the normal range now.     14 point review of systems obtained and negative if not documented above.    HPI from initial consultation 9/25/20  79 year old male with history of CLL, being monitored at this point without an indication for therapy. On Saturday, going to get dirt at a nursery, August 22nd, was driving and noticed that he 'wasn't getting enough light in his eye'. The next day, Sunday, he was continuing to have left eye symptoms. Called the [place] [facility]. Asked for appointment because of these new symptoms. On Tuesday evening, he called into the on  call physician due to new complaint of 'light reflecting off the wall into my eye'. He came in the next day, Wednesday, and was sent to the retinal center on Friday.  Had an exam and was told that there was a defect in the retina/artery. Had MRI of the brain and bloodwork on Tuesday. On Thursday, results of the MRI were discussed and the findings were questioned to be due to his known CLL. Possible stroke he was told due to viscosity increase? On Friday, 9/4/20, had worsening symptoms in the right eye. So came back in to the retinal specialists. At that time there was consideration of sending to [place] ED, though because of the long expected wait, was instead started on 100mg of prednisone daily. 9/9/20 went down to 60mg of prednisone, with plan to go down to 50mg of 10/7/20. Was setup for biopsy on 9/10.     Denies any headaches. No jaw pain, even when chewing tough food. Feels that he has had some weight loss over the last few months unintentionally (139-130 over a period of 1 month). No fatigue, night sweats. No rashes. No chest pain, cough, shortness of breath. No abdominal pain. No joint pain. Has noticed that his stool is less formed. Not diarrhea, just softer. No blood. No issues with urination. No swelling in lower extremities. No raynauds. No double vision.     Past Medical History  CLL  Hernia  Mono   Deviated septum  Osteopenia by DEXA    Past Surgical History  Tonsillectomy  Deviated septum    Medications  Prednisone 7mg daily  Current Outpatient Medications   Medication     alendronate (FOSAMAX) 70 MG tablet     Aspirin-Calcium Carbonate  MG TABS     predniSONE (DELTASONE) 1 MG tablet     predniSONE (DELTASONE) 5 MG tablet     omeprazole (PRILOSEC) 20 MG DR capsule     predniSONE (DELTASONE) 2.5 MG tablet     predniSONE (DELTASONE) 20 MG tablet     sulfamethoxazole-trimethoprim (BACTRIM) 400-80 MG tablet     tocilizumab (ACTEMRA) 162 MG/0.9ML subcutaneous injection     No current  facility-administered medications for this visit.          Weeks Daily prednisone dose (mg) 26-week taper Weekly 162mg Actemra      1 60    2 50 11/5/2020    3 40    4 35 11/20/2020   5 30    6 25    7 20 12/10/2020   8 15 12/17/2020   9 12.5    10 12.5    11 10 1/8/2021   12 9    13 8    14 7 2/5/21   15 6 2/11/21   16 6    17 5    18 5    19 4    20 4    21 3    22 3    23 2    24 2    25 1    26 1      Allergies: Seasonal/ environmental allergies    Family History: No family history of autoimmune diseases like RA, sjogrens, SLE, scleroderma, IBD.    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 No drug use.        Objective:    Physical exam:  No vitals or exam for telephone visit    Labs:   2/3/21  WBC 30.6  HGB 11.9    Cr 0.83  Alk phos 40  ALT 30  AST 29    1/5/21  UA without protein, cells or casts  ESR 5  CRP <2.9  WBC 40.6  HGB 11.6    Cr 0.81  Chol 166  Trig 86  HDL 68  LDL 81    12/14/2020   CRP <2.9  ESR 4  WBC 54  HGB 11.8    Cr 0.78  Ua unremarkable  Cholesterol 168  Trig 82  HDL 70  LDL 81    11-  ESR 3  CRP less than 2.9  WBC 63.3  Hemoglobin 12  Platelets 181  Creatinine 0.98  Total protein low at 5.9  Alk phos 48  ALT 31  AST 30  UA with 30 of albumin  Cholesterol 169  Triglycerides 45  HDL 83  LDL 77    10/1/2020  WBC 84.9  HGB 12.3    IgM 36 low  IgA 124 normal  IgG 625    Cr 0.98    9/1/2020  WBC 50.2  HGB 11.6       Imaging:  MR brain and orbits 9/10/20  Impression:    1. Regarding the orbits and globes, there is no definite abnormal  contrast enhancement or mass. No evidence of leukemic infiltration.  2. Regarding the remainder of the brain, abnormal T2 hyperintense bone  marrow signal with associated enhancement in the skull, likely  representing leukemic infiltration.  3. Subcutaneous T2 hyperintensity with associated diffusion  restriction and contrast enhancement over the left zygomatic region,  question leukemic  "infiltration.    Pathology:  Patient Name: MARIELA WALLACE   MR#: [id number]   Specimen #: [id number]   Collected: 9/10/2020   Received: 9/10/2020   Reported: 9/16/2020 12:35   Ordering Phy(s): VALERIE CARRINGTON     For improved result formatting, select 'View Enhanced Report Format' under    Linked Documents section.     SPECIMEN(S):   Temporal artery biopsy, left     FINAL DIAGNOSIS:   Temporal artery, left, biopsy:   1. Granulomatous arteritis consistent with giant cell arteritis.   2. Chronic inflammation of the surrounding adventitia.     COMMENT:   Preliminary results were communicated to Dr. Valerie Carrington and Dr. Guille Garcia on 9/14/20 at 12:45pm.     I have personally reviewed all specimens and/or slides, including the   listed special stains, and used them   with my medical judgement to determine or confirm the final diagnosis.     Electronically signed out by:     Za García M.D., [facility]     CLINICAL HISTORY:   The patient is a 79 year old male with a history of chronic lymphocytic   leukemia who presented with bilateral   sequential optic neuropathy with associated pallid optic disc edema and   choroidal hypoperfusion, but   relatively intact visual acuity, negative elevation of acute phase   reactants, and lack of constitutional   symptoms consistent with giant cell arteritis. He also has findings of   likely leukemic infiltration in the   skull seen on MRI. He undergoes temporal artery biopsy on the left.     GROSS:   The specimen is received in formalin with proper patient identification,   labeled \"left temporal artery\".  The   specimen consists of a 3.2 cm in length by 0.3 cm in diameter tan-white   vessel segment. The specimen is   submitted intact in A1. (Dictated by: PADILLA Montalvo 9/11/2020 03:08   PM)     MICROSCOPIC:   The tissue consists of artery with narrowed lumen and intimal hyperplasia.    There is an infiltrate of   lymphocytes, plasma cells, epithelioid " histiocytes, and multinucleated   giant cells throughout all layers of   the artery. There is associated loss of the internal elastic lamina on   elastic stain. Occasional focal   calcifications are seen a the level of the internal elastic lamina.   Neovascularization of the vessel wall is   present. Perivascular lymphocytic infiltrates are seen in the surrounding   adventitia. Immunohistochemistry   with CD3 and CD20 demonstrates a predominantly T-cell lymphocytic   infiltrate within the vessel wall. There is   a mixed infiltrate of T and B lymphocytes perivascular in the adventitia.   CD5 and CD43 have similar staining   patterns to CD3. There is light patchy CD23 staining in the areas positive    for CD20. CD68 highlights the   epithelioid histiocytes within the vessel wall.     The technical component of this testing was completed at the [facility], with the professional component performed    at the [facility], [address] [place] [id number] ([phone])     CPT Codes:   A: 52585-OM7, 60105-IPGI, 90636-HAK, 48792-LLK, 00326-SPS, 50521-OZH,   41603-QJU, 73463-JKK     COLLECTION SITE:   Client: [facility]   [place] [facility] [place]

## 2021-02-10 ENCOUNTER — TELEPHONE (OUTPATIENT)
Dept: RHEUMATOLOGY | Facility: CLINIC | Age: 81
End: 2021-02-10

## 2021-02-12 NOTE — TELEPHONE ENCOUNTER
Pt is calling to schedule his 4 week follow up from his visit with Dr. Alcantara on 2/5/21.  There was no openings available on 3/5 or 3/4.  Pt wanted to schedule on 3/4 because he has another appointment on 3/5.   had use a SHANNON slot on 3/4 to get the Pt in for a follow up.    Please advise if this is okay?  If not okay, please send back to scheduling pool and we can contact the Pt back to schedule him in on a different date or time.    Thank you!

## 2021-02-16 DIAGNOSIS — M31.6 GIANT CELL ARTERITIS (H): ICD-10-CM

## 2021-02-16 RX ORDER — PREDNISONE 1 MG/1
TABLET ORAL
Qty: 154 TABLET | Refills: 0 | Status: SHIPPED | OUTPATIENT
Start: 2021-02-16 | End: 2023-11-02

## 2021-02-16 RX ORDER — PREDNISONE 5 MG/1
TABLET ORAL
Qty: 28 TABLET | Refills: 0 | Status: SHIPPED | OUTPATIENT
Start: 2021-02-16 | End: 2023-11-02

## 2021-02-16 NOTE — TELEPHONE ENCOUNTER
Pt called for refill of prednisone. Review of note show taper to off.  Have cued up 1 mg and 5 mg tablets for Dr. Alcantara to review and sign.    Candis Montero RN  Rheumatology Clinic

## 2021-02-25 ENCOUNTER — TELEPHONE (OUTPATIENT)
Dept: RHEUMATOLOGY | Facility: CLINIC | Age: 81
End: 2021-02-25

## 2021-02-25 NOTE — TELEPHONE ENCOUNTER
Pt called into clinic wondering if he was supposed to decrease to 5 mg a day.  We did hold him at 6 mg a day for an extra week due to antibiotics for a tooth infection. He states he has it on his calendar. I told him to follow that. Pt will do so.    Candis Montero RN  Rheumatology Clinic

## 2021-03-02 ENCOUNTER — TELEPHONE (OUTPATIENT)
Dept: RHEUMATOLOGY | Facility: CLINIC | Age: 81
End: 2021-03-02

## 2021-03-02 DIAGNOSIS — M31.6 GIANT CELL ARTERITIS (H): ICD-10-CM

## 2021-03-02 DIAGNOSIS — R79.9 ABNORMAL FINDING OF BLOOD CHEMISTRY, UNSPECIFIED: ICD-10-CM

## 2021-03-02 LAB
ALBUMIN UR-MCNC: NEGATIVE MG/DL
APPEARANCE UR: CLEAR
BACTERIA #/AREA URNS HPF: ABNORMAL /HPF
BASOPHILS # BLD AUTO: 0.1 10E9/L (ref 0–0.2)
BASOPHILS NFR BLD AUTO: 0.4 %
BILIRUB UR QL STRIP: NEGATIVE
COLOR UR AUTO: YELLOW
CRP SERPL-MCNC: <2.9 MG/L (ref 0–8)
DIFFERENTIAL METHOD BLD: ABNORMAL
EOSINOPHIL # BLD AUTO: 0 10E9/L (ref 0–0.7)
EOSINOPHIL NFR BLD AUTO: 0.1 %
ERYTHROCYTE [DISTWIDTH] IN BLOOD BY AUTOMATED COUNT: 16.1 % (ref 10–15)
ERYTHROCYTE [SEDIMENTATION RATE] IN BLOOD BY WESTERGREN METHOD: 4 MM/H (ref 0–20)
GLUCOSE UR STRIP-MCNC: NEGATIVE MG/DL
HCT VFR BLD AUTO: 40.5 % (ref 40–53)
HGB BLD-MCNC: 12.8 G/DL (ref 13.3–17.7)
HGB UR QL STRIP: ABNORMAL
KETONES UR STRIP-MCNC: NEGATIVE MG/DL
LEUKOCYTE ESTERASE UR QL STRIP: NEGATIVE
LYMPHOCYTES # BLD AUTO: 21.9 10E9/L (ref 0.8–5.3)
LYMPHOCYTES NFR BLD AUTO: 77.3 %
MCH RBC QN AUTO: 30.1 PG (ref 26.5–33)
MCHC RBC AUTO-ENTMCNC: 31.6 G/DL (ref 31.5–36.5)
MCV RBC AUTO: 95 FL (ref 78–100)
MONOCYTES # BLD AUTO: 0.6 10E9/L (ref 0–1.3)
MONOCYTES NFR BLD AUTO: 2 %
NEUTROPHILS # BLD AUTO: 5.7 10E9/L (ref 1.6–8.3)
NEUTROPHILS NFR BLD AUTO: 20.2 %
NITRATE UR QL: NEGATIVE
PH UR STRIP: 7 PH (ref 5–7)
PLATELET # BLD AUTO: 164 10E9/L (ref 150–450)
RBC # BLD AUTO: 4.25 10E12/L (ref 4.4–5.9)
RBC #/AREA URNS AUTO: ABNORMAL /HPF
SOURCE: ABNORMAL
SP GR UR STRIP: 1.02 (ref 1–1.03)
UROBILINOGEN UR STRIP-ACNC: 0.2 EU/DL (ref 0.2–1)
WBC # BLD AUTO: 28.3 10E9/L (ref 4–11)
WBC #/AREA URNS AUTO: ABNORMAL /HPF

## 2021-03-02 PROCEDURE — 80053 COMPREHEN METABOLIC PANEL: CPT | Performed by: INTERNAL MEDICINE

## 2021-03-02 PROCEDURE — 85025 COMPLETE CBC W/AUTO DIFF WBC: CPT | Performed by: INTERNAL MEDICINE

## 2021-03-02 PROCEDURE — 86140 C-REACTIVE PROTEIN: CPT | Performed by: INTERNAL MEDICINE

## 2021-03-02 PROCEDURE — 80061 LIPID PANEL: CPT | Performed by: INTERNAL MEDICINE

## 2021-03-02 PROCEDURE — 85652 RBC SED RATE AUTOMATED: CPT | Performed by: INTERNAL MEDICINE

## 2021-03-02 PROCEDURE — 81001 URINALYSIS AUTO W/SCOPE: CPT | Performed by: INTERNAL MEDICINE

## 2021-03-02 PROCEDURE — 36415 COLL VENOUS BLD VENIPUNCTURE: CPT | Performed by: INTERNAL MEDICINE

## 2021-03-02 NOTE — TELEPHONE ENCOUNTER
Reviewed with Dr. Alcantara, nothing to do at this time.    Candis Montero RN  Rheumatology Clinic

## 2021-03-02 NOTE — TELEPHONE ENCOUNTER
Time of Call: 1459    Person reporting results: Malyun    Depart/facility results are coming from:[facility]    Phone number: [phone]    CRITICAL RESULTS: WBC: 28.3     Results taken by: Candis Montero RN                Diagnosis: GCA/CLL     Ordering provider: Dr. Alcantara    Course of Action: Sent to Provider for review.  Improved from last month, was 30.6.    Candis Montero RN  Rheumatology Clinic

## 2021-03-03 LAB
ALBUMIN SERPL-MCNC: 3.7 G/DL (ref 3.4–5)
ALP SERPL-CCNC: 40 U/L (ref 40–150)
ALT SERPL W P-5'-P-CCNC: 23 U/L (ref 0–70)
ANION GAP SERPL CALCULATED.3IONS-SCNC: 4 MMOL/L (ref 3–14)
AST SERPL W P-5'-P-CCNC: 25 U/L (ref 0–45)
BILIRUB SERPL-MCNC: 0.4 MG/DL (ref 0.2–1.3)
BUN SERPL-MCNC: 24 MG/DL (ref 7–30)
CALCIUM SERPL-MCNC: 9.8 MG/DL (ref 8.5–10.1)
CHLORIDE SERPL-SCNC: 104 MMOL/L (ref 94–109)
CHOLEST SERPL-MCNC: 174 MG/DL
CO2 SERPL-SCNC: 31 MMOL/L (ref 20–32)
CREAT SERPL-MCNC: 0.86 MG/DL (ref 0.66–1.25)
GFR SERPL CREATININE-BSD FRML MDRD: 83 ML/MIN/{1.73_M2}
GLUCOSE SERPL-MCNC: 91 MG/DL (ref 70–99)
HDLC SERPL-MCNC: 57 MG/DL
LDLC SERPL CALC-MCNC: 89 MG/DL
NONHDLC SERPL-MCNC: 117 MG/DL
POTASSIUM SERPL-SCNC: 4.4 MMOL/L (ref 3.4–5.3)
PROT SERPL-MCNC: 6.3 G/DL (ref 6.8–8.8)
SODIUM SERPL-SCNC: 139 MMOL/L (ref 133–144)
TRIGL SERPL-MCNC: 138 MG/DL

## 2021-03-04 ENCOUNTER — VIRTUAL VISIT (OUTPATIENT)
Dept: RHEUMATOLOGY | Facility: CLINIC | Age: 81
End: 2021-03-04
Attending: INTERNAL MEDICINE
Payer: MEDICARE

## 2021-03-04 DIAGNOSIS — M85.80 OSTEOPENIA, UNSPECIFIED LOCATION: ICD-10-CM

## 2021-03-04 DIAGNOSIS — Z79.899 ENCOUNTER FOR LONG-TERM (CURRENT) USE OF HIGH-RISK MEDICATION: ICD-10-CM

## 2021-03-04 DIAGNOSIS — M31.6 GIANT CELL ARTERITIS (H): Primary | ICD-10-CM

## 2021-03-04 DIAGNOSIS — C91.10 CHRONIC LYMPHOCYTIC LEUKEMIA (H): ICD-10-CM

## 2021-03-04 PROCEDURE — 99443 PR PHYSICIAN TELEPHONE EVALUATION 21-30 MIN: CPT | Performed by: INTERNAL MEDICINE

## 2021-03-04 NOTE — PATIENT INSTRUCTIONS
1) Continue to decrease prednisone by 1mg every 2 weeks  2) Continue actemra every Wednesday  3) Follow-up phone call visit in 1 month with labs a few days prior    Let me know if anything comes up prior to your visit.    Thanks  Bib Alcantara MD  Rheumatology

## 2021-03-04 NOTE — PROGRESS NOTES
Bon is a 80 year old who is being evaluated via a billable telephone visit.      What phone number would you like to be contacted at? [phone]  How would you like to obtain your AVS? 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 Never had HA, jaw claudication, PMR symptoms. No inflammatory arthritis symptoms. Disease currently in remission by history and labs on 5mg prednisone and actemra 162mg weekly. Taper ongoing guided by GiACTA trial.    # Giant cell arteritis   1) continue 162mg once weekly injection, next dose due on Wednesday  2) Prednisone plan:  - starting 3/11 decrease from 5 to 4mg of prednisone daily. Go down by 1mg q2 weeks until off of prednisone. Makes prednisone dose changes on thursdays.  3) Continue off bactrim daily and omeprazole  4) Follow-up in 4 weeks by phone visit with labs a few days prior  5) Has ophthalmology appointment scheduled for 3/5    # Long term use of high risk medication  1) Prednisone  2) Actemra    # Osteopenia no interval fractures: Continue alendronate. Managed by PCP. Trying to minimize steroid use with actemra to minimize bone health effects    #CLL  Managed by oncology. Stable. 28.3 WBC on 3/2/21.    Severity of this disease discussed at length again with the patient today, particularly the potential for blindness/ loss of vision. Counseled to seek immediate medical attention should he experience changes in vision. He understands. Spent a significant amount of time discussing the side  effects of Actemra including risk of malignancy, serious infections resulting in death, opportunistic infections, neutropenia/thrombocytopenia, allergic reaction, GI perforation, demyelinating disease, as well as fever, chills, hypercholesterolemia, abdominal pain, dizziness. Patient had the opportunity to ask questions, which were all answered.    Bib Alcantara MD   Rheumatology     Subjective:   3/4/21 interval history  Currently down on 5mg of prednisone daily. Has been on this dose for 1 week. Continuing to go down by 1mg every 2 weeks. No issues as he continues to go down. Swelling in feet has now resolved as he reduces the dose. Continues with the Wednesday actemra injection, no injection site reaction. No interval infections. No fevers/chills/weight loss. No rash. No fatigue. Eating well. No abdominal pain or symptoms. No change in bowel habits.     2/5/21  Had his dental procedure 1 week ago. Took only 1 ibuprofen on the first day, otherwise no significant pain. Has been improving. Last dose of antibiotics was yesterday. No fevers or chills. No HA, jaw pain, change in vision. No erythema/edema/warmth of any joints. No stiffness. Able to make a full fist. Went down to 7mg last week. Still on 7, appropriately did not take actemra this week. Due for next dose on Wednesday of actemra. Thursday is normally when he goes down. No stiffness in hips/shoulders. No new rash. Feels well.  No weight loss. Tolerating medications without side effects. ROS otherwise negative.    1/8/2020  No HA, jaw pain, no vision since last visit. Sleep is improved back to baseline, sleeping 7-730 hours per night with decreasing dose of steroids. No injection site reaction with actemra. No weight gain despite steroids. No other side effects. No longer with bowel movements at night, has returned to morning only, which is baseline. No fevers, chills, infections since last visit. Current dose of prednisone is 10mg daily. No joint pain or  stiffness. No difficulty getting in/our of car, climbing stairs, putting on shirt in the AM. No erythema/edema/warmth of any joints. ROS otherwise negative.      12/17/2020  No HA, no jaw pain, no new change in vision. Has had continued sensitivity to sun, which is chronic. Brought up with ophthalmology, not issue from GCA, possible cataract. No joint pain except for chronic back discomfort. No stiffness in shoulders or hips. No issues with giving himself the injection, no rash or burning. Wednesday morning takes the injection. Last week Thursday decreased the dose from 25mg daily down to 20mg daily. Has continued on this. No ongoing side effects from prednisone. No weight gain. Feels that he is having nocturnal bowel movements, which is new. He thinks this is from having bowl of oatmeal. Not increased in frequency, previously just in the AM. Now just at night. Thinks that it is for oatmeal. Seeing ophthalmology regularly. No interval infections or fevers.     14 point review of systems collected and negative if not documented above.    11/20/2020  Has been tolerating injections without issues. No injection site reactions, which he experienced during his first injection of some burning and redness at the site. No systemic side effects. No new HA. No new change in his vision. No Jaw pain. No stiffness in shoulders or hips. No difficulty putting shirt on in the morning or getting up out of a chair. Has chronic swelling in his feet that is unchanged. Has been doing exercises, which he continues. Walks 1-2 times per week. Has cut down to 35mg daily of prednisone. No return of any symptoms, though has noticed some interruption of sleep which is new since started. Continues to check his finger sticks which he says are in the range he was told. Checking his BP daily and is in the normal range now.     14 point review of systems obtained and negative if not documented above.    HPI from initial consultation 9/25/20  79 year  old male with history of CLL, being monitored at this point without an indication for therapy. On Saturday, going to get dirt at a nursery, August 22nd, was driving and noticed that he 'wasn't getting enough light in his eye'. The next day, Sunday, he was continuing to have left eye symptoms. Called the [place] [facility]. Asked for appointment because of these new symptoms. On Tuesday evening, he called into the on call physician due to new complaint of 'light reflecting off the wall into my eye'. He came in the next day, Wednesday, and was sent to the retinal center on Friday.  Had an exam and was told that there was a defect in the retina/artery. Had MRI of the brain and bloodwork on Tuesday. On Thursday, results of the MRI were discussed and the findings were questioned to be due to his known CLL. Possible stroke he was told due to viscosity increase? On Friday, 9/4/20, had worsening symptoms in the right eye. So came back in to the retinal specialists. At that time there was consideration of sending to [place] ED, though because of the long expected wait, was instead started on 100mg of prednisone daily. 9/9/20 went down to 60mg of prednisone, with plan to go down to 50mg of 10/7/20. Was setup for biopsy on 9/10.     Denies any headaches. No jaw pain, even when chewing tough food. Feels that he has had some weight loss over the last few months unintentionally (139-130 over a period of 1 month). No fatigue, night sweats. No rashes. No chest pain, cough, shortness of breath. No abdominal pain. No joint pain. Has noticed that his stool is less formed. Not diarrhea, just softer. No blood. No issues with urination. No swelling in lower extremities. No raynauds. No double vision.     Past Medical History  CLL  Hernia  Mono   Deviated septum  Osteopenia by DEXA    Past Surgical History  Tonsillectomy  Deviated septum    Medications  Prednisone 5mg daily  Current Outpatient Medications   Medication     alendronate  (FOSAMAX) 70 MG tablet     Aspirin-Calcium Carbonate  MG TABS     predniSONE (DELTASONE) 5 MG tablet     omeprazole (PRILOSEC) 20 MG DR capsule     predniSONE (DELTASONE) 1 MG tablet     predniSONE (DELTASONE) 2.5 MG tablet     predniSONE (DELTASONE) 20 MG tablet     sulfamethoxazole-trimethoprim (BACTRIM) 400-80 MG tablet     tocilizumab (ACTEMRA) 162 MG/0.9ML subcutaneous injection     No current facility-administered medications for this visit.          Weeks Daily prednisone dose (mg) 26-week taper Weekly 162mg Actemra      1 60    2 50 11/5/2020    3 40    4 35 11/20/2020   5 30    6 25    7 20 12/10/2020   8 15 12/17/2020   9 12.5    10 12.5    11 10 1/8/2021   12 9    13 8    14 7 2/5/21   15 6 2/11/21   16 6    17 5    18 5 3/4/21   19 4    20 4    21 3    22 3    23 2    24 2    25 1    26 1      Allergies: Seasonal/ environmental allergies    Family History: No family history of autoimmune diseases like RA, sjogrens, SLE, scleroderma, IBD.    Social History: Works as [employer] (now [employer] Never smoker. No ETOH use. No drug use.        Objective:    Physical exam:  No vitals or exam for telephone visit    Labs:   3/2/21  ESR 4  CRP <2.9  WBC 28.3  HGB 12.8    Cr 0.86  ALT 23  AST 25    2/3/21  WBC 30.6  HGB 11.9    Cr 0.83  Alk phos 40  ALT 30  AST 29    1/5/21  UA without protein, cells or casts  ESR 5  CRP <2.9  WBC 40.6  HGB 11.6    Cr 0.81  Chol 166  Trig 86  HDL 68  LDL 81    12/14/2020   CRP <2.9  ESR 4  WBC 54  HGB 11.8    Cr 0.78  Ua unremarkable  Cholesterol 168  Trig 82  HDL 70  LDL 81    11-  ESR 3  CRP less than 2.9  WBC 63.3  Hemoglobin 12  Platelets 181  Creatinine 0.98  Total protein low at 5.9  Alk phos 48  ALT 31  AST 30  UA with 30 of albumin  Cholesterol 169  Triglycerides 45  HDL 83  LDL 77    10/1/2020  WBC 84.9  HGB 12.3    IgM 36 low  IgA 124 normal  IgG 625    Cr 0.98    9/1/2020  WBC 50.2  HGB 11.6       Imaging:  MR brain and orbits  9/10/20  Impression:    1. Regarding the orbits and globes, there is no definite abnormal  contrast enhancement or mass. No evidence of leukemic infiltration.  2. Regarding the remainder of the brain, abnormal T2 hyperintense bone  marrow signal with associated enhancement in the skull, likely  representing leukemic infiltration.  3. Subcutaneous T2 hyperintensity with associated diffusion  restriction and contrast enhancement over the left zygomatic region,  question leukemic infiltration.    Pathology:  Patient Name: MARIELA WALLACE   MR#: [id number]   Specimen #: [id number]   Collected: 9/10/2020   Received: 9/10/2020   Reported: 9/16/2020 12:35   Ordering Phy(s): VALERIE CARRINGTON     For improved result formatting, select 'View Enhanced Report Format' under    Linked Documents section.     SPECIMEN(S):   Temporal artery biopsy, left     FINAL DIAGNOSIS:   Temporal artery, left, biopsy:   1. Granulomatous arteritis consistent with giant cell arteritis.   2. Chronic inflammation of the surrounding adventitia.     COMMENT:   Preliminary results were communicated to Dr. Valerie Carrington and Dr. Guille Garcia on 9/14/20 at 12:45pm.     I have personally reviewed all specimens and/or slides, including the   listed special stains, and used them   with my medical judgement to determine or confirm the final diagnosis.     Electronically signed out by:     Za García M.D., [facility], [place]     CLINICAL HISTORY:   The patient is a 79 year old male with a history of chronic lymphocytic   leukemia who presented with bilateral   sequential optic neuropathy with associated pallid optic disc edema and   choroidal hypoperfusion, but   relatively intact visual acuity, negative elevation of acute phase   reactants, and lack of constitutional   symptoms consistent with giant cell arteritis. He also has findings of   likely leukemic infiltration in the   skull seen on MRI. He undergoes temporal artery biopsy on the left.  "    GROSS:   The specimen is received in formalin with proper patient identification,   labeled \"left temporal artery\".  The   specimen consists of a 3.2 cm in length by 0.3 cm in diameter tan-white   vessel segment. The specimen is   submitted intact in A1. (Dictated by: PADILLA Montalvo 9/11/2020 03:08   PM)     MICROSCOPIC:   The tissue consists of artery with narrowed lumen and intimal hyperplasia.    There is an infiltrate of   lymphocytes, plasma cells, epithelioid histiocytes, and multinucleated   giant cells throughout all layers of   the artery. There is associated loss of the internal elastic lamina on   elastic stain. Occasional focal   calcifications are seen a the level of the internal elastic lamina.   Neovascularization of the vessel wall is   present. Perivascular lymphocytic infiltrates are seen in the surrounding   adventitia. Immunohistochemistry   with CD3 and CD20 demonstrates a predominantly T-cell lymphocytic   infiltrate within the vessel wall. There is   a mixed infiltrate of T and B lymphocytes perivascular in the adventitia.   CD5 and CD43 have similar staining   patterns to CD3. There is light patchy CD23 staining in the areas positive    for CD20. CD68 highlights the   epithelioid histiocytes within the vessel wall.     The technical component of this testing was completed at the [facility], with the professional component performed    at the [facility], [address] [place] [id number] ([phone])     CPT Codes:   A: 18043-TB2, 63462-JNSP, 13516-QWX, 49557-PNP, 37564-FDF, 81157-OTS,   43389-LUN, 62132-XIC     COLLECTION SITE:   Client: [facility]   [place] [facility] [place]         "

## 2021-03-04 NOTE — LETTER
3/4/2021       RE: Bon Michael  [birth year][address] [facility] [address]     Dear Colleague,    Thank you for referring your patient, Bon Michael, to the [facility] RHEUM[facility]OLOG[facility] [place] at [facility]. Please see a copy of my visit note below.    Bon is a 80 year old who is being evaluated via a billable telephone visit.      What phone number would you like to be contacted at? [phone]  How would you like to obtain your AVS? 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 Never had HA, jaw claudication, PMR symptoms. No inflammatory arthritis symptoms. Disease currently in remission by history and labs on 5mg prednisone and actemra 162mg weekly. Taper ongoing guided by GiACTA trial.    # Giant cell arteritis   1) continue 162mg once weekly injection, next dose due on Wednesday  2) Prednisone plan:  - starting 3/11 decrease from 5 to 4mg of prednisone daily. Go down by 1mg q2 weeks until off of prednisone. Makes prednisone dose changes on thursdays.  3) Continue off bactrim daily and omeprazole  4) Follow-up in 4 weeks by phone visit with labs a few days prior  5) Has ophthalmology appointment scheduled for 3/5    # Long term use of high risk medication  1) Prednisone  2) Actemra    # Osteopenia no interval fractures: Continue alendronate. Managed by PCP. Trying to minimize steroid use with actemra to minimize bone health effects    #CLL  Managed by oncology.  Stable. 28.3 WBC on 3/2/21.    Severity of this disease discussed at length again with the patient today, particularly the potential for blindness/ loss of vision. Counseled to seek immediate medical attention should he experience changes in vision. He understands. Spent a significant amount of time discussing the side effects of Actemra including risk of malignancy, serious infections resulting in death, opportunistic infections, neutropenia/thrombocytopenia, allergic reaction, GI perforation, demyelinating disease, as well as fever, chills, hypercholesterolemia, abdominal pain, dizziness. Patient had the opportunity to ask questions, which were all answered.    Bib Alcantara MD   Rheumatology     Subjective:   3/4/21 interval history  Currently down on 5mg of prednisone daily. Has been on this dose for 1 week. Continuing to go down by 1mg every 2 weeks. No issues as he continues to go down. Swelling in feet has now resolved as he reduces the dose. Continues with the Wednesday actemra injection, no injection site reaction. No interval infections. No fevers/chills/weight loss. No rash. No fatigue. Eating well. No abdominal pain or symptoms. No change in bowel habits.     2/5/21  Had his dental procedure 1 week ago. Took only 1 ibuprofen on the first day, otherwise no significant pain. Has been improving. Last dose of antibiotics was yesterday. No fevers or chills. No HA, jaw pain, change in vision. No erythema/edema/warmth of any joints. No stiffness. Able to make a full fist. Went down to 7mg last week. Still on 7, appropriately did not take actemra this week. Due for next dose on Wednesday of actemra. Thursday is normally when he goes down. No stiffness in hips/shoulders. No new rash. Feels well.  No weight loss. Tolerating medications without side effects. ROS otherwise negative.    1/8/2020  No HA, jaw pain, no vision since last visit. Sleep is improved back to baseline, sleeping 7-730 hours per night with  decreasing dose of steroids. No injection site reaction with actemra. No weight gain despite steroids. No other side effects. No longer with bowel movements at night, has returned to morning only, which is baseline. No fevers, chills, infections since last visit. Current dose of prednisone is 10mg daily. No joint pain or stiffness. No difficulty getting in/our of car, climbing stairs, putting on shirt in the AM. No erythema/edema/warmth of any joints. ROS otherwise negative.      12/17/2020  No HA, no jaw pain, no new change in vision. Has had continued sensitivity to sun, which is chronic. Brought up with ophthalmology, not issue from GCA, possible cataract. No joint pain except for chronic back discomfort. No stiffness in shoulders or hips. No issues with giving himself the injection, no rash or burning. Wednesday morning takes the injection. Last week Thursday decreased the dose from 25mg daily down to 20mg daily. Has continued on this. No ongoing side effects from prednisone. No weight gain. Feels that he is having nocturnal bowel movements, which is new. He thinks this is from having bowl of oatmeal. Not increased in frequency, previously just in the AM. Now just at night. Thinks that it is for oatmeal. Seeing ophthalmology regularly. No interval infections or fevers.     14 point review of systems collected and negative if not documented above.    11/20/2020  Has been tolerating injections without issues. No injection site reactions, which he experienced during his first injection of some burning and redness at the site. No systemic side effects. No new HA. No new change in his vision. No Jaw pain. No stiffness in shoulders or hips. No difficulty putting shirt on in the morning or getting up out of a chair. Has chronic swelling in his feet that is unchanged. Has been doing exercises, which he continues. Walks 1-2 times per week. Has cut down to 35mg daily of prednisone. No return of any symptoms, though has  noticed some interruption of sleep which is new since started. Continues to check his finger sticks which he says are in the range he was told. Checking his BP daily and is in the normal range now.     14 point review of systems obtained and negative if not documented above.    HPI from initial consultation 9/25/20  79 year old male with history of CLL, being monitored at this point without an indication for therapy. On Saturday, going to get dirt at a nursery, August 22nd, was driving and noticed that he 'wasn't getting enough light in his eye'. The next day, Sunday, he was continuing to have left eye symptoms. Called the [place] [facility]. Asked for appointment because of these new symptoms. On Tuesday evening, he called into the on call physician due to new complaint of 'light reflecting off the wall into my eye'. He came in the next day, Wednesday, and was sent to the retinal center on Friday.  Had an exam and was told that there was a defect in the retina/artery. Had MRI of the brain and bloodwork on Tuesday. On Thursday, results of the MRI were discussed and the findings were questioned to be due to his known CLL. Possible stroke he was told due to viscosity increase? On Friday, 9/4/20, had worsening symptoms in the right eye. So came back in to the retinal specialists. At that time there was consideration of sending to [place] ED, though because of the long expected wait, was instead started on 100mg of prednisone daily. 9/9/20 went down to 60mg of prednisone, with plan to go down to 50mg of 10/7/20. Was setup for biopsy on 9/10.     Denies any headaches. No jaw pain, even when chewing tough food. Feels that he has had some weight loss over the last few months unintentionally (139-130 over a period of 1 month). No fatigue, night sweats. No rashes. No chest pain, cough, shortness of breath. No abdominal pain. No joint pain. Has noticed that his stool is less formed. Not diarrhea, just softer. No blood. No  issues with urination. No swelling in lower extremities. No raynauds. No double vision.     Past Medical History  CLL  Hernia  Mono   Deviated septum  Osteopenia by DEXA    Past Surgical History  Tonsillectomy  Deviated septum    Medications  Prednisone 5mg daily  Current Outpatient Medications   Medication     alendronate (FOSAMAX) 70 MG tablet     Aspirin-Calcium Carbonate  MG TABS     predniSONE (DELTASONE) 5 MG tablet     omeprazole (PRILOSEC) 20 MG DR capsule     predniSONE (DELTASONE) 1 MG tablet     predniSONE (DELTASONE) 2.5 MG tablet     predniSONE (DELTASONE) 20 MG tablet     sulfamethoxazole-trimethoprim (BACTRIM) 400-80 MG tablet     tocilizumab (ACTEMRA) 162 MG/0.9ML subcutaneous injection     No current facility-administered medications for this visit.          Weeks Daily prednisone dose (mg) 26-week taper Weekly 162mg Actemra      1 60    2 50 11/5/2020    3 40    4 35 11/20/2020   5 30    6 25    7 20 12/10/2020   8 15 12/17/2020   9 12.5    10 12.5    11 10 1/8/2021   12 9    13 8    14 7 2/5/21   15 6 2/11/21   16 6    17 5    18 5 3/4/21   19 4    20 4    21 3    22 3    23 2    24 2    25 1    26 1      Allergies: Seasonal/ environmental allergies    Family History: No family history of autoimmune diseases like RA, sjogrens, SLE, scleroderma, IBD.    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 No drug use.        Objective:    Physical exam:  No vitals or exam for telephone visit    Labs:   3/2/21  ESR 4  CRP <2.9  WBC 28.3  HGB 12.8    Cr 0.86  ALT 23  AST 25    2/3/21  WBC 30.6  HGB 11.9    Cr 0.83  Alk phos 40  ALT 30  AST 29    1/5/21  UA without protein, cells or casts  ESR 5  CRP <2.9  WBC 40.6  HGB 11.6    Cr 0.81  Chol 166  Trig 86  HDL 68  LDL 81    12/14/2020   CRP <2.9  ESR 4  WBC 54  HGB 11.8    Cr 0.78  Ua unremarkable  Cholesterol 168  Trig 82  HDL 70  LDL 81    11-  ESR 3  CRP less than 2.9  WBC 63.3  Hemoglobin  12  Platelets 181  Creatinine 0.98  Total protein low at 5.9  Alk phos 48  ALT 31  AST 30  UA with 30 of albumin  Cholesterol 169  Triglycerides 45  HDL 83  LDL 77    10/1/2020  WBC 84.9  HGB 12.3    IgM 36 low  IgA 124 normal  IgG 625    Cr 0.98    9/1/2020  WBC 50.2  HGB 11.6       Imaging:  MR brain and orbits 9/10/20  Impression:    1. Regarding the orbits and globes, there is no definite abnormal  contrast enhancement or mass. No evidence of leukemic infiltration.  2. Regarding the remainder of the brain, abnormal T2 hyperintense bone  marrow signal with associated enhancement in the skull, likely  representing leukemic infiltration.  3. Subcutaneous T2 hyperintensity with associated diffusion  restriction and contrast enhancement over the left zygomatic region,  question leukemic infiltration.    Pathology:  Patient Name: MARIELA WALLACE   MR#: [id number]   Specimen #: [id number]   Collected: 9/10/2020   Received: 9/10/2020   Reported: 9/16/2020 12:35   Ordering Phy(s): VALERIE CARRINGTON     For improved result formatting, select 'View Enhanced Report Format' under    Linked Documents section.     SPECIMEN(S):   Temporal artery biopsy, left     FINAL DIAGNOSIS:   Temporal artery, left, biopsy:   1. Granulomatous arteritis consistent with giant cell arteritis.   2. Chronic inflammation of the surrounding adventitia.     COMMENT:   Preliminary results were communicated to Dr. Valerie Carrington and Dr. Guille Garcia on 9/14/20 at 12:45pm.     I have personally reviewed all specimens and/or slides, including the   listed special stains, and used them   with my medical judgement to determine or confirm the final diagnosis.     Electronically signed out by:     Za García M.D., [facility]     CLINICAL HISTORY:   The patient is a 79 year old male with a history of chronic lymphocytic   leukemia who presented with bilateral   sequential optic neuropathy with associated pallid optic disc  "edema and   choroidal hypoperfusion, but   relatively intact visual acuity, negative elevation of acute phase   reactants, and lack of constitutional   symptoms consistent with giant cell arteritis. He also has findings of   likely leukemic infiltration in the   skull seen on MRI. He undergoes temporal artery biopsy on the left.     GROSS:   The specimen is received in formalin with proper patient identification,   labeled \"left temporal artery\".  The   specimen consists of a 3.2 cm in length by 0.3 cm in diameter tan-white   vessel segment. The specimen is   submitted intact in A1. (Dictated by: PADILLA Montalvo 9/11/2020 03:08   PM)     MICROSCOPIC:   The tissue consists of artery with narrowed lumen and intimal hyperplasia.    There is an infiltrate of   lymphocytes, plasma cells, epithelioid histiocytes, and multinucleated   giant cells throughout all layers of   the artery. There is associated loss of the internal elastic lamina on   elastic stain. Occasional focal   calcifications are seen a the level of the internal elastic lamina.   Neovascularization of the vessel wall is   present. Perivascular lymphocytic infiltrates are seen in the surrounding   adventitia. Immunohistochemistry   with CD3 and CD20 demonstrates a predominantly T-cell lymphocytic   infiltrate within the vessel wall. There is   a mixed infiltrate of T and B lymphocytes perivascular in the adventitia.   CD5 and CD43 have similar staining   patterns to CD3. There is light patchy CD23 staining in the areas positive    for CD20. CD68 highlights the   epithelioid histiocytes within the vessel wall.     The technical component of this testing was completed at the [facility], with the professional component performed    at the [facility], [address] [place] [id number] ([phone])     CPT Codes:   A: " 57328-PS3, 92552-BIIH, 02301-JCP, 65754-OTM, 71393-OPX, 81620-SCF,   02364-FLE, 69655-LSS     COLLECTION SITE:   Client: [facility], [place]   Location: [place] (B)

## 2021-03-05 ENCOUNTER — OFFICE VISIT (OUTPATIENT)
Dept: OPHTHALMOLOGY | Facility: CLINIC | Age: 81
End: 2021-03-05
Attending: OPHTHALMOLOGY
Payer: MEDICARE

## 2021-03-05 DIAGNOSIS — H46.9 OPTIC NEUROPATHY: Primary | ICD-10-CM

## 2021-03-05 DIAGNOSIS — H53.40 VISUAL FIELD DEFECT: ICD-10-CM

## 2021-03-05 PROCEDURE — 92133 CPTRZD OPH DX IMG PST SGM ON: CPT | Performed by: OPHTHALMOLOGY

## 2021-03-05 PROCEDURE — 99214 OFFICE O/P EST MOD 30 MIN: CPT | Mod: GC | Performed by: OPHTHALMOLOGY

## 2021-03-05 PROCEDURE — G0463 HOSPITAL OUTPT CLINIC VISIT: HCPCS | Mod: 25

## 2021-03-05 PROCEDURE — 92083 EXTENDED VISUAL FIELD XM: CPT | Performed by: OPHTHALMOLOGY

## 2021-03-05 ASSESSMENT — VISUAL ACUITY
METHOD: SNELLEN - LINEAR
CORRECTION_TYPE: GLASSES
OS_CC+: -3
OD_CC+: -2
OD_CC: 20/20
OS_CC: 20/25

## 2021-03-05 ASSESSMENT — SLIT LAMP EXAM - LIDS
COMMENTS: NORMAL
COMMENTS: NORMAL

## 2021-03-05 ASSESSMENT — EXTERNAL EXAM - LEFT EYE: OS_EXAM: NORMAL

## 2021-03-05 ASSESSMENT — CUP TO DISC RATIO
OS_RATIO: 0.3
OD_RATIO: 0.3

## 2021-03-05 ASSESSMENT — CONF VISUAL FIELD
METHOD: COUNTING FINGERS
OD_NORMAL: 1
OS_NORMAL: 1

## 2021-03-05 ASSESSMENT — REFRACTION_WEARINGRX
OD_AXIS: 134
OS_SPHERE: +6.25
SPECS_TYPE: PAL
OD_CYLINDER: +1.25
OD_SPHERE: +3.25
OS_AXIS: 059
OS_CYLINDER: +0.50

## 2021-03-05 ASSESSMENT — EXTERNAL EXAM - RIGHT EYE: OD_EXAM: NORMAL

## 2021-03-05 ASSESSMENT — TONOMETRY
OS_IOP_MMHG: 9
OD_IOP_MMHG: 8
IOP_METHOD: ICARE

## 2021-03-05 NOTE — LETTER
"March 15, 2021    RE: Bon Michael  : 1940  MRN: [id number]    Dear Providers,    I saw our mutual patient, Bon Michael, in follow-up in my clinic recently.  After a thorough neuro-ophthalmic history and examination, I came to the following conclusions:     1. 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  Vision stable in both eyes with intact / normal central vision but persistent (probably permanent) peripheral field defects in both eyes.  No exam or history indicators to suggest recurrence.   Continue steroid taper and Actemra as per rheumatology.     2.  Recent initiation of glare symptoms likely attributable to developing cataracts worsened with recent corticosteroid use.  These are not yet visually significant and we will observe.  May consider referral to cataract surgeon in the next 6 months to a year.     Follow-up with me in 2 months or sooner for any acute vision changes.  Patient will continue taper as per rheumatology.     - For a more thorough description of the disease presentation, please see my letter from the patient's initial visit with me      history from presentation (2020 visit)     Bon Michael is a 79 year old male who presents to neuro-ophthalmology   clinic today for consultation from Jose Andino MD at [facility] for   visual disturbance of left eye. He describes it as \"his left eye not   getting enough light\". He describes noticing the change in his left eye's   temporal visual field, inferior > superior. He notices it more first thing      in the morning. These symptoms began on 2020 and he describes it as   worsening - he is unclear if it the deficit has gotten larger or darker   however. He first noticed it while driving at the periphery of his vision.       Patient saw Dr. Lewis on .  He underwent esr / crp testing as   well as mri (see below).     Labs: 20- WBC- 50.2, ESR- 28, CRP < 5     On  he " developed symptoms of waviness in his peripheral vision in      the right eye.  He went to Dr. Andino on 9/4/2020 who wanted him to go to      the ED but he was unable.  Dr. Andino reviewed the patient's fluorescein   angiography and felt there was choroidal filling delay in the right eye   and pallid edema in the right eye concerning for giant cell arteritis.  We      discussed the case via phone.      PRIOR IMAGING:  BRAIN MRI WITH/WO GADOLINIUM, MRA OF THE [place] AND MRA OF THE   CAROTID ARTERIES - [facility] (9/1/2020)  IMPRESSION:    1.  No acute intracranial abnormality.  2.  No evidence of an orbital abnormalities.  3.  Mild generalized cerebral atrophy.  4.  Unremarkable MRA of the [place].  5.  Unremarkable MRA of the cervical vessels.     On my re-review I disagreed with the radiology read.  There is evidence of   patchy enhancement diffusely within the bilateral orbits and along the   bilateral intraorbital optic nerve sheaths.  Will discuss with   neuro-radiology regarding finding and repeat scan.     No bisphosphonates in the past (no osteoporosis medications for 7-8 years   per patient).      Left temporal artery biopsy 9/9/20- positive for giant cell arteritis   Prednisone (started 9/4/2020)     INTERIM HISTORY     Last seen 1/14/ /2021.     Patient started Actemra 10/28/20. Currently his is taking prednisone 5 mg. He has not noticed any change in vision or any headaaches     Last saw rheumatology on 3/4/20- Dr. Alcantara.  The plan outlined was:  1) Continue to decrease prednisone by 1mg every 2 weeks  2) Continue actemra every Wednesday  3) Follow-up phone call visit in 1 month with labs a few days prior    Last saw oncology 2/10/21 Dr. Wall:   1) Chronic lymphocytic leukemia, Stage IA, del (13q)     - He does not have any B symptoms and no new abn on exam.  - Stable small cervical adenopathy and no palpable hepatosplenomegaly.  - His LDH is normal and his ALC is  lower presumably from the ongoing steroids and ? Actemra  -The indications to treat would be worsening cytopenias, onset of B symptoms, autoimmune hemolysis, hyperviscosity (rarely seen below WBC count of 200K), recurrent sinopulmonary infns and adenopathy/organomegaly threatening the function of an adjacent organ.  - Observation alone is recommended at this time.     His visual acuity is stable in both eyes today  Octopus automated 30 degree visual fields showed stable defects in both eyes.   OCT optic nerves showed stable RNFL loss.      Follow-up in 2 months.       For further exam details, please feel free to contact our office for additional records.  If you wish to contact me regarding this patient please email me at [facility] [email or web address] or give my clinic a call to arrange a phone conversation.    Sincerely,    Guille Garcia MD  , Neuro-Ophthalmology and Adult Strabismus Surgery  The Lucy Castano Chair in Neuro-Ophthalmology  Department of Ophthalmology and Visual Neurosciences  [facility]

## 2021-03-05 NOTE — PROGRESS NOTES
"     1. 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  Vision stable in both eyes with intact / normal central vision but persistent (probably permanent) peripheral field defects in both eyes.  No exam or history indicators to suggest recurrence.   Continue steroid taper and Actemra as per rheumatology.     2.  Recent initiation of glare symptoms likely attributable to developing cataracts worsened with recent corticosteroid use.  These are not yet visually significant and we will observe.  May consider referral to cataract surgeon in the next 6 months to a year.     Follow-up with me in 2 months or sooner for any acute vision changes.  Patient will continue taper as per rheumatology.     - For a more thorough description of the disease presentation, please see my letter from the patient's initial visit with me      history from presentation (9/9/2020 visit)     Bon Michael is a 79 year old male who presents to neuro-ophthalmology   clinic today for consultation from Jose Andino MD at [facility] for   visual disturbance of left eye. He describes it as \"his left eye not   getting enough light\". He describes noticing the change in his left eye's   temporal visual field, inferior > superior. He notices it more first thing      in the morning. These symptoms began on 8/22/2020 and he describes it as   worsening - he is unclear if it the deficit has gotten larger or darker   however. He first noticed it while driving at the periphery of his vision.       Patient saw Dr. Lewis on August 28.  He underwent esr / crp testing as   well as mri (see below).     Labs: 9/2/20- WBC- 50.2, ESR- 28, CRP < 5     On August 31 he developed symptoms of waviness in his peripheral vision in      the right eye.  He went to Dr. Andino on 9/4/2020 who wanted him to go to      the ED but he was unable.  Dr. Andino reviewed the patient's fluorescein   angiography and felt there was choroidal filling " delay in the right eye   and pallid edema in the right eye concerning for giant cell arteritis.  We      discussed the case via phone.      PRIOR IMAGING:  BRAIN MRI WITH/WO GADOLINIUM, MRA OF THE [place] OF [place] AND MRA OF THE   CAROTID ARTERIES - [facility] (9/1/2020)  IMPRESSION:    1.  No acute intracranial abnormality.  2.  No evidence of an orbital abnormalities.  3.  Mild generalized cerebral atrophy.  4.  Unremarkable MRA of the [place] of [place].  5.  Unremarkable MRA of the cervical vessels.     On my re-review I disagreed with the radiology read.  There is evidence of   patchy enhancement diffusely within the bilateral orbits and along the   bilateral intraorbital optic nerve sheaths.  Will discuss with   neuro-radiology regarding finding and repeat scan.     No bisphosphonates in the past (no osteoporosis medications for 7-8 years   per patient).      Left temporal artery biopsy 9/9/20- positive for giant cell arteritis   Prednisone (started 9/4/2020)     INTERIM HISTORY     Last seen 1/14/ /2021.     Patient started Actemra 10/28/20. Currently his is taking prednisone 5 mg. He has not noticed any change in vision or any headaaches     Last saw rheumatology on 3/4/20- Dr. Alcantara.  The plan outlined was:  1) Continue to decrease prednisone by 1mg every 2 weeks  2) Continue actemra every Wednesday  3) Follow-up phone call visit in 1 month with labs a few days prior    Last saw oncology 2/10/21 Dr. Wall:   1) Chronic lymphocytic leukemia, Stage IA, del (13q)     - He does not have any B symptoms and no new abn on exam.  - Stable small cervical adenopathy and no palpable hepatosplenomegaly.  - His LDH is normal and his ALC is lower presumably from the ongoing steroids and ? Actemra  -The indications to treat would be worsening cytopenias, onset of B symptoms, autoimmune hemolysis, hyperviscosity (rarely seen below WBC count of 200K), recurrent sinopulmonary infns and adenopathy/organomegaly  threatening the function of an adjacent organ.  - Observation alone is recommended at this time.     His visual acuity is stable in both eyes today  Octopus automated 30 degree visual fields showed stable defects in both eyes.   OCT optic nerves showed stable RNFL loss.      Follow-up in 2 months.      30 minutes were spent on the date of the encounter by me doing chart review, history and exam, documentation, and further activities as noted above    Complete documentation of historical and exam elements from today's encounter can be found in the full encounter summary report (not reduplicated in this progress note).  I personally obtained the chief complaint(s) and history of present illness.  I confirmed and edited as necessary the review of systems, past medical/surgical history, family history, social history, and examination findings as documented by others; and I examined the patient myself.  I personally reviewed the relevant tests, images, and reports as documented above.  I formulated and edited as necessary the assessment and plan and discussed the findings and management plan with the patient and family.  I personally reviewed the ophthalmic test(s) associated with this encounter, agree with the interpretation(s) as documented by the resident/fellow, and have edited the corresponding report(s) as necessary.     MD Jaclyn Gomez MD  Neuro-ophthalmology fellow  [facility]

## 2021-03-10 DIAGNOSIS — M31.6 GIANT CELL ARTERITIS (H): ICD-10-CM

## 2021-03-12 ENCOUNTER — TELEPHONE (OUTPATIENT)
Dept: OPHTHALMOLOGY | Facility: CLINIC | Age: 81
End: 2021-03-12

## 2021-03-12 RX ORDER — PREDNISONE 5 MG/1
TABLET ORAL
Qty: 28 TABLET | Status: CANCELLED | OUTPATIENT
Start: 2021-03-12

## 2021-03-12 NOTE — TELEPHONE ENCOUNTER
predniSONE (DELTASONE) 5 MG tablet    Take 6 mg daily x 14 days, then 5 mg daily x 14 days, then 4 mg daily x 14 days, then 3 mg daily x 14 days, then 2 mg daily x 14 days, then 1 mg daily x 14 days, then stop. Use with 1 mg tablets.    Last Written Prescription Date:  2/16/21  Last Fill Quantity: 28,   # refills: 0  Last Office Visit : 3/4/21  Future Office visit:  4/8/21    Routing refill request to provider for review/approval because:  Deferred to Clinic RN. Medication titration.   Prednisone 5 mg tablet dosage will be complete on 3/16/21.  Patient will utilize 1 mg tablets thereafter.  Please advise.

## 2021-03-12 NOTE — TELEPHONE ENCOUNTER
"Per Rheum clinic staff: \"Spoke with patient who stated that refill was not needed. Please remove the med and close encounter \"  Medication request removed.  "

## 2021-03-12 NOTE — TELEPHONE ENCOUNTER
Patient called and has questions about his teeth/jaw he would like to ask Dr. Garcia.  Patient got teeth incisions about 1.5 months ago. He says that it was an extraction. He is having aches above and below his gum line. He is wondering if that's what is considered jaw pain as that's one of the the symptoms he's been told to keep an eye out for. Patient would like to know from Dr. Garcia what is considered jaw pain, the definition of jaw pain and what area of the mouth is considered the jaw. Also, if what he is feeling is considered jaw pain as this has been on and off.    PREMA MILLER, COT on 3/12/2021 at 11:19 AM

## 2021-03-12 NOTE — TELEPHONE ENCOUNTER
Called the patient, he is complaining of teeth pain. Explained to him what jaw claudition is and informed him that his symptoms do not the description

## 2021-03-22 ENCOUNTER — TELEPHONE (OUTPATIENT)
Dept: OPHTHALMOLOGY | Facility: CLINIC | Age: 81
End: 2021-03-22

## 2021-03-22 NOTE — TELEPHONE ENCOUNTER
Received a VM around 3PM on 3/19/21.  Patient had experienced some headaches back to back that lasted about 2-3 seconds. The stronger aches were located on the forehead area. He wants to know if this is considered to be a headache or not and if this is normal. He stated that this is hard to deal with and doesn't want it to get worse.       Called patient and left him a message to let him know I received his message and am following up on this with Dr. Garcia .    PREMA MILLER COT on 3/22/2021 at 1:23 PM

## 2021-03-22 NOTE — TELEPHONE ENCOUNTER
Patient left me a voicemail asking for a call back to clarify information to relay back to Dr. Garcia    He states he has short aches, that is on part of his forehead. They vary in how long they last. Sometimes they last for 1 minute or so. He is quite sure what is defined as a headache. Do they have to last for duration of time to be considered a headache. He hasn't had a lot of headaches in the past so he isn't quite sure what a headache is.     PREMA MILLER, COT on 3/22/2021 at 1:50 PM

## 2021-03-23 ENCOUNTER — TELEPHONE (OUTPATIENT)
Dept: OPHTHALMOLOGY | Facility: CLINIC | Age: 81
End: 2021-03-23

## 2021-03-23 NOTE — TELEPHONE ENCOUNTER
Called the patient back. He reports very short lived sharp at the left side of his head. It last for few seconds and it has happened several times this weekend. He has not experienced this pain during this week. He denies any temporal pain or jaw claudication. I explained to him that his symptoms are not usually associated with giant cell arteritis. He will call us if he develops any new symptoms

## 2021-03-23 NOTE — TELEPHONE ENCOUNTER
Received a voicemail from patient that he received a phone call from Dr. Elias.  However he wasn't sure of the phone number that was left on his VM to call back but he couldn't hear it clearly.  He called [phone] and said when he listened to it, the VM inbox was for a person named Lynn who works with Dr. Rodriguez.  Patient would like a call back to discuss the headaches.     PREMA MILLER COT on 3/23/2021 at 1:07 PM

## 2021-03-23 NOTE — TELEPHONE ENCOUNTER
Called patient to inquire more about his headaches but he never answered. Left a voice message to contact us back  Will try to call him again tomorrow

## 2021-04-06 ENCOUNTER — TELEPHONE (OUTPATIENT)
Dept: RHEUMATOLOGY | Facility: CLINIC | Age: 81
End: 2021-04-06

## 2021-04-06 DIAGNOSIS — M31.6 GIANT CELL ARTERITIS (H): ICD-10-CM

## 2021-04-06 DIAGNOSIS — R79.9 ABNORMAL FINDING OF BLOOD CHEMISTRY, UNSPECIFIED: ICD-10-CM

## 2021-04-06 LAB
ALBUMIN SERPL-MCNC: 3.9 G/DL (ref 3.4–5)
ALBUMIN UR-MCNC: NEGATIVE MG/DL
ALP SERPL-CCNC: 42 U/L (ref 40–150)
ALT SERPL W P-5'-P-CCNC: 25 U/L (ref 0–70)
ANION GAP SERPL CALCULATED.3IONS-SCNC: 1 MMOL/L (ref 3–14)
ANISOCYTOSIS BLD QL SMEAR: SLIGHT
APPEARANCE UR: CLEAR
AST SERPL W P-5'-P-CCNC: 33 U/L (ref 0–45)
BACTERIA #/AREA URNS HPF: ABNORMAL /HPF
BASOPHILS # BLD AUTO: 0 10E9/L (ref 0–0.2)
BASOPHILS NFR BLD AUTO: 0 %
BILIRUB SERPL-MCNC: 0.6 MG/DL (ref 0.2–1.3)
BILIRUB UR QL STRIP: NEGATIVE
BUN SERPL-MCNC: 24 MG/DL (ref 7–30)
CALCIUM SERPL-MCNC: 9 MG/DL (ref 8.5–10.1)
CHLORIDE SERPL-SCNC: 108 MMOL/L (ref 94–109)
CHOLEST SERPL-MCNC: 179 MG/DL
CO2 SERPL-SCNC: 31 MMOL/L (ref 20–32)
COLOR UR AUTO: YELLOW
CREAT SERPL-MCNC: 0.86 MG/DL (ref 0.66–1.25)
CRP SERPL-MCNC: <2.9 MG/L (ref 0–8)
DIFFERENTIAL METHOD BLD: ABNORMAL
EOSINOPHIL # BLD AUTO: 0 10E9/L (ref 0–0.7)
EOSINOPHIL NFR BLD AUTO: 0 %
ERYTHROCYTE [DISTWIDTH] IN BLOOD BY AUTOMATED COUNT: 15.3 % (ref 10–15)
ERYTHROCYTE [SEDIMENTATION RATE] IN BLOOD BY WESTERGREN METHOD: 4 MM/H (ref 0–20)
GFR SERPL CREATININE-BSD FRML MDRD: 83 ML/MIN/{1.73_M2}
GLUCOSE SERPL-MCNC: 93 MG/DL (ref 70–99)
GLUCOSE UR STRIP-MCNC: NEGATIVE MG/DL
HCT VFR BLD AUTO: 41.5 % (ref 40–53)
HDLC SERPL-MCNC: 60 MG/DL
HGB BLD-MCNC: 13.6 G/DL (ref 13.3–17.7)
HGB UR QL STRIP: ABNORMAL
KETONES UR STRIP-MCNC: NEGATIVE MG/DL
LDLC SERPL CALC-MCNC: 102 MG/DL
LEUKOCYTE ESTERASE UR QL STRIP: NEGATIVE
LYMPHOCYTES # BLD AUTO: 26.5 10E9/L (ref 0.8–5.3)
LYMPHOCYTES NFR BLD AUTO: 88 %
MCH RBC QN AUTO: 30.7 PG (ref 26.5–33)
MCHC RBC AUTO-ENTMCNC: 32.8 G/DL (ref 31.5–36.5)
MCV RBC AUTO: 94 FL (ref 78–100)
MICROCYTES BLD QL SMEAR: PRESENT
MONOCYTES # BLD AUTO: 0.3 10E9/L (ref 0–1.3)
MONOCYTES NFR BLD AUTO: 1 %
NEUTROPHILS # BLD AUTO: 3.3 10E9/L (ref 1.6–8.3)
NEUTROPHILS NFR BLD AUTO: 11 %
NITRATE UR QL: NEGATIVE
NON-SQ EPI CELLS #/AREA URNS LPF: ABNORMAL /LPF
NONHDLC SERPL-MCNC: 119 MG/DL
PH UR STRIP: 6 PH (ref 5–7)
PLATELET # BLD AUTO: 157 10E9/L (ref 150–450)
PLATELET # BLD EST: ABNORMAL 10*3/UL
POTASSIUM SERPL-SCNC: 4.7 MMOL/L (ref 3.4–5.3)
PROT SERPL-MCNC: 6.7 G/DL (ref 6.8–8.8)
RBC # BLD AUTO: 4.43 10E12/L (ref 4.4–5.9)
RBC #/AREA URNS AUTO: ABNORMAL /HPF
SMUDGE CELLS BLD QL SMEAR: PRESENT
SODIUM SERPL-SCNC: 140 MMOL/L (ref 133–144)
SOURCE: ABNORMAL
SP GR UR STRIP: >1.03 (ref 1–1.03)
TRIGL SERPL-MCNC: 83 MG/DL
UROBILINOGEN UR STRIP-ACNC: 0.2 EU/DL (ref 0.2–1)
WBC # BLD AUTO: 30.1 10E9/L (ref 4–11)
WBC #/AREA URNS AUTO: ABNORMAL /HPF

## 2021-04-06 PROCEDURE — 85652 RBC SED RATE AUTOMATED: CPT | Performed by: INTERNAL MEDICINE

## 2021-04-06 PROCEDURE — 86140 C-REACTIVE PROTEIN: CPT | Performed by: INTERNAL MEDICINE

## 2021-04-06 PROCEDURE — 81001 URINALYSIS AUTO W/SCOPE: CPT | Performed by: INTERNAL MEDICINE

## 2021-04-06 PROCEDURE — 80053 COMPREHEN METABOLIC PANEL: CPT | Performed by: INTERNAL MEDICINE

## 2021-04-06 PROCEDURE — 36415 COLL VENOUS BLD VENIPUNCTURE: CPT | Performed by: INTERNAL MEDICINE

## 2021-04-06 PROCEDURE — 85025 COMPLETE CBC W/AUTO DIFF WBC: CPT | Performed by: INTERNAL MEDICINE

## 2021-04-06 PROCEDURE — 80061 LIPID PANEL: CPT | Performed by: INTERNAL MEDICINE

## 2021-04-06 NOTE — TELEPHONE ENCOUNTER
Dr. Alcantara reviewed the critical WBC value and Nothing to do. Will continue to follow closely.     Paige Zhou MSN, RN  Rheumatology RN Care Coordinator   [facility]

## 2021-04-06 NOTE — TELEPHONE ENCOUNTER
TIME OF CALL:  [id number]  PERSON REPORTING RESULTS: Ember from   [facility] [phone]  CRITICAL RESULTS: WBC: 30.07  RESULTS TAKEN BY: Paige Zhou RN  ORDERING PROVIDER:  Dr. Alcantara  Course of Action:  Sent to the Provider to Review  Last month WBC: 28.3    Paige Zhou MSN, RN  Rheumatology RN Care Coordinator  [facility]

## 2021-04-08 ENCOUNTER — VIRTUAL VISIT (OUTPATIENT)
Dept: RHEUMATOLOGY | Facility: CLINIC | Age: 81
End: 2021-04-08
Attending: INTERNAL MEDICINE
Payer: MEDICARE

## 2021-04-08 DIAGNOSIS — Z79.899 ENCOUNTER FOR LONG-TERM (CURRENT) USE OF HIGH-RISK MEDICATION: ICD-10-CM

## 2021-04-08 DIAGNOSIS — M85.80 OSTEOPENIA, UNSPECIFIED LOCATION: ICD-10-CM

## 2021-04-08 DIAGNOSIS — M31.6 GIANT CELL ARTERITIS (H): Primary | ICD-10-CM

## 2021-04-08 PROCEDURE — 99442 PR PHYSICIAN TELEPHONE EVALUATION 11-20 MIN: CPT | Mod: 95 | Performed by: INTERNAL MEDICINE

## 2021-04-08 NOTE — PATIENT INSTRUCTIONS
1) Continue steroid taper  2) Continue weekly actemra  3) Labs in the days prior to your follow-up  4) Follow-up in 4 weeks    My clinic will call to schedule the labs and follow-up    Please let me know if any questions    Thanks  Bib Alcantara MD  Rheumatology

## 2021-04-08 NOTE — PROGRESS NOTES
Bon is a 80 year old who is being evaluated via a billable telephone visit.      What phone number would you like to be contacted at? [phone]  How would you like to obtain your AVS? 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 Never had HA, jaw claudication, PMR symptoms. No inflammatory arthritis symptoms. Disease currently in remission by history and labs on 3mg prednisone and actemra 162mg weekly. Taper ongoing guided by GiACTA trial.    # Giant cell arteritis   1) continue 162mg once weekly injection, next dose due on Wednesday  2) Prednisone plan:  - On 4/1, went down to 3mg prednisone, continue on taper outlined below. Makes prednisone dose changes on thursdays.  3) Continue off bactrim daily and omeprazole  4) Follow-up in 4 weeks by phone visit with labs a few days prior  5) continue f/u with ophthalmology    # Long term use of high risk medication  1) Prednisone  2) Actemra weekly    # Osteopenia no interval fractures: Continue alendronate. Managed by PCP. Trying to minimize steroid use with actemra to minimize bone health effects    #CLL  Managed by oncology. Stable. 30.1 WBC on 4/6/21.    Severity of this disease discussed at length again with the patient today, particularly the potential for blindness/ loss of vision. Counseled to seek immediate medical attention should he experience changes in vision. He understands. Spent a significant amount of time discussing the side effects  of Actemra including risk of malignancy, serious infections resulting in death, opportunistic infections, neutropenia/thrombocytopenia, allergic reaction, GI perforation, demyelinating disease, as well as fever, chills, hypercholesterolemia, abdominal pain, dizziness. Patient had the opportunity to ask questions, which were all answered.    Bib Alcantara MD   Rheumatology     Subjective:   4/8/21 Interval history  No vision changes, overall stable. No HA. No jaw pain. No fevers or chills. No weight changes. No fatigue. No rash. Has chronic sinus congestion (years), taken allergy shots, takes sudafed which is some helpful. Eating without issues. No abdominal pain. No change in bowel in movements. Takes his actemra on wednesdays. Saw ophthalmology on 3/5, no evidence by their assessment of recurrence at that time. Currently on 3mg starting on 4/1. Will be on that until the 15th then will go down to 2mg. ROS otherwise negative      3/4/21   Currently down on 5mg of prednisone daily. Has been on this dose for 1 week. Continuing to go down by 1mg every 2 weeks. No issues as he continues to go down. Swelling in feet has now resolved as he reduces the dose. Continues with the Wednesday actemra injection, no injection site reaction. No interval infections. No fevers/chills/weight loss. No rash. No fatigue. Eating well. No abdominal pain or symptoms. No change in bowel habits.     2/5/21  Had his dental procedure 1 week ago. Took only 1 ibuprofen on the first day, otherwise no significant pain. Has been improving. Last dose of antibiotics was yesterday. No fevers or chills. No HA, jaw pain, change in vision. No erythema/edema/warmth of any joints. No stiffness. Able to make a full fist. Went down to 7mg last week. Still on 7, appropriately did not take actemra this week. Due for next dose on Wednesday of actemra. Thursday is normally when he goes down. No stiffness in hips/shoulders. No new rash. Feels well.  No weight  loss. Tolerating medications without side effects. ROS otherwise negative.    1/8/2020  No HA, jaw pain, no vision since last visit. Sleep is improved back to baseline, sleeping 7-730 hours per night with decreasing dose of steroids. No injection site reaction with actemra. No weight gain despite steroids. No other side effects. No longer with bowel movements at night, has returned to morning only, which is baseline. No fevers, chills, infections since last visit. Current dose of prednisone is 10mg daily. No joint pain or stiffness. No difficulty getting in/our of car, climbing stairs, putting on shirt in the AM. No erythema/edema/warmth of any joints. ROS otherwise negative.      12/17/2020  No HA, no jaw pain, no new change in vision. Has had continued sensitivity to sun, which is chronic. Brought up with ophthalmology, not issue from [facility], possible cataract. No joint pain except for chronic back discomfort. No stiffness in shoulders or hips. No issues with giving himself the injection, no rash or burning. Wednesday morning takes the injection. Last week Thursday decreased the dose from 25mg daily down to 20mg daily. Has continued on this. No ongoing side effects from prednisone. No weight gain. Feels that he is having nocturnal bowel movements, which is new. He thinks this is from having bowl of oatmeal. Not increased in frequency, previously just in the AM. Now just at night. Thinks that it is for oatmeal. Seeing ophthalmology regularly. No interval infections or fevers.     14 point review of systems collected and negative if not documented above.    11/20/2020  Has been tolerating injections without issues. No injection site reactions, which he experienced during his first injection of some burning and redness at the site. No systemic side effects. No new HA. No new change in his vision. No Jaw pain. No stiffness in shoulders or hips. No difficulty putting shirt on in the morning or getting up out of a chair.  Has chronic swelling in his feet that is unchanged. Has been doing exercises, which he continues. Walks 1-2 times per week. Has cut down to 35mg daily of prednisone. No return of any symptoms, though has noticed some interruption of sleep which is new since started. Continues to check his finger sticks which he says are in the range he was told. Checking his BP daily and is in the normal range now.     14 point review of systems obtained and negative if not documented above.    HPI from initial consultation 9/25/20  79 year old male with history of CLL, being monitored at this point without an indication for therapy. On Saturday, going to get dirt at a nursery, August 22nd, was driving and noticed that he 'wasn't getting enough light in his eye'. The next day, Sunday, he was continuing to have left eye symptoms. Called the [facility]. Asked for appointment because of these new symptoms. On Tuesday evening, he called into the on call physician due to new complaint of 'light reflecting off the wall into my eye'. He came in the next day, Wednesday, and was sent to the retinal center on Friday.  Had an exam and was told that there was a defect in the retina/artery. Had MRI of the brain and bloodwork on Tuesday. On Thursday, results of the MRI were discussed and the findings were questioned to be due to his known CLL. Possible stroke he was told due to viscosity increase? On Friday, 9/4/20, had worsening symptoms in the right eye. So came back in to the retinal specialists. At that time there was consideration of sending to [facility], though because of the long expected wait, was instead started on 100mg of prednisone daily. 9/9/20 went down to 60mg of prednisone, with plan to go down to 50mg of 10/7/20. Was setup for biopsy on 9/10.     Denies any headaches. No jaw pain, even when chewing tough food. Feels that he has had some weight loss over the last few months unintentionally (139-130 over a period of 1  month). No fatigue, night sweats. No rashes. No chest pain, cough, shortness of breath. No abdominal pain. No joint pain. Has noticed that his stool is less formed. Not diarrhea, just softer. No blood. No issues with urination. No swelling in lower extremities. No raynauds. No double vision.     Past Medical History  CLL  Hernia  Mono   Deviated septum  Osteopenia by DEXA    Past Surgical History  Tonsillectomy  Deviated septum    Medications  Prednisone 5mg daily  Current Outpatient Medications   Medication     alendronate (FOSAMAX) 70 MG tablet     Aspirin-Calcium Carbonate  MG TABS     predniSONE (DELTASONE) 5 MG tablet     omeprazole (PRILOSEC) 20 MG DR capsule     predniSONE (DELTASONE) 1 MG tablet     predniSONE (DELTASONE) 2.5 MG tablet     predniSONE (DELTASONE) 20 MG tablet     tocilizumab (ACTEMRA) 162 MG/0.9ML subcutaneous injection     No current facility-administered medications for this visit.          Weeks Daily prednisone dose (mg) 26-week taper Weekly 162mg Actemra      1 60    2 50 11/5/2020    3 40    4 35 11/20/2020   5 30    6 25    7 20 12/10/2020   8 15 12/17/2020   9 12.5    10 12.5    11 10 1/8/2021   12 9    13 8    14 7 2/5/21   15 6 2/11/21   16 6    17 5    18 5 3/4/21   19 4    20 4    21 3    22 3 4/8/21   23 2    24 2    25 1    26 1      Allergies: Seasonal/ environmental allergies    Family History: No family history of autoimmune diseases like RA, sjogrens, SLE, scleroderma, IBD.    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 No drug use.        Objective:    Physical exam:  No vitals or exam for telephone visit    Labs:   4/8/21  ESR 4  CRP less than 2.9  WBC 30.1  Hemoglobin 13.6  Platelets 157  Creatinine 0.86  Cholesterol 179  Triglycerides 83  HDL 60      3/2/21  ESR 4  CRP <2.9  WBC 28.3  HGB 12.8    Cr 0.86  ALT 23  AST 25    2/3/21  WBC 30.6  HGB 11.9    Cr 0.83  Alk phos 40  ALT 30  AST 29    1/5/21  UA without protein, cells or  casts  ESR 5  CRP <2.9  WBC 40.6  HGB 11.6    Cr 0.81  Chol 166  Trig 86  HDL 68  LDL 81    12/14/2020   CRP <2.9  ESR 4  WBC 54  HGB 11.8    Cr 0.78  Ua unremarkable  Cholesterol 168  Trig 82  HDL 70  LDL 81    11-  ESR 3  CRP less than 2.9  WBC 63.3  Hemoglobin 12  Platelets 181  Creatinine 0.98  Total protein low at 5.9  Alk phos 48  ALT 31  AST 30  UA with 30 of albumin  Cholesterol 169  Triglycerides 45  HDL 83  LDL 77    10/1/2020  WBC 84.9  HGB 12.3    IgM 36 low  IgA 124 normal  IgG 625    Cr 0.98    9/1/2020  WBC 50.2  HGB 11.6       Imaging:  MR brain and orbits 9/10/20  Impression:    1. Regarding the orbits and globes, there is no definite abnormal  contrast enhancement or mass. No evidence of leukemic infiltration.  2. Regarding the remainder of the brain, abnormal T2 hyperintense bone  marrow signal with associated enhancement in the skull, likely  representing leukemic infiltration.  3. Subcutaneous T2 hyperintensity with associated diffusion  restriction and contrast enhancement over the left zygomatic region,  question leukemic infiltration.    Pathology:  Patient Name: MARIELA WALLACE   MR#: [id number]   Specimen #: [id number]   Collected: 9/10/2020   Received: 9/10/2020   Reported: 9/16/2020 12:35   Ordering Phy(s): AGUS RETANA     For improved result formatting, select 'View Enhanced Report Format' under    Linked Documents section.     SPECIMEN(S):   Temporal artery biopsy, left     FINAL DIAGNOSIS:   Temporal artery, left, biopsy:   1. Granulomatous arteritis consistent with giant cell arteritis.   2. Chronic inflammation of the surrounding adventitia.     COMMENT:   Preliminary results were communicated to Dr. Agus Retana and Dr. Guille Garcia on 9/14/20 at 12:45pm.     I have personally reviewed all specimens and/or slides, including the   listed special stains, and used them   with my medical judgement to determine or confirm the final  "diagnosis.     Electronically signed out by:     Za García M.D., [facility]     CLINICAL HISTORY:   The patient is a 79 year old male with a history of chronic lymphocytic   leukemia who presented with bilateral   sequential optic neuropathy with associated pallid optic disc edema and   choroidal hypoperfusion, but   relatively intact visual acuity, negative elevation of acute phase   reactants, and lack of constitutional   symptoms consistent with giant cell arteritis. He also has findings of   likely leukemic infiltration in the   skull seen on MRI. He undergoes temporal artery biopsy on the left.     GROSS:   The specimen is received in formalin with proper patient identification,   labeled \"left temporal artery\".  The   specimen consists of a 3.2 cm in length by 0.3 cm in diameter tan-white   vessel segment. The specimen is   submitted intact in A1. (Dictated by: PADILLA Montalvo 9/11/2020 03:08   PM)     MICROSCOPIC:   The tissue consists of artery with narrowed lumen and intimal hyperplasia.    There is an infiltrate of   lymphocytes, plasma cells, epithelioid histiocytes, and multinucleated   giant cells throughout all layers of   the artery. There is associated loss of the internal elastic lamina on   elastic stain. Occasional focal   calcifications are seen a the level of the internal elastic lamina.   Neovascularization of the vessel wall is   present. Perivascular lymphocytic infiltrates are seen in the surrounding   adventitia. Immunohistochemistry   with CD3 and CD20 demonstrates a predominantly T-cell lymphocytic   infiltrate within the vessel wall. There is   a mixed infiltrate of T and B lymphocytes perivascular in the adventitia.   CD5 and CD43 have similar staining   patterns to CD3. There is light patchy CD23 staining in the areas positive    for CD20. CD68 highlights the   epithelioid histiocytes within the vessel wall.     The technical component of this testing was completed at the " [facility], with the professional component performed    at the [facility], [address],   [place] [address] ([phone])     CPT Codes:   A: 37135-PD5, 39150-QHZC, 54794-ICJ, 66618-ZCV, 90459-SRH, 79673-TXQ,   57977-CUF, 01803-MPY     COLLECTION SITE:   Client: [facility]   Location: [place] [facility]ENT[facility])

## 2021-04-08 NOTE — LETTER
4/8/2021       RE: Bon Michael  [birth year][address] [facility] [address]     Dear Colleague,    Thank you for referring your patient, Bon Michael, to the [facility] [place] at [facility]. Please see a copy of my visit note below.    Bon is a 80 year old who is being evaluated via a billable telephone visit.      What phone number would you like to be contacted at? [phone]  How would you like to obtain your AVS? 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 Never had HA, jaw claudication, PMR symptoms. No inflammatory arthritis symptoms. Disease currently in remission by history and labs on 3mg prednisone and actemra 162mg weekly. Taper ongoing guided by GiACTA trial.    # Giant cell arteritis   1) continue 162mg once weekly injection, next dose due on Wednesday  2) Prednisone plan:  - On 4/1, went down to 3mg prednisone, continue on taper outlined below. Makes prednisone dose changes on thursdays.  3) Continue off bactrim daily and omeprazole  4) Follow-up in 4 weeks by phone visit with labs a few days prior  5) continue f/u with ophthalmology    # Long term use of high risk medication  1) Prednisone  2) Actemra weekly    # Osteopenia no interval fractures: Continue alendronate. Managed by PCP. Trying to minimize steroid use with actemra to minimize bone health effects    #CLL  Managed by oncology. Stable.  30.1 WBC on 4/6/21.    Severity of this disease discussed at length again with the patient today, particularly the potential for blindness/ loss of vision. Counseled to seek immediate medical attention should he experience changes in vision. He understands. Spent a significant amount of time discussing the side effects of Actemra including risk of malignancy, serious infections resulting in death, opportunistic infections, neutropenia/thrombocytopenia, allergic reaction, GI perforation, demyelinating disease, as well as fever, chills, hypercholesterolemia, abdominal pain, dizziness. Patient had the opportunity to ask questions, which were all answered.    Bib Alcantara MD   Rheumatology     Subjective:   4/8/21 Interval history  No vision changes, overall stable. No HA. No jaw pain. No fevers or chills. No weight changes. No fatigue. No rash. Has chronic sinus congestion (years), taken allergy shots, takes sudafed which is some helpful. Eating without issues. No abdominal pain. No change in bowel in movements. Takes his actemra on wednesdays. Saw ophthalmology on 3/5, no evidence by their assessment of recurrence at that time. Currently on 3mg starting on 4/1. Will be on that until the 15th then will go down to 2mg. ROS otherwise negative      3/4/21   Currently down on 5mg of prednisone daily. Has been on this dose for 1 week. Continuing to go down by 1mg every 2 weeks. No issues as he continues to go down. Swelling in feet has now resolved as he reduces the dose. Continues with the Wednesday actemra injection, no injection site reaction. No interval infections. No fevers/chills/weight loss. No rash. No fatigue. Eating well. No abdominal pain or symptoms. No change in bowel habits.     2/5/21  Had his dental procedure 1 week ago. Took only 1 ibuprofen on the first day, otherwise no significant pain. Has been improving. Last dose of antibiotics was yesterday. No fevers or chills. No HA, jaw pain, change in vision.  No erythema/edema/warmth of any joints. No stiffness. Able to make a full fist. Went down to 7mg last week. Still on 7, appropriately did not take actemra this week. Due for next dose on Wednesday of actemra. Thursday is normally when he goes down. No stiffness in hips/shoulders. No new rash. Feels well.  No weight loss. Tolerating medications without side effects. ROS otherwise negative.    1/8/2020  No HA, jaw pain, no vision since last visit. Sleep is improved back to baseline, sleeping 7-730 hours per night with decreasing dose of steroids. No injection site reaction with actemra. No weight gain despite steroids. No other side effects. No longer with bowel movements at night, has returned to morning only, which is baseline. No fevers, chills, infections since last visit. Current dose of prednisone is 10mg daily. No joint pain or stiffness. No difficulty getting in/our of car, climbing stairs, putting on shirt in the AM. No erythema/edema/warmth of any joints. ROS otherwise negative.      12/17/2020  No HA, no jaw pain, no new change in vision. Has had continued sensitivity to sun, which is chronic. Brought up with ophthalmology, not issue from GCA, possible cataract. No joint pain except for chronic back discomfort. No stiffness in shoulders or hips. No issues with giving himself the injection, no rash or burning. Wednesday morning takes the injection. Last week Thursday decreased the dose from 25mg daily down to 20mg daily. Has continued on this. No ongoing side effects from prednisone. No weight gain. Feels that he is having nocturnal bowel movements, which is new. He thinks this is from having bowl of oatmeal. Not increased in frequency, previously just in the AM. Now just at night. Thinks that it is for oatmeal. Seeing ophthalmology regularly. No interval infections or fevers.     14 point review of systems collected and negative if not documented above.    11/20/2020  Has been tolerating injections without  issues. No injection site reactions, which he experienced during his first injection of some burning and redness at the site. No systemic side effects. No new HA. No new change in his vision. No Jaw pain. No stiffness in shoulders or hips. No difficulty putting shirt on in the morning or getting up out of a chair. Has chronic swelling in his feet that is unchanged. Has been doing exercises, which he continues. Walks 1-2 times per week. Has cut down to 35mg daily of prednisone. No return of any symptoms, though has noticed some interruption of sleep which is new since started. Continues to check his finger sticks which he says are in the range he was told. Checking his BP daily and is in the normal range now.     14 point review of systems obtained and negative if not documented above.    HPI from initial consultation 9/25/20  79 year old male with history of CLL, being monitored at this point without an indication for therapy. On Saturday, going to get dirt at a nursery, August 22nd, was driving and noticed that he 'wasn't getting enough light in his eye'. The next day, Sunday, he was continuing to have left eye symptoms. Called the [place] [facility]. Asked for appointment because of these new symptoms. On Tuesday evening, he called into the on call physician due to new complaint of 'light reflecting off the wall into my eye'. He came in the next day, Wednesday, and was sent to the retinal center on Friday.  Had an exam and was told that there was a defect in the retina/artery. Had MRI of the brain and bloodwork on Tuesday. On Thursday, results of the MRI were discussed and the findings were questioned to be due to his known CLL. Possible stroke he was told due to viscosity increase? On Friday, 9/4/20, had worsening symptoms in the right eye. So came back in to the retinal specialists. At that time there was consideration of sending to [facility], though because of the long expected wait, was instead started on  100mg of prednisone daily. 9/9/20 went down to 60mg of prednisone, with plan to go down to 50mg of 10/7/20. Was setup for biopsy on 9/10.     Denies any headaches. No jaw pain, even when chewing tough food. Feels that he has had some weight loss over the last few months unintentionally (139-130 over a period of 1 month). No fatigue, night sweats. No rashes. No chest pain, cough, shortness of breath. No abdominal pain. No joint pain. Has noticed that his stool is less formed. Not diarrhea, just softer. No blood. No issues with urination. No swelling in lower extremities. No raynauds. No double vision.     Past Medical History  CLL  Hernia  Mono   Deviated septum  Osteopenia by DEXA    Past Surgical History  Tonsillectomy  Deviated septum    Medications  Prednisone 5mg daily  Current Outpatient Medications   Medication     alendronate (FOSAMAX) 70 MG tablet     Aspirin-Calcium Carbonate  MG TABS     predniSONE (DELTASONE) 5 MG tablet     omeprazole (PRILOSEC) 20 MG DR capsule     predniSONE (DELTASONE) 1 MG tablet     predniSONE (DELTASONE) 2.5 MG tablet     predniSONE (DELTASONE) 20 MG tablet     tocilizumab (ACTEMRA) 162 MG/0.9ML subcutaneous injection     No current facility-administered medications for this visit.          Weeks Daily prednisone dose (mg) 26-week taper Weekly 162mg Actemra      1 60    2 50 11/5/2020    3 40    4 35 11/20/2020   5 30    6 25    7 20 12/10/2020   8 15 12/17/2020   9 12.5    10 12.5    11 10 1/8/2021   12 9    13 8    14 7 2/5/21   15 6 2/11/21   16 6    17 5    18 5 3/4/21   19 4    20 4    21 3    22 3 4/8/21   23 2    24 2    25 1    26 1      Allergies: Seasonal/ environmental allergies    Family History: No family history of autoimmune diseases like RA, sjogrens, SLE, scleroderma, IBD.    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 No drug use.        Objective:    Physical exam:  No vitals or exam for telephone visit    Labs:   4/8/21  ESR 4  CRP less  than 2.9  WBC 30.1  Hemoglobin 13.6  Platelets 157  Creatinine 0.86  Cholesterol 179  Triglycerides 83  HDL 60      3/2/21  ESR 4  CRP <2.9  WBC 28.3  HGB 12.8    Cr 0.86  ALT 23  AST 25    2/3/21  WBC 30.6  HGB 11.9    Cr 0.83  Alk phos 40  ALT 30  AST 29    1/5/21  UA without protein, cells or casts  ESR 5  CRP <2.9  WBC 40.6  HGB 11.6    Cr 0.81  Chol 166  Trig 86  HDL 68  LDL 81    12/14/2020   CRP <2.9  ESR 4  WBC 54  HGB 11.8    Cr 0.78  Ua unremarkable  Cholesterol 168  Trig 82  HDL 70  LDL 81    11-  ESR 3  CRP less than 2.9  WBC 63.3  Hemoglobin 12  Platelets 181  Creatinine 0.98  Total protein low at 5.9  Alk phos 48  ALT 31  AST 30  UA with 30 of albumin  Cholesterol 169  Triglycerides 45  HDL 83  LDL 77    10/1/2020  WBC 84.9  HGB 12.3    IgM 36 low  IgA 124 normal  IgG 625    Cr 0.98    9/1/2020  WBC 50.2  HGB 11.6       Imaging:  MR brain and orbits 9/10/20  Impression:    1. Regarding the orbits and globes, there is no definite abnormal  contrast enhancement or mass. No evidence of leukemic infiltration.  2. Regarding the remainder of the brain, abnormal T2 hyperintense bone  marrow signal with associated enhancement in the skull, likely  representing leukemic infiltration.  3. Subcutaneous T2 hyperintensity with associated diffusion  restriction and contrast enhancement over the left zygomatic region,  question leukemic infiltration.    Pathology:  Patient Name: MARIELA WALLACE   MR#: [id number]   Specimen #: [id number]   Collected: 9/10/2020   Received: 9/10/2020   Reported: 9/16/2020 12:35   Ordering Phy(s): AGUS RETANA     For improved result formatting, select 'View Enhanced Report Format' under    Linked Documents section.     SPECIMEN(S):   Temporal artery biopsy, left     FINAL DIAGNOSIS:   Temporal artery, left, biopsy:   1. Granulomatous arteritis consistent with giant cell arteritis.   2. Chronic inflammation of the  "surrounding adventitia.     COMMENT:   Preliminary results were communicated to Dr. Valerie Carrington and Dr. Guille Garcia on 9/14/20 at 12:45pm.     I have personally reviewed all specimens and/or slides, including the   listed special stains, and used them   with my medical judgement to determine or confirm the final diagnosis.     Electronically signed out by:     Za García M.D., [facility]     CLINICAL HISTORY:   The patient is a 79 year old male with a history of chronic lymphocytic   leukemia who presented with bilateral   sequential optic neuropathy with associated pallid optic disc edema and   choroidal hypoperfusion, but   relatively intact visual acuity, negative elevation of acute phase   reactants, and lack of constitutional   symptoms consistent with giant cell arteritis. He also has findings of   likely leukemic infiltration in the   skull seen on MRI. He undergoes temporal artery biopsy on the left.     GROSS:   The specimen is received in formalin with proper patient identification,   labeled \"left temporal artery\".  The   specimen consists of a 3.2 cm in length by 0.3 cm in diameter tan-white   vessel segment. The specimen is   submitted intact in A1. (Dictated by: PADILLA Montalvo 9/11/2020 03:08   PM)     MICROSCOPIC:   The tissue consists of artery with narrowed lumen and intimal hyperplasia.    There is an infiltrate of   lymphocytes, plasma cells, epithelioid histiocytes, and multinucleated   giant cells throughout all layers of   the artery. There is associated loss of the internal elastic lamina on   elastic stain. Occasional focal   calcifications are seen a the level of the internal elastic lamina.   Neovascularization of the vessel wall is   present. Perivascular lymphocytic infiltrates are seen in the surrounding   adventitia. Immunohistochemistry   with CD3 and CD20 demonstrates a predominantly T-cell lymphocytic   infiltrate within the vessel wall. There is   a mixed " infiltrate of T and B lymphocytes perivascular in the adventitia.   CD5 and CD43 have similar staining   patterns to CD3. There is light patchy CD23 staining in the areas positive    for CD20. CD68 highlights the   epithelioid histiocytes within the vessel wall.     [facility] of [facility] with the professional component performed    at the [facility], [address] [place] [phone] ([phone])     CPT Codes:   A: 12547-EG5, 42185-ZNCP, 65439-LBU, 92792-DJV, 44936-ULS, 55300-JWW,   22844-ROA, 22328-SCP     COLLECTION SITE:   Client: [facility]   Location: [place] [facility])

## 2021-04-21 DIAGNOSIS — M31.6 GIANT CELL ARTERITIS (H): ICD-10-CM

## 2021-04-22 ENCOUNTER — THERAPY VISIT (OUTPATIENT)
Dept: PHYSICAL THERAPY | Facility: CLINIC | Age: 81
End: 2021-04-22
Payer: MEDICARE

## 2021-04-22 DIAGNOSIS — M79.604 PAIN OF RIGHT LOWER EXTREMITY: ICD-10-CM

## 2021-04-22 DIAGNOSIS — M54.50 LOW BACK PAIN: ICD-10-CM

## 2021-04-22 PROCEDURE — 97161 PT EVAL LOW COMPLEX 20 MIN: CPT | Mod: GP | Performed by: PHYSICAL THERAPIST

## 2021-04-22 PROCEDURE — 97112 NEUROMUSCULAR REEDUCATION: CPT | Mod: GP | Performed by: PHYSICAL THERAPIST

## 2021-04-22 PROCEDURE — 97110 THERAPEUTIC EXERCISES: CPT | Mod: GP | Performed by: PHYSICAL THERAPIST

## 2021-04-22 RX ORDER — PREDNISONE 1 MG/1
TABLET ORAL
Qty: 154 TABLET | Status: CANCELLED | OUTPATIENT
Start: 2021-04-22

## 2021-04-22 NOTE — LETTER
DEPARTMENT OF HEALTH AND HUMAN SERVICES  CENTERS FOR MEDICARE & MEDICAID SERVICES    PLAN/UPDATED PLAN OF PROGRESS FOR OUTPATIENT REHABILITATION        PATIENTS NAME:  Bon Michael   : 1940    PROVIDER NUMBER:    [id number]    HICN:  [id number]     PROVIDER NAME: [facility] [place]    MEDICAL RECORD NUMBER: [id number]     START OF CARE DATE:  SOC Date: 21   TYPE:  PT    PRIMARY/TREATMENT DIAGNOSIS: (Pertinent Medical Diagnosis)   Low back pain  Pain of right lower extremity    VISITS FROM START OF CARE:  Rxs Used: 1     Physical Therapy Initial Evaluation  Subjective:    Patient Health History  Bon Michael being seen for Low back pain, R leg pain.   Problem began: 2021 (2021 MD visit).   Problem occurred: 2021 saw MD for insidious onset LBP while walking.  Also about 2 weeks ago while walking (4-5 days in a row) felt pain in R knee sometimes to ankle.  Continued LBP and now also R leg/knee pain.   Pain is reported as 4/10 on pain scale.  General health as reported by patient is good.  Pertinent medical history includes: cancer and osteoporosis (leukemia, lymphoma, giant cell arteritis, hernia).     Medical allergies: none.   Surgeries include:  None.    Current medications:  Bone density.    Current occupation is retired.      Therapist Generated HPI Evaluation  Type of problem:  Lumbar (R leg new).    This is a recurrent condition.  Patient reports pain:  Lumbar spine right and lumbar spine left.  Pain is described as aching (sore) and is intermittent.  Pain radiates to:  Knee right (R leg pain, ). Pain is worse during the day.  Since onset symptoms are gradually improving.  Associated symptoms:  Loss of motion/stiffness. Symptoms are exacerbated by walking and bending (30-40 min of walking 4-5 days in a row so stopped walking.)  and relieved by other (sitting).  Imaging testing: none.  Barriers include:  Lives alone.    PATIENTS NAME:  Alec  Bon   : 1940    Therapist Generated HPI Evaluation  Type of problem:  Right knee.    This is a new condition.  Patient reports pain:  Anterior, posterior, lateral and medial.  Pain is described as aching and is intermittent.  Pain radiates to:  Lower leg. Pain is worse during the day.  Since onset symptoms are gradually improving.  Associated symptoms:  Loss of motion/stiffness. Symptoms are exacerbated by descending stairs, ascending stairs and walking (after 30-40 min of walking 4-5 days in a row, stopped walking)  and relieved by other (doesn't do anything).  Imaging testing: none.    Objective:  Standing Alignment:    Lumbar deviations alignment: stands in trunk flexion and min R rotation.    Gait:    Gait Type:  Antalgic   Assistive Devices:  None    Flexibility/Screens:   Lower Extremity:  Decreased left lower extremity flexibility:Hamstrings  Decreased right lower extremity flexibility:  Hamstrings     Lumbar/SI Evaluation  ROM:    AROM Lumbar:   Flexion:          Min loss no pain  Ext:                    Mod loss L LBP   Side Bend:        Left:     Right:   Rotation:           Left:     Right:   Side Glide:        Left:  Min loss L LBP    Right:  No loss        Lumbar Myotomes:  Lumbar myotomes: MMT hamstring L 5/5, R 4+/5.  T12-L3 (Hip Flex):  Left: 5    Right: 5  L2-4 (Quads):  Left:  5    Right:  5  L4 (Ankle DF):  Left:  5    Right:  5  L5 (Great Toe Ext): Left: 5    Right: 5     Neural Tension/Mobility:    Left side:SLR; SLR w/DF or Slump  negative.   Right side:   SLR (R SLR produces pain L LB) positive.  Right side:   SLR w/DF or Slump (more tension R >L but NE with head lift)  negative.     Lumbar Palpation:  normal      Knee Evaluation:  ROM:  Arom wnl knee: seated knee extension x 10 NE/NE improve motion.  Strength wnl knee: MMT hip ABD B 4/5.    AROM  Extension:  Left: 5    Right:  8  Flexion: Left: 135    Right: 135      PATIENTS NAME:  Bon Michael   :  1940    PROM  Extension: Left:   Right:  5    Ligament Testing:  Normal    Special Tests:   Right knee negative for the following special tests:  Meniscal    Palpation:  Normal    Edema:  Normal    Functional Testing:    Proprioception:   Stork Balance Test:  Left:  10 sec  Right:  3 sec  % of Uninvolved:     William Lumbar Evaluation  Posture:  Sitting: poor  Standing: fair  Lateral Shift: no  Correction of Posture: better  Other Observations: slouch over-correct lessens L LBP    Test Movements:  FIS: During: no effect  After: no effect  Pretest Movements: L LBP  Repeat FIS: During: increases  After: no worse    EIS: During: increases  After: no worse    Repeat EIS: During: increases  After: no worse      Assessment/Plan:    Patient is a 80 year old male with lumbar and R leg complaints.    Patient has the following significant findings with corresponding treatment plan.                Diagnosis 1:  Low back pain and R leg pain    Pain -  manual therapy, self management, education and home program  Decreased ROM/flexibility - manual therapy, therapeutic exercise, therapeutic activity and home program  Decreased strength - therapeutic exercise, therapeutic activities and home program  Decreased proprioception - neuro re-education, therapeutic activities and home program  Decreased function - therapeutic activities and home program  Impaired posture - neuro re-education and home program    Therapy Evaluation Codes:   1) History comprised of:   Personal factors that impact the plan of care:        Cumulative Therapy Evaluation is: Low complexity.    Previous and current functional limitations:  (See Goal Flow Sheet for this information)    Short term and Long term goals: (See Goal Flow Sheet for this information)     Communication ability:  Patient appears to be able to clearly communicate and understand verbal and written communication and follow directions correctly.        PATIENTS NAME:  Bon Michael  "  : 1940      Treatment Explanation - The following has been discussed with the patient:   RX ordered/plan of care  Anticipated outcomes  Possible risks and side effects  This patient would benefit from PT intervention to resume normal activities.   Rehab potential is good.    Frequency:  1 X week, once daily  Duration:  for 6 weeks  Discharge Plan:  Achieve all LTG.  Independent in home treatment program.  Reach maximal therapeutic benefit.    Please refer to the daily flowsheet for treatment today, total treatment time and time spent performing 1:1 timed codes.       Caregiver Signature/Credentials _____________________________ Date ________       Treating Provider: Cate Rodriguez DPT   I have reviewed and certified the need for these services and plan of treatment while under my care.        PHYSICIAN'S SIGNATURE:   _________________________________________  Date___________   Robinson Lopez MD    Certification period:  Beginning of Cert date period: 21 to  End of Cert period date: 21     Functional Level Progress Report: Please see attached \"Goal Flow sheet for Functional level.\"    ____X____ Continue Services or       ________ DC Services                Service dates: From  SOC Date: 21 date to present                         "

## 2021-04-22 NOTE — LETTER
DEPARTMENT OF HEALTH AND HUMAN SERVICES  [facility] FOR [facility]ARE & MEDICAID SERVICES    PLAN/UPDATED PLAN OF PROGRESS FOR OUTPATIENT REHABILITATION   PATIENTS NAME:  Bon Michael   : 1940  PROVIDER NUMBER:    [id number]  HICN: [id number]  PROVIDER NAME: [facility] [place]  MEDICAL RECORD NUMBER: [id number]   START OF CARE DATE:  SOC Date: 21   TYPE:  PT  PRIMARY/TREATMENT DIAGNOSIS: (Pertinent Medical Diagnosis)     Low back pain  Pain of right lower extremity    VISITS FROM START OF CARE:  Rxs Used: 1     Physical Therapy Initial Evaluation  Subjective:  Patient Health History  Bon Michael being seen for Low back pain, R leg pain.   Problem began: 2021 (2021 MD visit).   Problem occurred: 2021 saw MD for insidious onset LBP while walking.  Also about 2 weeks ago while walking (4-5 days in a row) felt pain in R knee sometimes to ankle.  Continued LBP and now also R leg/knee pain.   Pain is reported as 4/10 on pain scale.  General health as reported by patient is good.  Pertinent medical history includes: cancer and osteoporosis (leukemia, lymphoma, giant cell arteritis, hernia).     Medical allergies: none.   Surgeries include:  None.    Current medications:  Bone density.    Current occupation is retired.          Therapist Generated HPI Evaluation  Type of problem:  Lumbar (R leg new).  This is a recurrent condition.  Patient reports pain:  Lumbar spine right and lumbar spine left.  Pain is described as aching (sore) and is intermittent.  Pain radiates to:  Knee right (R leg pain, ). Pain is worse during the day.  Since onset symptoms are gradually improving.  Associated symptoms:  Loss of motion/stiffness. Symptoms are exacerbated by walking and bending (30-40 min of walking 4-5 days in a row so stopped walking.)  and relieved by other (sitting).  Imaging testing: none.  Barriers include:  Lives alone.    Therapist Generated HPI Evaluation  Type  of problem:  Right knee.  This is a new condition.  Patient reports pain:  Anterior, posterior, lateral and medial.  Pain is described as aching and is intermittent.  Pain radiates to:  Lower leg. Pain is worse during the day.  Since onset symptoms are gradually improving.  Associated symptoms:  Loss of motion/stiffness. Symptoms are exacerbated by descending stairs, ascending stairs and walking (after 30-40 min of walking 4-5 days in a row, stopped walking)  and relieved by other (doesn't do anything).  Imaging testing: none.                 PATIENTS NAME:  Bon Michael   : 1940  Objective:  Standing Alignment:    Lumbar deviations alignment: stands in trunk flexion and min R rotation.  Gait:    Gait Type:  Antalgic   Assistive Devices:  None  Flexibility/Screens:   Lower Extremity:  Decreased left lower extremity flexibility:Hamstrings  Decreased right lower extremity flexibility:  Hamstrings       Lumbar/SI Evaluation  ROM:    AROM Lumbar:   Flexion:          Min loss no pain  Ext:                    Mod loss L LBP   Side Glide:        Left:  Min loss L LBP    Right:  No loss  Lumbar Myotomes:  Lumbar myotomes: MMT hamstring L 5/5, R 4+/5.  T12-L3 (Hip Flex):  Left: 5    Right: 5  L2-4 (Quads):  Left:  5    Right:  5  L4 (Ankle DF):  Left:  5    Right:  5  L5 (Great Toe Ext): Left: 5    Right: 5   Neural Tension/Mobility:    Left side:SLR; SLR w/DF or Slump  negative.   Right side:   SLR (R SLR produces pain L LB) positive.  Right side:   SLR w/DF or Slump (more tension R >L but NE with head lift)  negative.   Lumbar Palpation:  normal       Knee Evaluation:  ROM:  Arom wnl knee: seated knee extension x 10 NE/NE improve motion.  Strength wnl knee: MMT hip ABD B 4/5.  AROM  Extension:  Left: 5    Right:  8  Flexion: Left: 135    Right: 135  PROM  Extension: Left:   Right:  5  Ligament Testing:  Normal  Special Tests:   Right knee negative for the following special tests:  Meniscal  Palpation:   Normal  Edema:  Normal  Functional Testing:    Proprioception:   Stork Balance Test:  Left:  10 sec  Right:  3 sec  % of Uninvolved:     William Lumbar Evaluation  Posture:  Sitting: poor  Standing: fair  Lateral Shift: no  Correction of Posture: better  Other Observations: slouch over-correct lessens L LBP  Test Movements:  FIS: During: no effect  After: no effect  Pretest Movements: L LBP  Repeat FIS: During: increases  After: no worse    EIS: During: increases  After: no worse    Repeat EIS: During: increases  After: no worse      Assessment/Plan:    Patient is a 80 year old male with lumbar and R leg complaints.    Patient has the following significant findings with corresponding treatment plan.                Diagnosis 1:  Low back pain and R leg pain    Pain -  manual therapy, self management, education and home program  Decreased ROM/flexibility - manual therapy, therapeutic exercise, therapeutic activity and home program  Decreased strength - therapeutic exercise, therapeutic activities and home program  Decreased proprioception - neuro re-education, therapeutic activities and home program  Decreased function - therapeutic activities and home program  Impaired posture - neuro re-education and home program    Therapy Evaluation Codes:   1) History comprised of:   Personal factors that impact the plan of care:        Cumulative Therapy Evaluation is: Low complexity.  Previous and current functional limitations:  (See Goal Flow Sheet for this information)    Short term and Long term goals: (See Goal Flow Sheet for this information)     Communication ability:  Patient appears to be able to clearly communicate and understand verbal and written communication and follow directions correctly.  Treatment Explanation - The following has been discussed with the patient:   RX ordered/plan of care  Anticipated outcomes  Possible risks and side effects  This patient would benefit from PT intervention to resume normal  "activities.   Rehab potential is good.    Frequency:  1 X week, once daily  Duration:  for 6 weeks  Discharge Plan:  Achieve all LTG.  Independent in home treatment program.  Reach maximal therapeutic benefit.    Caregiver Signature/Credentials _____________________________ Date ________      Treating Provider: Cate Rodriguez DPT   I have reviewed and certified the need for these services and plan of treatment while under my care.        PHYSICIAN'S SIGNATURE:   _________________________________________  Date___________    Robinson Lopez    Certification period:  Beginning of Cert date period: 04/22/21 to  End of Cert period date: 07/01/21     Functional Level Progress Report: Please see attached \"Goal Flow sheet for Functional level.\"    ____X____ Continue Services or       ________ DC Services                Service dates: From  SOC Date: 04/22/21 date to present                         "

## 2021-04-22 NOTE — PROGRESS NOTES
Physical Therapy Initial Evaluation  Subjective:    Patient Health History  Bon Michael being seen for Low back pain, R leg pain.     Problem began: 1/21/2021 (1/2021 MD visit).   Problem occurred: 1/2021 saw MD for insidious onset LBP while walking.  Also about 2 weeks ago while walking (4-5 days in a row) felt pain in R knee sometimes to ankle.  Continued LBP and now also R leg/knee pain.   Pain is reported as 4/10 on pain scale.  General health as reported by patient is good.  Pertinent medical history includes: cancer and osteoporosis (leukemia, lymphoma, giant cell arteritis, hernia).     Medical allergies: none.   Surgeries include:  None.    Current medications:  Bone density.    Current occupation is retired.                     Therapist Generated HPI Evaluation         Type of problem:  Lumbar (R leg new).    This is a recurrent condition.      Patient reports pain:  Lumbar spine right and lumbar spine left.  Pain is described as aching (sore) and is intermittent.  Pain radiates to:  Knee right (R leg pain, ). Pain is worse during the day.  Since onset symptoms are gradually improving.  Associated symptoms:  Loss of motion/stiffness. Symptoms are exacerbated by walking and bending (30-40 min of walking 4-5 days in a row so stopped walking.)  and relieved by other (sitting).  Imaging testing: none.    Barriers include:  Lives alone.    Therapist Generated HPI Evaluation         Type of problem:  Right knee.    This is a new condition.      Patient reports pain:  Anterior, posterior, lateral and medial.  Pain is described as aching and is intermittent.  Pain radiates to:  Lower leg. Pain is worse during the day.  Since onset symptoms are gradually improving.  Associated symptoms:  Loss of motion/stiffness. Symptoms are exacerbated by descending stairs, ascending stairs and walking (after 30-40 min of walking 4-5 days in a row, stopped walking)  and relieved by other (doesn't do anything).  Imaging  testing: none.                            Objective:  Standing Alignment:        Lumbar deviations alignment: stands in trunk flexion and min R rotation.            Gait:    Gait Type:  Antalgic   Assistive Devices:  None      Flexibility/Screens:       Lower Extremity:  Decreased left lower extremity flexibility:Hamstrings    Decreased right lower extremity flexibility:  Hamstrings               Lumbar/SI Evaluation  ROM:    AROM Lumbar:   Flexion:          Min loss no pain  Ext:                    Mod loss L LBP   Side Bend:        Left:     Right:   Rotation:           Left:     Right:   Side Glide:        Left:  Min loss L LBP    Right:  No loss          Lumbar Myotomes:  Lumbar myotomes: MMT hamstring L 5/5, R 4+/5.  T12-L3 (Hip Flex):  Left: 5    Right: 5  L2-4 (Quads):  Left:  5    Right:  5  L4 (Ankle DF):  Left:  5    Right:  5  L5 (Great Toe Ext): Left: 5    Right: 5           Neural Tension/Mobility:      Left side:SLR; SLR w/DF or Slump  negative.   Right side:   SLR (R SLR produces pain L LB) positive.  Right side:   SLR w/DF or Slump (more tension R >L but NE with head lift)  negative.   Lumbar Palpation:  normal                                                   Knee Evaluation:  ROM:  Arom wnl knee: seated knee extension x 10 NE/NE improve motion.  Strength wnl knee: MMT hip ABD B 4/5.  AROM      Extension:  Left: 5    Right:  8  Flexion: Left: 135    Right: 135  PROM      Extension: Left:   Right:  5          Ligament Testing:  Normal                Special Tests:       Right knee negative for the following special tests:  Meniscal  Palpation:  Normal      Edema:  Normal      Functional Testing:            Proprioception:   Stork Balance Test:  Left:  10 sec  Right:  3 sec  % of Uninvolved:             William Lumbar Evaluation    Posture:  Sitting: poor  Standing: fair    Lateral Shift: no  Correction of Posture: better  Other Observations: slouch over-correct lessens L LBP    Test Movements:  FIS:  During: no effect  After: no effect  Pretest Movements: L LBP  Repeat FIS: During: increases  After: no worse    EIS: During: increases  After: no worse    Repeat EIS: During: increases  After: no worse                                                       ROS    Assessment/Plan:    Patient is a 80 year old male with lumbar and R leg complaints.    Patient has the following significant findings with corresponding treatment plan.                Diagnosis 1:  Low back pain and R leg pain    Pain -  manual therapy, self management, education and home program  Decreased ROM/flexibility - manual therapy, therapeutic exercise, therapeutic activity and home program  Decreased strength - therapeutic exercise, therapeutic activities and home program  Decreased proprioception - neuro re-education, therapeutic activities and home program  Decreased function - therapeutic activities and home program  Impaired posture - neuro re-education and home program    Therapy Evaluation Codes:   1) History comprised of:   Personal factors that impact the plan of care:        Cumulative Therapy Evaluation is: Low complexity.    Previous and current functional limitations:  (See Goal Flow Sheet for this information)    Short term and Long term goals: (See Goal Flow Sheet for this information)     Communication ability:  Patient appears to be able to clearly communicate and understand verbal and written communication and follow directions correctly.  Treatment Explanation - The following has been discussed with the patient:   RX ordered/plan of care  Anticipated outcomes  Possible risks and side effects  This patient would benefit from PT intervention to resume normal activities.   Rehab potential is good.    Frequency:  1 X week, once daily  Duration:  for 6 weeks  Discharge Plan:  Achieve all LTG.  Independent in home treatment program.  Reach maximal therapeutic benefit.    Please refer to the daily flowsheet for treatment today, total  treatment time and time spent performing 1:1 timed codes.

## 2021-04-22 NOTE — TELEPHONE ENCOUNTER
predniSONE (DELTASONE) 1 MG tablet   Take 6 mg daily x 14 days, then 5 mg daily x 14 days, then 4 mg daily x 14 days, then 3 mg daily x 14 days, then 2 mg daily x 14 days, then 1 mg daily x 14 days, then stop. Use with 5 mg tablets.     Last Written Prescription Date:  2/16/21  Last Fill Quantity: 154,   # refills: 0  Last Office Visit : 4/8/21  Future Office visit:  5/7/21    Routing because:  Medication taper.

## 2021-04-23 NOTE — TELEPHONE ENCOUNTER
Called to inquire where patient is in his prednisone taper.     Left a message for patient to return my call.    Aster Segovia CMA   4/23/2021 11:57 AM

## 2021-04-29 ENCOUNTER — THERAPY VISIT (OUTPATIENT)
Dept: PHYSICAL THERAPY | Facility: CLINIC | Age: 81
End: 2021-04-29
Payer: MEDICARE

## 2021-04-29 DIAGNOSIS — M79.604 PAIN OF RIGHT LOWER EXTREMITY: ICD-10-CM

## 2021-04-29 DIAGNOSIS — M54.50 LOW BACK PAIN: ICD-10-CM

## 2021-04-29 PROCEDURE — 97110 THERAPEUTIC EXERCISES: CPT | Mod: GP | Performed by: PHYSICAL THERAPIST

## 2021-04-29 PROCEDURE — 97112 NEUROMUSCULAR REEDUCATION: CPT | Mod: GP | Performed by: PHYSICAL THERAPIST

## 2021-05-05 ENCOUNTER — TELEPHONE (OUTPATIENT)
Dept: RHEUMATOLOGY | Facility: CLINIC | Age: 81
End: 2021-05-05

## 2021-05-05 DIAGNOSIS — M31.6 GIANT CELL ARTERITIS (H): ICD-10-CM

## 2021-05-05 DIAGNOSIS — R79.9 ABNORMAL FINDING OF BLOOD CHEMISTRY, UNSPECIFIED: ICD-10-CM

## 2021-05-05 LAB
ALBUMIN SERPL-MCNC: 3.7 G/DL (ref 3.4–5)
ALBUMIN UR-MCNC: NEGATIVE MG/DL
ALP SERPL-CCNC: 45 U/L (ref 40–150)
ALT SERPL W P-5'-P-CCNC: 25 U/L (ref 0–70)
ANION GAP SERPL CALCULATED.3IONS-SCNC: 1 MMOL/L (ref 3–14)
APPEARANCE UR: CLEAR
AST SERPL W P-5'-P-CCNC: 29 U/L (ref 0–45)
BACTERIA #/AREA URNS HPF: ABNORMAL /HPF
BILIRUB SERPL-MCNC: 0.4 MG/DL (ref 0.2–1.3)
BILIRUB UR QL STRIP: NEGATIVE
BUN SERPL-MCNC: 26 MG/DL (ref 7–30)
CALCIUM SERPL-MCNC: 8.8 MG/DL (ref 8.5–10.1)
CHLORIDE SERPL-SCNC: 109 MMOL/L (ref 94–109)
CHOLEST SERPL-MCNC: 152 MG/DL
CO2 SERPL-SCNC: 32 MMOL/L (ref 20–32)
COLOR UR AUTO: YELLOW
CREAT SERPL-MCNC: 0.81 MG/DL (ref 0.66–1.25)
CRP SERPL-MCNC: <2.9 MG/L (ref 0–8)
ERYTHROCYTE [SEDIMENTATION RATE] IN BLOOD BY WESTERGREN METHOD: 3 MM/H (ref 0–20)
GFR SERPL CREATININE-BSD FRML MDRD: 85 ML/MIN/{1.73_M2}
GLUCOSE SERPL-MCNC: 56 MG/DL (ref 70–99)
GLUCOSE UR STRIP-MCNC: NEGATIVE MG/DL
HDLC SERPL-MCNC: 54 MG/DL
HGB UR QL STRIP: ABNORMAL
KETONES UR STRIP-MCNC: NEGATIVE MG/DL
LDLC SERPL CALC-MCNC: 80 MG/DL
LEUKOCYTE ESTERASE UR QL STRIP: NEGATIVE
NITRATE UR QL: NEGATIVE
NONHDLC SERPL-MCNC: 98 MG/DL
PH UR STRIP: 5.5 PH (ref 5–7)
POTASSIUM SERPL-SCNC: 4.2 MMOL/L (ref 3.4–5.3)
PROT SERPL-MCNC: 6.4 G/DL (ref 6.8–8.8)
RBC #/AREA URNS AUTO: ABNORMAL /HPF
SODIUM SERPL-SCNC: 142 MMOL/L (ref 133–144)
SOURCE: ABNORMAL
SP GR UR STRIP: >1.03 (ref 1–1.03)
TRIGL SERPL-MCNC: 88 MG/DL
UROBILINOGEN UR STRIP-ACNC: 0.2 EU/DL (ref 0.2–1)
WBC #/AREA URNS AUTO: ABNORMAL /HPF

## 2021-05-05 PROCEDURE — 36415 COLL VENOUS BLD VENIPUNCTURE: CPT | Performed by: INTERNAL MEDICINE

## 2021-05-05 PROCEDURE — 80053 COMPREHEN METABOLIC PANEL: CPT | Performed by: INTERNAL MEDICINE

## 2021-05-05 PROCEDURE — 86140 C-REACTIVE PROTEIN: CPT | Performed by: INTERNAL MEDICINE

## 2021-05-05 PROCEDURE — 81001 URINALYSIS AUTO W/SCOPE: CPT | Performed by: INTERNAL MEDICINE

## 2021-05-05 PROCEDURE — 85025 COMPLETE CBC W/AUTO DIFF WBC: CPT | Performed by: INTERNAL MEDICINE

## 2021-05-05 PROCEDURE — 85652 RBC SED RATE AUTOMATED: CPT | Performed by: INTERNAL MEDICINE

## 2021-05-05 PROCEDURE — 80061 LIPID PANEL: CPT | Performed by: INTERNAL MEDICINE

## 2021-05-05 NOTE — TELEPHONE ENCOUNTER
Time of Call: 02:01 pm    Person reporting results: Jo Ann    Depart/facility results are coming from: [facility]    Phone number:     CRITICAL RESULTS: WBC [phone]     Results taken by: Michael Greene RN               Diagnosis: GCA      Ordering provider: Dr Bib Alcantara     Course of Action: Web paged provider notifying him of result. Dr Alcantara did call to verify he had received the page.    DIAMOND Streeter, RN  Rheumatology Care Coordinator  [facility]

## 2021-05-06 ENCOUNTER — THERAPY VISIT (OUTPATIENT)
Dept: PHYSICAL THERAPY | Facility: CLINIC | Age: 81
End: 2021-05-06
Payer: MEDICARE

## 2021-05-06 DIAGNOSIS — M79.604 PAIN OF RIGHT LOWER EXTREMITY: ICD-10-CM

## 2021-05-06 DIAGNOSIS — M54.50 LOW BACK PAIN: ICD-10-CM

## 2021-05-06 LAB
DIFFERENTIAL METHOD BLD: ABNORMAL
EOSINOPHIL # BLD AUTO: 0.3 10E9/L (ref 0–0.7)
EOSINOPHIL NFR BLD AUTO: 1 %
ERYTHROCYTE [DISTWIDTH] IN BLOOD BY AUTOMATED COUNT: 14.7 % (ref 10–15)
HCT VFR BLD AUTO: 41.6 % (ref 40–53)
HGB BLD-MCNC: 13.6 G/DL (ref 13.3–17.7)
LYMPHOCYTES # BLD AUTO: 23.9 10E9/L (ref 0.8–5.3)
LYMPHOCYTES NFR BLD AUTO: 87 %
MCH RBC QN AUTO: 31.6 PG (ref 26.5–33)
MCHC RBC AUTO-ENTMCNC: 32.7 G/DL (ref 31.5–36.5)
MCV RBC AUTO: 97 FL (ref 78–100)
MONOCYTES # BLD AUTO: 0.5 10E9/L (ref 0–1.3)
MONOCYTES NFR BLD AUTO: 2 %
NEUTROPHILS # BLD AUTO: 2.7 10E9/L (ref 1.6–8.3)
NEUTROPHILS NFR BLD AUTO: 10 %
PLATELET # BLD AUTO: 120 10E9/L (ref 150–450)
PLATELET # BLD EST: ABNORMAL 10*3/UL
RBC # BLD AUTO: 4.31 10E12/L (ref 4.4–5.9)
RBC MORPH BLD: ABNORMAL
SMUDGE CELLS BLD QL SMEAR: PRESENT
WBC # BLD AUTO: 27.4 10E9/L (ref 4–11)

## 2021-05-06 PROCEDURE — 97112 NEUROMUSCULAR REEDUCATION: CPT | Mod: GP | Performed by: PHYSICAL THERAPIST

## 2021-05-06 PROCEDURE — 97110 THERAPEUTIC EXERCISES: CPT | Mod: GP | Performed by: PHYSICAL THERAPIST

## 2021-05-07 ENCOUNTER — VIRTUAL VISIT (OUTPATIENT)
Dept: RHEUMATOLOGY | Facility: CLINIC | Age: 81
End: 2021-05-07
Attending: INTERNAL MEDICINE
Payer: MEDICARE

## 2021-05-07 VITALS — DIASTOLIC BLOOD PRESSURE: 80 MMHG | SYSTOLIC BLOOD PRESSURE: 132 MMHG

## 2021-05-07 DIAGNOSIS — Z79.899 ENCOUNTER FOR LONG-TERM (CURRENT) USE OF HIGH-RISK MEDICATION: ICD-10-CM

## 2021-05-07 DIAGNOSIS — M31.6 GIANT CELL ARTERITIS (H): Primary | ICD-10-CM

## 2021-05-07 DIAGNOSIS — C91.10 CHRONIC LYMPHOCYTIC LEUKEMIA (H): ICD-10-CM

## 2021-05-07 DIAGNOSIS — M85.80 OSTEOPENIA, UNSPECIFIED LOCATION: ICD-10-CM

## 2021-05-07 PROCEDURE — 99442 PR PHYSICIAN TELEPHONE EVALUATION 11-20 MIN: CPT | Mod: 95 | Performed by: INTERNAL MEDICINE

## 2021-05-07 RX ORDER — MULTIVITAMIN WITH IRON
1 TABLET ORAL DAILY
COMMUNITY

## 2021-05-07 RX ORDER — AZELASTINE 1 MG/ML
SPRAY, METERED NASAL
COMMUNITY
Start: 2020-08-27 | End: 2024-04-19

## 2021-05-07 RX ORDER — CALCIUM CARBONATE 500(1250)
TABLET ORAL
COMMUNITY
End: 2023-11-02

## 2021-05-07 RX ORDER — ALENDRONATE SODIUM 70 MG/75ML
SOLUTION ORAL
COMMUNITY
Start: 2020-09-22 | End: 2023-11-02

## 2021-05-07 RX ORDER — CHLORAL HYDRATE 500 MG
1200 CAPSULE ORAL
COMMUNITY

## 2021-05-07 NOTE — LETTER
5/7/2021       RE: Bon Michael  1923 [facility] [address]     Dear Colleague,    Thank you for referring your patient, Bon Michael, to the [facility] [place] at [facility]. Please see a copy of my visit note below.    Bon is a 80 year old who is being evaluated via a billable telephone visit.      What phone number would you like to be contacted at? [phone]  How would you like to obtain your AVS? 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 Never had HA, jaw claudication, PMR symptoms. No inflammatory arthritis symptoms. Disease currently in remission by history and labs on 1mg prednisone and actemra 162mg weekly. Taper ongoing guided by GiACTA trial.    # Giant cell arteritis   1) continue 162mg once weekly injection, next dose due on Wednesday  2) Prednisone plan:  - Continue on taper outlined below. Makes prednisone dose changes on thursdays. Will take last dose of 1mg on 5/12.   3) Continue off bactrim daily and omeprazole  4) Follow-up in 6 weeks by phone visit with labs a few days prior  5) continue f/u with ophthalmology    # Long term use of high risk medication  1) Prednisone 1mg daily  2) Actemra weekly    # Osteopenia no interval fractures: Continue alendronate. Managed by PCP. Trying to minimize steroid use with actemra to minimize bone health effects    #CLL  Managed by oncology. WBC  27.4.     Severity of this disease discussed at length again with the patient today, particularly the potential for blindness/ loss of vision. Counseled to seek immediate medical attention should he experience changes in vision. He understands. Spent a significant amount of time discussing the side effects of Actemra including risk of malignancy, serious infections resulting in death, opportunistic infections, neutropenia/thrombocytopenia, allergic reaction, GI perforation, demyelinating disease, as well as fever, chills, hypercholesterolemia, abdominal pain, dizziness. Patient had the opportunity to ask questions, which were all answered.    Bib Alcantara MD   Rheumatology     Subjective:   5/8/21  No changes in vision. See's Dr. Garcia next week. No HA, jaw claudication, fevers/chills/weight changes. No proximal muscle stiffness/pain. No erythema/edema/warmth of any peripheral joints. Has been using nose spray for seasonal allergies. No abdominal symptoms/abdominal symptom/no blood in stool. Currently on 1mg of prednisone. Will take his last dose of prednisone on Wednesday may 12th. No interval infections. Otherwise ROS negative.    4/8/21 Interval history  No vision changes, overall stable. No HA. No jaw pain. No fevers or chills. No weight changes. No fatigue. No rash. Has chronic sinus congestion (years), taken allergy shots, takes sudafed which is some helpful. Eating without issues. No abdominal pain. No change in bowel in movements. Takes his actemra on wednesdays. Saw ophthalmology on 3/5, no evidence by their assessment of recurrence at that time. Currently on 3mg starting on 4/1. Will be on that until the 15th then will go down to 2mg. ROS otherwise negative    3/4/21   Currently down on 5mg of prednisone daily. Has been on this dose for 1 week. Continuing to go down by 1mg every 2 weeks. No issues as he continues to go down. Swelling in feet has now resolved as he reduces the dose. Continues with  the Wednesday actemra injection, no injection site reaction. No interval infections. No fevers/chills/weight loss. No rash. No fatigue. Eating well. No abdominal pain or symptoms. No change in bowel habits.     2/5/21  Had his dental procedure 1 week ago. Took only 1 ibuprofen on the first day, otherwise no significant pain. Has been improving. Last dose of antibiotics was yesterday. No fevers or chills. No HA, jaw pain, change in vision. No erythema/edema/warmth of any joints. No stiffness. Able to make a full fist. Went down to 7mg last week. Still on 7, appropriately did not take actemra this week. Due for next dose on Wednesday of actemra. Thursday is normally when he goes down. No stiffness in hips/shoulders. No new rash. Feels well.  No weight loss. Tolerating medications without side effects. ROS otherwise negative.    1/8/2020  No HA, jaw pain, no vision since last visit. Sleep is improved back to baseline, sleeping 7-730 hours per night with decreasing dose of steroids. No injection site reaction with actemra. No weight gain despite steroids. No other side effects. No longer with bowel movements at night, has returned to morning only, which is baseline. No fevers, chills, infections since last visit. Current dose of prednisone is 10mg daily. No joint pain or stiffness. No difficulty getting in/our of car, climbing stairs, putting on shirt in the AM. No erythema/edema/warmth of any joints. ROS otherwise negative.      12/17/2020  No HA, no jaw pain, no new change in vision. Has had continued sensitivity to sun, which is chronic. Brought up with ophthalmology, not issue from GCA, possible cataract. No joint pain except for chronic back discomfort. No stiffness in shoulders or hips. No issues with giving himself the injection, no rash or burning. Wednesday morning takes the injection. Last week Thursday decreased the dose from 25mg daily down to 20mg daily. Has continued on this. No ongoing side effects from  prednisone. No weight gain. Feels that he is having nocturnal bowel movements, which is new. He thinks this is from having bowl of oatmeal. Not increased in frequency, previously just in the AM. Now just at night. Thinks that it is for oatmeal. Seeing ophthalmology regularly. No interval infections or fevers.     14 point review of systems collected and negative if not documented above.    11/20/2020  Has been tolerating injections without issues. No injection site reactions, which he experienced during his first injection of some burning and redness at the site. No systemic side effects. No new HA. No new change in his vision. No Jaw pain. No stiffness in shoulders or hips. No difficulty putting shirt on in the morning or getting up out of a chair. Has chronic swelling in his feet that is unchanged. Has been doing exercises, which he continues. Walks 1-2 times per week. Has cut down to 35mg daily of prednisone. No return of any symptoms, though has noticed some interruption of sleep which is new since started. Continues to check his finger sticks which he says are in the range he was told. Checking his BP daily and is in the normal range now.     14 point review of systems obtained and negative if not documented above.    HPI from initial consultation 9/25/20  79 year old male with history of CLL, being monitored at this point without an indication for therapy. On Saturday, going to get dirt at a nursery, August 22nd, was driving and noticed that he 'wasn't getting enough light in his eye'. The next day, Sunday, he was continuing to have left eye symptoms. Called the [place] [facility]. Asked for appointment because of these new symptoms. On Tuesday evening, he called into the on call physician due to new complaint of 'light reflecting off the wall into my eye'. He came in the next day, Wednesday, and was sent to the retinal center on Friday.  Had an exam and was told that there was a defect in the retina/artery. Had  MRI of the brain and bloodwork on Tuesday. On Thursday, results of the MRI were discussed and the findings were questioned to be due to his known CLL. Possible stroke he was told due to viscosity increase? On Friday, 9/4/20, had worsening symptoms in the right eye. So came back in to the retinal specialists. At that time there was consideration of sending to [place] [facility], though because of the long expected wait, was instead started on 100mg of prednisone daily. 9/9/20 went down to 60mg of prednisone, with plan to go down to 50mg of 10/7/20. Was setup for biopsy on 9/10.     Denies any headaches. No jaw pain, even when chewing tough food. Feels that he has had some weight loss over the last few months unintentionally (139-130 over a period of 1 month). No fatigue, night sweats. No rashes. No chest pain, cough, shortness of breath. No abdominal pain. No joint pain. Has noticed that his stool is less formed. Not diarrhea, just softer. No blood. No issues with urination. No swelling in lower extremities. No raynauds. No double vision.     Past Medical History  CLL  Hernia  Mono   Deviated septum  Osteopenia by DEXA    Past Surgical History  Tonsillectomy  Deviated septum    Medications  Prednisone 1mg daily  Current Outpatient Medications   Medication     alendronate (FOSAMAX) 70 MG tablet     alendronate (FOSAMAX) 70 MG/75ML solution     aspirin (ASPIR-LOW) 81 MG EC tablet     Aspirin-Calcium Carbonate  MG TABS     azelastine (ASTELIN) 0.1 % nasal spray     B Complex Vitamins (VITAMIN B COMPLEX 100 IJ)     calcium carbonate (OS-BALBIR) 500 MG tablet     Cholecalciferol (VITAMIN D3) 945945 UNIT/GM POWD     Cranberry 405 MG CAPS     fish oil-omega-3 fatty acids 1000 MG capsule     magnesium 250 MG tablet     prednisoLONE 5 MG (21) TBPK     predniSONE (DELTASONE) 1 MG tablet     predniSONE (DELTASONE) 2.5 MG tablet     predniSONE (DELTASONE) 20 MG tablet     predniSONE (DELTASONE) 5 MG tablet     tocilizumab (ACTEMRA)  162 MG/0.9ML subcutaneous injection     Zinc 50 MG CAPS     omeprazole (PRILOSEC) 20 MG DR capsule     No current facility-administered medications for this visit.        Weeks Daily prednisone dose (mg) 26-week taper Weekly 162mg Actemra      1 60    2 50 11/5/2020    3 40    4 35 11/20/2020   5 30    6 25    7 20 12/10/2020   8 15 12/17/2020   9 12.5    10 12.5    11 10 1/8/2021   12 9    13 8    14 7 2/5/21   15 6 2/11/21   16 6    17 5    18 5 3/4/21   19 4    20 4    21 3    22 3 4/8/21   23 2    24 2    25 1    26 1      Allergies: Seasonal/ environmental allergies    Family History: No family history of autoimmune diseases like RA, sjogrens, SLE, scleroderma, IBD.    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 No drug use.        Objective:   Physical exam:  No vitals or exam for telephone visit    Labs:   5/5/21  WBC 27.4  HGB 13.6    Cr 0.81  ALT 25  AST 29  Chol 152  Trig 88  HDL 54  LDL 80  CRP <2.9  ESR 3  UA with small blood, no cells, no protein, casts    4/8/21  ESR 4  CRP less than 2.9  WBC 30.1  Hemoglobin 13.6  Platelets 157  Creatinine 0.86  Cholesterol 179  Triglycerides 83  HDL 60      3/2/21  ESR 4  CRP <2.9  WBC 28.3  HGB 12.8    Cr 0.86  ALT 23  AST 25    2/3/21  WBC 30.6  HGB 11.9    Cr 0.83  Alk phos 40  ALT 30  AST 29    1/5/21  UA without protein, cells or casts  ESR 5  CRP <2.9  WBC 40.6  HGB 11.6    Cr 0.81  Chol 166  Trig 86  HDL 68  LDL 81    12/14/2020   CRP <2.9  ESR 4  WBC 54  HGB 11.8    Cr 0.78  Ua unremarkable  Cholesterol 168  Trig 82  HDL 70  LDL 81    11-  ESR 3  CRP less than 2.9  WBC 63.3  Hemoglobin 12  Platelets 181  Creatinine 0.98  Total protein low at 5.9  Alk phos 48  ALT 31  AST 30  UA with 30 of albumin  Cholesterol 169  Triglycerides 45  HDL 83  LDL 77    10/1/2020  WBC 84.9  HGB 12.3    IgM 36 low  IgA 124 normal  IgG 625    Cr 0.98    9/1/2020  WBC 50.2  HGB 11.6        Imaging:  MR brain and orbits 9/10/20  Impression:    1. Regarding the orbits and globes, there is no definite abnormal  contrast enhancement or mass. No evidence of leukemic infiltration.  2. Regarding the remainder of the brain, abnormal T2 hyperintense bone  marrow signal with associated enhancement in the skull, likely  representing leukemic infiltration.  3. Subcutaneous T2 hyperintensity with associated diffusion  restriction and contrast enhancement over the left zygomatic region,  question leukemic infiltration.    Pathology:  Patient Name: MARIELA WALLACE   MR#: [id number]   Specimen #: [id number]   Collected: 9/10/2020   Received: 9/10/2020   Reported: 9/16/2020 12:35   Ordering Phy(s): VALERIE CARRINGTON     For improved result formatting, select 'View Enhanced Report Format' under    Linked Documents section.     SPECIMEN(S):   Temporal artery biopsy, left     FINAL DIAGNOSIS:   Temporal artery, left, biopsy:   1. Granulomatous arteritis consistent with giant cell arteritis.   2. Chronic inflammation of the surrounding adventitia.     COMMENT:   Preliminary results were communicated to Dr. Valerie Carrington and Dr. Guille Garcia on 9/14/20 at 12:45pm.     I have personally reviewed all specimens and/or slides, including the   listed special stains, and used them   with my medical judgement to determine or confirm the final diagnosis.     Electronically signed out by:     Za García M.D., [facility]     CLINICAL HISTORY:   The patient is a 79 year old male with a history of chronic lymphocytic   leukemia who presented with bilateral   sequential optic neuropathy with associated pallid optic disc edema and   choroidal hypoperfusion, but   relatively intact visual acuity, negative elevation of acute phase   reactants, and lack of constitutional   symptoms consistent with giant cell arteritis. He also has findings of   likely leukemic infiltration in the   skull seen on MRI. He undergoes temporal  "artery biopsy on the left.     GROSS:   The specimen is received in formalin with proper patient identification,   labeled \"left temporal artery\".  The   specimen consists of a 3.2 cm in length by 0.3 cm in diameter tan-white   vessel segment. The specimen is   submitted intact in A1. (Dictated by: PADILLA Montalvo 9/11/2020 03:08   PM)     MICROSCOPIC:   The tissue consists of artery with narrowed lumen and intimal hyperplasia.    There is an infiltrate of   lymphocytes, plasma cells, epithelioid histiocytes, and multinucleated   giant cells throughout all layers of   the artery. There is associated loss of the internal elastic lamina on   elastic stain. Occasional focal   calcifications are seen a the level of the internal elastic lamina.   Neovascularization of the vessel wall is   present. Perivascular lymphocytic infiltrates are seen in the surrounding   adventitia. Immunohistochemistry   with CD3 and CD20 demonstrates a predominantly T-cell lymphocytic   infiltrate within the vessel wall. There is   a mixed infiltrate of T and B lymphocytes perivascular in the adventitia.   CD5 and CD43 have similar staining   patterns to CD3. There is light patchy CD23 staining in the areas positive    for CD20. CD68 highlights the   epithelioid histiocytes within the vessel wall.     The technical component of this testing was completed at the [facility], with the professional component performed    at the [facility], [address] [place] [phone] ([phone])     CPT Codes:   A: 24333-HP7, 12230-PBQO, 69688-EEV, 46749-GLX, 35929-PST, 59014-ACL,   28590-YVA, 99393-HKC     COLLECTION SITE:   Client: [facility]   Location: [facility] (TOY)       "

## 2021-05-07 NOTE — PROGRESS NOTES
Bon is a 80 year old who is being evaluated via a billable telephone visit.      What phone number would you like to be contacted at? [phone]  How would you like to obtain your AVS? 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 Never had HA, jaw claudication, PMR symptoms. No inflammatory arthritis symptoms. Disease currently in remission by history and labs on 1mg prednisone and actemra 162mg weekly. Taper ongoing guided by GiACTA trial.    # Giant cell arteritis   1) continue 162mg once weekly injection, next dose due on Wednesday  2) Prednisone plan:  - Continue on taper outlined below. Makes prednisone dose changes on thursdays. Will take last dose of 1mg on 5/12.   3) Continue off bactrim daily and omeprazole  4) Follow-up in 6 weeks by phone visit with labs a few days prior  5) continue f/u with ophthalmology    # Long term use of high risk medication  1) Prednisone 1mg daily  2) Actemra weekly    # Osteopenia no interval fractures: Continue alendronate. Managed by PCP. Trying to minimize steroid use with actemra to minimize bone health effects    #CLL  Managed by oncology. WBC 27.4.     Severity of this disease discussed at length again with the patient today, particularly the potential for blindness/ loss of vision. Counseled to seek immediate medical attention should he experience changes in vision. He understands. Spent a significant amount of time discussing the side effects of Actemra  including risk of malignancy, serious infections resulting in death, opportunistic infections, neutropenia/thrombocytopenia, allergic reaction, GI perforation, demyelinating disease, as well as fever, chills, hypercholesterolemia, abdominal pain, dizziness. Patient had the opportunity to ask questions, which were all answered.    Bib Alcantara MD   Rheumatology     Subjective:   5/8/21  No changes in vision. See's Dr. Garcia next week. No HA, jaw claudication, fevers/chills/weight changes. No proximal muscle stiffness/pain. No erythema/edema/warmth of any peripheral joints. Has been using nose spray for seasonal allergies. No abdominal symptoms/abdominal symptom/no blood in stool. Currently on 1mg of prednisone. Will take his last dose of prednisone on Wednesday may 12th. No interval infections. Otherwise ROS negative.    4/8/21 Interval history  No vision changes, overall stable. No HA. No jaw pain. No fevers or chills. No weight changes. No fatigue. No rash. Has chronic sinus congestion (years), taken allergy shots, takes sudafed which is some helpful. Eating without issues. No abdominal pain. No change in bowel in movements. Takes his actemra on wednesdays. Saw ophthalmology on 3/5, no evidence by their assessment of recurrence at that time. Currently on 3mg starting on 4/1. Will be on that until the 15th then will go down to 2mg. ROS otherwise negative    3/4/21   Currently down on 5mg of prednisone daily. Has been on this dose for 1 week. Continuing to go down by 1mg every 2 weeks. No issues as he continues to go down. Swelling in feet has now resolved as he reduces the dose. Continues with the Wednesday actemra injection, no injection site reaction. No interval infections. No fevers/chills/weight loss. No rash. No fatigue. Eating well. No abdominal pain or symptoms. No change in bowel habits.     2/5/21  Had his dental procedure 1 week ago. Took only 1 ibuprofen on the first day, otherwise no significant  pain. Has been improving. Last dose of antibiotics was yesterday. No fevers or chills. No HA, jaw pain, change in vision. No erythema/edema/warmth of any joints. No stiffness. Able to make a full fist. Went down to 7mg last week. Still on 7, appropriately did not take actemra this week. Due for next dose on Wednesday of actemra. Thursday is normally when he goes down. No stiffness in hips/shoulders. No new rash. Feels well.  No weight loss. Tolerating medications without side effects. ROS otherwise negative.    1/8/2020  No HA, jaw pain, no vision since last visit. Sleep is improved back to baseline, sleeping 7-730 hours per night with decreasing dose of steroids. No injection site reaction with actemra. No weight gain despite steroids. No other side effects. No longer with bowel movements at night, has returned to morning only, which is baseline. No fevers, chills, infections since last visit. Current dose of prednisone is 10mg daily. No joint pain or stiffness. No difficulty getting in/our of car, climbing stairs, putting on shirt in the AM. No erythema/edema/warmth of any joints. ROS otherwise negative.      12/17/2020  No HA, no jaw pain, no new change in vision. Has had continued sensitivity to sun, which is chronic. Brought up with ophthalmology, not issue from GCA, possible cataract. No joint pain except for chronic back discomfort. No stiffness in shoulders or hips. No issues with giving himself the injection, no rash or burning. Wednesday morning takes the injection. Last week Thursday decreased the dose from 25mg daily down to 20mg daily. Has continued on this. No ongoing side effects from prednisone. No weight gain. Feels that he is having nocturnal bowel movements, which is new. He thinks this is from having bowl of oatmeal. Not increased in frequency, previously just in the AM. Now just at night. Thinks that it is for oatmeal. Seeing ophthalmology regularly. No interval infections or fevers.     14  point review of systems collected and negative if not documented above.    11/20/2020  Has been tolerating injections without issues. No injection site reactions, which he experienced during his first injection of some burning and redness at the site. No systemic side effects. No new HA. No new change in his vision. No Jaw pain. No stiffness in shoulders or hips. No difficulty putting shirt on in the morning or getting up out of a chair. Has chronic swelling in his feet that is unchanged. Has been doing exercises, which he continues. Walks 1-2 times per week. Has cut down to 35mg daily of prednisone. No return of any symptoms, though has noticed some interruption of sleep which is new since started. Continues to check his finger sticks which he says are in the range he was told. Checking his BP daily and is in the normal range now.     14 point review of systems obtained and negative if not documented above.    HPI from initial consultation 9/25/20  79 year old male with history of CLL, being monitored at this point without an indication for therapy. On Saturday, going to get dirt at a nursery, August 22nd, was driving and noticed that he 'wasn't getting enough light in his eye'. The next day, Sunday, he was continuing to have left eye symptoms. Called the [facility]. Asked for appointment because of these new symptoms. On Tuesday evening, he called into the on call physician due to new complaint of 'light reflecting off the wall into my eye'. He came in the next day, Wednesday, and was sent to the retinal center on Friday.  Had an exam and was told that there was a defect in the retina/artery. Had MRI of the brain and bloodwork on Tuesday. On Thursday, results of the MRI were discussed and the findings were questioned to be due to his known CLL. Possible stroke he was told due to viscosity increase? On Friday, 9/4/20, had worsening symptoms in the right eye. So came back in to the retinal specialists. At that  time there was consideration of sending to [facility] ED, though because of the long expected wait, was instead started on 100mg of prednisone daily. 9/9/20 went down to 60mg of prednisone, with plan to go down to 50mg of 10/7/20. Was setup for biopsy on 9/10.     Denies any headaches. No jaw pain, even when chewing tough food. Feels that he has had some weight loss over the last few months unintentionally (139-130 over a period of 1 month). No fatigue, night sweats. No rashes. No chest pain, cough, shortness of breath. No abdominal pain. No joint pain. Has noticed that his stool is less formed. Not diarrhea, just softer. No blood. No issues with urination. No swelling in lower extremities. No raynauds. No double vision.     Past Medical History  CLL  Hernia  Mono   Deviated septum  Osteopenia by DEXA    Past Surgical History  Tonsillectomy  Deviated septum    Medications  Prednisone 1mg daily  Current Outpatient Medications   Medication     alendronate (FOSAMAX) 70 MG tablet     alendronate (FOSAMAX) 70 MG/75ML solution     aspirin (ASPIR-LOW) 81 MG EC tablet     Aspirin-Calcium Carbonate  MG TABS     azelastine (ASTELIN) 0.1 % nasal spray     B Complex Vitamins (VITAMIN B COMPLEX 100 IJ)     calcium carbonate (OS-BALBIR) 500 MG tablet     Cholecalciferol (VITAMIN D3) 662103 UNIT/GM POWD     Cranberry 405 MG CAPS     fish oil-omega-3 fatty acids 1000 MG capsule     magnesium 250 MG tablet     prednisoLONE 5 MG (21) TBPK     predniSONE (DELTASONE) 1 MG tablet     predniSONE (DELTASONE) 2.5 MG tablet     predniSONE (DELTASONE) 20 MG tablet     predniSONE (DELTASONE) 5 MG tablet     tocilizumab (ACTEMRA) 162 MG/0.9ML subcutaneous injection     Zinc 50 MG CAPS     omeprazole (PRILOSEC) 20 MG DR capsule     No current facility-administered medications for this visit.        Weeks Daily prednisone dose (mg) 26-week taper Weekly 162mg Actemra      1 60    2 50 11/5/2020    3 40    4 35 11/20/2020   5 30    6 25    7 20  12/10/2020   8 15 12/17/2020   9 12.5    10 12.5    11 10 1/8/2021   12 9    13 8    14 7 2/5/21   15 6 2/11/21   16 6    17 5    18 5 3/4/21   19 4    20 4    21 3    22 3 4/8/21   23 2    24 2    25 1    26 1      Allergies: Seasonal/ environmental allergies    Family History: No family history of autoimmune diseases like RA, sjogrens, SLE, scleroderma, IBD.    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 No drug use.        Objective:   Physical exam:  No vitals or exam for telephone visit    Labs:   5/5/21  WBC 27.4  HGB 13.6    Cr 0.81  ALT 25  AST 29  Chol 152  Trig 88  HDL 54  LDL 80  CRP <2.9  ESR 3  UA with small blood, no cells, no protein, casts    4/8/21  ESR 4  CRP less than 2.9  WBC 30.1  Hemoglobin 13.6  Platelets 157  Creatinine 0.86  Cholesterol 179  Triglycerides 83  HDL 60      3/2/21  ESR 4  CRP <2.9  WBC 28.3  HGB 12.8    Cr 0.86  ALT 23  AST 25    2/3/21  WBC 30.6  HGB 11.9    Cr 0.83  Alk phos 40  ALT 30  AST 29    1/5/21  UA without protein, cells or casts  ESR 5  CRP <2.9  WBC 40.6  HGB 11.6    Cr 0.81  Chol 166  Trig 86  HDL 68  LDL 81    12/14/2020   CRP <2.9  ESR 4  WBC 54  HGB 11.8    Cr 0.78  Ua unremarkable  Cholesterol 168  Trig 82  HDL 70  LDL 81    11-  ESR 3  CRP less than 2.9  WBC 63.3  Hemoglobin 12  Platelets 181  Creatinine 0.98  Total protein low at 5.9  Alk phos 48  ALT 31  AST 30  UA with 30 of albumin  Cholesterol 169  Triglycerides 45  HDL 83  LDL 77    10/1/2020  WBC 84.9  HGB 12.3    IgM 36 low  IgA 124 normal  IgG 625    Cr 0.98    9/1/2020  WBC 50.2  HGB 11.6       Imaging:  MR brain and orbits 9/10/20  Impression:    1. Regarding the orbits and globes, there is no definite abnormal  contrast enhancement or mass. No evidence of leukemic infiltration.  2. Regarding the remainder of the brain, abnormal T2 hyperintense bone  marrow signal with associated enhancement in the skull,  "likely  representing leukemic infiltration.  3. Subcutaneous T2 hyperintensity with associated diffusion  restriction and contrast enhancement over the left zygomatic region,  question leukemic infiltration.    Pathology:  Patient Name: MARIELA WALLACE   MR#: [id number]   Specimen #: [id number]   Collected: 9/10/2020   Received: 9/10/2020   Reported: 9/16/2020 12:35   Ordering Phy(s): VALERIE CARRINGTON     For improved result formatting, select 'View Enhanced Report Format' under    Linked Documents section.     SPECIMEN(S):   Temporal artery biopsy, left     FINAL DIAGNOSIS:   Temporal artery, left, biopsy:   1. Granulomatous arteritis consistent with giant cell arteritis.   2. Chronic inflammation of the surrounding adventitia.     COMMENT:   Preliminary results were communicated to Dr. Valerie Carrington and Dr. Guille Garcia on 9/14/20 at 12:45pm.     I have personally reviewed all specimens and/or slides, including the   listed special stains, and used them   with my medical judgement to determine or confirm the final diagnosis.     Electronically signed out by:     Za García M.D., [facility]     CLINICAL HISTORY:   The patient is a 79 year old male with a history of chronic lymphocytic   leukemia who presented with bilateral   sequential optic neuropathy with associated pallid optic disc edema and   choroidal hypoperfusion, but   relatively intact visual acuity, negative elevation of acute phase   reactants, and lack of constitutional   symptoms consistent with giant cell arteritis. He also has findings of   likely leukemic infiltration in the   skull seen on MRI. He undergoes temporal artery biopsy on the left.     GROSS:   The specimen is received in formalin with proper patient identification,   labeled \"left temporal artery\".  The   specimen consists of a 3.2 cm in length by 0.3 cm in diameter tan-white   vessel segment. The specimen is   submitted intact in A1. (Dictated by: PADILLA Montalvo " 9/11/2020 03:08   PM)     MICROSCOPIC:   The tissue consists of artery with narrowed lumen and intimal hyperplasia.    There is an infiltrate of   lymphocytes, plasma cells, epithelioid histiocytes, and multinucleated   giant cells throughout all layers of   the artery. There is associated loss of the internal elastic lamina on   elastic stain. Occasional focal   calcifications are seen a the level of the internal elastic lamina.   Neovascularization of the vessel wall is   present. Perivascular lymphocytic infiltrates are seen in the surrounding   adventitia. Immunohistochemistry   with CD3 and CD20 demonstrates a predominantly T-cell lymphocytic   infiltrate within the vessel wall. There is   a mixed infiltrate of T and B lymphocytes perivascular in the adventitia.   CD5 and CD43 have similar staining   patterns to CD3. There is light patchy CD23 staining in the areas positive    for CD20. CD68 highlights the   epithelioid histiocytes within the vessel wall.     The technical component of this testing was completed at the [facility], with the professional component performed    at the [facility], [address] [place] [id number] ([phone])     CPT Codes:   A: 70632-OX1, 59859-DFNP, 90953-BHD, 50103-PDS, 60217-PEE, 85954-NWW,   83203-ICI, 61001-DTU     COLLECTION SITE:   Client: [facility]   Location: [facility])

## 2021-05-12 ENCOUNTER — OFFICE VISIT (OUTPATIENT)
Dept: OPHTHALMOLOGY | Facility: CLINIC | Age: 81
End: 2021-05-12
Attending: OPHTHALMOLOGY
Payer: MEDICARE

## 2021-05-12 DIAGNOSIS — H53.40 VISUAL FIELD DEFECT: ICD-10-CM

## 2021-05-12 DIAGNOSIS — H47.20 PARTIAL OPTIC ATROPHY: ICD-10-CM

## 2021-05-12 PROCEDURE — 92083 EXTENDED VISUAL FIELD XM: CPT | Performed by: OPHTHALMOLOGY

## 2021-05-12 PROCEDURE — 92133 CPTRZD OPH DX IMG PST SGM ON: CPT | Performed by: OPHTHALMOLOGY

## 2021-05-12 PROCEDURE — 92014 COMPRE OPH EXAM EST PT 1/>: CPT | Performed by: OPHTHALMOLOGY

## 2021-05-12 PROCEDURE — G0463 HOSPITAL OUTPT CLINIC VISIT: HCPCS

## 2021-05-12 RX ORDER — PSEUDOEPHEDRINE HCL 30 MG
TABLET ORAL EVERY 4 HOURS PRN
COMMUNITY
End: 2024-04-19

## 2021-05-12 ASSESSMENT — VISUAL ACUITY
CORRECTION_TYPE: GLASSES
OS_CC: 20/25
METHOD: SNELLEN - LINEAR
OS_CC+: -3/+1
OD_CC: 20/20

## 2021-05-12 ASSESSMENT — CONF VISUAL FIELD: COMMENTS: VF TODAY

## 2021-05-12 ASSESSMENT — SLIT LAMP EXAM - LIDS
COMMENTS: NORMAL
COMMENTS: NORMAL

## 2021-05-12 ASSESSMENT — CUP TO DISC RATIO
OD_RATIO: 0.3
OS_RATIO: 0.3

## 2021-05-12 ASSESSMENT — TONOMETRY
IOP_METHOD: TONOPEN
OD_IOP_MMHG: 16
OS_IOP_MMHG: 15

## 2021-05-12 ASSESSMENT — EXTERNAL EXAM - LEFT EYE: OS_EXAM: NORMAL

## 2021-05-12 ASSESSMENT — REFRACTION_WEARINGRX
OS_CYLINDER: +0.50
OD_SPHERE: +3.25
OS_AXIS: 059
OD_CYLINDER: +1.25
OD_AXIS: 134
OS_SPHERE: +6.25
SPECS_TYPE: PAL

## 2021-05-12 ASSESSMENT — EXTERNAL EXAM - RIGHT EYE: OD_EXAM: NORMAL

## 2021-05-12 NOTE — LETTER
"May 12, 2021    RE: Bon Michael  : 1940  MRN: [id number]    Dear Providers,    I saw our mutual patient, Bon Michael, in follow-up in my clinic recently.  After a thorough neuro-ophthalmic history and examination, I came to the following conclusions:     1. 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  Vision stable in both eyes with intact / normal central vision but persistent (probably permanent) peripheral field defects in both eyes.  No exam or history indicators to suggest recurrence.   Continue Actemra as per rheumatology- patient now off of prednisone.    In terms of duration of Actemra- I will touch base with Dr. Alcantara for his opinion on the matter. Given this patients lack of typical premonitory symptoms of giant cell arteritis and lack of elevated acute phase reactants before presenting with vision loss, I would regard him as high risk in terms of a visually devastating recurrence.  My inclination would be to treat for at least 1 year of Actemra and perhaps longer however I am unsure whether we have approval for that duration and also am open to the fact that there may be other non-visual concerns / side-effects with a longer course.  I will send Dr. Alcantara a note in epic to discuss.    Follow-up with me in 6 weeks or sooner as needed      2.  Borderline visually significant cataracts- observe for now     - For a more thorough description of the disease presentation, please see my letter from the patient's initial visit with me      history from presentation (2020 visit)     Bon Michael is a 79 year old male who presents to neuro-ophthalmology clinic today for consultation from Jose Andino MD at [facility] for visual disturbance of left eye. He describes it as \"his left eye not getting enough light\". He describes noticing the change in his left eye's temporal visual field, inferior > superior. He notices it more first thing in the " morning. These symptoms began on 8/22/2020 and he describes it as worsening - he is unclear if it the deficit has gotten larger or darker however. He first noticed it while driving at the periphery of his vision.     Patient saw Dr. Lewis on August 28.  He underwent esr / crp testing as   well as mri (see below).     Labs: 9/2/20- WBC- 50.2, ESR- 28, CRP < 5     On August 31 he developed symptoms of waviness in his peripheral vision in the right eye.  He went to Dr. Andino on 9/4/2020 who wanted him to go to the ED but he was unable.  Dr. Andino reviewed the patient's fluorescein angiography and felt there was choroidal filling delay in the right eye   and pallid edema in the right eye concerning for giant cell arteritis.  We discussed the case via phone.      PRIOR IMAGING:  BRAIN MRI WITH/WO GADOLINIUM, MRA OF THE [place] OF [place] AND MRA OF THE   CAROTID ARTERIES - [facility] (9/1/2020)  IMPRESSION:    1.  No acute intracranial abnormality.  2.  No evidence of an orbital abnormalities.  3.  Mild generalized cerebral atrophy.  4.  Unremarkable MRA of the [place] of [place].  5.  Unremarkable MRA of the cervical vessels.     On my re-review I disagreed with the radiology read.  There is evidence of patchy enhancement diffusely within the bilateral orbits and along the bilateral intraorbital optic nerve sheaths.  Will discuss with neuro-radiology regarding finding and repeat scan.     No bisphosphonates in the past (no osteoporosis medications for 7-8 years per patient).      Left temporal artery biopsy 9/9/20- positive for giant cell arteritis   Prednisone (started 9/4/2020)     Patient started Actemra 10/28/20.    Interim history and exam:  Last seen by me 3/5/2021     Since last visit with me patient notes no significant changes in vision.  Today is his last day of prednisone of 1 mg daily.  Patient has follow-up with Dr. Wall next month- Joy 10.    Patient last saw Dr. Alcantara 5/7/21 (a/p pasted  below)     # Giant cell arteritis   1) continue 162mg once weekly injection, next dose due on Wednesday  2) Prednisone plan:  - Continue on taper outlined below. Makes prednisone dose changes on thursdays. Will take last dose of 1mg on 5/12.   3) Continue off bactrim daily and omeprazole  4) Follow-up in 6 weeks by phone visit with labs a few days prior  5) continue f/u with ophthalmology     # Long term use of high risk medication  1) Prednisone 1mg daily  2) Actemra weekly     # Osteopenia no interval fractures: Continue alendronate. Managed by PCP. Trying to minimize steroid use with actemra to minimize bone health effects     #CLL  Managed by oncology. WBC 27.4.     Last saw oncology 2/10/21 Dr. Wall:   1) Chronic lymphocytic leukemia, Stage IA, del (13q)     - He does not have any B symptoms and no new abn on exam.  - Stable small cervical adenopathy and no palpable hepatosplenomegaly.  - His LDH is normal and his ALC is lower presumably from the ongoing   steroids and ? Actemra  -The indications to treat would be worsening cytopenias, onset of B   symptoms, autoimmune hemolysis, hyperviscosity (rarely seen below WBC   count of 200K), recurrent sinopulmonary infns and adenopathy/organomegaly   threatening the function of an adjacent organ.  - Observation alone is recommended at this time.       Visual acuity and color vision today stable in both eyes. Optic nerve head appearance stable.  Octopus automated 30 degree visual field today showed stable visual field defects in both eyes.  OCT of the optic nerve head revealed stable retinal nerve fiber layer loss        For further exam details, please feel free to contact our office for additional records.  If you wish to contact me regarding this patient please email me at [email or web address] or give my clinic a call to arrange a phone conversation.    Sincerely,    Guille Garcia MD  , Neuro-Ophthalmology and Adult Strabismus Surgery  The [facility]  JEISON and Roxanne Castano Chair in Neuro-Ophthalmology  Department of Ophthalmology and Visual Neurosciences  [facility]

## 2021-05-12 NOTE — PROGRESS NOTES
"     1. 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  Vision stable in both eyes with intact / normal central vision but persistent (probably permanent) peripheral field defects in both eyes.  No exam or history indicators to suggest recurrence.   Continue Actemra as per rheumatology- patient now off of prednisone.    In terms of duration of Actemra- I will touch base with Dr. Alcantara for his opinion on the matter. Given this patients lack of typical premonitory symptoms of giant cell arteritis and lack of elevated acute phase reactants before presenting with vision loss, I would regard him as high risk in terms of a visually devastating recurrence.  My inclination would be to treat for at least 1 year of Actemra and perhaps longer however I am unsure whether we have approval for that duration and also am open to the fact that there may be other non-visual concerns / side-effects with a longer course.  I will send Dr. Alcantara a note in epic to discuss.    Follow-up with me in 6 weeks or sooner as needed      2.  Borderline visually significant cataracts- observe for now     - For a more thorough description of the disease presentation, please see my letter from the patient's initial visit with me      history from presentation (9/9/2020 visit)     Bon Michael is a 79 year old male who presents to neuro-ophthalmology   clinic today for consultation from Jose Andino MD at [facility] for   visual disturbance of left eye. He describes it as \"his left eye not   getting enough light\". He describes noticing the change in his left eye's   temporal visual field, inferior > superior. He notices it more first thing   in the morning. These symptoms began on 8/22/2020 and he describes it as   worsening - he is unclear if it the deficit has gotten larger or darker   however. He first noticed it while driving at the periphery of his vision.        Patient saw Dr. Lewis on August 28. "  He underwent esr / crp testing as   well as mri (see below).     Labs: 9/2/20- WBC- 50.2, ESR- 28, CRP < 5     On August 31 he developed symptoms of waviness in his peripheral vision in the right eye.  He went to Dr. Andino on 9/4/2020 who wanted him to go to        the ED but he was unable.  Dr. Andino reviewed the patient's fluorescein   angiography and felt there was choroidal filling delay in the right eye   and pallid edema in the right eye concerning for giant cell arteritis.  We   discussed the case via phone.      PRIOR IMAGING:  BRAIN MRI WITH/WO GADOLINIUM, MRA OF THE [place] OF [place] AND MRA OF THE   CAROTID ARTERIES - [facility] (9/1/2020)  IMPRESSION:    1.  No acute intracranial abnormality.  2.  No evidence of an orbital abnormalities.  3.  Mild generalized cerebral atrophy.  4.  Unremarkable MRA of the [place] of [place].  5.  Unremarkable MRA of the cervical vessels.     On my re-review I disagreed with the radiology read.  There is evidence of     patchy enhancement diffusely within the bilateral orbits and along the   bilateral intraorbital optic nerve sheaths.  Will discuss with   neuro-radiology regarding finding and repeat scan.     No bisphosphonates in the past (no osteoporosis medications for 7-8 years   per patient).      Left temporal artery biopsy 9/9/20- positive for giant cell arteritis   Prednisone (started 9/4/2020)     Patient started Actemra 10/28/20.    Interim history and exam:  Last seen by me 3/5/2021     Since last visit with me patient notes no significant changes in vision.  Today is his last day of prednisone of 1 mg daily.  Patient has follow-up with Dr. Wall next month- Joy 10.    Patient last saw Dr. Alcantara 5/7/21 (a/p pasted below)     # Giant cell arteritis   1) continue 162mg once weekly injection, next dose due on Wednesday  2) Prednisone plan:  - Continue on taper outlined below. Makes prednisone dose changes on thursdays. Will take last dose of  1mg on 5/12.   3) Continue off bactrim daily and omeprazole  4) Follow-up in 6 weeks by phone visit with labs a few days prior  5) continue f/u with ophthalmology     # Long term use of high risk medication  1) Prednisone 1mg daily  2) Actemra weekly     # Osteopenia no interval fractures: Continue alendronate. Managed by PCP. Trying to minimize steroid use with actemra to minimize bone health effects     #CLL  Managed by oncology. WBC 27.4.     Last saw oncology 2/10/21 Dr. Wall:   1) Chronic lymphocytic leukemia, Stage IA, del (13q)     - He does not have any B symptoms and no new abn on exam.  - Stable small cervical adenopathy and no palpable hepatosplenomegaly.  - His LDH is normal and his ALC is lower presumably from the ongoing   steroids and ? Actemra  -The indications to treat would be worsening cytopenias, onset of B   symptoms, autoimmune hemolysis, hyperviscosity (rarely seen below WBC   count of 200K), recurrent sinopulmonary infns and adenopathy/organomegaly   threatening the function of an adjacent organ.  - Observation alone is recommended at this time.       Visual acuity and color vision today stable in both eyes. Optic nerve head appearance stable.  Octopus automated 30 degree visual field today showed stable visual field defects in both eyes.  OCT of the optic nerve head revealed stable retinal nerve fiber layer loss         Complete documentation of historical and exam elements from today's encounter can be found in the full encounter summary report (not reduplicated in this progress note).  I personally obtained the chief complaint(s) and history of present illness.  I confirmed and edited as necessary the review of systems, past medical/surgical history, family history, social history, and examination findings as documented by others; and I examined the patient myself.  I personally reviewed the relevant tests, images, and reports as documented above.  I formulated and edited as necessary  the assessment and plan and discussed the findings and management plan with the patient and family     Guille Garcia MD

## 2021-05-18 ENCOUNTER — APPOINTMENT (OUTPATIENT)
Dept: URBAN - METROPOLITAN AREA CLINIC 256 | Age: 81
Setting detail: DERMATOLOGY
End: 2021-05-18

## 2021-05-18 DIAGNOSIS — L72.0 EPIDERMAL CYST: ICD-10-CM

## 2021-05-18 DIAGNOSIS — Z85.828 PERSONAL HISTORY OF OTHER MALIGNANT NEOPLASM OF SKIN: ICD-10-CM

## 2021-05-18 DIAGNOSIS — L82.1 OTHER SEBORRHEIC KERATOSIS: ICD-10-CM

## 2021-05-18 PROCEDURE — 99212 OFFICE O/P EST SF 10 MIN: CPT | Mod: 25

## 2021-05-18 PROCEDURE — OTHER COUNSELING: OTHER

## 2021-05-18 PROCEDURE — OTHER ACNE SURGERY: OTHER

## 2021-05-18 PROCEDURE — 10040 ACNE SURGERY: CPT

## 2021-05-18 PROCEDURE — OTHER REASSURANCE: OTHER

## 2021-05-18 ASSESSMENT — LOCATION DETAILED DESCRIPTION DERM
LOCATION DETAILED: LEFT NASAL ALA
LOCATION DETAILED: RIGHT CHIN
LOCATION DETAILED: LEFT CENTRAL LATERAL NECK
LOCATION DETAILED: LEFT SUPERIOR CENTRAL MALAR CHEEK

## 2021-05-18 ASSESSMENT — LOCATION SIMPLE DESCRIPTION DERM
LOCATION SIMPLE: NECK
LOCATION SIMPLE: LEFT NOSE
LOCATION SIMPLE: LEFT CHEEK
LOCATION SIMPLE: CHIN

## 2021-05-18 ASSESSMENT — LOCATION ZONE DERM
LOCATION ZONE: NECK
LOCATION ZONE: FACE
LOCATION ZONE: NOSE

## 2021-05-18 NOTE — PROCEDURE: ACNE SURGERY
Hemostasis: Pressure
Prep Text (Optional): Prior to removal the treatment areas were prepped in the usual fashion.
Render Number Of Lesions Treated: yes
Consent was obtained and risks were reviewed including but not limited to scarring, infection, bleeding, scabbing, incomplete removal, and allergy to anesthesia.
Post-Care Instructions: I reviewed with the patient in detail post-care instructions. Patient is to keep the treatment areas dry overnight, and then apply bacitracin twice daily until healed. Patient may apply hydrogen peroxide soaks to remove any crusting.
Detail Level: Detailed
Extraction Method: 15 blade and comedo extractor
Acne Type: Comedonal Lesions

## 2021-05-20 ENCOUNTER — THERAPY VISIT (OUTPATIENT)
Dept: PHYSICAL THERAPY | Facility: CLINIC | Age: 81
End: 2021-05-20
Payer: MEDICARE

## 2021-05-20 DIAGNOSIS — M79.604 PAIN OF RIGHT LOWER EXTREMITY: ICD-10-CM

## 2021-05-20 DIAGNOSIS — M54.50 LOW BACK PAIN: ICD-10-CM

## 2021-05-20 PROCEDURE — 97110 THERAPEUTIC EXERCISES: CPT | Mod: GP | Performed by: PHYSICAL THERAPIST

## 2021-05-20 PROCEDURE — 97112 NEUROMUSCULAR REEDUCATION: CPT | Mod: GP | Performed by: PHYSICAL THERAPIST

## 2021-06-01 DIAGNOSIS — Z79.899 OTHER LONG TERM (CURRENT) DRUG THERAPY: ICD-10-CM

## 2021-06-01 DIAGNOSIS — M31.6 GIANT CELL ARTERITIS (H): Primary | ICD-10-CM

## 2021-06-01 NOTE — PROGRESS NOTES
Lab orders placed.  Will be done prior to telephone visit in June.  Pt will have them done at [facility], requested that they be faxed to [facility].  Orders have been faxed as requested.    Candis Montero RN  Rheumatology Clinic

## 2021-06-07 ENCOUNTER — THERAPY VISIT (OUTPATIENT)
Dept: PHYSICAL THERAPY | Facility: CLINIC | Age: 81
End: 2021-06-07
Payer: MEDICARE

## 2021-06-07 DIAGNOSIS — M79.604 PAIN OF RIGHT LOWER EXTREMITY: ICD-10-CM

## 2021-06-07 DIAGNOSIS — M54.50 LOW BACK PAIN: ICD-10-CM

## 2021-06-07 PROCEDURE — 97112 NEUROMUSCULAR REEDUCATION: CPT | Mod: GP | Performed by: PHYSICAL THERAPIST

## 2021-06-07 PROCEDURE — 97110 THERAPEUTIC EXERCISES: CPT | Mod: GP | Performed by: PHYSICAL THERAPIST

## 2021-06-09 ENCOUNTER — TELEPHONE (OUTPATIENT)
Dept: RHEUMATOLOGY | Facility: CLINIC | Age: 81
End: 2021-06-09

## 2021-06-09 NOTE — TELEPHONE ENCOUNTER
[facility] Call Center    Phone Message    May a detailed message be left on voicemail: yes     Reason for Call: Other: Dr. Alcantara labs and appointment   Pt states Candis was suppose to fax Maricruz's lab orders to his [facility] clinic, when Pt went in for oncology appt, they did not receive any lab orders and labs was not completed for Dr. Alcantara.    Pt has a tele visit this Friday, 6/11/21.  Pt is not sure if Dr. Alcantara wants to do the tele visit still when labs arent completed?  Pt is scheduled to complete labs next Wednesday, 6/16/21; does Dr. Alcantara want to do a tele visit with him next week instead? (currently nothing available but there are a couple SHANNON slots?)    Please advise and contact the Pt back.  Pt would like to speak with a nurse/assistant working with Dr. Alcantara.      Action Taken: Message routed to:  [facility] ([facility] Adult Rheumatology    Travel Screening: Not Applicable

## 2021-06-10 LAB
CHOLEST SERPL-MCNC: 166 MG/DL
LDL/HDL RATIO: 3.3 (ref 2.5–5.7)
Lab: 50 (ref 40–60)
Lab: 93
TRIGLYCERIDES (EXTERNAL): 117 (ref 35–160)

## 2021-06-10 NOTE — TELEPHONE ENCOUNTER
Spoke with patient and let him know that we did receive the results for the lipid paned. Lab results were abstracted into his chart. Patient was informed that he did not need to fast for the other labs that need to be done.     Aster Segovia CMA   6/10/2021 11:05 AM

## 2021-06-10 NOTE — TELEPHONE ENCOUNTER
lab and appoiontment with Dr. Maricruz Alcantara, Bib Lindsay MD  You 16 hours ago (3:31 PM)     Thanks, Aster. Can we re-schedule him for next week into a SHANNON slot. If no SHANNON is available. I can find a time for him next week.    Message text      Left a message of the above information. Asked patient to return my call. Please let me know when patient calls back.    Aster Segovia CMA   6/10/2021 7:40 AM

## 2021-06-10 NOTE — TELEPHONE ENCOUNTER
"Pt would like Aster to call him back; Pt just wants clarification if the lab results (cholesterol labs) that he had sent over today are \"acceptable\" or does he need to complete another lab?    Pt was told that he did not need to be fasting for his lab work for next week but Pt just wants a call back to get clarification.    Please call the Pt back; okay to leave detailed message.    "

## 2021-06-14 ENCOUNTER — THERAPY VISIT (OUTPATIENT)
Dept: PHYSICAL THERAPY | Facility: CLINIC | Age: 81
End: 2021-06-14
Payer: MEDICARE

## 2021-06-14 DIAGNOSIS — M79.604 PAIN OF RIGHT LOWER EXTREMITY: ICD-10-CM

## 2021-06-14 DIAGNOSIS — M54.50 LOW BACK PAIN: ICD-10-CM

## 2021-06-14 PROCEDURE — 97112 NEUROMUSCULAR REEDUCATION: CPT | Mod: GP | Performed by: PHYSICAL THERAPIST

## 2021-06-14 PROCEDURE — 97110 THERAPEUTIC EXERCISES: CPT | Mod: GP | Performed by: PHYSICAL THERAPIST

## 2021-06-14 NOTE — LETTER
DEPARTMENT OF HEALTH AND HUMAN SERVICES  CENTERS FOR MEDICARE & MEDICAID SERVICES    PLAN/UPDATED PLAN OF PROGRESS FOR OUTPATIENT REHABILITATION     PATIENTS NAME:  Bon Michael   : 1940  PROVIDER NUMBER:    [id number]  HICN:  [id number]   PROVIDER NAME: [facility] [place]  MEDICAL RECORD NUMBER: [id number]   START OF CARE DATE:  SOC Date: 21   TYPE:  PT  PRIMARY/TREATMENT DIAGNOSIS: (Pertinent Medical Diagnosis)     Low back pain  Pain of right lower extremity  VISITS FROM START OF CARE:  Rxs Used: 6     PROGRESS  REPORT  Progress reporting period is from 21 to 21.       SUBJECTIVE  Subjective changes noted by patient: Patient brought in chair that rest knees/lower legs on and no back support- wants therapist to assist.  Has been doing painting overhead on step ladder and this is straining.  No longer having pain into R lower leg/knee, only in R LB.   Significant improvement with posture awareness.  Slouch over-correct very helpful.  Is walking 25-30 min 1-2x each day, every day so much improved activity level.  Patient wishing to continue therapy.    Current Pain level: 0/10(3-4/10 at worst).     Initial Pain level: 4/10.   Changes in function:  Yes (See Goal flowsheet attached for changes in current functional level)  Adverse reaction to treatment or activity: None    OBJECTIVE  Changes noted in objective findings:  Yes:   Continued R lateral shift, patient performing side glide incorrectly.   Highest setting on supported non back chair gives patient best posture.   LROM flexion min loss, ext mod loss, side glide L min loss, R no loss mild R LBP.     Repeated L SGIS NE/NE (no pain) improves shift but continues to resume R shifted position.    Prone EIL with hips shifted to R NE/NE no pain improve shift but technique confusion for patient.    L SGIS with extension max cues but Ne/Ne no pain and improves shift.    Patient self corrects posture  "throughout treatment so improved awareness.       ASSESSMENT/PLAN  Updated problem list and treatment plan: Diagnosis 1:  low back pain, R leg pain    Pain -  manual therapy, self management, education, directional preference exercise and home program  Decreased ROM/flexibility - manual therapy, therapeutic exercise and home program  Decreased strength - therapeutic exercise, therapeutic activities and home program  Impaired gait - gait training and home program    PATIENTS NAME:  Bon Michael   : 1940    Decreased function - therapeutic activities and home program  Impaired posture - neuro re-education, therapeutic activities and home program  STG/LTGs have been met or progress has been made towards goals:  Yes (See Goal flow sheet completed today.)  Assessment of Progress: The patient's condition is overall improving, however continued limitations and R lateral (ipsilateral ) shift.  Self Management Plans:  Patient has been instructed in a home treatment program.  Patient  has been instructed in self management of symptoms.  I have re-evaluated this patient and find that the nature, scope, duration and intensity of the therapy is appropriate for the medical condition of the patient.  Bon continues to require the following intervention to meet STG and LTG's:  PT    Recommendations:  This patient would benefit from continued therapy.     Frequency:  1 X week, once daily  Duration:  for 6 weeks        Caregiver Signature/Credentials _____________________________ Date ________      Treating Provider: Cate Rodriguez DPT   I have reviewed and certified the need for these services and plan of treatment while under my care.        PHYSICIAN'S SIGNATURE:   _________________________________________  Date___________   Robinson Lopez MD    Certification period:  Beginning of Cert date period: 21 to  End of Cert period date: 21     Functional Level Progress Report: Please see attached \"Goal " "Flow sheet for Functional level.\"    ____X____ Continue Services or       ________ DC Services                Service dates: From  SOC Date: 04/22/21 date to present                         "

## 2021-06-15 NOTE — PROGRESS NOTES
Subjective:  HPI  Physical Exam                    Objective:  System    Physical Exam    General     ROS    Assessment/Plan:    PROGRESS  REPORT    Progress reporting period is from 4/22/21 to 6/14/21.       SUBJECTIVE  Subjective changes noted by patient: Patient brought in chair that rest knees/lower legs on and no back support- wants therapist to assist.  Has been doing painting overhead on step ladder and this is straining.  No longer having pain into R lower leg/knee, only in R LB.   Significant improvement with posture awareness.  Slouch over-correct very helpful.  Is walking 25-30 min 1-2x each day, every day so much improved activity level.  Patient wishing to continue therapy.    Current Pain level: 0/10(3-4/10 at worst).     Initial Pain level: 4/10.   Changes in function:  Yes (See Goal flowsheet attached for changes in current functional level)  Adverse reaction to treatment or activity: None    OBJECTIVE  Changes noted in objective findings:  Yes:   Continued R lateral shift, patient performing side glide incorrectly.   Highest setting on supported non back chair gives patient best posture.   LROM flexion min loss, ext mod loss, side glide L min loss, R no loss mild R LBP.     Repeated L SGIS NE/NE (no pain) improves shift but continues to resume R shifted position.    Prone EIL with hips shifted to R NE/NE no pain improve shift but technique confusion for patient.    L SGIS with extension max cues but Ne/Ne no pain and improves shift.    Patient self corrects posture throughout treatment so improved awareness.       ASSESSMENT/PLAN  Updated problem list and treatment plan: Diagnosis 1:  low back pain, R leg pain    Pain -  manual therapy, self management, education, directional preference exercise and home program  Decreased ROM/flexibility - manual therapy, therapeutic exercise and home program  Decreased strength - therapeutic exercise, therapeutic activities and home program  Impaired gait - gait  training and home program  Decreased function - therapeutic activities and home program  Impaired posture - neuro re-education, therapeutic activities and home program  STG/LTGs have been met or progress has been made towards goals:  Yes (See Goal flow sheet completed today.)  Assessment of Progress: The patient's condition is overall improving, however continued limitations and R lateral (ipsilateral ) shift.  Self Management Plans:  Patient has been instructed in a home treatment program.  Patient  has been instructed in self management of symptoms.  I have re-evaluated this patient and find that the nature, scope, duration and intensity of the therapy is appropriate for the medical condition of the patient.  Bon continues to require the following intervention to meet STG and LTG's:  PT    Recommendations:  This patient would benefit from continued therapy.     Frequency:  1 X week, once daily  Duration:  for 6 weeks        Please refer to the daily flowsheet for treatment today, total treatment time and time spent performing 1:1 timed codes.

## 2021-06-16 DIAGNOSIS — M31.6 GIANT CELL ARTERITIS (H): ICD-10-CM

## 2021-06-16 DIAGNOSIS — Z79.899 OTHER LONG TERM (CURRENT) DRUG THERAPY: ICD-10-CM

## 2021-06-16 LAB
ALBUMIN UR-MCNC: NEGATIVE MG/DL
APPEARANCE UR: CLEAR
BILIRUB UR QL STRIP: NEGATIVE
COLOR UR AUTO: YELLOW
CRP SERPL-MCNC: <2.9 MG/L (ref 0–8)
ERYTHROCYTE [SEDIMENTATION RATE] IN BLOOD BY WESTERGREN METHOD: 3 MM/H (ref 0–20)
GLUCOSE UR STRIP-MCNC: NEGATIVE MG/DL
HGB UR QL STRIP: NEGATIVE
KETONES UR STRIP-MCNC: NEGATIVE MG/DL
LEUKOCYTE ESTERASE UR QL STRIP: NEGATIVE
NITRATE UR QL: NEGATIVE
PH UR STRIP: 5.5 PH (ref 5–7)
RBC #/AREA URNS AUTO: NORMAL /HPF
SOURCE: NORMAL
SP GR UR STRIP: 1.02 (ref 1–1.03)
UROBILINOGEN UR STRIP-ACNC: 0.2 EU/DL (ref 0.2–1)
WBC #/AREA URNS AUTO: NORMAL /HPF

## 2021-06-16 PROCEDURE — 86140 C-REACTIVE PROTEIN: CPT | Performed by: INTERNAL MEDICINE

## 2021-06-16 PROCEDURE — 85652 RBC SED RATE AUTOMATED: CPT | Performed by: INTERNAL MEDICINE

## 2021-06-16 PROCEDURE — 81001 URINALYSIS AUTO W/SCOPE: CPT | Performed by: INTERNAL MEDICINE

## 2021-06-16 PROCEDURE — 36415 COLL VENOUS BLD VENIPUNCTURE: CPT | Performed by: INTERNAL MEDICINE

## 2021-06-17 ENCOUNTER — VIRTUAL VISIT (OUTPATIENT)
Dept: RHEUMATOLOGY | Facility: CLINIC | Age: 81
End: 2021-06-17
Attending: INTERNAL MEDICINE
Payer: MEDICARE

## 2021-06-17 DIAGNOSIS — M31.6 GIANT CELL ARTERITIS (H): Primary | ICD-10-CM

## 2021-06-17 DIAGNOSIS — Z79.899 HIGH RISK MEDICATION USE: ICD-10-CM

## 2021-06-17 PROCEDURE — 99442 PR PHYSICIAN TELEPHONE EVALUATION 11-20 MIN: CPT | Mod: 95 | Performed by: INTERNAL MEDICINE

## 2021-06-17 ASSESSMENT — PAIN SCALES - GENERAL: PAINLEVEL: NO PAIN (0)

## 2021-06-17 NOTE — LETTER
6/17/2021       RE: Bon Michael  [birth year][address] [facility] [address]     Dear Colleague,    Thank you for referring your patient, Bon Michael, to the [facility] RHEUMATOLOGY [facility]INIC [place] at [facility]. Please see a copy of my visit note below.    Patient could not connect to video so switched to phone.  Bon is a 80 year old who is being evaluated via a billable Phone visit.      How would you like to obtain your AVS? Mail a copy  If the video visit is dropped, the invitation should be resent by: Text to cell phone: [phone]  Will anyone else be joining your video visit? 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 Never had HA, jaw claudication, PMR symptoms. No inflammatory arthritis symptoms. Disease currently in remission by history actemra 162mg weekly. Has completed his prednisone taper which was guided by GiACTA trial.    # Giant cell arteritis   1) continue 162mg once weekly injection, next dose due on Wednesday  2) Prednisone last dose was on 5/12/21   3) Continue off bactrim daily and omeprazole  4) Follow-up in 10 weeks with labs a few days prior  5) continue f/u with ophthalmology    # Long term use of high risk medication  1) Actemra 162mg weekly. No interval infections. No injection site reactions. Tolerating without issues. No evidence of toxicity by most recent labs.    # Osteopenia no interval fractures: Continue alendronate. Managed by PCP.      #CLL  Managed by oncology. WBC 29.7.     Severity of this disease discussed at length again with the patient today, particularly the potential for blindness/ loss of vision. Counseled to seek immediate medical attention should he experience changes in vision. He understands. Spent a significant amount of time discussing the side effects of Actemra including risk of malignancy, serious infections resulting in death, opportunistic infections, neutropenia/thrombocytopenia, allergic reaction, GI perforation, demyelinating disease, as well as fever, chills, hypercholesterolemia, abdominal pain, dizziness. Patient had the opportunity to ask questions, which were all answered.    Bib Alcantara MD   Rheumatology     Subjective:   No vision changes, HA, jaw claudication. Has baseline vision change from initial presentation. No fevers, chills, weight changes. Still has not been vaccinated for COVID. Continues to consider this. No side effects from actemra. No interval infections. No proximal muscle pain, stiffness. No erythema/ edema/ warmth of any joints. No joints pain/ stiffness. Looks forward to being more active and exercising again with the warmer weather. ROS otherwise negative.    5/8/21  No changes in vision. See's Dr. Garcia next week. No HA, jaw claudication, fevers/chills/weight changes. No proximal muscle stiffness/pain. No erythema/edema/warmth of any peripheral joints. Has been using nose spray for seasonal allergies. No abdominal symptoms/abdominal symptom/no blood in stool. Currently on 1mg of prednisone. Will take his last dose of prednisone on Wednesday may 12th. No interval infections. Otherwise ROS negative.    4/8/21 Interval history  No vision changes, overall stable. No HA. No jaw pain. No fevers or chills. No weight changes. No fatigue. No rash. Has chronic sinus congestion (years), taken allergy shots, takes sudafed which is some helpful. Eating without issues. No abdominal pain. No change  in bowel in movements. Takes his actemra on wednesdays. Saw ophthalmology on 3/5, no evidence by their assessment of recurrence at that time. Currently on 3mg starting on 4/1. Will be on that until the 15th then will go down to 2mg. ROS otherwise negative    3/4/21   Currently down on 5mg of prednisone daily. Has been on this dose for 1 week. Continuing to go down by 1mg every 2 weeks. No issues as he continues to go down. Swelling in feet has now resolved as he reduces the dose. Continues with the Wednesday actemra injection, no injection site reaction. No interval infections. No fevers/chills/weight loss. No rash. No fatigue. Eating well. No abdominal pain or symptoms. No change in bowel habits.     2/5/21  Had his dental procedure 1 week ago. Took only 1 ibuprofen on the first day, otherwise no significant pain. Has been improving. Last dose of antibiotics was yesterday. No fevers or chills. No HA, jaw pain, change in vision. No erythema/edema/warmth of any joints. No stiffness. Able to make a full fist. Went down to 7mg last week. Still on 7, appropriately did not take actemra this week. Due for next dose on Wednesday of actemra. Thursday is normally when he goes down. No stiffness in hips/shoulders. No new rash. Feels well.  No weight loss. Tolerating medications without side effects. ROS otherwise negative.    1/8/2020  No HA, jaw pain, no vision since last visit. Sleep is improved back to baseline, sleeping 7-730 hours per night with decreasing dose of steroids. No injection site reaction with actemra. No weight gain despite steroids. No other side effects. No longer with bowel movements at night, has returned to morning only, which is baseline. No fevers, chills, infections since last visit. Current dose of prednisone is 10mg daily. No joint pain or stiffness. No difficulty getting in/our of car, climbing stairs, putting on shirt in the AM. No erythema/edema/warmth of any joints. ROS otherwise  negative.      12/17/2020  No HA, no jaw pain, no new change in vision. Has had continued sensitivity to sun, which is chronic. Brought up with ophthalmology, not issue from [facility], possible cataract. No joint pain except for chronic back discomfort. No stiffness in shoulders or hips. No issues with giving himself the injection, no rash or burning. Wednesday morning takes the injection. Last week Thursday decreased the dose from 25mg daily down to 20mg daily. Has continued on this. No ongoing side effects from prednisone. No weight gain. Feels that he is having nocturnal bowel movements, which is new. He thinks this is from having bowl of oatmeal. Not increased in frequency, previously just in the AM. Now just at night. Thinks that it is for oatmeal. Seeing ophthalmology regularly. No interval infections or fevers.     14 point review of systems collected and negative if not documented above.    11/20/2020  Has been tolerating injections without issues. No injection site reactions, which he experienced during his first injection of some burning and redness at the site. No systemic side effects. No new HA. No new change in his vision. No Jaw pain. No stiffness in shoulders or hips. No difficulty putting shirt on in the morning or getting up out of a chair. Has chronic swelling in his feet that is unchanged. Has been doing exercises, which he continues. Walks 1-2 times per week. Has cut down to 35mg daily of prednisone. No return of any symptoms, though has noticed some interruption of sleep which is new since started. Continues to check his finger sticks which he says are in the range he was told. Checking his BP daily and is in the normal range now.     14 point review of systems obtained and negative if not documented above.    HPI from initial consultation 9/25/20  79 year old male with history of CLL, being monitored at this point without an indication for therapy. On Saturday, going to get dirt at a nursery, August  22nd, was driving and noticed that he 'wasn't getting enough light in his eye'. The next day, Sunday, he was continuing to have left eye symptoms. Called the [place]. Asked for appointment because of these new symptoms. On Tuesday evening, he called into the on call physician due to new complaint of 'light reflecting off the wall into my eye'. He came in the next day, Wednesday, and was sent to the retinal center on Friday.  Had an exam and was told that there was a defect in the retina/artery. Had MRI of the brain and bloodwork on Tuesday. On Thursday, results of the MRI were discussed and the findings were questioned to be due to his known CLL. Possible stroke he was told due to viscosity increase? On Friday, 9/4/20, had worsening symptoms in the right eye. So came back in to the retinal specialists. At that time there was consideration of sending to [place] ED, though because of the long expected wait, was instead started on 100mg of prednisone daily. 9/9/20 went down to 60mg of prednisone, with plan to go down to 50mg of 10/7/20. Was setup for biopsy on 9/10.     Denies any headaches. No jaw pain, even when chewing tough food. Feels that he has had some weight loss over the last few months unintentionally (139-130 over a period of 1 month). No fatigue, night sweats. No rashes. No chest pain, cough, shortness of breath. No abdominal pain. No joint pain. Has noticed that his stool is less formed. Not diarrhea, just softer. No blood. No issues with urination. No swelling in lower extremities. No raynauds. No double vision.     Past Medical History  CLL  Hernia  Mono   Deviated septum  Osteopenia by DEXA    Past Surgical History  Tonsillectomy  Deviated septum    Medications  Prednisone 1mg daily  Current Outpatient Medications   Medication     alendronate (FOSAMAX) 70 MG tablet     aspirin (ASPIR-LOW) 81 MG EC tablet     azelastine (ASTELIN) 0.1 % nasal spray     B Complex Vitamins (VITAMIN B COMPLEX 100  IJ)     calcium carbonate (OS-BALBIR) 500 MG tablet     Cholecalciferol (VITAMIN D3)     Cranberry 405 MG CAPS     fish oil-omega-3 fatty acids 1000 MG capsule     magnesium 250 MG tablet     tocilizumab (ACTEMRA) 162 MG/0.9ML subcutaneous injection     Zinc 50 MG CAPS     alendronate (FOSAMAX) 70 MG/75ML solution     Aspirin-Calcium Carbonate  MG TABS     omeprazole (PRILOSEC) 20 MG DR capsule     prednisoLONE 5 MG (21) TBPK     predniSONE (DELTASONE) 1 MG tablet     predniSONE (DELTASONE) 2.5 MG tablet     predniSONE (DELTASONE) 20 MG tablet     predniSONE (DELTASONE) 5 MG tablet     pseudoePHEDrine (SUDAFED) 30 MG tablet     No current facility-administered medications for this visit.        Weeks Daily prednisone dose (mg) 26-week taper Weekly 162mg Actemra      1 60    2 50 11/5/2020    3 40    4 35 11/20/2020   5 30    6 25    7 20 12/10/2020   8 15 12/17/2020   9 12.5    10 12.5    11 10 1/8/2021   12 9    13 8    14 7 2/5/21   15 6 2/11/21   16 6    17 5    18 5 3/4/21   19 4    20 4    21 3    22 3 4/8/21   23 2    24 2    25 1    26 1 5/12/21     Allergies: Seasonal/ environmental allergies    Family History: No family history of autoimmune diseases like RA, sjogrens, SLE, scleroderma, IBD.    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 No drug use.        Objective:   Physical exam:  No vitals or exam for telephone visit    Labs:   6/16/21  ESR 3  CRP <2.9  Lab Results   Component Value Date    WBC 27.4 05/05/2021     Lab Results   Component Value Date    RBC 4.31 05/05/2021     Lab Results   Component Value Date    HGB 13.6 05/05/2021     Lab Results   Component Value Date    HCT 41.6 05/05/2021     No components found for: MCT  Lab Results   Component Value Date    MCV 97 05/05/2021     Lab Results   Component Value Date    MCH 31.6 05/05/2021     Lab Results   Component Value Date    MCHC 32.7 05/05/2021     Lab Results   Component Value Date    RDW 14.7 05/05/2021     Lab Results    Component Value Date     05/05/2021     Creatinine   Date Value Ref Range Status   05/05/2021 0.81 0.66 - 1.25 mg/dL Final       CRP  5/5/21  WBC 27.4  HGB 13.6    Cr 0.81  ALT 25  AST 29  Chol 152  Trig 88  HDL 54  LDL 80  CRP <2.9  ESR 3  UA with small blood, no cells, no protein, casts    4/8/21  ESR 4  CRP less than 2.9  WBC 30.1  Hemoglobin 13.6  Platelets 157  Creatinine 0.86  Cholesterol 179  Triglycerides 83  HDL 60      3/2/21  ESR 4  CRP <2.9  WBC 28.3  HGB 12.8    Cr 0.86  ALT 23  AST 25    2/3/21  WBC 30.6  HGB 11.9    Cr 0.83  Alk phos 40  ALT 30  AST 29    1/5/21  UA without protein, cells or casts  ESR 5  CRP <2.9  WBC 40.6  HGB 11.6    Cr 0.81  Chol 166  Trig 86  HDL 68  LDL 81    12/14/2020   CRP <2.9  ESR 4  WBC 54  HGB 11.8    Cr 0.78  Ua unremarkable  Cholesterol 168  Trig 82  HDL 70  LDL 81    11-  ESR 3  CRP less than 2.9  WBC 63.3  Hemoglobin 12  Platelets 181  Creatinine 0.98  Total protein low at 5.9  Alk phos 48  ALT 31  AST 30  UA with 30 of albumin  Cholesterol 169  Triglycerides 45  HDL 83  LDL 77    10/1/2020  WBC 84.9  HGB 12.3    IgM 36 low  IgA 124 normal  IgG 625    Cr 0.98    9/1/2020  WBC 50.2  HGB 11.6       Imaging:  MR brain and orbits 9/10/20  Impression:    1. Regarding the orbits and globes, there is no definite abnormal  contrast enhancement or mass. No evidence of leukemic infiltration.  2. Regarding the remainder of the brain, abnormal T2 hyperintense bone  marrow signal with associated enhancement in the skull, likely  representing leukemic infiltration.  3. Subcutaneous T2 hyperintensity with associated diffusion  restriction and contrast enhancement over the left zygomatic region,  question leukemic infiltration.    Pathology:  Patient Name: MARIELA WALLACE   MR#: [id number]   Specimen #: [id number]   Collected: 9/10/2020   Received: 9/10/2020   Reported: 9/16/2020 12:35   Ordering Phy(s): ALI  "LAINEY     For improved result formatting, select 'View Enhanced Report Format' under    Linked Documents section.     SPECIMEN(S):   Temporal artery biopsy, left     FINAL DIAGNOSIS:   Temporal artery, left, biopsy:   1. Granulomatous arteritis consistent with giant cell arteritis.   2. Chronic inflammation of the surrounding adventitia.     COMMENT:   Preliminary results were communicated to Dr. Valerie Carrington and Dr. Guille Garcia on 9/14/20 at 12:45pm.     I have personally reviewed all specimens and/or slides, including the   listed special stains, and used them   with my medical judgement to determine or confirm the final diagnosis.     Electronically signed out by:     Za García M.D., [facility]     CLINICAL HISTORY:   The patient is a 79 year old male with a history of chronic lymphocytic   leukemia who presented with bilateral   sequential optic neuropathy with associated pallid optic disc edema and   choroidal hypoperfusion, but   relatively intact visual acuity, negative elevation of acute phase   reactants, and lack of constitutional   symptoms consistent with giant cell arteritis. He also has findings of   likely leukemic infiltration in the   skull seen on MRI. He undergoes temporal artery biopsy on the left.     GROSS:   The specimen is received in formalin with proper patient identification,   labeled \"left temporal artery\".  The   specimen consists of a 3.2 cm in length by 0.3 cm in diameter tan-white   vessel segment. The specimen is   submitted intact in A1. (Dictated by: PADILLA Montalvo 9/11/2020 03:08   PM)     MICROSCOPIC:   The tissue consists of artery with narrowed lumen and intimal hyperplasia.    There is an infiltrate of   lymphocytes, plasma cells, epithelioid histiocytes, and multinucleated   giant cells throughout all layers of   the artery. There is associated loss of the internal elastic lamina on   elastic stain. Occasional focal   calcifications are seen a " the level of the internal elastic lamina.   Neovascularization of the vessel wall is   present. Perivascular lymphocytic infiltrates are seen in the surrounding   adventitia. Immunohistochemistry   with CD3 and CD20 demonstrates a predominantly T-cell lymphocytic   infiltrate within the vessel wall. There is   a mixed infiltrate of T and B lymphocytes perivascular in the adventitia.   CD5 and CD43 have similar staining   patterns to CD3. There is light patchy CD23 staining in the areas positive    for CD20. CD68 highlights the   epithelioid histiocytes within the vessel wall.     The technical component of this testing was completed at the [facility], with the professional component performed    at the [facility], [address] [place] [id number] ([phone])     CPT Codes:   A: 47480-XM7, 92411-VUOJ, 48779-BQR, 49377-PUE, 18691-CUI, 62586-RXL,   57238-DOY, 42687-QRM     COLLECTION SITE:   Client: [facility]   Location: [facility] [facility]UENTES)

## 2021-06-17 NOTE — PATIENT INSTRUCTIONS
Follow-up in 10 weeks with labs the week beforehand    Continue on actemra once weekly.    Let me know if you have any questions prior to your follow-up.    Thanks  Bib Alcantara MD  Rheumatology

## 2021-06-21 ENCOUNTER — THERAPY VISIT (OUTPATIENT)
Dept: PHYSICAL THERAPY | Facility: CLINIC | Age: 81
End: 2021-06-21
Payer: MEDICARE

## 2021-06-21 DIAGNOSIS — M79.604 PAIN OF RIGHT LOWER EXTREMITY: ICD-10-CM

## 2021-06-21 DIAGNOSIS — M54.50 LOW BACK PAIN: ICD-10-CM

## 2021-06-21 PROCEDURE — 97112 NEUROMUSCULAR REEDUCATION: CPT | Mod: GP | Performed by: PHYSICAL THERAPIST

## 2021-06-21 PROCEDURE — 97110 THERAPEUTIC EXERCISES: CPT | Mod: GP | Performed by: PHYSICAL THERAPIST

## 2021-06-22 NOTE — PROGRESS NOTES
".Bon is a 80 year old who is being evaluated via a billable video visit.      How would you like to obtain your AVS? Mail a copy  If the video visit is dropped, the invitation should be resent by: Text to cell phone: [phone]  Will anyone else be joining your video visit? No  {If patient encounters technical issues they should call [phone] :[id number]}    Video Start Time: {video visit start/end time for provider to select:013506}  Video-Visit Details    Type of service:  Video Visit    Video End Time:{video visit start/end time for provider to select:079594}    Originating Location (pt. 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 Never had HA, jaw claudication, PMR symptoms. No inflammatory arthritis symptoms. Disease currently in remission by history and labs on 1mg prednisone and actemra 162mg weekly. Taper ongoing guided by GiACTA trial.      19 minutes    # Giant cell arteritis   1) continue 162mg once weekly injection, next dose due on Wednesday  2) Prednisone plan:  - Continue on taper outlined below. Makes prednisone dose changes on thursdays. Will take last dose of 1mg on 5/12.   3) Continue off bactrim daily and omeprazole  4) Follow-up in 6 weeks by phone visit with labs a few days prior  5) continue f/u with ophthalmology    # Long term use " of high risk medication  1) Prednisone 1mg daily  2) Actemra weekly    # Osteopenia no interval fractures: Continue alendronate. Managed by PCP. Trying to minimize steroid use with actemra to minimize bone health effects    #CLL  Managed by oncology. WBC 27.4.     Severity of this disease discussed at length again with the patient today, particularly the potential for blindness/ loss of vision. Counseled to seek immediate medical attention should he experience changes in vision. He understands. Spent a significant amount of time discussing the side effects of Actemra including risk of malignancy, serious infections resulting in death, opportunistic infections, neutropenia/thrombocytopenia, allergic reaction, GI perforation, demyelinating disease, as well as fever, chills, hypercholesterolemia, abdominal pain, dizziness. Patient had the opportunity to ask questions, which were all answered.    Bib Alcantara MD   Rheumatology     Subjective:   No vision changes, HA, jaw claudication.     5/8/21  No changes in vision. See's Dr. Garcia next week. No HA, jaw claudication, fevers/chills/weight changes. No proximal muscle stiffness/pain. No erythema/edema/warmth of any peripheral joints. Has been using nose spray for seasonal allergies. No abdominal symptoms/abdominal symptom/no blood in stool. Currently on 1mg of prednisone. Will take his last dose of prednisone on Wednesday may 12th. No interval infections. Otherwise ROS negative.    4/8/21 Interval history  No vision changes, overall stable. No HA. No jaw pain. No fevers or chills. No weight changes. No fatigue. No rash. Has chronic sinus congestion (years), taken allergy shots, takes sudafed which is some helpful. Eating without issues. No abdominal pain. No change in bowel in movements. Takes his actemra on wednesdays. Saw ophthalmology on 3/5, no evidence by their assessment of recurrence at that time. Currently on 3mg starting on 4/1. Will be on that until the  15th then will go down to 2mg. ROS otherwise negative    3/4/21   Currently down on 5mg of prednisone daily. Has been on this dose for 1 week. Continuing to go down by 1mg every 2 weeks. No issues as he continues to go down. Swelling in feet has now resolved as he reduces the dose. Continues with the Wednesday actemra injection, no injection site reaction. No interval infections. No fevers/chills/weight loss. No rash. No fatigue. Eating well. No abdominal pain or symptoms. No change in bowel habits.     2/5/21  Had his dental procedure 1 week ago. Took only 1 ibuprofen on the first day, otherwise no significant pain. Has been improving. Last dose of antibiotics was yesterday. No fevers or chills. No HA, jaw pain, change in vision. No erythema/edema/warmth of any joints. No stiffness. Able to make a full fist. Went down to 7mg last week. Still on 7, appropriately did not take actemra this week. Due for next dose on Wednesday of actemra. Thursday is normally when he goes down. No stiffness in hips/shoulders. No new rash. Feels well.  No weight loss. Tolerating medications without side effects. ROS otherwise negative.    1/8/2020  No HA, jaw pain, no vision since last visit. Sleep is improved back to baseline, sleeping 7-730 hours per night with decreasing dose of steroids. No injection site reaction with actemra. No weight gain despite steroids. No other side effects. No longer with bowel movements at night, has returned to morning only, which is baseline. No fevers, chills, infections since last visit. Current dose of prednisone is 10mg daily. No joint pain or stiffness. No difficulty getting in/our of car, climbing stairs, putting on shirt in the AM. No erythema/edema/warmth of any joints. ROS otherwise negative.      12/17/2020  No HA, no jaw pain, no new change in vision. Has had continued sensitivity to sun, which is chronic. Brought up with ophthalmology, not issue from GCA, possible cataract. No joint pain  except for chronic back discomfort. No stiffness in shoulders or hips. No issues with giving himself the injection, no rash or burning. Wednesday morning takes the injection. Last week Thursday decreased the dose from 25mg daily down to 20mg daily. Has continued on this. No ongoing side effects from prednisone. No weight gain. Feels that he is having nocturnal bowel movements, which is new. He thinks this is from having bowl of oatmeal. Not increased in frequency, previously just in the AM. Now just at night. Thinks that it is for oatmeal. Seeing ophthalmology regularly. No interval infections or fevers.     14 point review of systems collected and negative if not documented above.    11/20/2020  Has been tolerating injections without issues. No injection site reactions, which he experienced during his first injection of some burning and redness at the site. No systemic side effects. No new HA. No new change in his vision. No Jaw pain. No stiffness in shoulders or hips. No difficulty putting shirt on in the morning or getting up out of a chair. Has chronic swelling in his feet that is unchanged. Has been doing exercises, which he continues. Walks 1-2 times per week. Has cut down to 35mg daily of prednisone. No return of any symptoms, though has noticed some interruption of sleep which is new since started. Continues to check his finger sticks which he says are in the range he was told. Checking his BP daily and is in the normal range now.     14 point review of systems obtained and negative if not documented above.    HPI from initial consultation 9/25/20  79 year old male with history of CLL, being monitored at this point without an indication for therapy. On Saturday, going to get dirt at a nursery, August 22nd, was driving and noticed that he 'wasn't getting enough light in his eye'. The next day, Sunday, he was continuing to have left eye symptoms. Called the [place] [facility]. Asked for appointment because of  these new symptoms. On Tuesday evening, he called into the on call physician due to new complaint of 'light reflecting off the wall into my eye'. He came in the next day, Wednesday, and was sent to the retinal center on Friday.  Had an exam and was told that there was a defect in the retina/artery. Had MRI of the brain and bloodwork on Tuesday. On Thursday, results of the MRI were discussed and the findings were questioned to be due to his known CLL. Possible stroke he was told due to viscosity increase? On Friday, 9/4/20, had worsening symptoms in the right eye. So came back in to the retinal specialists. At that time there was consideration of sending to [place] ED, though because of the long expected wait, was instead started on 100mg of prednisone daily. 9/9/20 went down to 60mg of prednisone, with plan to go down to 50mg of 10/7/20. Was setup for biopsy on 9/10.     Denies any headaches. No jaw pain, even when chewing tough food. Feels that he has had some weight loss over the last few months unintentionally (139-130 over a period of 1 month). No fatigue, night sweats. No rashes. No chest pain, cough, shortness of breath. No abdominal pain. No joint pain. Has noticed that his stool is less formed. Not diarrhea, just softer. No blood. No issues with urination. No swelling in lower extremities. No raynauds. No double vision.     Past Medical History  CLL  Hernia  Mono   Deviated septum  Osteopenia by DEXA    Past Surgical History  Tonsillectomy  Deviated septum    Medications  Prednisone 1mg daily  Current Outpatient Medications   Medication     alendronate (FOSAMAX) 70 MG tablet     aspirin (ASPIR-LOW) 81 MG EC tablet     azelastine (ASTELIN) 0.1 % nasal spray     B Complex Vitamins (VITAMIN B COMPLEX 100 IJ)     calcium carbonate (OS-BALBIR) 500 MG tablet     Cholecalciferol (VITAMIN D3)     Cranberry 405 MG CAPS     fish oil-omega-3 fatty acids 1000 MG capsule     magnesium 250 MG tablet     tocilizumab  (ACTEMRA) 162 MG/0.9ML subcutaneous injection     Zinc 50 MG CAPS     alendronate (FOSAMAX) 70 MG/75ML solution     Aspirin-Calcium Carbonate  MG TABS     omeprazole (PRILOSEC) 20 MG DR capsule     prednisoLONE 5 MG (21) TBPK     predniSONE (DELTASONE) 1 MG tablet     predniSONE (DELTASONE) 2.5 MG tablet     predniSONE (DELTASONE) 20 MG tablet     predniSONE (DELTASONE) 5 MG tablet     pseudoePHEDrine (SUDAFED) 30 MG tablet     No current facility-administered medications for this visit.        Weeks Daily prednisone dose (mg) 26-week taper Weekly 162mg Actemra      1 60    2 50 11/5/2020    3 40    4 35 11/20/2020   5 30    6 25    7 20 12/10/2020   8 15 12/17/2020   9 12.5    10 12.5    11 10 1/8/2021   12 9    13 8    14 7 2/5/21   15 6 2/11/21   16 6    17 5    18 5 3/4/21   19 4    20 4    21 3    22 3 4/8/21   23 2    24 2    25 1    26 1      Allergies: Seasonal/ environmental allergies    Family History: No family history of autoimmune diseases like RA, sjogrens, SLE, scleroderma, IBD.    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 No drug use.        Objective:   Physical exam:  No vitals or exam for telephone visit    Labs:   5/5/21  WBC 27.4  HGB 13.6    Cr 0.81  ALT 25  AST 29  Chol 152  Trig 88  HDL 54  LDL 80  CRP <2.9  ESR 3  UA with small blood, no cells, no protein, casts    4/8/21  ESR 4  CRP less than 2.9  WBC 30.1  Hemoglobin 13.6  Platelets 157  Creatinine 0.86  Cholesterol 179  Triglycerides 83  HDL 60      3/2/21  ESR 4  CRP <2.9  WBC 28.3  HGB 12.8    Cr 0.86  ALT 23  AST 25    2/3/21  WBC 30.6  HGB 11.9    Cr 0.83  Alk phos 40  ALT 30  AST 29    1/5/21  UA without protein, cells or casts  ESR 5  CRP <2.9  WBC 40.6  HGB 11.6    Cr 0.81  Chol 166  Trig 86  HDL 68  LDL 81    12/14/2020   CRP <2.9  ESR 4  WBC 54  HGB 11.8    Cr 0.78  Ua unremarkable  Cholesterol 168  Trig 82  HDL 70  LDL 81    11-  ESR 3  CRP less than  2.9  WBC 63.3  Hemoglobin 12  Platelets 181  Creatinine 0.98  Total protein low at 5.9  Alk phos 48  ALT 31  AST 30  UA with 30 of albumin  Cholesterol 169  Triglycerides 45  HDL 83  LDL 77    10/1/2020  WBC 84.9  HGB 12.3    IgM 36 low  IgA 124 normal  IgG 625    Cr 0.98    9/1/2020  WBC 50.2  HGB 11.6       Imaging:  MR brain and orbits 9/10/20  Impression:    1. Regarding the orbits and globes, there is no definite abnormal  contrast enhancement or mass. No evidence of leukemic infiltration.  2. Regarding the remainder of the brain, abnormal T2 hyperintense bone  marrow signal with associated enhancement in the skull, likely  representing leukemic infiltration.  3. Subcutaneous T2 hyperintensity with associated diffusion  restriction and contrast enhancement over the left zygomatic region,  question leukemic infiltration.    Pathology:  Patient Name: MARIELA WALLACE   MR#: [id number]   Specimen #: [id number]   Collected: 9/10/2020   Received: 9/10/2020   Reported: 9/16/2020 12:35   Ordering Phy(s): VALERIE CARRINGTON     For improved result formatting, select 'View Enhanced Report Format' under    Linked Documents section.     SPECIMEN(S):   Temporal artery biopsy, left     FINAL DIAGNOSIS:   Temporal artery, left, biopsy:   1. Granulomatous arteritis consistent with giant cell arteritis.   2. Chronic inflammation of the surrounding adventitia.     COMMENT:   Preliminary results were communicated to Dr. Valerie Carrington and Dr. Guille Garcia on 9/14/20 at 12:45pm.     I have personally reviewed all specimens and/or slides, including the   listed special stains, and used them   with my medical judgement to determine or confirm the final diagnosis.     Electronically signed out by:     Za García M.D., [facility]     CLINICAL HISTORY:   The patient is a 79 year old male with a history of chronic lymphocytic   leukemia who presented with bilateral   sequential optic neuropathy with  "associated pallid optic disc edema and   choroidal hypoperfusion, but   relatively intact visual acuity, negative elevation of acute phase   reactants, and lack of constitutional   symptoms consistent with giant cell arteritis. He also has findings of   likely leukemic infiltration in the   skull seen on MRI. He undergoes temporal artery biopsy on the left.     GROSS:   The specimen is received in formalin with proper patient identification,   labeled \"left temporal artery\".  The   specimen consists of a 3.2 cm in length by 0.3 cm in diameter tan-white   vessel segment. The specimen is   submitted intact in A1. (Dictated by: PADILLA Montalvo 9/11/2020 03:08   PM)     MICROSCOPIC:   The tissue consists of artery with narrowed lumen and intimal hyperplasia.    There is an infiltrate of   lymphocytes, plasma cells, epithelioid histiocytes, and multinucleated   giant cells throughout all layers of   the artery. There is associated loss of the internal elastic lamina on   elastic stain. Occasional focal   calcifications are seen a the level of the internal elastic lamina.   Neovascularization of the vessel wall is   present. Perivascular lymphocytic infiltrates are seen in the surrounding   adventitia. Immunohistochemistry   with CD3 and CD20 demonstrates a predominantly T-cell lymphocytic   infiltrate within the vessel wall. There is   a mixed infiltrate of T and B lymphocytes perivascular in the adventitia.   CD5 and CD43 have similar staining   patterns to CD3. There is light patchy CD23 staining in the areas positive    for CD20. CD68 highlights the   epithelioid histiocytes within the vessel wall.     The technical component of this testing was completed at the [facility], with the professional component performed    at the [facility], [address] [place] [address],   [place] [address] ([phone]) "     CPT Codes:   A: 33671-QL3, 83093-FKPD, 42632-DCN, 76398-FFN, 01274-OAJ, 95308-JOC,   63730-LQG, 26821-GGS     COLLECTION SITE:   Client: [facility]   Location: [facility])

## 2021-06-24 NOTE — PROGRESS NOTES
Patient could not connect to video so switched to phone.  Bon is a 80 year old who is being evaluated via a billable Phone visit.      How would you like to obtain your AVS? Mail a copy  If the video visit is dropped, the invitation should be resent by: Text to cell phone: [phone]  Will anyone else be joining your video visit? 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 Never had HA, jaw claudication, PMR symptoms. No inflammatory arthritis symptoms. Disease currently in remission by history actemra 162mg weekly. Has completed his prednisone taper which was guided by GiACTA trial.    # Giant cell arteritis   1) continue 162mg once weekly injection, next dose due on Wednesday  2) Prednisone last dose was on 5/12/21   3) Continue off bactrim daily and omeprazole  4) Follow-up in 10 weeks with labs a few days prior  5) continue f/u with ophthalmology    # Long term use of high risk medication  1) Actemra 162mg weekly. No interval infections. No injection site reactions. Tolerating without issues. No evidence of toxicity by most recent labs.    # Osteopenia no interval fractures: Continue alendronate. Managed by PCP.     #CLL  Managed by oncology. WBC 29.7.     Severity of this disease discussed at length again with the patient today, particularly the potential for blindness/ loss of vision. Counseled to seek immediate medical attention should he experience changes in vision. He understands. Spent a significant amount of time discussing  the side effects of Actemra including risk of malignancy, serious infections resulting in death, opportunistic infections, neutropenia/thrombocytopenia, allergic reaction, GI perforation, demyelinating disease, as well as fever, chills, hypercholesterolemia, abdominal pain, dizziness. Patient had the opportunity to ask questions, which were all answered.    Bib Alcantara MD   Rheumatology     Subjective:   No vision changes, HA, jaw claudication. Has baseline vision change from initial presentation. No fevers, chills, weight changes. Still has not been vaccinated for COVID. Continues to consider this. No side effects from actemra. No interval infections. No proximal muscle pain, stiffness. No erythema/ edema/ warmth of any joints. No joints pain/ stiffness. Looks forward to being more active and exercising again with the warmer weather. ROS otherwise negative.    5/8/21  No changes in vision. See's Dr. Garcia next week. No HA, jaw claudication, fevers/chills/weight changes. No proximal muscle stiffness/pain. No erythema/edema/warmth of any peripheral joints. Has been using nose spray for seasonal allergies. No abdominal symptoms/abdominal symptom/no blood in stool. Currently on 1mg of prednisone. Will take his last dose of prednisone on Wednesday may 12th. No interval infections. Otherwise ROS negative.    4/8/21 Interval history  No vision changes, overall stable. No HA. No jaw pain. No fevers or chills. No weight changes. No fatigue. No rash. Has chronic sinus congestion (years), taken allergy shots, takes sudafed which is some helpful. Eating without issues. No abdominal pain. No change in bowel in movements. Takes his actemra on wednesdays. Saw ophthalmology on 3/5, no evidence by their assessment of recurrence at that time. Currently on 3mg starting on 4/1. Will be on that until the 15th then will go down to 2mg. ROS otherwise negative    3/4/21   Currently down on 5mg of prednisone daily. Has been on  this dose for 1 week. Continuing to go down by 1mg every 2 weeks. No issues as he continues to go down. Swelling in feet has now resolved as he reduces the dose. Continues with the Wednesday actemra injection, no injection site reaction. No interval infections. No fevers/chills/weight loss. No rash. No fatigue. Eating well. No abdominal pain or symptoms. No change in bowel habits.     2/5/21  Had his dental procedure 1 week ago. Took only 1 ibuprofen on the first day, otherwise no significant pain. Has been improving. Last dose of antibiotics was yesterday. No fevers or chills. No HA, jaw pain, change in vision. No erythema/edema/warmth of any joints. No stiffness. Able to make a full fist. Went down to 7mg last week. Still on 7, appropriately did not take actemra this week. Due for next dose on Wednesday of actemra. Thursday is normally when he goes down. No stiffness in hips/shoulders. No new rash. Feels well.  No weight loss. Tolerating medications without side effects. ROS otherwise negative.    1/8/2020  No HA, jaw pain, no vision since last visit. Sleep is improved back to baseline, sleeping 7-730 hours per night with decreasing dose of steroids. No injection site reaction with actemra. No weight gain despite steroids. No other side effects. No longer with bowel movements at night, has returned to morning only, which is baseline. No fevers, chills, infections since last visit. Current dose of prednisone is 10mg daily. No joint pain or stiffness. No difficulty getting in/our of car, climbing stairs, putting on shirt in the AM. No erythema/edema/warmth of any joints. ROS otherwise negative.      12/17/2020  No HA, no jaw pain, no new change in vision. Has had continued sensitivity to sun, which is chronic. Brought up with ophthalmology, not issue from GCA, possible cataract. No joint pain except for chronic back discomfort. No stiffness in shoulders or hips. No issues with giving himself the injection, no rash  or burning. Wednesday morning takes the injection. Last week Thursday decreased the dose from 25mg daily down to 20mg daily. Has continued on this. No ongoing side effects from prednisone. No weight gain. Feels that he is having nocturnal bowel movements, which is new. He thinks this is from having bowl of oatmeal. Not increased in frequency, previously just in the AM. Now just at night. Thinks that it is for oatmeal. Seeing ophthalmology regularly. No interval infections or fevers.     14 point review of systems collected and negative if not documented above.    11/20/2020  Has been tolerating injections without issues. No injection site reactions, which he experienced during his first injection of some burning and redness at the site. No systemic side effects. No new HA. No new change in his vision. No Jaw pain. No stiffness in shoulders or hips. No difficulty putting shirt on in the morning or getting up out of a chair. Has chronic swelling in his feet that is unchanged. Has been doing exercises, which he continues. Walks 1-2 times per week. Has cut down to 35mg daily of prednisone. No return of any symptoms, though has noticed some interruption of sleep which is new since started. Continues to check his finger sticks which he says are in the range he was told. Checking his BP daily and is in the normal range now.     14 point review of systems obtained and negative if not documented above.    HPI from initial consultation 9/25/20  79 year old male with history of CLL, being monitored at this point without an indication for therapy. On Saturday, going to get dirt at a nursery, August 22nd, was driving and noticed that he 'wasn't getting enough light in his eye'. The next day, Sunday, he was continuing to have left eye symptoms. Called the [place] [facility]. Asked for appointment because of these new symptoms. On Tuesday evening, he called into the on call physician due to new complaint of 'light reflecting off the  wall into my eye'. He came in the next day, Wednesday, and was sent to the retinal center on Friday.  Had an exam and was told that there was a defect in the retina/artery. Had MRI of the brain and bloodwork on Tuesday. On Thursday, results of the MRI were discussed and the findings were questioned to be due to his known CLL. Possible stroke he was told due to viscosity increase? On Friday, 9/4/20, had worsening symptoms in the right eye. So came back in to the retinal specialists. At that time there was consideration of sending to [facility] ED, though because of the long expected wait, was instead started on 100mg of prednisone daily. 9/9/20 went down to 60mg of prednisone, with plan to go down to 50mg of 10/7/20. Was setup for biopsy on 9/10.     Denies any headaches. No jaw pain, even when chewing tough food. Feels that he has had some weight loss over the last few months unintentionally (139-130 over a period of 1 month). No fatigue, night sweats. No rashes. No chest pain, cough, shortness of breath. No abdominal pain. No joint pain. Has noticed that his stool is less formed. Not diarrhea, just softer. No blood. No issues with urination. No swelling in lower extremities. No raynauds. No double vision.     Past Medical History  CLL  Hernia  Mono   Deviated septum  Osteopenia by DEXA    Past Surgical History  Tonsillectomy  Deviated septum    Medications  Prednisone 1mg daily  Current Outpatient Medications   Medication     alendronate (FOSAMAX) 70 MG tablet     aspirin (ASPIR-LOW) 81 MG EC tablet     azelastine (ASTELIN) 0.1 % nasal spray     B Complex Vitamins (VITAMIN B COMPLEX 100 IJ)     calcium carbonate (OS-BALBIR) 500 MG tablet     Cholecalciferol (VITAMIN D3)     Cranberry 405 MG CAPS     fish oil-omega-3 fatty acids 1000 MG capsule     magnesium 250 MG tablet     tocilizumab (ACTEMRA) 162 MG/0.9ML subcutaneous injection     Zinc 50 MG CAPS     alendronate (FOSAMAX) 70 MG/75ML solution     Aspirin-Calcium  Carbonate  MG TABS     omeprazole (PRILOSEC) 20 MG DR capsule     prednisoLONE 5 MG (21) TBPK     predniSONE (DELTASONE) 1 MG tablet     predniSONE (DELTASONE) 2.5 MG tablet     predniSONE (DELTASONE) 20 MG tablet     predniSONE (DELTASONE) 5 MG tablet     pseudoePHEDrine (SUDAFED) 30 MG tablet     No current facility-administered medications for this visit.        Weeks Daily prednisone dose (mg) 26-week taper Weekly 162mg Actemra      1 60    2 50 11/5/2020    3 40    4 35 11/20/2020   5 30    6 25    7 20 12/10/2020   8 15 12/17/2020   9 12.5    10 12.5    11 10 1/8/2021   12 9    13 8    14 7 2/5/21   15 6 2/11/21   16 6    17 5    18 5 3/4/21   19 4    20 4    21 3    22 3 4/8/21   23 2    24 2    25 1    26 1 5/12/21     Allergies: Seasonal/ environmental allergies    Family History: No family history of autoimmune diseases like RA, sjogrens, SLE, scleroderma, IBD.    Social History: Works as [employer]. Never smoker. No ETOH use. No drug use.        Objective:   Physical exam:  No vitals or exam for telephone visit    Labs:   6/16/21  ESR 3  CRP <2.9  Lab Results   Component Value Date    WBC 27.4 05/05/2021     Lab Results   Component Value Date    RBC 4.31 05/05/2021     Lab Results   Component Value Date    HGB 13.6 05/05/2021     Lab Results   Component Value Date    HCT 41.6 05/05/2021     No components found for: MCT  Lab Results   Component Value Date    MCV 97 05/05/2021     Lab Results   Component Value Date    MCH 31.6 05/05/2021     Lab Results   Component Value Date    MCHC 32.7 05/05/2021     Lab Results   Component Value Date    RDW 14.7 05/05/2021     Lab Results   Component Value Date     05/05/2021     Creatinine   Date Value Ref Range Status   05/05/2021 0.81 0.66 - 1.25 mg/dL Final       CRP  5/5/21  WBC 27.4  HGB 13.6    Cr 0.81  ALT 25  AST 29  Chol 152  Trig 88  HDL 54  LDL 80  CRP <2.9  ESR 3  UA with small blood, no cells, no protein, casts    4/8/21  ESR 4  CRP less  than 2.9  WBC 30.1  Hemoglobin 13.6  Platelets 157  Creatinine 0.86  Cholesterol 179  Triglycerides 83  HDL 60      3/2/21  ESR 4  CRP <2.9  WBC 28.3  HGB 12.8    Cr 0.86  ALT 23  AST 25    2/3/21  WBC 30.6  HGB 11.9    Cr 0.83  Alk phos 40  ALT 30  AST 29    1/5/21  UA without protein, cells or casts  ESR 5  CRP <2.9  WBC 40.6  HGB 11.6    Cr 0.81  Chol 166  Trig 86  HDL 68  LDL 81    12/14/2020   CRP <2.9  ESR 4  WBC 54  HGB 11.8    Cr 0.78  Ua unremarkable  Cholesterol 168  Trig 82  HDL 70  LDL 81    11-  ESR 3  CRP less than 2.9  WBC 63.3  Hemoglobin 12  Platelets 181  Creatinine 0.98  Total protein low at 5.9  Alk phos 48  ALT 31  AST 30  UA with 30 of albumin  Cholesterol 169  Triglycerides 45  HDL 83  LDL 77    10/1/2020  WBC 84.9  HGB 12.3    IgM 36 low  IgA 124 normal  IgG 625    Cr 0.98    9/1/2020  WBC 50.2  HGB 11.6       Imaging:  MR brain and orbits 9/10/20  Impression:    1. Regarding the orbits and globes, there is no definite abnormal  contrast enhancement or mass. No evidence of leukemic infiltration.  2. Regarding the remainder of the brain, abnormal T2 hyperintense bone  marrow signal with associated enhancement in the skull, likely  representing leukemic infiltration.  3. Subcutaneous T2 hyperintensity with associated diffusion  restriction and contrast enhancement over the left zygomatic region,  question leukemic infiltration.    Pathology:  Patient Name: MARIELA WALLACE   MR#: [id number]   Specimen #: [id number]   Collected: 9/10/2020   Received: 9/10/2020   Reported: 9/16/2020 12:35   Ordering Phy(s): AGUS RETANA     For improved result formatting, select 'View Enhanced Report Format' under    Linked Documents section.     SPECIMEN(S):   Temporal artery biopsy, left     FINAL DIAGNOSIS:   Temporal artery, left, biopsy:   1. Granulomatous arteritis consistent with giant cell arteritis.   2. Chronic inflammation of the  "surrounding adventitia.     COMMENT:   Preliminary results were communicated to Dr. Valerie Carrington and Dr. Guille Garcia on 9/14/20 at 12:45pm.     I have personally reviewed all specimens and/or slides, including the   listed special stains, and used them   with my medical judgement to determine or confirm the final diagnosis.     Electronically signed out by:     Za García M.D., [facility]     CLINICAL HISTORY:   The patient is a 79 year old male with a history of chronic lymphocytic   leukemia who presented with bilateral   sequential optic neuropathy with associated pallid optic disc edema and   choroidal hypoperfusion, but   relatively intact visual acuity, negative elevation of acute phase   reactants, and lack of constitutional   symptoms consistent with giant cell arteritis. He also has findings of   likely leukemic infiltration in the   skull seen on MRI. He undergoes temporal artery biopsy on the left.     GROSS:   The specimen is received in formalin with proper patient identification,   labeled \"left temporal artery\".  The   specimen consists of a 3.2 cm in length by 0.3 cm in diameter tan-white   vessel segment. The specimen is   submitted intact in A1. (Dictated by: PADILLA Montalvo 9/11/2020 03:08   PM)     MICROSCOPIC:   The tissue consists of artery with narrowed lumen and intimal hyperplasia.    There is an infiltrate of   lymphocytes, plasma cells, epithelioid histiocytes, and multinucleated   giant cells throughout all layers of   the artery. There is associated loss of the internal elastic lamina on   elastic stain. Occasional focal   calcifications are seen a the level of the internal elastic lamina.   Neovascularization of the vessel wall is   present. Perivascular lymphocytic infiltrates are seen in the surrounding   adventitia. Immunohistochemistry   with CD3 and CD20 demonstrates a predominantly T-cell lymphocytic   infiltrate within the vessel wall. There is   a mixed " infiltrate of T and B lymphocytes perivascular in the adventitia.   CD5 and CD43 have similar staining   patterns to CD3. There is light patchy CD23 staining in the areas positive    for CD20. CD68 highlights the   epithelioid histiocytes within the vessel wall.     The technical component of this testing was completed at the [facility], with the professional component performed    at the [facility], [address] [place] [phone] ([phone])     CPT Codes:   A: 60425-PL6, 48509-PINP, 40097-TNL, 91232-RSQ, 77897-NNF, 32766-OUV,   77355-IVL, 44182-XUM     COLLECTION SITE:   Client: [facility]   Location: [place] [facility])

## 2021-06-28 ENCOUNTER — THERAPY VISIT (OUTPATIENT)
Dept: PHYSICAL THERAPY | Facility: CLINIC | Age: 81
End: 2021-06-28
Payer: MEDICARE

## 2021-06-28 DIAGNOSIS — M79.604 PAIN OF RIGHT LOWER EXTREMITY: ICD-10-CM

## 2021-06-28 DIAGNOSIS — M54.50 LOW BACK PAIN: ICD-10-CM

## 2021-06-28 PROCEDURE — 97110 THERAPEUTIC EXERCISES: CPT | Mod: GP | Performed by: PHYSICAL THERAPIST

## 2021-06-28 PROCEDURE — 97112 NEUROMUSCULAR REEDUCATION: CPT | Mod: GP | Performed by: PHYSICAL THERAPIST

## 2021-07-02 ENCOUNTER — OFFICE VISIT (OUTPATIENT)
Dept: OPHTHALMOLOGY | Facility: CLINIC | Age: 81
End: 2021-07-02
Attending: OPHTHALMOLOGY
Payer: MEDICARE

## 2021-07-02 DIAGNOSIS — H47.20 OPTIC ATROPHY: ICD-10-CM

## 2021-07-02 DIAGNOSIS — H53.40 VISUAL FIELD DEFECT: ICD-10-CM

## 2021-07-02 DIAGNOSIS — H46.9 OPTIC NEUROPATHY: Primary | ICD-10-CM

## 2021-07-02 PROCEDURE — 92014 COMPRE OPH EXAM EST PT 1/>: CPT | Mod: GC | Performed by: OPHTHALMOLOGY

## 2021-07-02 PROCEDURE — G0463 HOSPITAL OUTPT CLINIC VISIT: HCPCS

## 2021-07-02 PROCEDURE — 92133 CPTRZD OPH DX IMG PST SGM ON: CPT | Performed by: OPHTHALMOLOGY

## 2021-07-02 PROCEDURE — 92083 EXTENDED VISUAL FIELD XM: CPT | Performed by: OPHTHALMOLOGY

## 2021-07-02 ASSESSMENT — REFRACTION_WEARINGRX
OD_CYLINDER: +1.25
OS_AXIS: 059
OS_SPHERE: +6.25
OS_CYLINDER: +0.50
OD_SPHERE: +3.25
SPECS_TYPE: PAL
OD_AXIS: 134

## 2021-07-02 ASSESSMENT — TONOMETRY
IOP_METHOD: ICARE
OS_IOP_MMHG: 16
OD_IOP_MMHG: 16

## 2021-07-02 ASSESSMENT — EXTERNAL EXAM - LEFT EYE: OS_EXAM: NORMAL

## 2021-07-02 ASSESSMENT — CONF VISUAL FIELD
METHOD: COUNTING FINGERS
OD_NORMAL: 1
OS_NORMAL: 1

## 2021-07-02 ASSESSMENT — VISUAL ACUITY
CORRECTION_TYPE: GLASSES
OD_CC: 20/20
METHOD: SNELLEN - LINEAR
OD_CC+: -1
OS_CC: 20/25

## 2021-07-02 ASSESSMENT — SLIT LAMP EXAM - LIDS
COMMENTS: NORMAL
COMMENTS: NORMAL

## 2021-07-02 ASSESSMENT — EXTERNAL EXAM - RIGHT EYE: OD_EXAM: NORMAL

## 2021-07-02 ASSESSMENT — CUP TO DISC RATIO
OD_RATIO: 0.3
OS_RATIO: 0.3

## 2021-07-02 NOTE — LETTER
"2021    RE:  Bon Michael  :  1940  MRN:  [id number]     Dear Providers,    I saw our mutual patient, Bon Michael, in follow-up in my clinic recently.  After a thorough neuro-ophthalmic history and examination, I came to the following conclusions:     1. 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  Vision stable in both eyes with intact / normal central vision but persistent (probably permanent) peripheral field defects in both eyes.  No exam or history indicators to suggest recurrence.   Continue Actemra as per Rheumatology- patient now off of prednisone    Continue Actemra as per Dr. Alcantara.  I support a long course given his unusual presentation and lack of other premonitory symptoms or acute phase reactant elevation to warn of disease recurrence.    Follow-up in 3-4 months    2.  Borderline visually significant cataracts- observe for now    For a more thorough description of the disease presentation, please see my letter from the patient's initial visit with me.     History from presentation (2020 visit):   Bon Michael is a 79-year-old male who presents to Neuro-Ophthalmology   Clinic today for consultation from Jose Andino MD at [facility] for   visual disturbance of left eye. He describes it as \"his left eye not   getting enough light\". He describes noticing the change in his left eye's   temporal visual field, inferior > superior. He notices it more first thing   in the morning. These symptoms began on 2020 and he describes it as   worsening - he is unclear if it the deficit has gotten larger or darker   however. He first noticed it at the periphery of his vision while driving.       Patient saw Dr. Lewis on .  He underwent ESR / CRP testing as   well as MRI (see below).     Labs: 20- WBC- 50.2, ESR- 28, CRP < 5     On , he developed symptoms of waviness in his peripheral vision in the right eye.  He went to " Dr. Andino on 9/4/2020 who wanted him to go to the ED but he was unable.  Dr. Andino reviewed the patient's fluorescein angiography and felt there was choroidal filling delay in the right eye and pallid edema in the right eye concerning for giant cell arteritis.  We discussed the case via phone.      PRIOR IMAGING:  BRAIN MRI WITH/WO GADOLINIUM, MRA OF THE [place] OF [place] AND MRA OF THE   CAROTID ARTERIES - [facility] (9/1/2020)  IMPRESSION:    1.  No acute intracranial abnormality  2.  No evidence of an orbital abnormalities  3.  Mild generalized cerebral atrophy  4.  Unremarkable MRA of the [place]  5.  Unremarkable MRA of the cervical vessels     On my re-review I disagreed with the Radiology read.  There is evidence of    patchy enhancement diffusely within the bilateral orbits and along the   bilateral intraorbital optic nerve sheaths.  Will discuss with   Neuro-radiology regarding finding and repeat scan.     No bisphosphonates in the past (no osteoporosis medications for 7-8 years   per patient).      Left temporal artery biopsy 9/9/20- positive for giant cell arteritis.   Prednisone (started 9/4/2020).     Patient started Actemra 10/28/20.     Interim history and exam:  Last seen by me 5/12/2021      Since last visit with me, patient notes no significant changes in vision.  He denies headaches, jaw claudications, temporal tenderness.    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 Never had HA, jaw claudication, PMR symptoms. No inflammatory arthritis symptoms. Disease currently in remission by history Actemra 162mg weekly. Has completed his prednisone taper which was guided by GiACTA trial.     # Giant cell arteritis   1) Continue 162mg once weekly  injection, next dose due on Wednesday  2) Prednisone last dose was on 5/12/21   3) Continue off bactrim daily and omeprazole  4) Follow-up in 10 weeks with labs a few days prior  5) Continue f/u with Ophthalmology     # Long term use of high-risk medication  1) Actemra 162mg weekly. No interval infections. No injection site reactions. Tolerating without issues. No evidence of toxicity by most recent labs.     # Osteopenia no interval fractures: Continue alendronate. Managed by PCP.      #CLL  Managed by Oncology. WBC 29.7.     Visual acuity and color vision today stable in both eyes. Optic nerve head appearance stable.  Octopus automated 30 degree visual field today showed stable visual field defects in both eyes.  OCT of the optic nerve head revealed stable retinal nerve fiber layer loss.      For further exam details, please feel free to contact our office for additional records.  If you wish to contact me regarding this patient, please email me at [email or web address] or give my clinic a call to arrange a phone conversation.    Sincerely,    Guille Garcia MD  , Neuro-Ophthalmology and Adult Strabismus Surgery  The Lucy Castano Chair in Neuro-Ophthalmology  Department of Ophthalmology and Visual Neurosciences  [facility]

## 2021-07-02 NOTE — PROGRESS NOTES
"   1. 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  Vision stable in both eyes with intact / normal central vision but persistent (probably permanent) peripheral field defects in both eyes.  No exam or history indicators to suggest recurrence.   Continue Actemra as per rheumatology- patient now off of prednisone    Continue actemra as per Dr. Alcantara.  I support a long course given his unusual presentation and lack of other premonitory symptoms or acute phase reactant elevation to warn of disease recurrence.    Follow up in 3-4 months    2.  Borderline visually significant cataracts- observe for now    For a more thorough description of the disease presentation, please see my letter from the patient's initial visit with me      history from presentation (9/9/2020 visit)     Bon Michael is a 79 year old male who presents to neuro-ophthalmology   clinic today for consultation from Jose Andino MD at [facility] for   visual disturbance of left eye. He describes it as \"his left eye not   getting enough light\". He describes noticing the change in his left eye's   temporal visual field, inferior > superior. He notices it more first thing   in the morning. These symptoms began on 8/22/2020 and he describes it as   worsening - he is unclear if it the deficit has gotten larger or darker   however. He first noticed it while driving at the periphery of his vision.         Patient saw Dr. Lewis on August 28.  He underwent esr / crp testing as   well as mri (see below).     Labs: 9/2/20- WBC- 50.2, ESR- 28, CRP < 5     On August 31 he developed symptoms of waviness in his peripheral vision in the right eye.  He went to Dr. Andino on 9/4/2020 who wanted him to go to         the ED but he was unable.  Dr. Andino reviewed the patient's fluorescein   angiography and felt there was choroidal filling delay in the right eye   and pallid edema in the right eye concerning for giant cell " arteritis.  We   discussed the case via phone.      PRIOR IMAGING:  BRAIN MRI WITH/WO GADOLINIUM, MRA OF THE [place] OF [place] AND MRA OF THE   CAROTID ARTERIES - [facility] (9/1/2020)  IMPRESSION:    1.  No acute intracranial abnormality.  2.  No evidence of an orbital abnormalities.  3.  Mild generalized cerebral atrophy.  4.  Unremarkable MRA of the [place].  5.  Unremarkable MRA of the cervical vessels.     On my re-review I disagreed with the radiology read.  There is evidence of      patchy enhancement diffusely within the bilateral orbits and along the   bilateral intraorbital optic nerve sheaths.  Will discuss with   neuro-radiology regarding finding and repeat scan.     No bisphosphonates in the past (no osteoporosis medications for 7-8 years   per patient).      Left temporal artery biopsy 9/9/20- positive for giant cell arteritis   Prednisone (started 9/4/2020)     Patient started Actemra 10/28/20.     Interim history and exam:  Last seen by me 5/12/2021      Since last visit with me patient notes no significant changes in vision.  he denies headaches, jaw claudications, temporal tenderness.    Dr Alcantara 6/17/2021  Assessment:  80 year old male with history of CLL referred to rheumatology for evaluation and treatment of biopsy proven giant cell arteritis with bilateral ischemic optic neuropathy clinically manifested by now stable peripheral vision loss in left>right eye. Never had HA, jaw claudication, PMR symptoms. No inflammatory arthritis symptoms. Disease currently in remission by history actemra 162mg weekly. Has completed his prednisone taper which was guided by GiACTA trial.     # Giant cell arteritis   1) continue 162mg once weekly injection, next dose due on Wednesday  2) Prednisone last dose was on 5/12/21   3) Continue off bactrim daily and omeprazole  4) Follow-up in 10 weeks with labs a few days prior  5) continue f/u with ophthalmology     # Long term use of high risk  medication  1) Actemra 162mg weekly. No interval infections. No injection site reactions. Tolerating without issues. No evidence of toxicity by most recent labs.     # Osteopenia no interval fractures: Continue alendronate. Managed by PCP.      #CLL  Managed by oncology. WBC 29.7.     Visual acuity and color vision today stable in both eyes. Optic nerve head appearance stable.  Octopus automated 30 degree visual field today showed stable visual field defects in both eyes.  OCT of the optic nerve head revealed stable retinal nerve fiber layer loss     Complete documentation of historical and exam elements from today's encounter can be found in the full encounter summary report (not reduplicated in this progress note).  I personally obtained the chief complaint(s) and history of present illness.  I confirmed and edited as necessary the review of systems, past medical/surgical history, family history, social history, and examination findings as documented by others; and I examined the patient myself.  I personally reviewed the relevant tests, images, and reports as documented above.  I formulated and edited as necessary the assessment and plan and discussed the findings and management plan with the patient and family.  I personally reviewed the ophthalmic test(s) associated with this encounter, agree with the interpretation(s) as documented by the resident/fellow, and have edited the corresponding report(s) as necessary.     MD Jaclyn Gomez MD  Neuro-ophthalmology fellow  [facility]

## 2021-07-05 ENCOUNTER — THERAPY VISIT (OUTPATIENT)
Dept: PHYSICAL THERAPY | Facility: CLINIC | Age: 81
End: 2021-07-05
Payer: MEDICARE

## 2021-07-05 DIAGNOSIS — M79.604 PAIN OF RIGHT LOWER EXTREMITY: ICD-10-CM

## 2021-07-05 DIAGNOSIS — M54.50 LOW BACK PAIN: ICD-10-CM

## 2021-07-05 PROCEDURE — 97112 NEUROMUSCULAR REEDUCATION: CPT | Mod: GP | Performed by: PHYSICAL THERAPIST

## 2021-07-05 PROCEDURE — 97110 THERAPEUTIC EXERCISES: CPT | Mod: GP | Performed by: PHYSICAL THERAPIST

## 2021-07-15 ENCOUNTER — THERAPY VISIT (OUTPATIENT)
Dept: PHYSICAL THERAPY | Facility: CLINIC | Age: 81
End: 2021-07-15
Payer: MEDICARE

## 2021-07-15 DIAGNOSIS — M79.604 PAIN OF RIGHT LOWER EXTREMITY: ICD-10-CM

## 2021-07-15 DIAGNOSIS — M54.50 LOW BACK PAIN: ICD-10-CM

## 2021-07-15 PROCEDURE — 97112 NEUROMUSCULAR REEDUCATION: CPT | Mod: GP | Performed by: PHYSICAL THERAPIST

## 2021-07-15 PROCEDURE — 97110 THERAPEUTIC EXERCISES: CPT | Mod: GP | Performed by: PHYSICAL THERAPIST

## 2021-08-05 ENCOUNTER — TRANSFERRED RECORDS (OUTPATIENT)
Dept: PHYSICAL THERAPY | Facility: CLINIC | Age: 81
End: 2021-08-05

## 2021-09-01 DIAGNOSIS — M31.6 GIANT CELL ARTERITIS (H): ICD-10-CM

## 2021-09-01 NOTE — TELEPHONE ENCOUNTER
Actemra      Last Written Prescription Date:  9/25/20  Last Fill Quantity: 4,   # refills: 11  Last Office Visit: 6/17/21  Future Office visit:  9/3/21    CBC RESULTS: Recent Labs   Lab Test 05/05/21  1302   WBC 27.4*   RBC 4.31*   HGB 13.6   HCT 41.6   MCV 97   MCH 31.6   MCHC 32.7   RDW 14.7   *       Creatinine   Date Value Ref Range Status   05/05/2021 0.81 0.66 - 1.25 mg/dL Final   ]    Liver Function Studies -   Recent Labs   Lab Test 05/05/21  1302   PROTTOTAL 6.4*   ALBUMIN 3.7   BILITOTAL 0.4   ALKPHOS 45   AST 29   ALT 25       Routing refill request to provider for review/approval because:  Drug not on the G, P or [facility] refill protocol or controlled substance    Pt did have labs done at [facility] today.    Candis Montero RN  Rheumatology Clinic

## 2021-09-03 ENCOUNTER — VIRTUAL VISIT (OUTPATIENT)
Dept: RHEUMATOLOGY | Facility: CLINIC | Age: 81
End: 2021-09-03
Attending: INTERNAL MEDICINE
Payer: MEDICARE

## 2021-09-03 DIAGNOSIS — Z79.899 HIGH RISK MEDICATION USE: ICD-10-CM

## 2021-09-03 DIAGNOSIS — M31.6 GIANT CELL ARTERITIS (H): Primary | ICD-10-CM

## 2021-09-03 PROCEDURE — 99443 PR PHYSICIAN TELEPHONE EVALUATION 21-30 MIN: CPT | Performed by: INTERNAL MEDICINE

## 2021-09-03 ASSESSMENT — PAIN SCALES - GENERAL: PAINLEVEL: NO PAIN (0)

## 2021-09-03 NOTE — PROGRESS NOTES
Bon is a 80 year old who is being evaluated via a billable telephone visit.      What phone number would you like to be contacted at? [phone]  How would you like to obtain your AVS? Kimberly  Phone call duration: 29 minutes    TELEPHONE Rheumatology Follow-up    Name: Bon Michael    MRN [id number]   Today's date: September 3, 2021            Reason for Follow-up:  [facility]   Requesting physician: Guille Garcia MD        Assessment & Plan:   Assessment:  80 year old male with history of CLL referred to rheumatology for evaluation and treatment of biopsy proven giant cell arteritis with bilateral ischemic optic neuropathy clinically manifested by now stable peripheral vision loss in left>right eye. Never had HA, jaw claudication, PMR symptoms. No inflammatory arthritis symptoms. Disease currently in remission by history actemra 162mg weekly. Has completed his prednisone taper which was guided by GiACTA trial.    # Giant cell arteritis   1) continue 162mg once weekly injection, next dose due on Wednesday  2) Prednisone last dose was on 5/12/21   3) Continue off bactrim daily and omeprazole  4) Follow-up in 10 weeks with labs a few days prior  5) continue f/u with ophthalmology    # Long term use of high risk medication  1) Actemra 162mg weekly. No interval infections. No injection site reactions. Tolerating without issues. No evidence of toxicity by most recent labs.    # Osteopenia no interval fractures: Continue alendronate. Managed by PCP.     #CLL  Managed by oncology. WBC 47.3 most recently.     # Seasonal allergies: flonase daily    Severity of this disease discussed at length again with the patient today, particularly the potential for blindness/ loss of vision. Counseled to seek immediate medical attention should he experience changes in vision. He understands. Spent a significant amount of time discussing the side effects of Actemra including risk of malignancy, serious infections  resulting in death, opportunistic infections, neutropenia/thrombocytopenia, allergic reaction, GI perforation, demyelinating disease, as well as fever, chills, hypercholesterolemia, abdominal pain, dizziness. Patient had the opportunity to ask questions, which were all answered.    Bib Alcantara MD   Rheumatology     Subjective:   September 3, 2021  Has been having some congestion, thinks allergy related. Was started on 40mg for 5 days of prednisone which cleared this up, though back again when prednisone stopped. Uses flonase and saline flushing. And azelastine. Symptoms waxing and waning. No HA, jaw pain, vision changes. No fevers, chills, weight changes. No interval infections. No proximal muscle pain/stiffness. No peripheral joint stiffness/pain. No red/hot/swollen joints. Able to make a full fist upon waking. Still not vaccinated for COVID, very nervous about the virus and vaccine. Self reports being paranoid about being scared something is going to go wrong for him. No side effects from actemra.     6/17/21  No vision changes, HA, jaw claudication. Has baseline vision change from initial presentation. No fevers, chills, weight changes. Still has not been vaccinated for COVID. Continues to consider this. No side effects from actemra. No interval infections. No proximal muscle pain, stiffness. No erythema/ edema/ warmth of any joints. No joints pain/ stiffness. Looks forward to being more active and exercising again with the warmer weather. ROS otherwise negative.    5/8/21  No changes in vision. See's Dr. Garcia next week. No HA, jaw claudication, fevers/chills/weight changes. No proximal muscle stiffness/pain. No erythema/edema/warmth of any peripheral joints. Has been using nose spray for seasonal allergies. No abdominal symptoms/abdominal symptom/no blood in stool. Currently on 1mg of prednisone. Will take his last dose of prednisone on Wednesday may 12th. No interval infections. Otherwise ROS  negative.    4/8/21 Interval history  No vision changes, overall stable. No HA. No jaw pain. No fevers or chills. No weight changes. No fatigue. No rash. Has chronic sinus congestion (years), taken allergy shots, takes sudafed which is some helpful. Eating without issues. No abdominal pain. No change in bowel in movements. Takes his actemra on wednesdays. Saw ophthalmology on 3/5, no evidence by their assessment of recurrence at that time. Currently on 3mg starting on 4/1. Will be on that until the 15th then will go down to 2mg. ROS otherwise negative    3/4/21   Currently down on 5mg of prednisone daily. Has been on this dose for 1 week. Continuing to go down by 1mg every 2 weeks. No issues as he continues to go down. Swelling in feet has now resolved as he reduces the dose. Continues with the Wednesday actemra injection, no injection site reaction. No interval infections. No fevers/chills/weight loss. No rash. No fatigue. Eating well. No abdominal pain or symptoms. No change in bowel habits.     2/5/21  Had his dental procedure 1 week ago. Took only 1 ibuprofen on the first day, otherwise no significant pain. Has been improving. Last dose of antibiotics was yesterday. No fevers or chills. No HA, jaw pain, change in vision. No erythema/edema/warmth of any joints. No stiffness. Able to make a full fist. Went down to 7mg last week. Still on 7, appropriately did not take actemra this week. Due for next dose on Wednesday of actemra. Thursday is normally when he goes down. No stiffness in hips/shoulders. No new rash. Feels well.  No weight loss. Tolerating medications without side effects. ROS otherwise negative.    1/8/2020  No HA, jaw pain, no vision since last visit. Sleep is improved back to baseline, sleeping 7-730 hours per night with decreasing dose of steroids. No injection site reaction with actemra. No weight gain despite steroids. No other side effects. No longer with bowel movements at night, has returned to  morning only, which is baseline. No fevers, chills, infections since last visit. Current dose of prednisone is 10mg daily. No joint pain or stiffness. No difficulty getting in/our of car, climbing stairs, putting on shirt in the AM. No erythema/edema/warmth of any joints. ROS otherwise negative.    12/17/2020  No HA, no jaw pain, no new change in vision. Has had continued sensitivity to sun, which is chronic. Brought up with ophthalmology, not issue from GCA, possible cataract. No joint pain except for chronic back discomfort. No stiffness in shoulders or hips. No issues with giving himself the injection, no rash or burning. Wednesday morning takes the injection. Last week Thursday decreased the dose from 25mg daily down to 20mg daily. Has continued on this. No ongoing side effects from prednisone. No weight gain. Feels that he is having nocturnal bowel movements, which is new. He thinks this is from having bowl of oatmeal. Not increased in frequency, previously just in the AM. Now just at night. Thinks that it is for oatmeal. Seeing ophthalmology regularly. No interval infections or fevers.     14 point review of systems collected and negative if not documented above.    11/20/2020  Has been tolerating injections without issues. No injection site reactions, which he experienced during his first injection of some burning and redness at the site. No systemic side effects. No new HA. No new change in his vision. No Jaw pain. No stiffness in shoulders or hips. No difficulty putting shirt on in the morning or getting up out of a chair. Has chronic swelling in his feet that is unchanged. Has been doing exercises, which he continues. Walks 1-2 times per week. Has cut down to 35mg daily of prednisone. No return of any symptoms, though has noticed some interruption of sleep which is new since started. Continues to check his finger sticks which he says are in the range he was told. Checking his BP daily and is in the normal  range now.     14 point review of systems obtained and negative if not documented above.    HPI from initial consultation 9/25/20  79 year old male with history of CLL, being monitored at this point without an indication for therapy. On Saturday, going to get dirt at a nursery, August 22nd, was driving and noticed that he 'wasn't getting enough light in his eye'. The next day, Sunday, he was continuing to have left eye symptoms. Called the [place] [facility]. Asked for appointment because of these new symptoms. On Tuesday evening, he called into the on call physician due to new complaint of 'light reflecting off the wall into my eye'. He came in the next day, Wednesday, and was sent to the retinal center on Friday.  Had an exam and was told that there was a defect in the retina/artery. Had MRI of the brain and bloodwork on Tuesday. On Thursday, results of the MRI were discussed and the findings were questioned to be due to his known CLL. Possible stroke he was told due to viscosity increase? On Friday, 9/4/20, had worsening symptoms in the right eye. So came back in to the retinal specialists. At that time there was consideration of sending to [place] ED, though because of the long expected wait, was instead started on 100mg of prednisone daily. 9/9/20 went down to 60mg of prednisone, with plan to go down to 50mg of 10/7/20. Was setup for biopsy on 9/10.     Denies any headaches. No jaw pain, even when chewing tough food. Feels that he has had some weight loss over the last few months unintentionally (139-130 over a period of 1 month). No fatigue, night sweats. No rashes. No chest pain, cough, shortness of breath. No abdominal pain. No joint pain. Has noticed that his stool is less formed. Not diarrhea, just softer. No blood. No issues with urination. No swelling in lower extremities. No raynauds. No double vision.     Past Medical History  CLL  Hernia  Mono   Deviated septum  Osteopenia by DEXA    Past Surgical  History  Tonsillectomy  Deviated septum    Medications  Prednisone 1mg daily  Current Outpatient Medications   Medication     alendronate (FOSAMAX) 70 MG tablet     alendronate (FOSAMAX) 70 MG/75ML solution     aspirin (ASPIR-LOW) 81 MG EC tablet     Aspirin-Calcium Carbonate  MG TABS     azelastine (ASTELIN) 0.1 % nasal spray     B Complex Vitamins (VITAMIN B COMPLEX 100 IJ)     calcium carbonate (OS-BALBIR) 500 MG tablet     Cholecalciferol (VITAMIN D3)     Cranberry 405 MG CAPS     fish oil-omega-3 fatty acids 1000 MG capsule     magnesium 250 MG tablet     omeprazole (PRILOSEC) 20 MG DR capsule     prednisoLONE 5 MG (21) TBPK     predniSONE (DELTASONE) 1 MG tablet     predniSONE (DELTASONE) 2.5 MG tablet     predniSONE (DELTASONE) 20 MG tablet     predniSONE (DELTASONE) 5 MG tablet     pseudoePHEDrine (SUDAFED) 30 MG tablet     tocilizumab (ACTEMRA) 162 MG/0.9ML subcutaneous injection     Zinc 50 MG CAPS     No current facility-administered medications for this visit.       Weeks Daily prednisone dose (mg) 26-week taper Weekly 162mg Actemra      1 60    2 50 11/5/2020    3 40    4 35 11/20/2020   5 30    6 25    7 20 12/10/2020   8 15 12/17/2020   9 12.5    10 12.5    11 10 1/8/2021   12 9    13 8    14 7 2/5/21   15 6 2/11/21   16 6    17 5    18 5 3/4/21   19 4    20 4    21 3    22 3 4/8/21   23 2    24 2    25 1    26 1 5/12/21     Allergies: Seasonal/ environmental allergies    Family History: No family history of autoimmune diseases like RA, sjogrens, SLE, scleroderma, IBD.    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 No drug use.        Objective:   Physical exam:  No vitals or exam for telephone visit    Labs:   9/1/21  Creatinine 0.84  CRP 0.05  ESR <1  WBC 47.3  HGB 13.3      6/16/21  ESR 3  CRP <2. 9  Lab Results   Component Value Date    WBC 27.4 05/05/2021     Lab Results   Component Value Date    RBC 4.31 05/05/2021     Lab Results   Component Value Date    HGB 13.6  05/05/2021     Lab Results   Component Value Date    HCT 41.6 05/05/2021     No components found for: MCT  Lab Results   Component Value Date    MCV 97 05/05/2021     Lab Results   Component Value Date    MCH 31.6 05/05/2021     Lab Results   Component Value Date    MCHC 32.7 05/05/2021     Lab Results   Component Value Date    RDW 14.7 05/05/2021     Lab Results   Component Value Date     05/05/2021     Creatinine   Date Value Ref Range Status   05/05/2021 0.81 0.66 - 1.25 mg/dL Final       CRP  5/5/21  WBC 27.4  HGB 13.6    Cr 0.81  ALT 25  AST 29  Chol 152  Trig 88  HDL 54  LDL 80  CRP <2.9  ESR 3  UA with small blood, no cells, no protein, casts    4/8/21  ESR 4  CRP less than 2.9  WBC 30.1  Hemoglobin 13.6  Platelets 157  Creatinine 0.86  Cholesterol 179  Triglycerides 83  HDL 60      3/2/21  ESR 4  CRP <2.9  WBC 28.3  HGB 12.8    Cr 0.86  ALT 23  AST 25    2/3/21  WBC 30.6  HGB 11.9    Cr 0.83  Alk phos 40  ALT 30  AST 29    1/5/21  UA without protein, cells or casts  ESR 5  CRP <2.9  WBC 40.6  HGB 11.6    Cr 0.81  Chol 166  Trig 86  HDL 68  LDL 81    12/14/2020   CRP <2.9  ESR 4  WBC 54  HGB 11.8    Cr 0.78  Ua unremarkable  Cholesterol 168  Trig 82  HDL 70  LDL 81    11-  ESR 3  CRP less than 2.9  WBC 63.3  Hemoglobin 12  Platelets 181  Creatinine 0.98  Total protein low at 5.9  Alk phos 48  ALT 31  AST 30  UA with 30 of albumin  Cholesterol 169  Triglycerides 45  HDL 83  LDL 77    10/1/2020  WBC 84.9  HGB 12.3    IgM 36 low  IgA 124 normal  IgG 625    Cr 0.98    9/1/2020  WBC 50.2  HGB 11.6       Imaging:  MR brain and orbits 9/10/20  Impression:    1. Regarding the orbits and globes, there is no definite abnormal  contrast enhancement or mass. No evidence of leukemic infiltration.  2. Regarding the remainder of the brain, abnormal T2 hyperintense bone  marrow signal with associated enhancement in the skull, likely  representing  "leukemic infiltration.  3. Subcutaneous T2 hyperintensity with associated diffusion  restriction and contrast enhancement over the left zygomatic region,  question leukemic infiltration.    Pathology:  Patient Name: MARIELA WALLACE   MR#: [id number]   Specimen #: [id number]   Collected: 9/10/2020   Received: 9/10/2020   Reported: 9/16/2020 12:35   Ordering Phy(s): VALERIE CARRINGTON     For improved result formatting, select 'View Enhanced Report Format' under    Linked Documents section.     SPECIMEN(S):   Temporal artery biopsy, left     FINAL DIAGNOSIS:   Temporal artery, left, biopsy:   1. Granulomatous arteritis consistent with giant cell arteritis.   2. Chronic inflammation of the surrounding adventitia.     COMMENT:   Preliminary results were communicated to Dr. Valerie Carrignton and Dr. Guille Garcia on 9/14/20 at 12:45pm.     I have personally reviewed all specimens and/or slides, including the   listed special stains, and used them   with my medical judgement to determine or confirm the final diagnosis.     Electronically signed out by:     Za García M.D., [facility]     CLINICAL HISTORY:   The patient is a 79 year old male with a history of chronic lymphocytic   leukemia who presented with bilateral   sequential optic neuropathy with associated pallid optic disc edema and   choroidal hypoperfusion, but   relatively intact visual acuity, negative elevation of acute phase   reactants, and lack of constitutional   symptoms consistent with giant cell arteritis. He also has findings of   likely leukemic infiltration in the   skull seen on MRI. He undergoes temporal artery biopsy on the left.     GROSS:   The specimen is received in formalin with proper patient identification,   labeled \"left temporal artery\".  The   specimen consists of a 3.2 cm in length by 0.3 cm in diameter tan-white   vessel segment. The specimen is   submitted intact in A1. (Dictated by: PADILLA Montalvo 9/11/2020 03:08   PM) "     MICROSCOPIC:   The tissue consists of artery with narrowed lumen and intimal hyperplasia.    There is an infiltrate of   lymphocytes, plasma cells, epithelioid histiocytes, and multinucleated   giant cells throughout all layers of   the artery. There is associated loss of the internal elastic lamina on   elastic stain. Occasional focal   calcifications are seen a the level of the internal elastic lamina.   Neovascularization of the vessel wall is   present. Perivascular lymphocytic infiltrates are seen in the surrounding   adventitia. Immunohistochemistry   with CD3 and CD20 demonstrates a predominantly T-cell lymphocytic   infiltrate within the vessel wall. There is   a mixed infiltrate of T and B lymphocytes perivascular in the adventitia.   CD5 and CD43 have similar staining   patterns to CD3. There is light patchy CD23 staining in the areas positive    for CD20. CD68 highlights the   epithelioid histiocytes within the vessel wall.     The technical component of this testing was completed at the [facility], with the professional component performed    at the [facility], [address] [place] [id number] ([phone])     CPT Codes:   A: 65942-KF7, 86866-HIQD, 60912-RCB, 56458-NAT, 35437-XWN, 05608-PGM,   53450-VTX, 24796-UAY     COLLECTION SITE:   Client: [facility]   Location: BERNADETTE FUENTES)

## 2021-09-03 NOTE — LETTER
9/3/2021       RE: Bon Michael  1923 [facility] [address]     Dear Colleague,    Thank you for referring your patient, Bon Michael, to the [facility] [place] [facility]. Please see a copy of my visit note below.    Bon is a 80 year old who is being evaluated via a billable telephone visit.      What phone number would you like to be contacted at? [phone]  How would you like to obtain your AVS? 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 Never had HA, jaw claudication, PMR symptoms. No inflammatory arthritis symptoms. Disease currently in remission by history actemra 162mg weekly. Has completed his prednisone taper which was guided by GiACTA trial.    # Giant cell arteritis   1) continue 162mg once weekly injection, next dose due on Wednesday  2) Prednisone last dose was on 5/12/21   3) Continue off bactrim daily and omeprazole  4) Follow-up in 10 weeks with labs a few days prior  5) continue f/u with ophthalmology    # Long term use of high risk medication  1) Actemra 162mg weekly. No interval infections. No injection site reactions. Tolerating without issues. No evidence of toxicity by most recent labs.    # Osteopenia no interval fractures: Continue alendronate. Managed by PCP.     #CLL  Managed by oncology. WBC 47.3 most recently.     # Seasonal allergies: flonase daily    Severity of this disease discussed at length  again with the patient today, particularly the potential for blindness/ loss of vision. Counseled to seek immediate medical attention should he experience changes in vision. He understands. Spent a significant amount of time discussing the side effects of Actemra including risk of malignancy, serious infections resulting in death, opportunistic infections, neutropenia/thrombocytopenia, allergic reaction, GI perforation, demyelinating disease, as well as fever, chills, hypercholesterolemia, abdominal pain, dizziness. Patient had the opportunity to ask questions, which were all answered.    Bib Alcantara MD   Rheumatology     Subjective:   September 3, 2021  Has been having some congestion, thinks allergy related. Was started on 40mg for 5 days of prednisone which cleared this up, though back again when prednisone stopped. Uses flonase and saline flushing. And azelastine. Symptoms waxing and waning. No HA, jaw pain, vision changes. No fevers, chills, weight changes. No interval infections. No proximal muscle pain/stiffness. No peripheral joint stiffness/pain. No red/hot/swollen joints. Able to make a full fist upon waking. Still not vaccinated for COVID, very nervous about the virus and vaccine. Self reports being paranoid about being scared something is going to go wrong for him. No side effects from actemra.     6/17/21  No vision changes, HA, jaw claudication. Has baseline vision change from initial presentation. No fevers, chills, weight changes. Still has not been vaccinated for COVID. Continues to consider this. No side effects from actemra. No interval infections. No proximal muscle pain, stiffness. No erythema/ edema/ warmth of any joints. No joints pain/ stiffness. Looks forward to being more active and exercising again with the warmer weather. ROS otherwise negative.    5/8/21  No changes in vision. See's Dr. Garcia next week. No HA, jaw claudication, fevers/chills/weight changes. No proximal muscle  stiffness/pain. No erythema/edema/warmth of any peripheral joints. Has been using nose spray for seasonal allergies. No abdominal symptoms/abdominal symptom/no blood in stool. Currently on 1mg of prednisone. Will take his last dose of prednisone on Wednesday may 12th. No interval infections. Otherwise ROS negative.    4/8/21 Interval history  No vision changes, overall stable. No HA. No jaw pain. No fevers or chills. No weight changes. No fatigue. No rash. Has chronic sinus congestion (years), taken allergy shots, takes sudafed which is some helpful. Eating without issues. No abdominal pain. No change in bowel in movements. Takes his actemra on wednesdays. Saw ophthalmology on 3/5, no evidence by their assessment of recurrence at that time. Currently on 3mg starting on 4/1. Will be on that until the 15th then will go down to 2mg. ROS otherwise negative    3/4/21   Currently down on 5mg of prednisone daily. Has been on this dose for 1 week. Continuing to go down by 1mg every 2 weeks. No issues as he continues to go down. Swelling in feet has now resolved as he reduces the dose. Continues with the Wednesday actemra injection, no injection site reaction. No interval infections. No fevers/chills/weight loss. No rash. No fatigue. Eating well. No abdominal pain or symptoms. No change in bowel habits.     2/5/21  Had his dental procedure 1 week ago. Took only 1 ibuprofen on the first day, otherwise no significant pain. Has been improving. Last dose of antibiotics was yesterday. No fevers or chills. No HA, jaw pain, change in vision. No erythema/edema/warmth of any joints. No stiffness. Able to make a full fist. Went down to 7mg last week. Still on 7, appropriately did not take actemra this week. Due for next dose on Wednesday of actemra. Thursday is normally when he goes down. No stiffness in hips/shoulders. No new rash. Feels well.  No weight loss. Tolerating medications without side effects. ROS otherwise  negative.    1/8/2020  No HA, jaw pain, no vision since last visit. Sleep is improved back to baseline, sleeping 7-730 hours per night with decreasing dose of steroids. No injection site reaction with actemra. No weight gain despite steroids. No other side effects. No longer with bowel movements at night, has returned to morning only, which is baseline. No fevers, chills, infections since last visit. Current dose of prednisone is 10mg daily. No joint pain or stiffness. No difficulty getting in/our of car, climbing stairs, putting on shirt in the AM. No erythema/edema/warmth of any joints. ROS otherwise negative.    12/17/2020  No HA, no jaw pain, no new change in vision. Has had continued sensitivity to sun, which is chronic. Brought up with ophthalmology, not issue from GCA, possible cataract. No joint pain except for chronic back discomfort. No stiffness in shoulders or hips. No issues with giving himself the injection, no rash or burning. Wednesday morning takes the injection. Last week Thursday decreased the dose from 25mg daily down to 20mg daily. Has continued on this. No ongoing side effects from prednisone. No weight gain. Feels that he is having nocturnal bowel movements, which is new. He thinks this is from having bowl of oatmeal. Not increased in frequency, previously just in the AM. Now just at night. Thinks that it is for oatmeal. Seeing ophthalmology regularly. No interval infections or fevers.     14 point review of systems collected and negative if not documented above.    11/20/2020  Has been tolerating injections without issues. No injection site reactions, which he experienced during his first injection of some burning and redness at the site. No systemic side effects. No new HA. No new change in his vision. No Jaw pain. No stiffness in shoulders or hips. No difficulty putting shirt on in the morning or getting up out of a chair. Has chronic swelling in his feet that is unchanged. Has been doing  exercises, which he continues. Walks 1-2 times per week. Has cut down to 35mg daily of prednisone. No return of any symptoms, though has noticed some interruption of sleep which is new since started. Continues to check his finger sticks which he says are in the range he was told. Checking his BP daily and is in the normal range now.     14 point review of systems obtained and negative if not documented above.    HPI from initial consultation 9/25/20  79 year old male with history of CLL, being monitored at this point without an indication for therapy. On Saturday, going to get dirt at a nursery, August 22nd, was driving and noticed that he 'wasn't getting enough light in his eye'. The next day, Sunday, he was continuing to have left eye symptoms. Called the [facility]. Asked for appointment because of these new symptoms. On Tuesday evening, he called into the on call physician due to new complaint of 'light reflecting off the wall into my eye'. He came in the next day, Wednesday, and was sent to the retinal center on Friday.  Had an exam and was told that there was a defect in the retina/artery. Had MRI of the brain and bloodwork on Tuesday. On Thursday, results of the MRI were discussed and the findings were questioned to be due to his known CLL. Possible stroke he was told due to viscosity increase? On Friday, 9/4/20, had worsening symptoms in the right eye. So came back in to the retinal specialists. At that time there was consideration of sending to [place] ED, though because of the long expected wait, was instead started on 100mg of prednisone daily. 9/9/20 went down to 60mg of prednisone, with plan to go down to 50mg of 10/7/20. Was setup for biopsy on 9/10.     Denies any headaches. No jaw pain, even when chewing tough food. Feels that he has had some weight loss over the last few months unintentionally (139-130 over a period of 1 month). No fatigue, night sweats. No rashes. No chest pain, cough,  shortness of breath. No abdominal pain. No joint pain. Has noticed that his stool is less formed. Not diarrhea, just softer. No blood. No issues with urination. No swelling in lower extremities. No raynauds. No double vision.     Past Medical History  CLL  Hernia  Mono   Deviated septum  Osteopenia by DEXA    Past Surgical History  Tonsillectomy  Deviated septum    Medications  Prednisone 1mg daily  Current Outpatient Medications   Medication     alendronate (FOSAMAX) 70 MG tablet     alendronate (FOSAMAX) 70 MG/75ML solution     aspirin (ASPIR-LOW) 81 MG EC tablet     Aspirin-Calcium Carbonate  MG TABS     azelastine (ASTELIN) 0.1 % nasal spray     B Complex Vitamins (VITAMIN B COMPLEX 100 IJ)     calcium carbonate (OS-BALBIR) 500 MG tablet     Cholecalciferol (VITAMIN D3)     Cranberry 405 MG CAPS     fish oil-omega-3 fatty acids 1000 MG capsule     magnesium 250 MG tablet     omeprazole (PRILOSEC) 20 MG DR capsule     prednisoLONE 5 MG (21) TBPK     predniSONE (DELTASONE) 1 MG tablet     predniSONE (DELTASONE) 2.5 MG tablet     predniSONE (DELTASONE) 20 MG tablet     predniSONE (DELTASONE) 5 MG tablet     pseudoePHEDrine (SUDAFED) 30 MG tablet     tocilizumab (ACTEMRA) 162 MG/0.9ML subcutaneous injection     Zinc 50 MG CAPS     No current facility-administered medications for this visit.       Weeks Daily prednisone dose (mg) 26-week taper Weekly 162mg Actemra      1 60    2 50 11/5/2020    3 40    4 35 11/20/2020   5 30    6 25    7 20 12/10/2020   8 15 12/17/2020   9 12.5    10 12.5    11 10 1/8/2021   12 9    13 8    14 7 2/5/21   15 6 2/11/21   16 6    17 5    18 5 3/4/21   19 4    20 4    21 3    22 3 4/8/21   23 2    24 2    25 1    26 1 5/12/21     Allergies: Seasonal/ environmental allergies    Family History: No family history of autoimmune diseases like RA, sjogrens, SLE, scleroderma, IBD.    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 No drug use.        Objective:   Physical  exam:  No vitals or exam for telephone visit    Labs:   9/1/21  Creatinine 0.84  CRP 0.05  ESR <1  WBC 47.3  HGB 13.3      6/16/21  ESR 3  CRP <2. 9  Lab Results   Component Value Date    WBC 27.4 05/05/2021     Lab Results   Component Value Date    RBC 4.31 05/05/2021     Lab Results   Component Value Date    HGB 13.6 05/05/2021     Lab Results   Component Value Date    HCT 41.6 05/05/2021     No components found for: MCT  Lab Results   Component Value Date    MCV 97 05/05/2021     Lab Results   Component Value Date    MCH 31.6 05/05/2021     Lab Results   Component Value Date    MCHC 32.7 05/05/2021     Lab Results   Component Value Date    RDW 14.7 05/05/2021     Lab Results   Component Value Date     05/05/2021     Creatinine   Date Value Ref Range Status   05/05/2021 0.81 0.66 - 1.25 mg/dL Final       CRP  5/5/21  WBC 27.4  HGB 13.6    Cr 0.81  ALT 25  AST 29  Chol 152  Trig 88  HDL 54  LDL 80  CRP <2.9  ESR 3  UA with small blood, no cells, no protein, casts    4/8/21  ESR 4  CRP less than 2.9  WBC 30.1  Hemoglobin 13.6  Platelets 157  Creatinine 0.86  Cholesterol 179  Triglycerides 83  HDL 60      3/2/21  ESR 4  CRP <2.9  WBC 28.3  HGB 12.8    Cr 0.86  ALT 23  AST 25    2/3/21  WBC 30.6  HGB 11.9    Cr 0.83  Alk phos 40  ALT 30  AST 29    1/5/21  UA without protein, cells or casts  ESR 5  CRP <2.9  WBC 40.6  HGB 11.6    Cr 0.81  Chol 166  Trig 86  HDL 68  LDL 81    12/14/2020   CRP <2.9  ESR 4  WBC 54  HGB 11.8    Cr 0.78  Ua unremarkable  Cholesterol 168  Trig 82  HDL 70  LDL 81    11-  ESR 3  CRP less than 2.9  WBC 63.3  Hemoglobin 12  Platelets 181  Creatinine 0.98  Total protein low at 5.9  Alk phos 48  ALT 31  AST 30  UA with 30 of albumin  Cholesterol 169  Triglycerides 45  HDL 83  LDL 77    10/1/2020  WBC 84.9  HGB 12.3    IgM 36 low  IgA 124 normal  IgG 625    Cr 0.98    9/1/2020  WBC 50.2  HGB 11.6       Imaging:  MR  brain and orbits 9/10/20  Impression:    1. Regarding the orbits and globes, there is no definite abnormal  contrast enhancement or mass. No evidence of leukemic infiltration.  2. Regarding the remainder of the brain, abnormal T2 hyperintense bone  marrow signal with associated enhancement in the skull, likely  representing leukemic infiltration.  3. Subcutaneous T2 hyperintensity with associated diffusion  restriction and contrast enhancement over the left zygomatic region,  question leukemic infiltration.    Pathology:  Patient Name: MARIELA WALLACE   MR#: [id number]   Specimen #: [id number]   Collected: 9/10/2020   Received: 9/10/2020   Reported: 9/16/2020 12:35   Ordering Phy(s): VALERIE CARRINGTON     For improved result formatting, select 'View Enhanced Report Format' under    Linked Documents section.     SPECIMEN(S):   Temporal artery biopsy, left     FINAL DIAGNOSIS:   Temporal artery, left, biopsy:   1. Granulomatous arteritis consistent with giant cell arteritis.   2. Chronic inflammation of the surrounding adventitia.     COMMENT:   Preliminary results were communicated to Dr. Valerie Carrington and Dr. Guille Garcia on 9/14/20 at 12:45pm.     I have personally reviewed all specimens and/or slides, including the   listed special stains, and used them   with my medical judgement to determine or confirm the final diagnosis.     Electronically signed out by:     Za García M.D., [facility]     CLINICAL HISTORY:   The patient is a 79 year old male with a history of chronic lymphocytic   leukemia who presented with bilateral   sequential optic neuropathy with associated pallid optic disc edema and   choroidal hypoperfusion, but   relatively intact visual acuity, negative elevation of acute phase   reactants, and lack of constitutional   symptoms consistent with giant cell arteritis. He also has findings of   likely leukemic infiltration in the   skull seen on MRI. He undergoes temporal artery biopsy on  "the left.     GROSS:   The specimen is received in formalin with proper patient identification,   labeled \"left temporal artery\".  The   specimen consists of a 3.2 cm in length by 0.3 cm in diameter tan-white   vessel segment. The specimen is   submitted intact in A1. (Dictated by: PADILLA Montalvo 9/11/2020 03:08   PM)     MICROSCOPIC:   The tissue consists of artery with narrowed lumen and intimal hyperplasia.    There is an infiltrate of   lymphocytes, plasma cells, epithelioid histiocytes, and multinucleated   giant cells throughout all layers of   the artery. There is associated loss of the internal elastic lamina on   elastic stain. Occasional focal   calcifications are seen a the level of the internal elastic lamina.   Neovascularization of the vessel wall is   present. Perivascular lymphocytic infiltrates are seen in the surrounding   adventitia. Immunohistochemistry   with CD3 and CD20 demonstrates a predominantly T-cell lymphocytic   infiltrate within the vessel wall. There is   a mixed infiltrate of T and B lymphocytes perivascular in the adventitia.   CD5 and CD43 have similar staining   patterns to CD3. There is light patchy CD23 staining in the areas positive    for CD20. CD68 highlights the   epithelioid histiocytes within the vessel wall.     The technical component of this testing was completed at the [facility], with the professional component performed    at the [facility], [address] [place] [id number] ([phone])     CPT Codes:   A: 62371-QV8, 22019-DEZC, 75915-ODS, 43399-FDC, 77751-KCD, 68234-OMY,   86074-MVO, 37062-VVL     COLLECTION SITE:   Client: [facility]   Location: [facility] [place]         "

## 2021-09-17 ENCOUNTER — TELEPHONE (OUTPATIENT)
Dept: RHEUMATOLOGY | Facility: CLINIC | Age: 81
End: 2021-09-17
Payer: MEDICARE

## 2021-09-17 NOTE — TELEPHONE ENCOUNTER
Pt called into clinic, he has been on generic flonase for 2 weeks, is getting better, but does have periods where it is worse. For the most part it has improved in the last 2 weeks.  Unsure if because using it every day for the last 2 weeks or if the weather is better.     Pt is wondering what his lab results are. He can't remember if they discussed or not.    Just wanted to update Dr. Alcantara.    Candis Montero RN  Rheumatology Clinic

## 2021-09-22 ENCOUNTER — OFFICE VISIT (OUTPATIENT)
Dept: OPHTHALMOLOGY | Facility: CLINIC | Age: 81
End: 2021-09-22
Attending: OPHTHALMOLOGY
Payer: MEDICARE

## 2021-09-22 DIAGNOSIS — H53.40 VISUAL FIELD DEFECT: ICD-10-CM

## 2021-09-22 DIAGNOSIS — H46.9 OPTIC NEUROPATHY: Primary | ICD-10-CM

## 2021-09-22 PROCEDURE — 99214 OFFICE O/P EST MOD 30 MIN: CPT | Performed by: OPHTHALMOLOGY

## 2021-09-22 PROCEDURE — 92083 EXTENDED VISUAL FIELD XM: CPT | Performed by: OPHTHALMOLOGY

## 2021-09-22 PROCEDURE — 92133 CPTRZD OPH DX IMG PST SGM ON: CPT | Performed by: OPHTHALMOLOGY

## 2021-09-22 PROCEDURE — G0463 HOSPITAL OUTPT CLINIC VISIT: HCPCS

## 2021-09-22 RX ORDER — FLUTICASONE PROPIONATE 50 MCG
SPRAY, SUSPENSION (ML) NASAL
COMMUNITY
Start: 2021-09-14

## 2021-09-22 ASSESSMENT — REFRACTION_WEARINGRX
OS_AXIS: 059
OD_AXIS: 134
OD_CYLINDER: +1.25
OD_SPHERE: +3.25
OS_CYLINDER: +0.50
OS_SPHERE: +6.25
SPECS_TYPE: PAL

## 2021-09-22 ASSESSMENT — VISUAL ACUITY
METHOD: SNELLEN - LINEAR
OS_CC: 20/25
CORRECTION_TYPE: GLASSES
OD_CC+: -2
OD_CC: 20/20
OS_CC+: -3/+1

## 2021-09-22 ASSESSMENT — EXTERNAL EXAM - RIGHT EYE: OD_EXAM: NORMAL

## 2021-09-22 ASSESSMENT — CUP TO DISC RATIO
OS_RATIO: 0.3
OD_RATIO: 0.3

## 2021-09-22 ASSESSMENT — TONOMETRY
OS_IOP_MMHG: 13
IOP_METHOD: ICARE
OD_IOP_MMHG: 7

## 2021-09-22 ASSESSMENT — SLIT LAMP EXAM - LIDS
COMMENTS: NORMAL
COMMENTS: NORMAL

## 2021-09-22 ASSESSMENT — EXTERNAL EXAM - LEFT EYE: OS_EXAM: NORMAL

## 2021-09-22 ASSESSMENT — CONF VISUAL FIELD: COMMENTS: VF TODAY

## 2021-09-22 NOTE — LETTER
"2021    RE: Bon Michael  : 1940  MRN: [id number]    Dear Providers,    I saw our mutual patient, Bon Michael, in follow-up in my clinic recently.  After a thorough neuro-ophthalmic history and examination, I came to the following conclusions:     1. 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  Vision stable in both eyes with intact / normal central vision but persistent (probably permanent) peripheral field defects in both eyes.      No exam or history indicators to suggest recurrence.     Continue Actemra as per rheumatology- patient off of prednisone since May 12, 2021.    Continue actemra as per Dr. Alcantara.  I support a long course given his unusual presentation and lack of other premonitory symptoms or acute phase reactant elevation to warn of disease recurrence.    2.  Borderline visually significant cataracts- observe for now    Follow-up in 3 months.    For a more thorough description of the disease presentation, please see my letter from the patient's initial visit with me     history from presentation (2020 visit)     Bon Michael is a 79 year old male who presents to neuro-ophthalmology clinic today for consultation from Jose Andino MD at [facility] for visual disturbance of left eye. He describes it as \"his left eye not getting enough light\". He describes noticing the change in his left eye's temporal visual field, inferior > superior. He notices it more first thing in the morning. These symptoms began on 2020 and he describes it as worsening - he is unclear if it the deficit has gotten larger or darker however. He first noticed it while driving at the periphery of his vision.      Patient saw Dr. Lewis on .  He underwent esr / crp testing as well as mri (see below).     Labs: 20- WBC- 50.2, ESR- 28, CRP < 5     On  he developed symptoms of waviness in his peripheral vision in the right eye.  He " went to Dr. Andino on 9/4/2020 who wanted him to go to the ED but he was unable.  Dr. Andino reviewed the patient's fluorescein angiography and felt there was choroidal filling delay in the right eye and pallid edema in the right eye concerning for giant cell arteritis.  We discussed the case via phone.      PRIOR IMAGING:  BRAIN MRI WITH/WO GADOLINIUM, MRA OF THE [place] OF [place] AND MRA OF THE   CAROTID ARTERIES - [facility] (9/1/2020)  IMPRESSION:    1.  No acute intracranial abnormality.  2.  No evidence of an orbital abnormalities.  3.  Mild generalized cerebral atrophy.  4.  Unremarkable MRA of the [place] of [place].  5.  Unremarkable MRA of the cervical vessels.     On my re-review I disagreed with the radiology read.  There is evidence of patchy enhancement diffusely within the bilateral orbits and along the bilateral intraorbital optic nerve sheaths.  Will discuss with neuro-radiology regarding finding and repeat scan.     No bisphosphonates in the past (no osteoporosis medications for 7-8 years per patient).      Left temporal artery biopsy 9/9/20- positive for giant cell arteritis   Prednisone (started 9/4/2020)     Patient started Actemra 10/28/20.    Interim history and exam:  Last visit with me 7/2/2021     Patient's last dose of prednisone taken for giant cell arteritis was on May 12, 2021.  He continues on Actemra.    No new vision changes of concern.  No headaches no double vision no temporal tenderness no other concerns to suggest recurrence of giant cell arteritis.    Assessment and plan from most recent visit with rheumatology from September 3, 2021 Dr. Alcantara.     # Giant cell arteritis   1) continue 162mg once weekly injection, next dose due on Wednesday  2) Prednisone last dose was on 5/12/21   3) Continue off bactrim daily and omeprazole  4) Follow-up in 10 weeks with labs a few days prior  5) continue f/u with ophthalmology     # Long term use of high risk medication  1)  Actemra 162mg weekly. No interval infections. No injection site reactions. Tolerating without issues. No evidence of toxicity by most recent labs.     # Osteopenia no interval fractures: Continue alendronate. Managed by PCP.      #CLL  Managed by oncology. WBC 47.3 most recently.      # Seasonal allergies: flonase daily    Visual acuity and color vision today stable in both eyes. Optic nerve head appearance stable.    Octopus automated 30 degree visual field today showed stable visual field   defects in both eyes.    OCT of the optic nerve head revealed stable retinal nerve fiber layer loss      For further exam details, please feel free to contact our office for additional records.  If you wish to contact me regarding this patient please email me at [email or web address] or give my clinic a call to arrange a phone conversation.    Sincerely,    Guille Garcia MD  , Neuro-Ophthalmology and Adult Strabismus Surgery  The Lucy Castano Chair in Neuro-Ophthalmology  Department of Ophthalmology and Visual Neurosciences  [facility]

## 2021-09-22 NOTE — PROGRESS NOTES
"     1. 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  Vision stable in both eyes with intact / normal central vision but persistent (probably permanent) peripheral field defects in both eyes.      No exam or history indicators to suggest recurrence.     Continue Actemra as per rheumatology- patient off of prednisone since May 12, 2021.    Continue actemra as per Dr. Alcantara.  I support a long course given his unusual presentation and lack of other premonitory symptoms or acute phase reactant elevation to warn of disease recurrence.    2.  Borderline visually significant cataracts- observe for now    Follow-up in 3 months.    For a more thorough description of the disease presentation, please see my letter from the patient's initial visit with me      history from presentation (9/9/2020 visit)     Bon Michael is a 79 year old male who presents to neuro-ophthalmology   clinic today for consultation from Jose Andino MD at [facility] for   visual disturbance of left eye. He describes it as \"his left eye not   getting enough light\". He describes noticing the change in his left eye's   temporal visual field, inferior > superior. He notices it more first thing     in the morning. These symptoms began on 8/22/2020 and he describes it as   worsening - he is unclear if it the deficit has gotten larger or darker   however. He first noticed it while driving at the periphery of his vision.      Patient saw Dr. Lewis on August 28.  He underwent esr / crp testing as   well as mri (see below).     Labs: 9/2/20- WBC- 50.2, ESR- 28, CRP < 5     On August 31 he developed symptoms of waviness in his peripheral vision in   the right eye.  He went to Dr. Adnino on 9/4/2020 who wanted him to go to      the ED but he was unable.  Dr. Andino reviewed the patient's fluorescein   angiography and felt there was choroidal filling delay in the right eye   and pallid edema in the right eye concerning " for giant cell arteritis.  We     discussed the case via phone.      PRIOR IMAGING:  BRAIN MRI WITH/WO GADOLINIUM, MRA OF THE [place] AND MRA OF THE   CAROTID ARTERIES - [facility] (9/1/2020)  IMPRESSION:    1.  No acute intracranial abnormality.  2.  No evidence of an orbital abnormalities.  3.  Mild generalized cerebral atrophy.  4.  Unremarkable MRA of the [place] of [place].  5.  Unremarkable MRA of the cervical vessels.     On my re-review I disagreed with the radiology read.  There is evidence of     patchy enhancement diffusely within the bilateral orbits and along the   bilateral intraorbital optic nerve sheaths.  Will discuss with   neuro-radiology regarding finding and repeat scan.     No bisphosphonates in the past (no osteoporosis medications for 7-8 years   per patient).      Left temporal artery biopsy 9/9/20- positive for giant cell arteritis   Prednisone (started 9/4/2020)     Patient started Actemra 10/28/20.       Interim history and exam:  Last visit with me 7/2/2021     Patient's last dose of prednisone taken for giant cell arteritis was on May 12, 2021.  He continues on Actemra.    No new vision changes of concern.  No headaches no double vision no temporal tenderness no other concerns to suggest recurrence of giant cell arteritis.    Assessment and plan from most recent visit with rheumatology from September 3, 2021 Dr. Alcantara.     # Giant cell arteritis   1) continue 162mg once weekly injection, next dose due on Wednesday  2) Prednisone last dose was on 5/12/21   3) Continue off bactrim daily and omeprazole  4) Follow-up in 10 weeks with labs a few days prior  5) continue f/u with ophthalmology     # Long term use of high risk medication  1) Actemra 162mg weekly. No interval infections. No injection site reactions. Tolerating without issues. No evidence of toxicity by most recent labs.     # Osteopenia no interval fractures: Continue alendronate. Managed by PCP.       #CLL  Managed by oncology. WBC 47.3 most recently.      # Seasonal allergies: flonase daily      Visual acuity and color vision today stable in both eyes. Optic nerve head appearance stable.    Octopus automated 30 degree visual field today showed stable visual field   defects in both eyes.    OCT of the optic nerve head revealed stable retinal nerve fiber layer loss       35 minutes were spent on the date of the encounter by me doing chart review, history and exam, documentation, and further activities as noted above    Complete documentation of historical and exam elements from today's encounter can be found in the full encounter summary report (not reduplicated in this progress note).  I personally obtained the chief complaint(s) and history of present illness.  I confirmed and edited as necessary the review of systems, past medical/surgical history, family history, social history, and examination findings as documented by others; and I examined the patient myself.  I personally reviewed the relevant tests, images, and reports as documented above.  I formulated and edited as necessary the assessment and plan and discussed the findings and management plan with the patient and family     Guille Garcia MD

## 2021-09-22 NOTE — NURSING NOTE
Chief Complaints and History of Present Illnesses   Patient presents with     Follow Up      Chief Complaint(s) and History of Present Illness(es)     Follow Up     Laterality: both eyes    Associated symptoms: Negative for eye pain, double vision and scalp tenderness    Treatments tried: artificial tears    Pain scale: 0/10              Comments     Follow up of giant cell arteritis with bilateral ischemic optic neuropathy each eye.  Denies headache, diplopia, scalp tenderness.  Denies ear ringing.  No changes in vision.  Used prednisone for a week, not currently using.  Eye meds: Actemra 162mg once weekly injection, Blink each eye PRN  ANNETTE Gruber 9/22/2021 8:47 AM

## 2021-10-20 ENCOUNTER — TELEPHONE (OUTPATIENT)
Dept: RHEUMATOLOGY | Facility: CLINIC | Age: 81
End: 2021-10-20

## 2021-10-20 DIAGNOSIS — M31.6 GIANT CELL ARTERITIS (H): Primary | ICD-10-CM

## 2021-10-20 NOTE — TELEPHONE ENCOUNTER
Received call from pt, he has tried to order his Actemra from [employer] [facility] [employer] and was told he needed to go on a waiting list, as they have none to send him at this time.      Pt is wondering what he should do, he has 2 doses left before he is out of medication.      He has been on Actemra for a year, CRP/ESR are normal on weekly actemra. We discussed that there are options, but it will be up to Dr. Alcantara to decide what is best to do.  Pt understands.  Will send to Dr. Alcantara and decide what to do with Actemra and what next steps are.    Candis Montero RN  Rheumatology Clinic

## 2021-10-21 RX ORDER — FOLIC ACID 1 MG/1
1 TABLET ORAL DAILY
Qty: 90 TABLET | Refills: 3 | Status: SHIPPED | OUTPATIENT
Start: 2021-10-21 | End: 2023-11-20

## 2021-10-21 NOTE — TELEPHONE ENCOUNTER
We should also get labs in 6 weeks: CBC with diff, CMP, ESR, CRP. He can follow-up with me around that time. Looks like nothing scheduled. Thanks

## 2021-10-21 NOTE — TELEPHONE ENCOUNTER
Yes, lets switch to every other week for now AND start methotrexate. He is a high risk gentleman who warrants extended treatment.    Methotrexate 15mg weekly x1 week, then 17.5mg x1 week, then 20mg weekly thereafter. Also with folic acid 1mg daily. Orders for both have been placed. Depending on whether he obtains additional actemra will guide whether we remain on methotrexate but he should continue and let us know if he gets more and we can give guidance.    Thanks.

## 2021-10-21 NOTE — TELEPHONE ENCOUNTER
Called and discussed with pt, change to Methotrexate. Pt is concerned that this will not work as well as Actemra.  He is worried as he has no warning for flares. We had discussed that this is an older drug, so he is very anxious. Message sent to Dr. Garcia who knows pt, they will see him in a month to hopefully help allay fears.      Candis Montero RN  Rheumatology Clinic

## 2021-10-22 ENCOUNTER — TELEPHONE (OUTPATIENT)
Dept: OPHTHALMOLOGY | Facility: CLINIC | Age: 81
End: 2021-10-22

## 2021-10-22 NOTE — TELEPHONE ENCOUNTER
Left VM for pt to call back and reschedule follow up with Dr. Garcia. Gave direct line for call back    Lucia Carranza on 10/22/2021 at 1:02 PM

## 2021-10-25 ENCOUNTER — TELEPHONE (OUTPATIENT)
Dept: OPHTHALMOLOGY | Facility: CLINIC | Age: 81
End: 2021-10-25

## 2021-10-25 NOTE — TELEPHONE ENCOUNTER
Left VM for pt - returning call to reschedule the appointment with Dr. Garcia (currently scheduled for 12/15/21)     Does 11/11/21 @ 8:30am work?    Will schedule that time - call back to confirm or set up another time.    Lucia Carranza on 10/25/2021 at 9:35 AM

## 2021-11-05 ENCOUNTER — TELEPHONE (OUTPATIENT)
Dept: OPHTHALMOLOGY | Facility: CLINIC | Age: 81
End: 2021-11-05

## 2021-11-05 NOTE — TELEPHONE ENCOUNTER
Called to assist pt in rescheduling follow-up appointment back to December.    Confirmed date/time/location. Appts last between 2-4 hours. Discussed dilation.     Lucia Carranza on 11/5/2021 at 2:48 PM

## 2021-11-05 NOTE — TELEPHONE ENCOUNTER
Left  for patient -     Returning call the other day to possibly move back the follow-up appointment with Dr. Garcia, as he is no longer taking the methotrexate    Gave direct line for call back    Lucia Carranza on 11/5/2021 at 9:15 AM

## 2021-11-26 DIAGNOSIS — M31.6 GIANT CELL ARTERITIS (H): Primary | ICD-10-CM

## 2021-11-26 DIAGNOSIS — Z79.899 HIGH RISK MEDICATION USE: ICD-10-CM

## 2021-12-15 ENCOUNTER — OFFICE VISIT (OUTPATIENT)
Dept: OPHTHALMOLOGY | Facility: CLINIC | Age: 81
End: 2021-12-15
Attending: OPHTHALMOLOGY
Payer: MEDICARE

## 2021-12-15 DIAGNOSIS — H46.9 OPTIC NEUROPATHY: Primary | ICD-10-CM

## 2021-12-15 DIAGNOSIS — H53.40 VISUAL FIELD DEFECTS: ICD-10-CM

## 2021-12-15 DIAGNOSIS — H47.20 PARTIAL OPTIC ATROPHY: ICD-10-CM

## 2021-12-15 PROCEDURE — 92083 EXTENDED VISUAL FIELD XM: CPT | Performed by: OPHTHALMOLOGY

## 2021-12-15 PROCEDURE — 99213 OFFICE O/P EST LOW 20 MIN: CPT | Performed by: OPHTHALMOLOGY

## 2021-12-15 PROCEDURE — 92133 CPTRZD OPH DX IMG PST SGM ON: CPT | Performed by: OPHTHALMOLOGY

## 2021-12-15 PROCEDURE — G0463 HOSPITAL OUTPT CLINIC VISIT: HCPCS | Performed by: TECHNICIAN/TECHNOLOGIST

## 2021-12-15 ASSESSMENT — CONF VISUAL FIELD
OS_NORMAL: 1
OD_NORMAL: 1
METHOD: COUNTING FINGERS

## 2021-12-15 ASSESSMENT — SLIT LAMP EXAM - LIDS
COMMENTS: NORMAL
COMMENTS: NORMAL

## 2021-12-15 ASSESSMENT — CUP TO DISC RATIO
OS_RATIO: 0.3
OD_RATIO: 0.3

## 2021-12-15 ASSESSMENT — REFRACTION_WEARINGRX
OS_CYLINDER: +0.50
OD_SPHERE: +3.25
OD_CYLINDER: +1.25
SPECS_TYPE: PAL
OS_AXIS: 059
OS_SPHERE: +6.25
OD_AXIS: 134

## 2021-12-15 ASSESSMENT — VISUAL ACUITY
CORRECTION_TYPE: GLASSES
OS_CC: 20/25
OD_CC+: +1
OD_CC: 20/25
OS_CC+: -2+2
METHOD: SNELLEN - LINEAR

## 2021-12-15 ASSESSMENT — EXTERNAL EXAM - LEFT EYE: OS_EXAM: NORMAL

## 2021-12-15 ASSESSMENT — TONOMETRY
OD_IOP_MMHG: 08
IOP_METHOD: ICARE
OS_IOP_MMHG: 10

## 2021-12-15 ASSESSMENT — EXTERNAL EXAM - RIGHT EYE: OD_EXAM: NORMAL

## 2021-12-15 NOTE — LETTER
2021    RE: Bon Michael  : 1940  MRN: [id number]    Dear Providers,    I saw our mutual patient, Bon Michael, in follow-up in my clinic recently.  After a thorough neuro-ophthalmic history and examination, I came to the following conclusions:     1. 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  Vision stable in both eyes with intact / normal central vision but persistent (probably permanent) peripheral field defects in both eyes.      No exam or history indicators to suggest recurrence.   Continue Actemra as per rheumatology- patient off of prednisone since May 12, 2021.    If patient is forced to stop Actemra because of the national shortage of the drug then I requested that he follow-up with me within 1 month given that at presentation he did not have an acute phase reactants or the other typical symptoms of giant cell arteritis.  This raises genuine concerns that a recurrence in the future could present without other harbingers that would allow us to detect early recurrence.    2.  Borderline visually significant cataracts- observe for now.    3. Mild intermittent nonspecific jaw symptoms-patient today denies typical symptoms of jaw claudication does endorse very sporadic jaw pain that he actually localizes to the teeth rather than the temporomandibular joint.  His description of this pain was very nonspecific and sounded mild.  This symptom is not at all compatible with jaw claudication from giant cell arteritis.  he seems to associate the pain with his dosing of alendronate weekly.  My understanding is that this drug can be associated with osteonecrosis of the jaw I would not expect the symptoms to be so intermittent and mild.  I will pose the question to his rheumatologist about whether further evaluation for the symptom is necessary as I do not have experience in diagnosis of osteonecrosis.    Follow-up in 3 months.    For a more thorough  "description of the disease presentation, please see my   letter from the patient's initial visit with me.     history from presentation (9/9/2020 visit)  Bon Michael is a 79 year old male who presents to neuro-ophthalmology clinic today for consultation from Jose Andino MD at [facility] for visual disturbance of left eye. He describes it as \"his left eye not getting enough light\". He describes noticing the change in his left eye's temporal visual field, inferior > superior. He notices it more first thing in the morning. These symptoms began on 8/22/2020 and he describes it as worsening - he is unclear if it the deficit has gotten larger or darker however. He first noticed it while driving at the periphery of his vision.      Patient saw Dr. Lewis on August 28.  He underwent esr / crp testing as well as mri (see below).     Labs: 9/2/20- WBC- 50.2, ESR- 28, CRP < 5     On August 31 he developed symptoms of waviness in his peripheral vision in the right eye.  He went to Dr. Andino on 9/4/2020 who wanted him to go to the ED but he was unable.  Dr. Andino reviewed the patient's fluorescein angiography and felt there was choroidal filling delay in the right eye and pallid edema in the right eye concerning for giant cell arteritis.  We discussed the case via phone.      PRIOR IMAGING:  BRAIN MRI WITH/WO GADOLINIUM, MRA OF THE [place] OF [place] AND MRA OF THE   CAROTID ARTERIES - [facility] (9/1/2020)  IMPRESSION:    1.  No acute intracranial abnormality.  2.  No evidence of an orbital abnormalities.  3.  Mild generalized cerebral atrophy.  4.  Unremarkable MRA of the [place] of [place].  5.  Unremarkable MRA of the cervical vessels.     On my re-review I disagreed with the radiology read.  There is evidence of patchy enhancement diffusely within the bilateral orbits and along the bilateral intraorbital optic nerve sheaths.  Will discuss with neuro-radiology regarding finding and repeat scan.     No " bisphosphonates in the past (no osteoporosis medications for 7-8 years   per patient).      Left temporal artery biopsy 9/9/20- positive for giant cell arteritis   Prednisone (started 9/4/2020)     Patient started Actemra 10/28/20.     Interim history and exam since last visit 9/22/2021     Patient denies any new visual symptoms.  He is never had typical nonvisual giant cell arteritis symptoms and continues to deny these.  No headaches. No jaw claudication.    Patient continues on Actemra 162 mg weekly and he currently has a 4 week supply.    ESR/ CRP normal 12/10/21 (<1, 0.04)    Last visit with Oncology Dr. Wall 11/11/21:  1) Chronic lymphocytic leukemia, Stage IA, del (13q)     - He does not have any B symptoms and no new adenopathy.  - Stable small cervical adenopathy and no palpable hepatosplenomegaly.  -The indications to treat would be worsening cytopenias, onset of B symptoms, autoimmune hemolysis, hyperviscosity (rarely seen below WBC count of 200K), recurrent sinopulmonary infns and adenopathy/organomegaly threatening the function of an adjacent organ.  - Observation alone is recommended at this time.  - Discussed lowered immunity byt virtue of having CLL and the utility of vaccinations for COVID-19.  - I feel he should be vaccinated and should have the new oral drug from Pfizer against COVID-19 (when approved) in hand as well given lowered vaccine efficacy in general with CLL.   - Given stable labs over the last two draws, six monthly follow up recommended.     2) Giant cell arteritis w b/l ischemic optic neuropathy     - Post long term prednisone taper + Actemra.  - Notes mild peripheral vision loss and no residual headaches.     RTC on 5/12/22  for continued monitoring.    Last visit with Dr. Alcantara (Rheum) 12/3/21:  Continue on Actemra once weekly  Let me know if problems obtaining the actemra     I have sent a message to [employer] to send over the ESR and CRP labs to josee. Please have these  done prior to your appointment with Dr Garcia.     My clinic will call to schedule a follow-up with you in 10-12 weeks with labs the week prior.     Exam today continues to show stable visual function including essentially normal visual acuity and color vision in both eyes.  There are moderate mixed cataracts in both eyes.    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 OCT of the optic nerve head revealed stable optic atrophy in both eyes.      For further exam details, please feel free to contact our office for additional records.  If you wish to contact me regarding this patient please email me at [facility] [email or web address] or give my clinic a call to arrange a phone conversation.    Sincerely,    Guille Garcia MD  , Neuro-Ophthalmology and Adult Strabismus Surgery  The Lucy Castano Chair in Neuro-Ophthalmology  Department of Ophthalmology and Visual Neurosciences  [facility]

## 2021-12-15 NOTE — PROGRESS NOTES
1. 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  Vision stable in both eyes with intact / normal central vision but persistent (probably permanent) peripheral field defects in both eyes.      No exam or history indicators to suggest recurrence.   Continue Actemra as per rheumatology- patient off of prednisone since May 12, 2021.    If patient is forced to stop Actemra because of the national shortage of the drug then I requested that he follow-up with me within 1 month given that at presentation he did not have an acute phase reactants or the other typical symptoms of giant cell arteritis.  This raises genuine concerns that a recurrence in the future could present without other harbingers that would allow us to detect early recurrence.    2.  Borderline visually significant cataracts- observe for now    3. Mild intermittent nonspecific jaw symptoms-patient today denies typical symptoms of jaw claudication does endorse very sporadic jaw pain that he actually localizes to the teeth rather than the temporomandibular joint.  His description of this pain was very nonspecific and sounded mild.  This symptom is not at all compatible with jaw claudication from giant cell arteritis.  he seems to associate the pain with his dosing of alendronate weekly.  My understanding is that this drug can be associated with osteonecrosis of the jaw I would not expect the symptoms to be so intermittent and mild.  I will pose the question to his rheumatologist about whether further evaluation for the symptom is necessary as I do not have experience in diagnosis of osteonecrosis.    Follow-up in 3 months.    For a more thorough description of the disease presentation, please see my   letter from the patient's initial visit with me      history from presentation (9/9/2020 visit)     Bon Michael is a 79 year old male who presents to neuro-ophthalmology   clinic today for consultation from Jose  "MD Sina at [facility] for   visual disturbance of left eye. He describes it as \"his left eye not   getting enough light\". He describes noticing the change in his left eye's   temporal visual field, inferior > superior. He notices it more first thing     in the morning. These symptoms began on 8/22/2020 and he describes it as   worsening - he is unclear if it the deficit has gotten larger or darker   however. He first noticed it while driving at the periphery of his vision.      Patient saw Dr. Lewis on August 28.  He underwent esr / crp testing as   well as mri (see below).     Labs: 9/2/20- WBC- 50.2, ESR- 28, CRP < 5     On August 31 he developed symptoms of waviness in his peripheral vision in     the right eye.  He went to Dr. Andino on 9/4/2020 who wanted him to go to   the ED but he was unable.  Dr. Andino reviewed the patient's fluorescein   angiography and felt there was choroidal filling delay in the right eye   and pallid edema in the right eye concerning for giant cell arteritis.  We     discussed the case via phone.      PRIOR IMAGING:  BRAIN MRI WITH/WO GADOLINIUM, MRA OF THE [place] OF [place] AND MRA OF THE   CAROTID ARTERIES - [facility] (9/1/2020)  IMPRESSION:    1.  No acute intracranial abnormality.  2.  No evidence of an orbital abnormalities.  3.  Mild generalized cerebral atrophy.  4.  Unremarkable MRA of the [place] of [place].  5.  Unremarkable MRA of the cervical vessels.     On my re-review I disagreed with the radiology read.  There is evidence of   patchy enhancement diffusely within the bilateral orbits and along the   bilateral intraorbital optic nerve sheaths.  Will discuss with   neuro-radiology regarding finding and repeat scan.     No bisphosphonates in the past (no osteoporosis medications for 7-8 years   per patient).      Left temporal artery biopsy 9/9/20- positive for giant cell arteritis   Prednisone (started 9/4/2020)     Patient started Actemra " 10/28/20.     Interim history and exam since last visit 9/22/2021     Patient denies any new visual symptoms.  He is never had typical nonvisual giant cell arteritis symptoms and continues to deny these.  No headaches. No jaw claudication.    Patient continues on Actemra 162 mg weekly and he currently has a 4 week supply.    ESR/ CRP normal 12/10/21 (<1, 0.04)    Last visit with Oncology Dr. Wall 11/11/21:  1) Chronic lymphocytic leukemia, Stage IA, del (13q)     - He does not have any B symptoms and no new adenopathy.  - Stable small cervical adenopathy and no palpable hepatosplenomegaly.  -The indications to treat would be worsening cytopenias, onset of B symptoms, autoimmune hemolysis, hyperviscosity (rarely seen below WBC count of 200K), recurrent sinopulmonary infns and adenopathy/organomegaly threatening the function of an adjacent organ.  - Observation alone is recommended at this time.  - Discussed lowered immunity byt virtue of having CLL and the utility of vaccinations for COVID-19.  - I feel he should be vaccinated and should have the new oral drug from Pfizer against COVID-19 (when approved) in hand as well given lowered vaccine efficacy in general with CLL.   - Given stable labs over the last two draws, six monthly follow up recommended.     2) Giant cell arteritis w b/l ischemic optic neuropathy     - Post long term prednisone taper + Actemra.  - Notes mild peripheral vision loss and no residual headaches.     RTC on 5/12/22  for continued monitoring.      Last visit with Dr. Alcantara (Rheum) 12/3/21:  Continue on Actemra once weekly  Let me know if problems obtaining the actemra     I have sent a message to [employer] to send over the ESR and CRP labs to josee. Please have these done prior to your appointment with Dr Garcia.     My clinic will call to schedule a follow-up with you in 10-12 weeks with labs the week prior.     Exam today continues to show stable visual function including  essentially normal visual acuity and color vision in both eyes.  There are moderate mixed cataracts in both eyes.    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 OCT of the optic nerve head revealed stable optic atrophy in both eyes         25 minutes were spent on the date of the encounter by me doing chart review, history and exam, documentation, and further activities as noted above    Complete documentation of historical and exam elements from today's encounter can be found in the full encounter summary report (not reduplicated in this progress note).  I personally obtained the chief complaint(s) and history of present illness.  I confirmed and edited as necessary the review of systems, past medical/surgical history, family history, social history, and examination findings as documented by others; and I examined the patient myself.  I personally reviewed the relevant tests, images, and reports as documented above.  I formulated and edited as necessary the assessment and plan and discussed the findings and management plan with the patient and family     Guille Garcia MD

## 2021-12-20 PROBLEM — M79.604 PAIN OF RIGHT LOWER EXTREMITY: Status: RESOLVED | Noted: 2021-04-22 | Resolved: 2021-12-20

## 2021-12-20 PROBLEM — M54.50 LOW BACK PAIN: Status: RESOLVED | Noted: 2021-04-22 | Resolved: 2021-12-20

## 2021-12-20 NOTE — PROGRESS NOTES
Discharge Note    Progress reporting period is from initial evaluation date (please see noted date below) to Jul 15, 2021.  Linked Episodes   Type: Episode: Status: Noted: Resolved: Last update: Updated by:   PHYSICAL THERAPY Low Back pain, R leg pain 4/22/21 Active 4/22/2021  7/15/2021 12:44 PM Cate Rodriguez, PT      Comments:       Bon failed to follow up and current status is unknown.  Please see information below for last relevant information on current status.  Patient seen for 10 visits.    SUBJECTIVE  Subjective changes noted by patient:  Has started using weights/dumbbells and going ok, but also noting some L neck strain more.  Low back is still sore, still notices shifts to the right but exercises always help.  Walking is improving- sometimes walking up to 45-50 minutes; does stop throughout to   .  Current pain level is 3/10.     Previous pain level was  4/10.   Changes in function:  Yes (See Goal flowsheet attached for changes in current functional level)  Adverse reaction to treatment or activity: None    OBJECTIVE  Changes noted in objective findings: R lateral shift, LROM flexion min loss, ext mod max loss, SG R no loss, L min loss.  Continues to respond with improved shift after exercise, but not able to maintain (comes back to clinic in shift, but still less than previously).  Challenged with technique with EIL hips shifted R.  Tendency for patient to hold excessive forward head/flexed neck position.       ASSESSMENT/PLAN  Diagnosis: low back pain, R leg pain   Updated problem list and treatment plan:   Pain - HEP  Decreased ROM/flexibility - HEP  Decreased function - HEP  Decreased strength - HEP  Impaired posture - HEP  STG/LTGs have been met or progress has been made towards goals:  Yes, please see goal flowsheet for most current information  Assessment of Progress: current status is unknown.    Last current status: Pt is progressing slower than anticipated   Self Management Plans:  HEP  I  have re-evaluated this patient and find that the nature, scope, duration and intensity of the therapy is appropriate for the medical condition of the patient.  Bon continues to require the following intervention to meet STG and LTG's:  HEP.    Recommendations:  Discharge with current home program.  Patient to follow up with MD as needed.    Please refer to the daily flowsheet for treatment today, total treatment time and time spent performing 1:1 timed codes.

## 2022-01-04 ENCOUNTER — TELEPHONE (OUTPATIENT)
Dept: RHEUMATOLOGY | Facility: CLINIC | Age: 82
End: 2022-01-04
Payer: MEDICARE

## 2022-01-04 NOTE — TELEPHONE ENCOUNTER
Pt is down to his last actemra, he will do it this week and then start the Methotrexate next week.  He will do labs in 4 weeks if he has to stay on Methotrexate. He was reminded to take folic acid with Methotrexate.      Candis Montero RN  Rheumatology Clinic

## 2022-01-10 ENCOUNTER — TELEPHONE (OUTPATIENT)
Dept: RHEUMATOLOGY | Facility: CLINIC | Age: 82
End: 2022-01-10
Payer: MEDICARE

## 2022-01-10 DIAGNOSIS — M31.6 GIANT CELL ARTERITIS (H): ICD-10-CM

## 2022-01-12 NOTE — TELEPHONE ENCOUNTER
methotrexate 2.5 MG tablet      Last Written Prescription Date:  10-  Last Fill Quantity: 93,   # refills: 0  Last Office Visit: 12-3-2021  Future Office visit:  None    RECENT OUTSIDE LABS AVAILABLE IN THE LAB TAB OR CARE EVERYWHERE.  December 2021    CBC RESULTS: Recent Labs   Lab Test 05/05/21  1302   WBC 27.4*   RBC 4.31*   HGB 13.6   HCT 41.6   MCV 97   MCH 31.6   MCHC 32.7   RDW 14.7   *       Creatinine   Date Value Ref Range Status   05/05/2021 0.81 0.66 - 1.25 mg/dL Final   ]    Liver Function Studies -   Recent Labs   Lab Test 05/05/21  1302   PROTTOTAL 6.4*   ALBUMIN 3.7   BILITOTAL 0.4   ALKPHOS 45   AST 29   ALT 25       Routing refill request to provider for review/approval because:  Not on protocol.          Kathleen M Doege RN

## 2022-01-13 NOTE — TELEPHONE ENCOUNTER
Call placed to patient regarding current methotrexate dose without success. Left a message for patient to return call. When patient calls back, please Rheumatology staff via back line.    Aster SWANSON CMA, Clinic Navigator  Adult Rheumatology Clinic  [facility]

## 2022-01-13 NOTE — TELEPHONE ENCOUNTER
Pt returning call, writer called back line and was unsuccessful. Please call pt back at [phone].

## 2022-01-31 ENCOUNTER — TRANSCRIBE ORDERS (OUTPATIENT)
Dept: OTHER | Age: 82
End: 2022-01-31
Payer: MEDICARE

## 2022-01-31 DIAGNOSIS — M54.2 NECK PAIN: Primary | ICD-10-CM

## 2022-02-02 NOTE — TELEPHONE ENCOUNTER
Call attempted to patient regarding refill request without success. Left a message for patient to return call.    Aster Segovia CMA   2/2/2022 11:39 AM

## 2022-02-09 NOTE — TELEPHONE ENCOUNTER
Spoke to patient regarding current dose of methotrexate. Patient was unsure how he was supposed to be taking this medication and believes that he was only taking 1 tablet every 7 days. He states that he has quite a few tablets and is not in need of a refill at this time.     I told patient that I would send this message to [place] to discuss questions he has regarding this medication.     Aster Segovia CMA   2/9/2022 8:58 AM

## 2022-02-11 ENCOUNTER — TELEPHONE (OUTPATIENT)
Dept: OPHTHALMOLOGY | Facility: CLINIC | Age: 82
End: 2022-02-11
Payer: MEDICARE

## 2022-02-11 NOTE — TELEPHONE ENCOUNTER
Pt called to report that he woke up from a nap in the evening of 2/10/22 and was experiencing some excessive tearing and mild blurred vision in his right eye. His vision normalized after a few minutes with blinking, and vision in both eyes has been normal since. Pt denies headache or eye pain. Denies flashes or floaters.    Advised pt to monitor for now, let us know if there is any blurred vision that does not resolve with blinking or the use of artificial tears.     Will route to neuro-op fellow for review.    Lucia Carranza on 2/11/2022 at 10:53 AM

## 2022-02-14 NOTE — TELEPHONE ENCOUNTER
Pt is not able to obtain his Actemra this week, is wondering how much Methotrexate he should take since he has not been taking it on a regular basis.  Does seem to do well when he does take it. No side effects that I am aware of.    Candis Montero RN  Rheumatology Clinic

## 2022-02-15 NOTE — TELEPHONE ENCOUNTER
15mg weekly x2 weeks, then 17.5mg weekly x2 weeks, then 20mg weekly thereafter and folic acid 1mg daily.

## 2022-02-15 NOTE — TELEPHONE ENCOUNTER
Called and relayed message to pt, he understand, he has done 6 tablets for 1 week, so will do 6 tabs this week.  The next time he has to take Methotrexate, he will increase to 7 tabs and do that for 2 weeks and will stop at a max of 8 tabs per week.  He will do this ramp up once. And once he is at 8 tabs per week, he will continue that.  He will use Actemra when he has it and will complete the ramp up when the Actemra is not available.    Candis Montero RN  Rheumatology Clinic

## 2022-02-16 NOTE — TELEPHONE ENCOUNTER
Dr. Alcantara wants pt to stay on Methotrexate all the time. We discussed Dr. Alcantara's recommendation, that it won't do any good to go on and off the Methotrexate. Discussed reasoning with him. He understands and will do so with the medication.    Candis Montero RN  Rheumatology Clinic

## 2022-03-01 ENCOUNTER — TELEPHONE (OUTPATIENT)
Dept: RHEUMATOLOGY | Facility: CLINIC | Age: 82
End: 2022-03-01
Payer: MEDICARE

## 2022-03-01 DIAGNOSIS — Z79.899 HIGH RISK MEDICATION USE: ICD-10-CM

## 2022-03-01 DIAGNOSIS — M31.6 GIANT CELL ARTERITIS (H): Primary | ICD-10-CM

## 2022-03-01 NOTE — TELEPHONE ENCOUNTER
Lab orders faxed to [facility] lab.    Candis Montero RN  Rheumatology Clinic

## 2022-03-11 ENCOUNTER — VIRTUAL VISIT (OUTPATIENT)
Dept: RHEUMATOLOGY | Facility: CLINIC | Age: 82
End: 2022-03-11
Attending: INTERNAL MEDICINE
Payer: MEDICARE

## 2022-03-11 DIAGNOSIS — M31.6 GIANT CELL ARTERITIS (H): Primary | ICD-10-CM

## 2022-03-11 DIAGNOSIS — Z79.899 HIGH RISK MEDICATION USE: ICD-10-CM

## 2022-03-11 DIAGNOSIS — M85.80 OSTEOPENIA, UNSPECIFIED LOCATION: ICD-10-CM

## 2022-03-11 PROCEDURE — 99443 PR PHYSICIAN TELEPHONE EVALUATION 21-30 MIN: CPT | Performed by: INTERNAL MEDICINE

## 2022-03-11 NOTE — LETTER
3/11/2022       RE: Bon Michael  [birth year][address] [address]     Dear Colleague,    Thank you for referring your patient, Bon Michael, to the [facility] RHEUMATOLOGY [facility]IC [place] at [facility]. Please see a copy of my visit note below.      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 Never had HA, jaw claudication, PMR symptoms. No inflammatory arthritis symptoms. Disease currently in remission by history on once weekly actemra 162mg weekly (has missed some doses due to national drug shortage) and started methotrexate 20mg weekly/folic acid 1mg daily (since feb 2022). Has completed his prednisone taper which was guided by GiACTA trial.    Most recent labs from 3/9/2022 show normal ESR CRP.  He has baseline elevation in his WBC.  Chronic mild anemia with hemoglobin 13.  Chronic thrombocytopenia with platelets 129.  Creatinine 0.77.  His ALT and AST were both normal.  His albumin is normal at 3.9.    # Giant cell arteritis   1) continue 162mg once weekly injection, next dose due on Wednesday  2) Prednisone last dose was on 5/12/21   3) Continue off bactrim daily and omeprazole  4) Continue on methotrexate 20mg weekly and folic acid 1mg daily  5) Labs in 6 weeks at [facility]  6) Labs in 12 weeks at [facility] and follow-up around that time with me    # Long term use of high risk medication  1) Actemra 162mg weekly. No interval infections. No injection site reactions. Tolerating without issues. No evidence of toxicity  by most recent labs.  2) Methotrexate 20mg weekly. No GI or other side effects. Has taken 3 doses. Using this short term in the background of his Actemra given the National drug shortage he has been unable to reliably obtain his Actemra supply.  We discussed that because it takes some time for methotrexate to build in the system it cannot be used on an as-needed basis and so our plan at this time is to continue his methotrexate until his follow-up with me in 3 months time.  At that point we are both hopeful that given the improvement in the National Covid pandemic that the Actemra supply will improve.  And so if he is able to obtain his Actemra reliably over the next 3 months we will be able to discontinue his methotrexate at his next appointment.    # Osteopenia no interval fractures: Continue alendronate. Managed by PCP.     #CLL  Managed by oncology.    # Seasonal allergies: flonase daily    Severity of this disease discussed at length again with the patient today, particularly the potential for blindness/ loss of vision. Counseled to seek immediate medical attention should he experience changes in vision. He understands. Spent a significant amount of time discussing the side effects of Actemra and methotrexate including risk of malignancy, serious infections resulting in death, opportunistic infections, neutropenia/thrombocytopenia, allergic reaction, GI perforation, demyelinating disease, as well as fever, chills, hypercholesterolemia, abdominal pain, dizziness. Patient had the opportunity to ask questions, which were all answered.    Bib Alcantara MD   Rheumatology     Subjective:   Interval History 3/11/22  Has continued on alendronate. Thinks he has more sensitivity in his teeth. He asks about need for addition of vit K2 to supplement. He plans to bring this question, along with question about green tea extract up with his primary care physician. Takes this on Wednesday evenings. Taking folic acid 1mg  daily. Has only had 3 doses of actemra that were late. Has been on methotrexate for 3 weeks now.  No headache, vision change, scalp tenderness, proximal muscle pain or stiffness, peripheral joint swelling redness warmth.  Able to make full fist upon waking in the morning.  No fevers chills or night sweats.  No interval infections.  No rash.  Is tolerating his methotrexate without any side effects.  GI or other.      Past Medical History  CLL  Hernia  Mono   Deviated septum  Osteopenia by DEXA    Past Surgical History  Tonsillectomy  Deviated septum    Medications:  Actemra 162 mg once weekly subcutaneous  Methotrexate 20 mg once weekly  Folic acid 1 mg daily  Current Outpatient Medications   Medication     alendronate (FOSAMAX) 70 MG tablet     alendronate (FOSAMAX) 70 MG/75ML solution     aspirin (ASPIR-LOW) 81 MG EC tablet     Aspirin-Calcium Carbonate  MG TABS     azelastine (ASTELIN) 0.1 % nasal spray     B Complex Vitamins (VITAMIN B COMPLEX 100 IJ)     calcium carbonate (OS-BALBIR) 500 MG tablet     Cholecalciferol (VITAMIN D3)     Cranberry 405 MG CAPS     fish oil-omega-3 fatty acids 1000 MG capsule     fluticasone (FLONASE) 50 MCG/ACT nasal spray     folic acid (FOLVITE) 1 MG tablet     magnesium 250 MG tablet     methotrexate 2.5 MG tablet     omeprazole (PRILOSEC) 20 MG DR capsule     prednisoLONE 5 MG (21) TBPK     predniSONE (DELTASONE) 1 MG tablet     predniSONE (DELTASONE) 2.5 MG tablet     predniSONE (DELTASONE) 20 MG tablet     predniSONE (DELTASONE) 5 MG tablet     pseudoePHEDrine (SUDAFED) 30 MG tablet     tocilizumab (ACTEMRA) 162 MG/0.9ML subcutaneous injection     Zinc 50 MG CAPS     No current facility-administered medications for this visit.       Weeks Daily prednisone dose (mg) 26-week taper Weekly 162mg Actemra      1 60    2 50 11/5/2020    3 40    4 35 11/20/2020   5 30    6 25    7 20 12/10/2020   8 15 12/17/2020   9 12.5    10 12.5    11 10 1/8/2021   12 9    13 8    14 7 2/5/21    15 6 2/11/21   16 6    17 5    18 5 3/4/21   19 4    20 4    21 3    22 3 4/8/21   23 2    24 2    25 1    26 1 5/12/21     Allergies: Seasonal/ environmental allergies    Family History: No family history of autoimmune diseases like RA, sjogrens, SLE, scleroderma, IBD.    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 No drug use.        Objective:   Physical exam:  No vitals or exam for telephone visit    Labs:   WBC   Date Value Ref Range Status   05/05/2021 27.4 (HH) 4.0 - 11.0 10e9/L Final     Comment:     Critical Value called to and read back by  Results confirmed by repeat test  GIVEN TO Yazmin by ARM on 5.5.21 at 1401       Hemoglobin   Date Value Ref Range Status   05/05/2021 13.6 13.3 - 17.7 g/dL Final     Platelet Count   Date Value Ref Range Status   05/05/2021 120 (L) 150 - 450 10e9/L Final     Creatinine   Date Value Ref Range Status   05/05/2021 0.81 0.66 - 1.25 mg/dL Final     Lab Results   Component Value Date    ALKPHOS 45 05/05/2021     AST   Date Value Ref Range Status   05/05/2021 29 0 - 45 U/L Final     Lab Results   Component Value Date    ALT 25 05/05/2021     Sed Rate   Date Value Ref Range Status   06/16/2021 3 0 - 20 mm/h Final     CRP Inflammation   Date Value Ref Range Status   06/16/2021 <2.9 0.0 - 8.0 mg/L Final     UA RESULTS:  Recent Labs   Lab Test 06/16/21  1331   COLOR Yellow   APPEARANCE Clear   URINEGLC Negative   URINEBILI Negative   URINEKETONE Negative   SG 1.025   UBLD Negative   URINEPH 5.5   PROTEIN Negative   UROBILINOGEN 0.2   NITRITE Negative   LEUKEST Negative   RBCU O - 2   WBCU 0 - 5      No results found for: ANAIGG, ANAP1, MUKESH, DNA, ENASMI, RNPIGG, ENASSA, ENASSB, C3COM, C4COM, CKTOTAL, ALDOLASE, RHF, CCPIGG, ANCA, MPOIGG, PR3IGG    CRP  5/5/21  WBC 27.4  HGB 13.6    Cr 0.81  ALT 25  AST 29  Chol 152  Trig 88  HDL 54  LDL 80  CRP <2.9  ESR 3  UA with small blood, no cells, no protein, casts    4/8/21  ESR 4  CRP less than 2.9  WBC  30.1  Hemoglobin 13.6  Platelets 157  Creatinine 0.86  Cholesterol 179  Triglycerides 83  HDL 60      3/2/21  ESR 4  CRP <2.9  WBC 28.3  HGB 12.8    Cr 0.86  ALT 23  AST 25    2/3/21  WBC 30.6  HGB 11.9    Cr 0.83  Alk phos 40  ALT 30  AST 29    1/5/21  UA without protein, cells or casts  ESR 5  CRP <2.9  WBC 40.6  HGB 11.6    Cr 0.81  Chol 166  Trig 86  HDL 68  LDL 81    12/14/2020   CRP <2.9  ESR 4  WBC 54  HGB 11.8    Cr 0.78  Ua unremarkable  Cholesterol 168  Trig 82  HDL 70  LDL 81    11-  ESR 3  CRP less than 2.9  WBC 63.3  Hemoglobin 12  Platelets 181  Creatinine 0.98  Total protein low at 5.9  Alk phos 48  ALT 31  AST 30  UA with 30 of albumin  Cholesterol 169  Triglycerides 45  HDL 83  LDL 77    10/1/2020  WBC 84.9  HGB 12.3    IgM 36 low  IgA 124 normal  IgG 625    Cr 0.98    9/1/2020  WBC 50.2  HGB 11.6       Imaging:  MR brain and orbits 9/10/20  Impression:    1. Regarding the orbits and globes, there is no definite abnormal  contrast enhancement or mass. No evidence of leukemic infiltration.  2. Regarding the remainder of the brain, abnormal T2 hyperintense bone  marrow signal with associated enhancement in the skull, likely  representing leukemic infiltration.  3. Subcutaneous T2 hyperintensity with associated diffusion  restriction and contrast enhancement over the left zygomatic region,  question leukemic infiltration.    Pathology:  Patient Name: MARIELA WALLACE   MR#: [id number]   Specimen #: [id number]   Collected: 9/10/2020   Received: 9/10/2020   Reported: 9/16/2020 12:35   Ordering Phy(s): AGUS RETANA     For improved result formatting, select 'View Enhanced Report Format' under    Linked Documents section.     SPECIMEN(S):   Temporal artery biopsy, left     FINAL DIAGNOSIS:   Temporal artery, left, biopsy:   1. Granulomatous arteritis consistent with giant cell arteritis.   2. Chronic inflammation of the surrounding adventitia.  "    COMMENT:   Preliminary results were communicated to Dr. Valerie Carrington and Dr. Guille Garcia on 9/14/20 at 12:45pm.     I have personally reviewed all specimens and/or slides, including the   listed special stains, and used them   with my medical judgement to determine or confirm the final diagnosis.     Electronically signed out by:     Za García M.D., [facility]     CLINICAL HISTORY:   The patient is a 79 year old male with a history of chronic lymphocytic   leukemia who presented with bilateral   sequential optic neuropathy with associated pallid optic disc edema and   choroidal hypoperfusion, but   relatively intact visual acuity, negative elevation of acute phase   reactants, and lack of constitutional   symptoms consistent with giant cell arteritis. He also has findings of   likely leukemic infiltration in the   skull seen on MRI. He undergoes temporal artery biopsy on the left.     GROSS:   The specimen is received in formalin with proper patient identification,   labeled \"left temporal artery\".  The   specimen consists of a 3.2 cm in length by 0.3 cm in diameter tan-white   vessel segment. The specimen is   submitted intact in A1. (Dictated by: PADILLA Montalvo 9/11/2020 03:08   PM)     MICROSCOPIC:   The tissue consists of artery with narrowed lumen and intimal hyperplasia.    There is an infiltrate of   lymphocytes, plasma cells, epithelioid histiocytes, and multinucleated   giant cells throughout all layers of   the artery. There is associated loss of the internal elastic lamina on   elastic stain. Occasional focal   calcifications are seen a the level of the internal elastic lamina.   Neovascularization of the vessel wall is   present. Perivascular lymphocytic infiltrates are seen in the surrounding   adventitia. Immunohistochemistry   with CD3 and CD20 demonstrates a predominantly T-cell lymphocytic   infiltrate within the vessel wall. There is   a mixed infiltrate of T and B " lymphocytes perivascular in the adventitia.   CD5 and CD43 have similar staining   patterns to CD3. There is light patchy CD23 staining in the areas positive    for CD20. CD68 highlights the   epithelioid histiocytes within the vessel wall.     The technical component of this testing was completed at the [facility], with the professional component performed    at the [facility], [address] [place] [phone] ([phone])     CPT Codes:   A: 11094-MG5, 15031-QHOY, 04397-DGI, 83410-RPX, 71775-MFE, 75123-FRK,   31664-YYD, 17643-EWE     COLLECTION SITE:   Client: [facility]   Location: BERNADETTE Crockett (B) is a 81 year old who is being evaluated via a billable telephone visit.      What phone number would you like to be contacted at? [phone]  How would you like to obtain your AVS? Mail a copy  Phone call duration: 21 minutes

## 2022-03-11 NOTE — PROGRESS NOTES
Bon is a 81 year old who is being evaluated via a billable telephone visit.      What phone number would you like to be contacted at? [phone]  How would you like to obtain your AVS? Mail a copy  Phone call duration: 21 minutes

## 2022-03-11 NOTE — PROGRESS NOTES
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 Never had HA, jaw claudication, PMR symptoms. No inflammatory arthritis symptoms. Disease currently in remission by history on once weekly actemra 162mg weekly (has missed some doses due to national drug shortage) and started methotrexate 20mg weekly/folic acid 1mg daily (since feb 2022). Has completed his prednisone taper which was guided by GiACTA trial.    Most recent labs from 3/9/2022 show normal ESR CRP.  He has baseline elevation in his WBC.  Chronic mild anemia with hemoglobin 13.  Chronic thrombocytopenia with platelets 129.  Creatinine 0.77.  His ALT and AST were both normal.  His albumin is normal at 3.9.    # Giant cell arteritis   1) continue 162mg once weekly injection, next dose due on Wednesday  2) Prednisone last dose was on 5/12/21   3) Continue off bactrim daily and omeprazole  4) Continue on methotrexate 20mg weekly and folic acid 1mg daily  5) Labs in 6 weeks at [facility]  6) Labs in 12 weeks at [facility] and follow-up around that time with me    # Long term use of high risk medication  1) Actemra 162mg weekly. No interval infections. No injection site reactions. Tolerating without issues. No evidence of toxicity by most recent labs.  2) Methotrexate 20mg weekly. No GI or other side effects. Has taken 3 doses. Using this short term in the background of his Actemra given the National drug shortage he has been unable to reliably obtain his Actemra supply.  We discussed that because it takes some time for methotrexate to build in  the system it cannot be used on an as-needed basis and so our plan at this time is to continue his methotrexate until his follow-up with me in 3 months time.  At that point we are both hopeful that given the improvement in the National Covid pandemic that the Actemra supply will improve.  And so if he is able to obtain his Actemra reliably over the next 3 months we will be able to discontinue his methotrexate at his next appointment.    # Osteopenia no interval fractures: Continue alendronate. Managed by PCP.     #CLL  Managed by oncology.    # Seasonal allergies: flonase daily    Severity of this disease discussed at length again with the patient today, particularly the potential for blindness/ loss of vision. Counseled to seek immediate medical attention should he experience changes in vision. He understands. Spent a significant amount of time discussing the side effects of Actemra and methotrexate including risk of malignancy, serious infections resulting in death, opportunistic infections, neutropenia/thrombocytopenia, allergic reaction, GI perforation, demyelinating disease, as well as fever, chills, hypercholesterolemia, abdominal pain, dizziness. Patient had the opportunity to ask questions, which were all answered.    Bib Alcantara MD   Rheumatology     Subjective:   Interval History 3/11/22  Has continued on alendronate. Thinks he has more sensitivity in his teeth. He asks about need for addition of vit K2 to supplement. He plans to bring this question, along with question about green tea extract up with his primary care physician. Takes this on Wednesday evenings. Taking folic acid 1mg daily. Has only had 3 doses of actemra that were late. Has been on methotrexate for 3 weeks now.  No headache, vision change, scalp tenderness, proximal muscle pain or stiffness, peripheral joint swelling redness warmth.  Able to make full fist upon waking in the morning.  No fevers chills or night sweats.  No interval  infections.  No rash.  Is tolerating his methotrexate without any side effects.  GI or other.      Past Medical History  CLL  Hernia  Mono   Deviated septum  Osteopenia by DEXA    Past Surgical History  Tonsillectomy  Deviated septum    Medications:  Actemra 162 mg once weekly subcutaneous  Methotrexate 20 mg once weekly  Folic acid 1 mg daily  Current Outpatient Medications   Medication     alendronate (FOSAMAX) 70 MG tablet     alendronate (FOSAMAX) 70 MG/75ML solution     aspirin (ASPIR-LOW) 81 MG EC tablet     Aspirin-Calcium Carbonate  MG TABS     azelastine (ASTELIN) 0.1 % nasal spray     B Complex Vitamins (VITAMIN B COMPLEX 100 IJ)     calcium carbonate (OS-BALBIR) 500 MG tablet     Cholecalciferol (VITAMIN D3)     Cranberry 405 MG CAPS     fish oil-omega-3 fatty acids 1000 MG capsule     fluticasone (FLONASE) 50 MCG/ACT nasal spray     folic acid (FOLVITE) 1 MG tablet     magnesium 250 MG tablet     methotrexate 2.5 MG tablet     omeprazole (PRILOSEC) 20 MG DR capsule     prednisoLONE 5 MG (21) TBPK     predniSONE (DELTASONE) 1 MG tablet     predniSONE (DELTASONE) 2.5 MG tablet     predniSONE (DELTASONE) 20 MG tablet     predniSONE (DELTASONE) 5 MG tablet     pseudoePHEDrine (SUDAFED) 30 MG tablet     tocilizumab (ACTEMRA) 162 MG/0.9ML subcutaneous injection     Zinc 50 MG CAPS     No current facility-administered medications for this visit.       Weeks Daily prednisone dose (mg) 26-week taper Weekly 162mg Actemra      1 60    2 50 11/5/2020    3 40    4 35 11/20/2020   5 30    6 25    7 20 12/10/2020   8 15 12/17/2020   9 12.5    10 12.5    11 10 1/8/2021   12 9    13 8    14 7 2/5/21   15 6 2/11/21   16 6    17 5    18 5 3/4/21   19 4    20 4    21 3    22 3 4/8/21   23 2    24 2    25 1    26 1 5/12/21     Allergies: Seasonal/ environmental allergies    Family History: No family history of autoimmune diseases like RA, sjogrens, SLE, scleroderma, IBD.    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  No ETOH use. No drug use.        Objective:   Physical exam:  No vitals or exam for telephone visit    Labs:   WBC   Date Value Ref Range Status   05/05/2021 27.4 (HH) 4.0 - 11.0 10e9/L Final     Comment:     Critical Value called to and read back by  Results confirmed by repeat test  GIVEN TO Yazmin by ARM on 5.5.21 at 1401       Hemoglobin   Date Value Ref Range Status   05/05/2021 13.6 13.3 - 17.7 g/dL Final     Platelet Count   Date Value Ref Range Status   05/05/2021 120 (L) 150 - 450 10e9/L Final     Creatinine   Date Value Ref Range Status   05/05/2021 0.81 0.66 - 1.25 mg/dL Final     Lab Results   Component Value Date    ALKPHOS 45 05/05/2021     AST   Date Value Ref Range Status   05/05/2021 29 0 - 45 U/L Final     Lab Results   Component Value Date    ALT 25 05/05/2021     Sed Rate   Date Value Ref Range Status   06/16/2021 3 0 - 20 mm/h Final     CRP Inflammation   Date Value Ref Range Status   06/16/2021 <2.9 0.0 - 8.0 mg/L Final     UA RESULTS:  Recent Labs   Lab Test 06/16/21  1331   COLOR Yellow   APPEARANCE Clear   URINEGLC Negative   URINEBILI Negative   URINEKETONE Negative   SG 1.025   UBLD Negative   URINEPH 5.5   PROTEIN Negative   UROBILINOGEN 0.2   NITRITE Negative   LEUKEST Negative   RBCU O - 2   WBCU 0 - 5      No results found for: ANAIGG, ANAP1, MUKESH, DNA, ENASMI, RNPIGG, ENASSA, ENASSB, C3COM, C4COM, CKTOTAL, ALDOLASE, RHF, CCPIGG, ANCA, MPOIGG, PR3IGG    CRP  5/5/21  WBC 27.4  HGB 13.6    Cr 0.81  ALT 25  AST 29  Chol 152  Trig 88  HDL 54  LDL 80  CRP <2.9  ESR 3  UA with small blood, no cells, no protein, casts    4/8/21  ESR 4  CRP less than 2.9  WBC 30.1  Hemoglobin 13.6  Platelets 157  Creatinine 0.86  Cholesterol 179  Triglycerides 83  HDL 60      3/2/21  ESR 4  CRP <2.9  WBC 28.3  HGB 12.8    Cr 0.86  ALT 23  AST 25    2/3/21  WBC 30.6  HGB 11.9    Cr 0.83  Alk phos 40  ALT 30  AST 29    1/5/21  UA without protein, cells or casts  ESR 5  CRP <2.9  WBC  40.6  HGB 11.6    Cr 0.81  Chol 166  Trig 86  HDL 68  LDL 81    12/14/2020   CRP <2.9  ESR 4  WBC 54  HGB 11.8    Cr 0.78  Ua unremarkable  Cholesterol 168  Trig 82  HDL 70  LDL 81    11-  ESR 3  CRP less than 2.9  WBC 63.3  Hemoglobin 12  Platelets 181  Creatinine 0.98  Total protein low at 5.9  Alk phos 48  ALT 31  AST 30  UA with 30 of albumin  Cholesterol 169  Triglycerides 45  HDL 83  LDL 77    10/1/2020  WBC 84.9  HGB 12.3    IgM 36 low  IgA 124 normal  IgG 625    Cr 0.98    9/1/2020  WBC 50.2  HGB 11.6       Imaging:  MR brain and orbits 9/10/20  Impression:    1. Regarding the orbits and globes, there is no definite abnormal  contrast enhancement or mass. No evidence of leukemic infiltration.  2. Regarding the remainder of the brain, abnormal T2 hyperintense bone  marrow signal with associated enhancement in the skull, likely  representing leukemic infiltration.  3. Subcutaneous T2 hyperintensity with associated diffusion  restriction and contrast enhancement over the left zygomatic region,  question leukemic infiltration.    Pathology:  Patient Name: MARIELA WALLACE   MR#: [id number]   Specimen #: [id number]   Collected: 9/10/2020   Received: 9/10/2020   Reported: 9/16/2020 12:35   Ordering Phy(s): AGUS RETANA     For improved result formatting, select 'View Enhanced Report Format' under    Linked Documents section.     SPECIMEN(S):   Temporal artery biopsy, left     FINAL DIAGNOSIS:   Temporal artery, left, biopsy:   1. Granulomatous arteritis consistent with giant cell arteritis.   2. Chronic inflammation of the surrounding adventitia.     COMMENT:   Preliminary results were communicated to Dr. Agus Retana and Dr. Guille Garcia on 9/14/20 at 12:45pm.     I have personally reviewed all specimens and/or slides, including the   listed special stains, and used them   with my medical judgement to determine or confirm the final diagnosis.  "    Electronically signed out by:     Za García M.D., [facility]     CLINICAL HISTORY:   The patient is a 79 year old male with a history of chronic lymphocytic   leukemia who presented with bilateral   sequential optic neuropathy with associated pallid optic disc edema and   choroidal hypoperfusion, but   relatively intact visual acuity, negative elevation of acute phase   reactants, and lack of constitutional   symptoms consistent with giant cell arteritis. He also has findings of   likely leukemic infiltration in the   skull seen on MRI. He undergoes temporal artery biopsy on the left.     GROSS:   The specimen is received in formalin with proper patient identification,   labeled \"left temporal artery\".  The   specimen consists of a 3.2 cm in length by 0.3 cm in diameter tan-white   vessel segment. The specimen is   submitted intact in A1. (Dictated by: PADILLA Montalvo 9/11/2020 03:08   PM)     MICROSCOPIC:   The tissue consists of artery with narrowed lumen and intimal hyperplasia.    There is an infiltrate of   lymphocytes, plasma cells, epithelioid histiocytes, and multinucleated   giant cells throughout all layers of   the artery. There is associated loss of the internal elastic lamina on   elastic stain. Occasional focal   calcifications are seen a the level of the internal elastic lamina.   Neovascularization of the vessel wall is   present. Perivascular lymphocytic infiltrates are seen in the surrounding   adventitia. Immunohistochemistry   with CD3 and CD20 demonstrates a predominantly T-cell lymphocytic   infiltrate within the vessel wall. There is   a mixed infiltrate of T and B lymphocytes perivascular in the adventitia.   CD5 and CD43 have similar staining   patterns to CD3. There is light patchy CD23 staining in the areas positive    for CD20. CD68 highlights the   epithelioid histiocytes within the vessel wall.     The technical component of this testing was completed at the [facility]"    [facility], with the professional component performed    at the [facility], [address],   [place] [address] ([phone])     CPT Codes:   A: 09392-GR8, 68729-RIBX, 37867-FXI, 42965-MEM, 26408-JFX, 89405-EPI,   79317-SQW, 21754-SUM     COLLECTION SITE:   Client: General acute hospital   Location: [facility])

## 2022-03-31 NOTE — PROGRESS NOTES
"   1. 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  Vision stable in both eyes with intact / normal central vision but persistent (probably permanent) peripheral field defects in both eyes.      No exam or history indicators to suggest recurrence.   Continue Actemra as per rheumatology- patient off of prednisone since May 12, 2021.    Patient has had intermittent delays in getting Actemra (for around one week maximum) and continues on methotrexate to ensure coverage during this gaps.     2.  Borderline visually significant cataracts - observe for now    3. Mild intermittent nonspecific jaw symptoms - he is getting a root canal on Monday 4/4. He reports \"bone loss,\" ?osteoporosis. Reports he may need a dental implant. Recommend ongoing follow-up with dentistry.     Patient interested in stopping Methotrexate.  I think this is reasonable as he has not had significant delays beyond 1 week in his Actemra dosing but I will touch base with Dr. Alcantara.    Follow-up in 4 months.    For a more thorough description of the disease presentation, please see my letter from the patient's initial visit with me     history from presentation (9/9/2020 visit)     Bon Michael is a 79 year old male who presents to neuro-ophthalmology   clinic today for consultation from Jose Andino MD at [facility] for   visual disturbance of left eye. He describes it as \"his left eye not   getting enough light\". He describes noticing the change in his left eye's   temporal visual field, inferior > superior. He notices it more first thing     in the morning. These symptoms began on 8/22/2020 and he describes it as   worsening - he is unclear if it the deficit has gotten larger or darker   however. He first noticed it while driving at the periphery of his vision.      Patient saw Dr. Lewis on August 28.  He underwent esr / crp testing as   well as mri (see below).     Labs: 9/2/20- WBC- 50.2, ESR- 28, CRP < " 5     On August 31 he developed symptoms of waviness in his peripheral vision in     the right eye.  He went to Dr. Andino on 9/4/2020 who wanted him to go to   the ED but he was unable.  Dr. Andino reviewed the patient's fluorescein   angiography and felt there was choroidal filling delay in the right eye   and pallid edema in the right eye concerning for giant cell arteritis.  We     discussed the case via phone.      PRIOR IMAGING:  BRAIN MRI WITH/WO GADOLINIUM, MRA OF THE [place] AND MRA OF THE   CAROTID ARTERIES - [facility] (9/1/2020)  IMPRESSION:    1.  No acute intracranial abnormality.  2.  No evidence of an orbital abnormalities.  3.  Mild generalized cerebral atrophy.  4.  Unremarkable MRA of the [place].  5.  Unremarkable MRA of the cervical vessels.     On my re-review I disagreed with the radiology read.  There is evidence of   patchy enhancement diffusely within the bilateral orbits and along the   bilateral intraorbital optic nerve sheaths.  Will discuss with   neuro-radiology regarding finding and repeat scan.     No bisphosphonates in the past (no osteoporosis medications for 7-8 years   per patient).      Left temporal artery biopsy 9/9/20- positive for giant cell arteritis   Prednisone (started 9/4/2020)     Patient started Actemra 10/28/20.     Interim history and exam since last visit:  Last visit 12/15/21  Denies any visual changes since last visit. No headaches.     He is still on Acetmra but there are occasionally week-long delays. He has been taking methotrexate in the meantime to make sure he is currently on 20 mg weekly. Last ESR and CRP were within normal limits at <1 and 0.07 respectively.    Next visit with Dr. Alcantara- 6/10/22    Last visit with Dr. Alcantara (Rheum) 3/11/22 (virtual):  # Giant cell arteritis   1) continue 162mg once weekly injection, next dose due on Wednesday  2) Prednisone last dose was on 5/12/21   3) Continue off bactrim daily  and omeprazole  4) Continue on methotrexate 20mg weekly and folic acid 1mg daily  5) Labs [facility] 6 weeks at [facility]  6) Labs in 12 weeks at [facility] and follow-up around that time with me     # Long term use of high risk medication  1) Actemra 162mg weekly. No interval infections. No injection site reactions. Tolerating without issues. No evidence of toxicity by most recent labs.  2) Methotrexate 20mg weekly. No GI or other side effects. Has taken 3 doses. Using this short term in the background of his Actemra given the National drug shortage he has been unable to reliably obtain his Actemra supply.  We discussed that because it takes some time for methotrexate to build in the system it cannot be used on an as-needed basis and so our plan at this time is to continue his methotrexate until his follow-up with me in 3 months time.  At that point we are both hopeful that given the improvement in the National Covid pandemic that the Actemra supply will improve.  And so if he is able to obtain his Actemra reliably over the next 3 months we will be able to discontinue his methotrexate at his next appointment.        Visual acuity with correction was 20/20 in the right eye and 20/30 in the left eye. IOP was 19 in the right eye and 16 in the left eye. Visual fields were full in both eyes. Ocular motility was full in all gaze directions. Color plates were 11/11 in the right eye and 11/11 in the left eye.  Pupils were equal, round and reactive to light with no RAPD.     There is mild diffuse pallor of both optic nerve heads today that are stable. No new optic disc edema.    Tests ordered and interpreted today:  OCT rNFL demonstrated a mean NFL thickness of 62 in the right eye and 60 in the left eye. Stable retinal nerve fiber layer thinning in both eyes- nasal predominate in the right eye and nasal /superior thinning in the left eye.    VF: Octopus 30 degree automated visual field was performed. Test was reliable.. 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 Stable       Complete documentation of historical and exam elements from today's encounter can be found in the full encounter summary report (not reduplicated in this progress note).  I personally obtained the chief complaint(s) and history of present illness.  I confirmed and edited as necessary the review of systems, past medical/surgical history, family history, social history, and examination findings as documented by others; and I examined the patient myself.  I personally reviewed the relevant tests, images, and reports as documented above.  I formulated and edited as necessary the assessment and plan and discussed the findings and management plan with the patient and family.  I personally reviewed the ophthalmic test(s) associated with this encounter, agree with the interpretation(s) as documented by the resident/fellow, and have edited the corresponding report(s) as necessary.     MD Lupillo Gomez, DO   PGY-5 Neuro-Ophthalmology Fellow

## 2022-04-01 ENCOUNTER — OFFICE VISIT (OUTPATIENT)
Dept: OPHTHALMOLOGY | Facility: CLINIC | Age: 82
End: 2022-04-01
Attending: OPHTHALMOLOGY
Payer: MEDICARE

## 2022-04-01 DIAGNOSIS — H46.9 OPTIC NEUROPATHY: ICD-10-CM

## 2022-04-01 DIAGNOSIS — H47.20 PARTIAL OPTIC ATROPHY: ICD-10-CM

## 2022-04-01 PROCEDURE — 92014 COMPRE OPH EXAM EST PT 1/>: CPT | Mod: GC | Performed by: OPHTHALMOLOGY

## 2022-04-01 PROCEDURE — 92083 EXTENDED VISUAL FIELD XM: CPT | Performed by: OPHTHALMOLOGY

## 2022-04-01 PROCEDURE — 92133 CPTRZD OPH DX IMG PST SGM ON: CPT | Performed by: OPHTHALMOLOGY

## 2022-04-01 PROCEDURE — G0463 HOSPITAL OUTPT CLINIC VISIT: HCPCS | Mod: 25

## 2022-04-01 ASSESSMENT — EXTERNAL EXAM - RIGHT EYE: OD_EXAM: NORMAL

## 2022-04-01 ASSESSMENT — TONOMETRY
IOP_METHOD: TONOPEN
OS_IOP_MMHG: 16
OD_IOP_MMHG: 19

## 2022-04-01 ASSESSMENT — CONF VISUAL FIELD
OD_NORMAL: 1
OS_NORMAL: 1

## 2022-04-01 ASSESSMENT — VISUAL ACUITY
OS_CC: 20/30
OD_CC: 20/20
OD_CC+: -2
CORRECTION_TYPE: GLASSES
METHOD: SNELLEN - LINEAR
OS_CC+: +2

## 2022-04-01 ASSESSMENT — REFRACTION_WEARINGRX
OS_SPHERE: +6.25
OD_AXIS: 134
SPECS_TYPE: PAL
OS_AXIS: 059
OS_CYLINDER: +0.50
OD_SPHERE: +3.25
OD_CYLINDER: +1.25

## 2022-04-01 ASSESSMENT — EXTERNAL EXAM - LEFT EYE: OS_EXAM: NORMAL

## 2022-04-01 ASSESSMENT — SLIT LAMP EXAM - LIDS
COMMENTS: NORMAL
COMMENTS: NORMAL

## 2022-04-01 ASSESSMENT — CUP TO DISC RATIO
OS_RATIO: 0.3
OD_RATIO: 0.3

## 2022-04-01 NOTE — NURSING NOTE
Chief Complaints and History of Present Illnesses   Patient presents with     Follow Up     Chief Complaint(s) and History of Present Illness(es)     Follow Up     Laterality: both eyes    Associated symptoms: Negative for eye pain, headache, double vision and abnormal color vision              Comments     Pt here for follow up on a biopsy for giant cell arteritis, optic neuropathy, and partial optic atrophy. Vision is stable since last exam.   Pt not using drops.   SHARAD LEON 9:06 AM April 1, 2022

## 2022-04-01 NOTE — LETTER
"2022    RE: Bon Michael  : 1940  MRN: [id number]    Dear Providers,    I saw our mutual patient, Bon Michael, in follow-up in my clinic recently.  After a thorough neuro-ophthalmic history and examination, I came to the following conclusions:     1. 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  Vision stable in both eyes with intact / normal central vision but persistent (probably permanent) peripheral field defects in both eyes.      No exam or history indicators to suggest recurrence.   Continue Actemra as per rheumatology- patient off of prednisone since May 12, 2021.    Patient has had intermittent delays in getting Actemra (for around one week maximum) and continues on methotrexate to ensure coverage during this gaps.     2.  Borderline visually significant cataracts - observe for now    3. Mild intermittent nonspecific jaw symptoms - he is getting a root canal on . He reports \"bone loss,\" ?osteoporosis. Reports he may need a dental implant. Recommend ongoing follow-up with dentistry.     Patient interested in stopping Methotrexate.  I think this is reasonable as he has not had significant delays beyond 1 week in his Actemra dosing but I will touch base with Dr. Alcantara.    Follow-up in 4 months.    For a more thorough description of the disease presentation, please see my letter from the patient's initial visit with me     history from presentation (2020 visit)     Bon Michael is a 79 year old male who presents to neuro-ophthalmology   clinic today for consultation from Jose Andino MD at [facility] for   visual disturbance of left eye. He describes it as \"his left eye not   getting enough light\". He describes noticing the change in his left eye's   temporal visual field, inferior > superior. He notices it more first thing     in the morning. These symptoms began on 2020 and he describes it as   worsening - he is unclear " if it the deficit has gotten larger or darker   however. He first noticed it while driving at the periphery of his vision.      Patient saw Dr. Lewis on August 28.  He underwent esr / crp testing as   well as mri (see below).     Labs: 9/2/20- WBC- 50.2, ESR- 28, CRP < 5     On August 31 he developed symptoms of waviness in his peripheral vision in     the right eye.  He went to Dr. Andino on 9/4/2020 who wanted him to go to   the ED but he was unable.  Dr. Andino reviewed the patient's fluorescein   angiography and felt there was choroidal filling delay in the right eye   and pallid edema in the right eye concerning for giant cell arteritis.  We     discussed the case via phone.      PRIOR IMAGING:  BRAIN MRI WITH/WO GADOLINIUM, MRA OF THE [place] OF SANCHEZ AND MRA OF THE   CAROTID ARTERIES - [facility] (9/1/2020)  IMPRESSION:    1.  No acute intracranial abnormality.  2.  No evidence of an orbital abnormalities.  3.  Mild generalized cerebral atrophy.  4.  Unremarkable MRA of the [place] of [place].  5.  Unremarkable MRA of the cervical vessels.     On my re-review I disagreed with the radiology read.  There is evidence of   patchy enhancement diffusely within the bilateral orbits and along the   bilateral intraorbital optic nerve sheaths.  Will discuss with   neuro-radiology regarding finding and repeat scan.     No bisphosphonates in the past (no osteoporosis medications for 7-8 years   per patient).      Left temporal artery biopsy 9/9/20- positive for giant cell arteritis   Prednisone (started 9/4/2020)     Patient started Actemra 10/28/20.     Interim history and exam since last visit:  Last visit 12/15/21  Denies any visual changes since last visit. No headaches.     He is still on Acetmra but there are occasionally week-long delays. He has been taking methotrexate in the meantime to make sure he is currently on 20 mg weekly. Last ESR and CRP were within normal limits at <1 and 0.07  respectively.    Next visit with Dr. Alcantara- 6/10/22    Last visit with Dr. Alcantara (Rheum) 3/11/22 (virtual):  # Giant cell arteritis   1) continue 162mg once weekly injection, next dose due on Wednesday  2) Prednisone last dose was on 5/12/21   3) Continue off bactrim daily and omeprazole  4) Continue on methotrexate 20mg weekly and folic acid 1mg daily  5) Labs in 6 weeks at [facility]  6) Labs in 12 weeks at [facility] and follow-up around that time with me     # Long term use of high risk medication  1) Actemra 162mg weekly. No interval infections. No injection site reactions. Tolerating without issues. No evidence of toxicity by most recent labs.  2) Methotrexate 20mg weekly. No GI or other side effects. Has taken 3 doses. Using this short term in the background of his Actemra given the National drug shortage he has been unable to reliably obtain his Actemra supply.  We discussed that because it takes some time for methotrexate to build in the system it cannot be used on an as-needed basis and so our plan at this time is to continue his methotrexate until his follow-up with me in 3 months time.  At that point we are both hopeful that given the improvement in the National Covid pandemic that the Actemra supply will improve.  And so if he is able to obtain his Actemra reliably over the next 3 months we will be able to discontinue his methotrexate at his next appointment.        Visual acuity with correction was 20/20 in the right eye and 20/30 in the left eye. IOP was 19 in the right eye and 16 in the left eye. Visual fields were full in both eyes. Ocular motility was full in all gaze directions. Color plates were 11/11 in the right eye and 11/11 in the left eye.  Pupils were equal, round and reactive to light with no RAPD.     Strabismus exam: full, ortho    Tests ordered and interpreted today:  OCT rNFL demonstrated a mean NFL thickness of 62 in the right eye and 60 in the left eye. Stable retinal nerve fiber  layer thinning in both eyes- nasal predominate in the right eye and nasal /superior thinning in the left eye.    VF: Octopus 30 degree automated visual field was performed. Test was reliable.. 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 Stable    For further exam details, please feel free to contact our office for additional records.  If you wish to contact me regarding this patient please email me at [facility] [email or web address] or give my clinic a call to arrange a phone conversation.    Sincerely,    Guille Garcia MD  , Neuro-Ophthalmology and Adult Strabismus Surgery  The Lucy Castano Chair in Neuro-Ophthalmology  Department of Ophthalmology and Visual Neurosciences  [facility]

## 2022-05-13 DIAGNOSIS — M31.6 GIANT CELL ARTERITIS (H): ICD-10-CM

## 2022-05-13 NOTE — TELEPHONE ENCOUNTER
methotrexate 2.5 MG tablet  Last Written Prescription Date:   10/21/2021-4/1/2022  Last Fill Quantity: 93,   # refills: 0  Last Office Visit:  3/11/2022  Future Office visit:   6/10/2022    CBC RESULTS: Recent Labs   Lab Test 05/05/21  1302   WBC 27.4*   RBC 4.31*   HGB 13.6   HCT 41.6   MCV 97   MCH 31.6   MCHC 32.7   RDW 14.7   *       Creatinine   Date Value Ref Range Status   05/05/2021 0.81 0.66 - 1.25 mg/dL Final   ]    Liver Function Studies -   Recent Labs   Lab Test 05/05/21  1302   PROTTOTAL 6.4*   ALBUMIN 3.7   BILITOTAL 0.4   ALKPHOS 45   AST 29   ALT 25       Routing refill request to provider for review/approval because:  Drug not on the [facility] [facility] or [facility] refill protocol or controlled substance      Justyna Levy RN  Central Triage Red Flags/Med Refills    Assessment:    3/11/2022  81 year old male with history of CLL referred to rheumatology for evaluation and treatment of biopsy proven giant cell arteritis with bilateral ischemic optic neuropathy clinically manifested by now stable peripheral vision loss in left>right eye. Never had HA, jaw claudication, PMR symptoms. No inflammatory arthritis symptoms. Disease currently in remission by history on once weekly actemra 162mg weekly (has missed some doses due to national drug shortage) and started methotrexate 20mg weekly/folic acid 1mg daily (since feb 2022). Has completed his prednisone taper which was guided by GiACTA trial.     Most recent labs from 3/9/2022 show normal ESR CRP.  He has baseline elevation in his WBC.  Chronic mild anemia with hemoglobin 13.  Chronic thrombocytopenia with platelets 129.  Creatinine 0.77.  His ALT and AST were both normal.  His albumin is normal at 3.9.     # Giant cell arteritis   1) continue 162mg once weekly injection, next dose due on Wednesday  2) Prednisone last dose was on 5/12/21   3) Continue off bactrim daily and omeprazole  4) Continue on methotrexate 20mg weekly and folic acid 1mg daily  5) Labs  in 6 weeks at [facility]  6) Labs in 12 weeks at [facility] and follow-up around that time with me     # Long term use of high risk medication  1) Actemra 162mg weekly. No interval infections. No injection site reactions. Tolerating without issues. No evidence of toxicity by most recent labs.  2) Methotrexate 20mg weekly. No GI or other side effects. Has taken 3 doses. Using this short term in the background of his Actemra given the National drug shortage he has been unable to reliably obtain his Actemra supply.  We discussed that because it takes some time for methotrexate to build in the system it cannot be used on an as-needed basis and so our plan at this time is to continue his methotrexate until his follow-up with me in 3 months time.  At that point we are both hopeful that given the improvement in the National Covid pandemic that the Actemra supply will improve.  And so if he is able to obtain his Actemra reliably over the next 3 months we will be able to discontinue his methotrexate at his next appointment.     # Osteopenia no interval fractures: Continue alendronate. Managed by PCP.      #CLL  Managed by oncology.     # Seasonal allergies: flonase daily     Severity of this disease discussed at length again with the patient today, particularly the potential for blindness/ loss of vision. Counseled to seek immediate medical attention should he experience changes in vision. He understands. Spent a significant amount of time discussing the side effects of Actemra and methotrexate including risk of malignancy, serious infections resulting in death, opportunistic infections, neutropenia/thrombocytopenia, allergic reaction, GI perforation, demyelinating disease, as well as fever, chills, hypercholesterolemia, abdominal pain, dizziness. Patient had the opportunity to ask questions, which were all answered.     Bib Alcantara MD   Rheumatology

## 2022-05-17 ENCOUNTER — TRANSCRIBE ORDERS (OUTPATIENT)
Dept: OTHER | Age: 82
End: 2022-05-17
Payer: MEDICARE

## 2022-05-17 DIAGNOSIS — M13.0 POLYARTHRITIS: Primary | ICD-10-CM

## 2022-06-10 ENCOUNTER — TELEPHONE (OUTPATIENT)
Dept: RHEUMATOLOGY | Facility: CLINIC | Age: 82
End: 2022-06-10
Payer: MEDICARE

## 2022-06-10 ENCOUNTER — VIRTUAL VISIT (OUTPATIENT)
Dept: RHEUMATOLOGY | Facility: CLINIC | Age: 82
End: 2022-06-10
Attending: INTERNAL MEDICINE
Payer: MEDICARE

## 2022-06-10 DIAGNOSIS — M31.6 GIANT CELL ARTERITIS (H): Primary | ICD-10-CM

## 2022-06-10 DIAGNOSIS — Z79.899 HIGH RISK MEDICATION USE: ICD-10-CM

## 2022-06-10 PROCEDURE — 99442 PR PHYSICIAN TELEPHONE EVALUATION 11-20 MIN: CPT | Mod: 95 | Performed by: INTERNAL MEDICINE

## 2022-06-10 NOTE — PROGRESS NOTES
TELEPHONE Rheumatology Follow-up    Name: Bon Michael    MRN [id number]   Today's date: 6/10/2022            Reason for Follow-up:  GCA   Requesting physician: Guille Garcia MD        Assessment & Plan:   Assessment:  81 year old male with history of CLL referred to rheumatology for evaluation and treatment of biopsy proven giant cell arteritis with bilateral ischemic optic neuropathy clinically manifested by now stable peripheral vision loss in left>right eye. Never had HA, jaw claudication, PMR symptoms. No inflammatory arthritis symptoms. Disease currently in remission by history on once weekly actemra 162mg weekly (has missed some doses due to national drug shortage) and methotrexate 20mg weekly/folic acid 1mg daily (since feb 2022). Has completed his prednisone taper which was guided by GiACTA trial.    Most recent labs obtained 6/8/2022 show normal ESR and CRP.  He has baseline elevation in his WBC.  His AST and ALT were both normal.  He has a baseline mild anemia with a hemoglobin of 12.6 and platelet of 114.  His creatinine was within normal limits at 0.76.  No evidence of drug toxicity on routine screening labs.  Has normal inflammatory markers also support his lack of symptoms by history today.    # Giant cell arteritis   1) continue 162mg once weekly injection, next dose due on Wednesday  2) Prednisone last dose was on 5/12/21   3) Continue off bactrim daily and omeprazole  4) stop methotrexate and folic acid  5) follow-up in 3 months with labs the week prior    # Long term use of high risk medication  1) Actemra 162mg weekly. No interval infections. No injection site reactions. Tolerating without issues. No evidence of toxicity by most recent labs.  2) Methotrexate 20mg weekly. No GI or other side effects.  Labs outlined above show no evidence of drug toxicity on routine screening labs.  Actemra national drug shortage has improved so we will discontinue methotrexate and folic acid  now.    # Osteopenia no interval fractures: Continue alendronate. Managed by PCP.     #CLL  Managed by oncology.    # Seasonal allergies: flonase daily    Severity of this disease discussed at length again with the patient today, particularly the potential for blindness/ loss of vision. Counseled to seek immediate medical attention should he experience changes in vision. He understands. Spent a significant amount of time discussing the side effects of Actemra and methotrexate including risk of malignancy, serious infections resulting in death, opportunistic infections, neutropenia/thrombocytopenia, allergic reaction, GI perforation, demyelinating disease, as well as fever, chills, hypercholesterolemia, abdominal pain, dizziness. Patient had the opportunity to ask questions, which were all answered.    Bib Alcantara MD   Rheumatology     Subjective:   Interval history 6/10/22  No HA, change in vision, scalp tenderness, jaw claudication. No proximal muscle pain/stiffness. No red/hot/swollen joints. No fevers/chills/night sweats. No side effects with methotrexate. Takes methotrexate and actemra on wednesdays. No side effects for either. No interval infections. No rashes.  Weight has been stable.  Able to make a full fist upon waking in the morning.  14 point review of systems collected and otherwise negative.    Interval History 3/11/22  Has continued on alendronate. Thinks he has more sensitivity in his teeth. He asks about need for addition of vit K2 to supplement. He plans to bring this question, along with question about green tea extract up with his primary care physician. Takes this on Wednesday evenings. Taking folic acid 1mg daily. Has only had 3 doses of actemra that were late. Has been on methotrexate for 3 weeks now.  No headache, vision change, scalp tenderness, proximal muscle pain or stiffness, peripheral joint swelling redness warmth.  Able to make full fist upon waking in the morning.  No fevers  chills or night sweats.  No interval infections.  No rash.  Is tolerating his methotrexate without any side effects.  GI or other.      Past Medical History  CLL  Hernia  Mono   Deviated septum  Osteopenia by DEXA    Past Surgical History  Tonsillectomy  Deviated septum    Medications:  Actemra 162 mg once weekly subcutaneous  Methotrexate 20 mg once weekly  Folic acid 1 mg daily  Current Outpatient Medications   Medication     alendronate (FOSAMAX) 70 MG tablet     alendronate (FOSAMAX) 70 MG/75ML solution     aspirin (ASPIR-LOW) 81 MG EC tablet     Aspirin-Calcium Carbonate  MG TABS     azelastine (ASTELIN) 0.1 % nasal spray     B Complex Vitamins (VITAMIN B COMPLEX 100 IJ)     calcium carbonate (OS-BALBIR) 500 MG tablet     Cholecalciferol (VITAMIN D3)     Cranberry 405 MG CAPS     fish oil-omega-3 fatty acids 1000 MG capsule     fluticasone (FLONASE) 50 MCG/ACT nasal spray     folic acid (FOLVITE) 1 MG tablet     magnesium 250 MG tablet     methotrexate 2.5 MG tablet     omeprazole (PRILOSEC) 20 MG DR capsule     prednisoLONE 5 MG (21) TBPK     predniSONE (DELTASONE) 1 MG tablet     predniSONE (DELTASONE) 2.5 MG tablet     predniSONE (DELTASONE) 20 MG tablet     predniSONE (DELTASONE) 5 MG tablet     pseudoePHEDrine (SUDAFED) 30 MG tablet     tocilizumab (ACTEMRA) 162 MG/0.9ML subcutaneous injection     Zinc 50 MG CAPS     No current facility-administered medications for this visit.       Weeks Daily prednisone dose (mg) 26-week taper Weekly 162mg Actemra      1 60    2 50 11/5/2020    3 40    4 35 11/20/2020   5 30    6 25    7 20 12/10/2020   8 15 12/17/2020   9 12.5    10 12.5    11 10 1/8/2021   12 9    13 8    14 7 2/5/21   15 6 2/11/21   16 6    17 5    18 5 3/4/21   19 4    20 4    21 3    22 3 4/8/21   23 2    24 2    25 1    26 1 5/12/21     Allergies: Seasonal/ environmental allergies    Family History: No family history of autoimmune diseases like RA, sjogrens, SLE, scleroderma, IBD.    Social  History: Works as realtor. Never smoker. No ETOH use. No drug use.        Objective:   Physical exam:  No vitals or exam for telephone visit    Labs:   From care everywhere:  Most recent labs obtained 6/8/2022 show normal ESR and CRP.  He has baseline elevation in his WBC.  His AST and ALT were both normal.  He has a baseline mild anemia with a hemoglobin of 12.6 and platelet of 114.  His creatinine was within normal limits at 0.76.    CRP  5/5/21  WBC 27.4  HGB 13.6    Cr 0.81  ALT 25  AST 29  Chol 152  Trig 88  HDL 54  LDL 80  CRP <2.9  ESR 3  UA with small blood, no cells, no protein, casts    4/8/21  ESR 4  CRP less than 2.9  WBC 30.1  Hemoglobin 13.6  Platelets 157  Creatinine 0.86  Cholesterol 179  Triglycerides 83  HDL 60      3/2/21  ESR 4  CRP <2.9  WBC 28.3  HGB 12.8    Cr 0.86  ALT 23  AST 25    2/3/21  WBC 30.6  HGB 11.9    Cr 0.83  Alk phos 40  ALT 30  AST 29    1/5/21  UA without protein, cells or casts  ESR 5  CRP <2.9  WBC 40.6  HGB 11.6    Cr 0.81  Chol 166  Trig 86  HDL 68  LDL 81    12/14/2020   CRP <2.9  ESR 4  WBC 54  HGB 11.8    Cr 0.78  Ua unremarkable  Cholesterol 168  Trig 82  HDL 70  LDL 81    11-  ESR 3  CRP less than 2.9  WBC 63.3  Hemoglobin 12  Platelets 181  Creatinine 0.98  Total protein low at 5.9  Alk phos 48  ALT 31  AST 30  UA with 30 of albumin  Cholesterol 169  Triglycerides 45  HDL 83  LDL 77    10/1/2020  WBC 84.9  HGB 12.3    IgM 36 low  IgA 124 normal  IgG 625    Cr 0.98    9/1/2020  WBC 50.2  HGB 11.6       Imaging:  MR brain and orbits 9/10/20  Impression:    1. Regarding the orbits and globes, there is no definite abnormal  contrast enhancement or mass. No evidence of leukemic infiltration.  2. Regarding the remainder of the brain, abnormal T2 hyperintense bone  marrow signal with associated enhancement in the skull, likely  representing leukemic infiltration.  3. Subcutaneous T2 hyperintensity with  "associated diffusion  restriction and contrast enhancement over the left zygomatic region,  question leukemic infiltration.    Pathology:  Patient Name: MARIELA WALLACE   MR#: [id number]   Specimen #: [id number]   Collected: 9/10/2020   Received: 9/10/2020   Reported: 9/16/2020 12:35   Ordering Phy(s): VALERIE CARRINGTON     For improved result formatting, select 'View Enhanced Report Format' under    Linked Documents section.     SPECIMEN(S):   Temporal artery biopsy, left     FINAL DIAGNOSIS:   Temporal artery, left, biopsy:   1. Granulomatous arteritis consistent with giant cell arteritis.   2. Chronic inflammation of the surrounding adventitia.     COMMENT:   Preliminary results were communicated to Dr. Valerie Carrington and Dr. Guille Garcia on 9/14/20 at 12:45pm.     I have personally reviewed all specimens and/or slides, including the   listed special stains, and used them   with my medical judgement to determine or confirm the final diagnosis.     Electronically signed out by:     Za García M.D., [facility]     CLINICAL HISTORY:   The patient is a 79 year old male with a history of chronic lymphocytic   leukemia who presented with bilateral   sequential optic neuropathy with associated pallid optic disc edema and   choroidal hypoperfusion, but   relatively intact visual acuity, negative elevation of acute phase   reactants, and lack of constitutional   symptoms consistent with giant cell arteritis. He also has findings of   likely leukemic infiltration in the   skull seen on MRI. He undergoes temporal artery biopsy on the left.     GROSS:   The specimen is received in formalin with proper patient identification,   labeled \"left temporal artery\".  The   specimen consists of a 3.2 cm in length by 0.3 cm in diameter tan-white   vessel segment. The specimen is   submitted intact in A1. (Dictated by: PADILLA Montalvo 9/11/2020 03:08   PM)     MICROSCOPIC:   The tissue consists of artery with narrowed " lumen and intimal hyperplasia.    There is an infiltrate of   lymphocytes, plasma cells, epithelioid histiocytes, and multinucleated   giant cells throughout all layers of   the artery. There is associated loss of the internal elastic lamina on   elastic stain. Occasional focal   calcifications are seen a the level of the internal elastic lamina.   Neovascularization of the vessel wall is   present. Perivascular lymphocytic infiltrates are seen in the surrounding   adventitia. Immunohistochemistry   with CD3 and CD20 demonstrates a predominantly T-cell lymphocytic   infiltrate within the vessel wall. There is   a mixed infiltrate of T and B lymphocytes perivascular in the adventitia.   CD5 and CD43 have similar staining   patterns to CD3. There is light patchy CD23 staining in the areas positive    for CD20. CD68 highlights the   epithelioid histiocytes within the vessel wall.     The [facility] component of this testing [facility], with the professional component performed    at the [facility], [address] [place] [phone] ([phone])     CPT Codes:   A: 63346-AP7, 16651-RMFV, 20297-AET, 92857-OQU, 66627-CUM, 99040-XPW,   39356-EPN, 18278-FPS     COLLECTION SITE:   Client: [facility]   Location: [place]ERNA[place] FUENTES)

## 2022-06-10 NOTE — PATIENT INSTRUCTIONS
1) stop methotrexate and folic acid  2) Continue actemra once weekly  3) Follow-up in 3 months with labs in the week prior

## 2022-06-10 NOTE — PROGRESS NOTES
Bon is a 81 year old who is being evaluated via a billable telephone visit.      What phone number would you like to be contacted at? [phone]  How would you like to obtain your AVS? Mail a copy  Phone call duration: 14 minutes

## 2022-06-10 NOTE — LETTER
6/10/2022       RE: oBn Michael  1923 [facility] [address] [address]     Dear Colleague,    Thank you for referring your patient, Bon Michael, to the [facility] [place] at [facility]. Please see a copy of my visit note below.      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 Never had HA, jaw claudication, PMR symptoms. No inflammatory arthritis symptoms. Disease currently in remission by history on once weekly actemra 162mg weekly (has missed some doses due to national drug shortage) and methotrexate 20mg weekly/folic acid 1mg daily (since feb 2022). Has completed his prednisone taper which was guided by GiACTA trial.    Most recent labs obtained 6/8/2022 show normal ESR and CRP.  He has baseline elevation in his WBC.  His AST and ALT were both normal.  He has a baseline mild anemia with a hemoglobin of 12.6 and platelet of 114.  His creatinine was within normal limits at 0.76.  No evidence of drug toxicity on routine screening labs.  Has normal inflammatory markers also support his lack of symptoms by history today.    # Giant cell arteritis   1) continue 162mg once weekly injection, next dose due on Wednesday  2) Prednisone last dose was on 5/12/21   3) Continue off bactrim daily and omeprazole  4) stop methotrexate and folic acid  5) follow-up in 3 months with labs the week prior    # Long term use of high risk medication  1) Actemra 162mg weekly. No interval infections. No injection site  reactions. Tolerating without issues. No evidence of toxicity by most recent labs.  2) Methotrexate 20mg weekly. No GI or other side effects.  Labs outlined above show no evidence of drug toxicity on routine screening labs.  Actemra national drug shortage has improved so we will discontinue methotrexate and folic acid now.    # Osteopenia no interval fractures: Continue alendronate. Managed by PCP.     #CLL  Managed by oncology.    # Seasonal allergies: flonase daily    Severity of this disease discussed at length again with the patient today, particularly the potential for blindness/ loss of vision. Counseled to seek immediate medical attention should he experience changes in vision. He understands. Spent a significant amount of time discussing the side effects of Actemra and methotrexate including risk of malignancy, serious infections resulting in death, opportunistic infections, neutropenia/thrombocytopenia, allergic reaction, GI perforation, demyelinating disease, as well as fever, chills, hypercholesterolemia, abdominal pain, dizziness. Patient had the opportunity to ask questions, which were all answered.    Bib Alcantara MD   Rheumatology     Subjective:   Interval history 6/10/22  No HA, change in vision, scalp tenderness, jaw claudication. No proximal muscle pain/stiffness. No red/hot/swollen joints. No fevers/chills/night sweats. No side effects with methotrexate. Takes methotrexate and actemra on wednesdays. No side effects for either. No interval infections. No rashes.  Weight has been stable.  Able to make a full fist upon waking in the morning.  14 point review of systems collected and otherwise negative.    Interval History 3/11/22  Has continued on alendronate. Thinks he has more sensitivity in his teeth. He asks about need for addition of vit K2 to supplement. He plans to bring this question, along with question about green tea extract up with his primary care physician. Takes this on Wednesday  evenings. Taking folic acid 1mg daily. Has only had 3 doses of actemra that were late. Has been on methotrexate for 3 weeks now.  No headache, vision change, scalp tenderness, proximal muscle pain or stiffness, peripheral joint swelling redness warmth.  Able to make full fist upon waking in the morning.  No fevers chills or night sweats.  No interval infections.  No rash.  Is tolerating his methotrexate without any side effects.  GI or other.      Past Medical History  CLL  Hernia  Mono   Deviated septum  Osteopenia by DEXA    Past Surgical History  Tonsillectomy  Deviated septum    Medications:  Actemra 162 mg once weekly subcutaneous  Methotrexate 20 mg once weekly  Folic acid 1 mg daily  Current Outpatient Medications   Medication     alendronate (FOSAMAX) 70 MG tablet     alendronate (FOSAMAX) 70 MG/75ML solution     aspirin (ASPIR-LOW) 81 MG EC tablet     Aspirin-Calcium Carbonate  MG TABS     azelastine (ASTELIN) 0.1 % nasal spray     B Complex Vitamins (VITAMIN B COMPLEX 100 IJ)     calcium carbonate (OS-BALBIR) 500 MG tablet     Cholecalciferol (VITAMIN D3)     Cranberry 405 MG CAPS     fish oil-omega-3 fatty acids 1000 MG capsule     fluticasone (FLONASE) 50 MCG/ACT nasal spray     folic acid (FOLVITE) 1 MG tablet     magnesium 250 MG tablet     methotrexate 2.5 MG tablet     omeprazole (PRILOSEC) 20 MG DR capsule     prednisoLONE 5 MG (21) TBPK     predniSONE (DELTASONE) 1 MG tablet     predniSONE (DELTASONE) 2.5 MG tablet     predniSONE (DELTASONE) 20 MG tablet     predniSONE (DELTASONE) 5 MG tablet     pseudoePHEDrine (SUDAFED) 30 MG tablet     tocilizumab (ACTEMRA) 162 MG/0.9ML subcutaneous injection     Zinc 50 MG CAPS     No current facility-administered medications for this visit.       Weeks Daily prednisone dose (mg) 26-week taper Weekly 162mg Actemra      1 60    2 50 11/5/2020    3 40    4 35 11/20/2020   5 30    6 25    7 20 12/10/2020   8 15 12/17/2020   9 12.5    10 12.5    11 10 1/8/2021    12 9    13 8    14 7 2/5/21   15 6 2/11/21   16 6    17 5    18 5 3/4/21   19 4    20 4    21 3    22 3 4/8/21   23 2    24 2    25 1    26 1 5/12/21     Allergies: Seasonal/ environmental allergies    Family History: No family history of autoimmune diseases like RA, sjogrens, SLE, scleroderma, IBD.    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 No drug use.        Objective:   Physical exam:  No vitals or exam for telephone visit    Labs:   From care everywhere:  Most recent labs obtained 6/8/2022 show normal ESR and CRP.  He has baseline elevation in his WBC.  His AST and ALT were both normal.  He has a baseline mild anemia with a hemoglobin of 12.6 and platelet of 114.  His creatinine was within normal limits at 0.76.    CRP  5/5/21  WBC 27.4  HGB 13.6    Cr 0.81  ALT 25  AST 29  Chol 152  Trig 88  HDL 54  LDL 80  CRP <2.9  ESR 3  UA with small blood, no cells, no protein, casts    4/8/21  ESR 4  CRP less than 2.9  WBC 30.1  Hemoglobin 13.6  Platelets 157  Creatinine 0.86  Cholesterol 179  Triglycerides 83  HDL 60      3/2/21  ESR 4  CRP <2.9  WBC 28.3  HGB 12.8    Cr 0.86  ALT 23  AST 25    2/3/21  WBC 30.6  HGB 11.9    Cr 0.83  Alk phos 40  ALT 30  AST 29    1/5/21  UA without protein, cells or casts  ESR 5  CRP <2.9  WBC 40.6  HGB 11.6    Cr 0.81  Chol 166  Trig 86  HDL 68  LDL 81    12/14/2020   CRP <2.9  ESR 4  WBC 54  HGB 11.8    Cr 0.78  Ua unremarkable  Cholesterol 168  Trig 82  HDL 70  LDL 81    11-  ESR 3  CRP less than 2.9  WBC 63.3  Hemoglobin 12  Platelets 181  Creatinine 0.98  Total protein low at 5.9  Alk phos 48  ALT 31  AST 30  UA with 30 of albumin  Cholesterol 169  Triglycerides 45  HDL 83  LDL 77    10/1/2020  WBC 84.9  HGB 12.3    IgM 36 low  IgA 124 normal  IgG 625    Cr 0.98    9/1/2020  WBC 50.2  HGB 11.6       Imaging:  MR brain and orbits 9/10/20  Impression:    1. Regarding the orbits and globes,  there is no definite abnormal  contrast enhancement or mass. No evidence of leukemic infiltration.  2. Regarding the remainder of the brain, abnormal T2 hyperintense bone  marrow signal with associated enhancement in the skull, likely  representing leukemic infiltration.  3. Subcutaneous T2 hyperintensity with associated diffusion  restriction and contrast enhancement over the left zygomatic region,  question leukemic infiltration.    Pathology:  Patient Name: MARIELA WALLACE   MR#: [id number]   Specimen #: [id number]   Collected: 9/10/2020   Received: 9/10/2020   Reported: 9/16/2020 12:35   Ordering Phy(s): VALERIE CARRINGTON     For improved result formatting, select 'View Enhanced Report Format' under    Linked Documents section.     SPECIMEN(S):   Temporal artery biopsy, left     FINAL DIAGNOSIS:   Temporal artery, left, biopsy:   1. Granulomatous arteritis consistent with giant cell arteritis.   2. Chronic inflammation of the surrounding adventitia.     COMMENT:   Preliminary results were communicated to Dr. Valerie Carrington and Dr. Guille Garcia on 9/14/20 at 12:45pm.     I have personally reviewed all specimens and/or slides, including the   listed special stains, and used them   with my medical judgement to determine or confirm the final diagnosis.     Electronically signed out by:     Za García M.D., [facility]     CLINICAL HISTORY:   The patient is a 79 year old male with a history of chronic lymphocytic   leukemia who presented with bilateral   sequential optic neuropathy with associated pallid optic disc edema and   choroidal hypoperfusion, but   relatively intact visual acuity, negative elevation of acute phase   reactants, and lack of constitutional   symptoms consistent with giant cell arteritis. He also has findings of   likely leukemic infiltration in the   skull seen on MRI. He undergoes temporal artery biopsy on the left.     GROSS:   The specimen is received in formalin with proper  "patient identification,   labeled \"left temporal artery\".  The   specimen consists of a 3.2 cm in length by 0.3 cm in diameter tan-white   vessel segment. The specimen is   submitted intact in A1. (Dictated by: PADILLA Montalvo 9/11/2020 03:08   PM)     MICROSCOPIC:   The tissue consists of artery with narrowed lumen and intimal hyperplasia.    There is an infiltrate of   lymphocytes, plasma cells, epithelioid histiocytes, and multinucleated   giant cells throughout all layers of   the artery. There is associated loss of the internal elastic lamina on   elastic stain. Occasional focal   calcifications are seen a the level of the internal elastic lamina.   Neovascularization of the vessel wall is   present. Perivascular lymphocytic infiltrates are seen in the surrounding   adventitia. Immunohistochemistry   with CD3 and CD20 demonstrates a predominantly T-cell lymphocytic   infiltrate within the vessel wall. There is   a mixed infiltrate of T and B lymphocytes perivascular in the adventitia.   CD5 and CD43 have similar staining   patterns to CD3. There is light patchy CD23 staining in the areas positive    for CD20. CD68 highlights the   epithelioid histiocytes within the vessel wall.     The technical component of this testing was completed at the [facility], with the professional component performed    at the [facility], [address] [place] [id number] ([phone])     CPT Codes:   A: 31414-UX6, 14085-HNWP, 72422-UMR, 38751-LQZ, 94374-SZC, 86323-TVR,   58433-FLD, 61167-XGY     COLLECTION SITE:   Client: [facility]   Location: BERNADETTE Crockett (B) is a 81 year old who is being evaluated via a billable telephone visit.      What phone number would you like to be contacted at? [phone]  How would you like to obtain your AVS? Mail a " copy  Phone call duration: 14 minutes

## 2022-08-11 ENCOUNTER — APPOINTMENT (OUTPATIENT)
Dept: URBAN - METROPOLITAN AREA CLINIC 256 | Age: 82
Setting detail: DERMATOLOGY
End: 2022-08-14

## 2022-08-11 VITALS — WEIGHT: 131 LBS | HEIGHT: 67 IN

## 2022-08-11 DIAGNOSIS — D18.0 HEMANGIOMA: ICD-10-CM

## 2022-08-11 DIAGNOSIS — D49.2 NEOPLASM OF UNSPECIFIED BEHAVIOR OF BONE, SOFT TISSUE, AND SKIN: ICD-10-CM

## 2022-08-11 DIAGNOSIS — L82.1 OTHER SEBORRHEIC KERATOSIS: ICD-10-CM

## 2022-08-11 DIAGNOSIS — Z85.828 PERSONAL HISTORY OF OTHER MALIGNANT NEOPLASM OF SKIN: ICD-10-CM

## 2022-08-11 DIAGNOSIS — Z71.89 OTHER SPECIFIED COUNSELING: ICD-10-CM

## 2022-08-11 PROBLEM — D18.01 HEMANGIOMA OF SKIN AND SUBCUTANEOUS TISSUE: Status: ACTIVE | Noted: 2022-08-11

## 2022-08-11 PROCEDURE — 99213 OFFICE O/P EST LOW 20 MIN: CPT | Mod: 25

## 2022-08-11 PROCEDURE — 11104 PUNCH BX SKIN SINGLE LESION: CPT

## 2022-08-11 PROCEDURE — OTHER BIOPSY BY PUNCH METHOD FOR DIF: OTHER

## 2022-08-11 PROCEDURE — OTHER COUNSELING: OTHER

## 2022-08-11 PROCEDURE — OTHER REASSURANCE: OTHER

## 2022-08-11 PROCEDURE — OTHER BIOPSY BY PUNCH METHOD: OTHER

## 2022-08-11 PROCEDURE — 11105 PUNCH BX SKIN EA SEP/ADDL: CPT

## 2022-08-11 PROCEDURE — OTHER MIPS QUALITY: OTHER

## 2022-08-11 ASSESSMENT — LOCATION DETAILED DESCRIPTION DERM
LOCATION DETAILED: LEFT LATERAL DISTAL PRETIBIAL REGION
LOCATION DETAILED: LEFT MEDIAL UPPER BACK
LOCATION DETAILED: LEFT NASAL ALA
LOCATION DETAILED: LEFT CENTRAL LATERAL NECK
LOCATION DETAILED: LEFT SUPERIOR UPPER BACK
LOCATION DETAILED: RIGHT CHIN

## 2022-08-11 ASSESSMENT — LOCATION SIMPLE DESCRIPTION DERM
LOCATION SIMPLE: LEFT PRETIBIAL REGION
LOCATION SIMPLE: CHIN
LOCATION SIMPLE: LEFT NOSE
LOCATION SIMPLE: NECK
LOCATION SIMPLE: LEFT UPPER BACK

## 2022-08-11 ASSESSMENT — LOCATION ZONE DERM
LOCATION ZONE: TRUNK
LOCATION ZONE: NOSE
LOCATION ZONE: LEG
LOCATION ZONE: FACE
LOCATION ZONE: NECK

## 2022-08-11 NOTE — PROCEDURE: MIPS QUALITY
Quality 226: Preventive Care And Screening: Tobacco Use: Screening And Cessation Intervention: Patient screened for tobacco use and is an ex/non-smoker
Detail Level: Detailed
Quality 431: Preventive Care And Screening: Unhealthy Alcohol Use - Screening: Patient not identified as an unhealthy alcohol user when screened for unhealthy alcohol use using a systematic screening method
Quality 111:Pneumonia Vaccination Status For Older Adults: Pneumococcal vaccine administered on or after patient’s 60th birthday and before the end of the measurement period
Quality 110: Preventive Care And Screening: Influenza Immunization: Influenza Immunization previously received during influenza season
Quality 130: Documentation Of Current Medications In The Medical Record: Current Medications Documented

## 2022-08-11 NOTE — PROCEDURE: BIOPSY BY PUNCH METHOD
Render Path Notes In Note?: No
X Size Of Lesion In Cm (Optional): 0
Billing Type: Third-Party Bill
Was A Bandage Applied: Yes
Suture Removal: 10 days
Biopsy Type: H and E
Anesthesia Type: 1% lidocaine with epinephrine
Path Notes (To The Dermatopathologist): Please check margins
Home Suture Removal Text: Patient was provided a home suture removal kit and will remove their sutures at home.  If they have any questions or difficulties they will call the office.
Wound Care: Gelfoam
Epidermal Sutures: 4-0 Nylon
Hemostasis: None
Anesthesia Volume In Cc (Will Not Render If 0): 0.5
Punch Size In Mm: 4
Post-Care Instructions: I reviewed with the patient in detail post-care instructions. Patient is to keep the biopsy site dry overnight, and then apply bacitracin twice daily until healed. Patient may apply hydrogen peroxide soaks to remove any crusting.
Dressing: bandage
Consent: Written consent was obtained and risks were reviewed including but not limited to scarring, infection, bleeding, scabbing, incomplete removal, nerve damage and allergy to anesthesia.
Detail Level: Detailed
Information: Selecting Yes will display possible errors in your note based on the variables you have selected. This validation is only offered as a suggestion for you. PLEASE NOTE THAT THE VALIDATION TEXT WILL BE REMOVED WHEN YOU FINALIZE YOUR NOTE. IF YOU WANT TO FAX A PRELIMINARY NOTE YOU WILL NEED TO TOGGLE THIS TO 'NO' IF YOU DO NOT WANT IT IN YOUR FAXED NOTE.
Notification Instructions: Patient will be notified of biopsy results. However, patient instructed to call the office if not contacted within 2 weeks.

## 2022-08-11 NOTE — PROCEDURE: BIOPSY BY PUNCH METHOD FOR DIF
Additional Anesthesia Volume In Cc (Will Not Render If 0): 0
Epidermal Sutures: 4-0 Nylon
Anesthesia Type: 1% lidocaine with epinephrine
Render Path Notes In Note?: No
Consent: Written consent was obtained and risks were reviewed including but not limited to scarring, infection, bleeding, scabbing, incomplete removal, nerve damage and allergy to anesthesia.
Notification Instructions: Patient will be notified of biopsy results. However, patient instructed to call the office if not contacted within 2 weeks.
Detail Level: Detailed
Post-Care Instructions: I reviewed with the patient in detail post-care instructions. Patient is to keep the biopsy site dry overnight, and then apply bacitracin twice daily until healed. Patient may apply hydrogen peroxide soaks to remove any crusting.
Was A Bandage Applied: Yes
Biopsy Type: DIF (perilesional)
Wound Care: Petrolatum
Home Suture Removal Text: Patient was provided a home suture removal kit and will remove their sutures at home.  If they have any questions or difficulties they will call the office.
Anesthesia Volume In Cc (Will Not Render If 0): 0.5
Billing Type: Third-Party Bill
Dressing: bandage
Path Notes (To The Dermatopathologist): Please check margins
Punch Size In Mm: 4
Suture Removal: 10 days
Hemostasis: None

## 2022-08-11 NOTE — HPI: RASH
What Type Of Note Output Would You Prefer (Optional)?: Standard Output
How Severe Is Your Rash?: moderate
Is This A New Presentation, Or A Follow-Up?: Rash
Additional History: Just getting off Prednisone.  It has helped.

## 2022-08-13 DIAGNOSIS — M31.6 GIANT CELL ARTERITIS (H): ICD-10-CM

## 2022-08-17 NOTE — TELEPHONE ENCOUNTER
methotrexate 2.5 MG tablet      Last Written Prescription Date:  5-13-22  End date 8-11-22  Last Fill Quantity: 96,   # refills: 0  Last Office Visit: 6-10-22  Future Office visit:  9-2-22    RECENT OUTSIDE LABS AVAILABLE IN THE LAB TAB OR CARE EVERYWHERE.  6-8-22 lab tab: CBC/PLT, CR, AST,ALT, ALBUMIN    CBC RESULTS: Recent Labs   Lab Test 05/05/21  1302   WBC 27.4*   RBC 4.31*   HGB 13.6   HCT 41.6   MCV 97   MCH 31.6   MCHC 32.7   RDW 14.7   *       Creatinine   Date Value Ref Range Status   05/05/2021 0.81 0.66 - 1.25 mg/dL Final   ]    Liver Function Studies -   Recent Labs   Lab Test 05/05/21  1302   PROTTOTAL 6.4*   ALBUMIN 3.7   BILITOTAL 0.4   ALKPHOS 45   AST 29   ALT 25       Routing refill request to provider for review/approval because:  Per protocol  Overdue LABS:  FYI to clinic:                    ALK, BILITOTAL,PROTTOTAL

## 2022-08-18 ENCOUNTER — RX ONLY (RX ONLY)
Age: 82
End: 2022-08-18

## 2022-08-18 RX ORDER — TERBINAFINE HYDROCHLORIDE 250 MG/1
TABLET ORAL QD
Qty: 14 | Refills: 0 | Status: ERX | COMMUNITY
Start: 2022-08-18

## 2022-08-19 ENCOUNTER — OFFICE VISIT (OUTPATIENT)
Dept: OPHTHALMOLOGY | Facility: CLINIC | Age: 82
End: 2022-08-19
Attending: OPHTHALMOLOGY
Payer: MEDICARE

## 2022-08-19 DIAGNOSIS — H53.40 VISUAL FIELD DEFECTS: ICD-10-CM

## 2022-08-19 DIAGNOSIS — H46.9 OPTIC NEUROPATHY: Primary | ICD-10-CM

## 2022-08-19 PROCEDURE — 92133 CPTRZD OPH DX IMG PST SGM ON: CPT | Performed by: OPHTHALMOLOGY

## 2022-08-19 PROCEDURE — 92014 COMPRE OPH EXAM EST PT 1/>: CPT | Mod: GC | Performed by: OPHTHALMOLOGY

## 2022-08-19 PROCEDURE — 92083 EXTENDED VISUAL FIELD XM: CPT | Performed by: OPHTHALMOLOGY

## 2022-08-19 PROCEDURE — G0463 HOSPITAL OUTPT CLINIC VISIT: HCPCS | Mod: 25

## 2022-08-19 ASSESSMENT — EXTERNAL EXAM - LEFT EYE: OS_EXAM: NORMAL

## 2022-08-19 ASSESSMENT — CUP TO DISC RATIO
OD_RATIO: 0.25
OS_RATIO: 0.3

## 2022-08-19 ASSESSMENT — VISUAL ACUITY
OS_CC: 20/25
OD_CC: 20/20
METHOD: SNELLEN - LINEAR
OS_CC+: -3
OD_CC+: -2
CORRECTION_TYPE: GLASSES

## 2022-08-19 ASSESSMENT — REFRACTION_WEARINGRX
OS_SPHERE: +6.25
OD_SPHERE: +3.25
OD_AXIS: 134
SPECS_TYPE: PAL
OD_CYLINDER: +1.25
OS_CYLINDER: +0.50
OS_AXIS: 059

## 2022-08-19 ASSESSMENT — SLIT LAMP EXAM - LIDS
COMMENTS: NORMAL
COMMENTS: NORMAL

## 2022-08-19 ASSESSMENT — CONF VISUAL FIELD
OS_INFERIOR_TEMPORAL_RESTRICTION: 3
OS_INFERIOR_NASAL_RESTRICTION: 3
METHOD: COUNTING FINGERS
OD_INFERIOR_TEMPORAL_RESTRICTION: 3
OD_INFERIOR_NASAL_RESTRICTION: 3

## 2022-08-19 ASSESSMENT — EXTERNAL EXAM - RIGHT EYE: OD_EXAM: NORMAL

## 2022-08-19 ASSESSMENT — TONOMETRY
OD_IOP_MMHG: 15
OS_IOP_MMHG: 14
IOP_METHOD: TONOPEN

## 2022-08-19 NOTE — NURSING NOTE
Chief Complaint(s) and History of Present Illness(es)     Follow Up     In both eyes (Biopsy proven giant cell arteritis).  Since onset it is stable.  Associated symptoms include Negative for double vision, eye pain and headache.  Pain was noted as 0/10. Additional comments: Bon denies any vision changes since his last visit.  No changes in his vision.  He denies eye pain, headache and double vision.  ANNETTE Christiansen 8/19/2022 7:44 AM

## 2022-08-19 NOTE — LETTER
"2022    RE: Bon Michael  : 1940  MRN: [id number]    Dear Providers,    I saw our mutual patient, Bon Michael, in follow-up in my clinic recently.  After a thorough neuro-ophthalmic history and examination, I came to the following conclusions:     1. 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  Vision stable in both eyes with intact / normal central vision but persistent (probably permanent) peripheral field defects in both eyes. He had some difficulty with perimetry testing today and mask fogging.      No exam or history indicators to suggest recurrence.   Continue Actemra as per rheumatology- patient off of prednisone since May 12, 2021.  He discontinued methotrexate 2022 since he no longer has delays with Actemra (philantrophic).     2.  Borderline visually significant cataracts - observe for now    3. Mild intermittent nonspecific jaw symptoms - resolved. Had root canal /. He has osteopenia. Is planning for follow-up with dentistry.     Follow-up in 6 months given stability. Given return precautions for worsening or new visual symptoms and patient expressed understanding.    Letter to Dr. Alcantara     For a more thorough description of the disease presentation, please see my letter from the patient's initial visit with me     History from presentation (2020 visit)  Bon Michael is a 79 year old male who presents to neuro-ophthalmology   clinic today for consultation from Jose Andino MD at [facility] for   visual disturbance of left eye. He describes it as \"his left eye not   getting enough light\". He describes noticing the change in his left eye's   temporal visual field, inferior > superior. He notices it more first thing     in the morning. These symptoms began on 2020 and he describes it as   worsening - he is unclear if it the deficit has gotten larger or darker   however. He first noticed it while driving at the " periphery of his vision.      Patient saw Dr. Lewis on August 28.  He underwent esr / crp testing as   well as mri (see below).     Labs: 9/2/20- WBC- 50.2, ESR- 28, CRP < 5     On August 31 he developed symptoms of waviness in his peripheral vision in     the right eye.  He went to Dr. Andino on 9/4/2020 who wanted him to go to   the ED but he was unable.  Dr. Andino reviewed the patient's fluorescein   angiography and felt there was choroidal filling delay in the right eye   and pallid edema in the right eye concerning for giant cell arteritis.  We     discussed the case via phone.      PRIOR IMAGING:  BRAIN MRI WITH/WO GADOLINIUM, MRA OF THE [place] OF [place]ANCHE[place] AND MRA OF THE   CAROTID ARTERIES - [facility] (9/1/2020)  IMPRESSION:    1.  No acute intracranial abnormality.  2.  No evidence of an orbital abnormalities.  3.  Mild generalized cerebral atrophy.  4.  Unremarkable MRA of the [place] of [place].  5.  Unremarkable MRA of the cervical vessels.     On my re-review I disagreed with the radiology read.  There is evidence of   patchy enhancement diffusely within the bilateral orbits and along the   bilateral intraorbital optic nerve sheaths.  Will discuss with   neuro-radiology regarding finding and repeat scan.     No bisphosphonates in the past (no osteoporosis medications for 7-8 years   per patient).      Left temporal artery biopsy 9/9/20- positive for giant cell arteritis   Prednisone (started 9/4/2020)     Patient started Actemra 10/28/20.     08/19/22 Interim history and exam since last visit: Last visit 4/1/2022  Denies any visual changes since last visit. No headaches. He is still on Actemra and he always gets it on time. No longer taking methotrexate to bridge delays since there are no delays. Last ESR and CRP were within normal limits at <1 and 0.04 respectively (6/8/2022). Next visit with Dr. Alcantara- 9/2/22. Patient also has CLL which is managed by oncology.     Last visit with   Maricruz (Rheum) 6/10/22 (virtual):  # Giant cell arteritis   1) continue 162mg once weekly injection, next dose due on Wednesday  2) Prednisone last dose was on 5/12/21   3) Continue off bactrim daily and omeprazole  4) Stop methotrexate and folic acid   5) Follow up in 3 months with labs week prior (9/2/22)     # Long term use of high risk medication  1) Actemra 162mg weekly. No interval infections. No injection site reactions. Tolerating without issues. No evidence of toxicity by most recent labs.  2) Methotrexate 20mg weekly. No GI or other side effects.  Labs outlined above show no evidence of drug toxicity on routine screening labs.  Actemra national drug shortage has improved so we will discontinue methotrexate and folic acid now.     Visual acuity with correction was 20/20 in the right eye and 20/25 in the left eye. IOP was 15 in the right eye and 14 in the left eye. Visual fields has inferior peripheral deficits in both eyes. Ocular motility was full in all gaze directions. Color plates were 11/11 in the right eye and 11/11 in the left eye.  Pupils were equal, round and reactive to light with no RAPD. There is mild diffuse pallor of both optic nerve heads today that are stable. No new optic disc edema.    Tests ordered and interpreted today:  OCT rNFL demonstrated a mean NFL thickness of 62 in the right eye and 62 in the left eye. Stable retinal nerve fiber layer thinning in both eyes- nasal predominate in the right eye and nasal /superior thinning in the left eye.    VF: Octopus 30 degree automated visual field was performed. Test was reliable.   Right eye: reliable (but high FN 22%) circumferential constriction MD 8.4 PSD 7.7 (worse compared to last (MD 5.1 PSD 4.7)).  Overall my sense is these fields are stable.    Left eye: reliable, circumferential constriction sparing superior arcuate area and central area with inferior altitudinal defect MD 14.9, PSD 9.3 (worse compared to last MD 5.4 PSD 6.8)  Of  note, does feel he had trouble with the visual field test today because of his mask. Overall my sense is these fields are stable.        For further exam details, please feel free to contact our office for additional records.  If you wish to contact me regarding this patient please email me at [email or web address] or give my clinic a call to arrange a phone conversation.    Sincerely,    Guille Garcia MD  , Neuro-Ophthalmology and Adult Strabismus Surgery  The Lucy Castano Chair in Neuro-Ophthalmology  Department of Ophthalmology and Visual Neurosciences  [facility]

## 2022-08-19 NOTE — PROGRESS NOTES
"   1. 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  Vision stable in both eyes with intact / normal central vision but persistent (probably permanent) peripheral field defects in both eyes. He had some difficulty with perimetry testing today and mask fogging.      No exam or history indicators to suggest recurrence.   Continue Actemra as per rheumatology- patient off of prednisone since May 12, 2021.  He discontinued methotrexate June 2022 since he no longer has delays with Actemra (philantrophic).     2.  Borderline visually significant cataracts - observe for now    3. Mild intermittent nonspecific jaw symptoms - resolved. Had root canal 4/4. He has osteopenia. Is planning for follow-up with dentistry.     Follow-up in 6 months given stability. Given return precautions for worsening or new visual symptoms and patient expressed understanding.    Letter to Dr. Alcantara     For a more thorough description of the disease presentation, please see my letter from the patient's initial visit with me     History from presentation (9/9/2020 visit)  Bon Michael is a 79 year old male who presents to neuro-ophthalmology   clinic today for consultation from Jose Andino MD at [facility] for   visual disturbance of left eye. He describes it as \"his left eye not   getting enough light\". He describes noticing the change in his left eye's   temporal visual field, inferior > superior. He notices it more first thing     in the morning. These symptoms began on 8/22/2020 and he describes it as   worsening - he is unclear if it the deficit has gotten larger or darker   however. He first noticed it while driving at the periphery of his vision.      Patient saw Dr. Lewis on August 28.  He underwent esr / crp testing as   well as mri (see below).     Labs: 9/2/20- WBC- 50.2, ESR- 28, CRP < 5     On August 31 he developed symptoms of waviness in his peripheral vision in     the right eye.  He went to " Dr. Andino on 9/4/2020 who wanted him to go to   the ED but he was unable.  Dr. Andino reviewed the patient's fluorescein   angiography and felt there was choroidal filling delay in the right eye   and pallid edema in the right eye concerning for giant cell arteritis.  We     discussed the case via phone.      PRIOR IMAGING:  BRAIN MRI WITH/WO GADOLINIUM, MRA OF THE [place] OF [place] AND MRA OF THE   CAROTID ARTERIES - [facility] (9/1/2020)  IMPRESSION:    1.  No acute intracranial abnormality.  2.  No evidence of an orbital abnormalities.  3.  Mild generalized cerebral atrophy.  4.  Unremarkable MRA of the [place] of [place].  5.  Unremarkable MRA of the cervical vessels.     On my re-review I disagreed with the radiology read.  There is evidence of   patchy enhancement diffusely within the bilateral orbits and along the   bilateral intraorbital optic nerve sheaths.  Will discuss with   neuro-radiology regarding finding and repeat scan.     No bisphosphonates in the past (no osteoporosis medications for 7-8 years   per patient).      Left temporal artery biopsy 9/9/20- positive for giant cell arteritis   Prednisone (started 9/4/2020)     Patient started Actemra 10/28/20.     08/19/22 Interim history and exam since last visit: Last visit 4/1/2022  Denies any visual changes since last visit. No headaches. He is still on Actemra and he always gets it on time. No longer taking methotrexate to bridge delays since there are no delays. Last ESR and CRP were within normal limits at <1 and 0.04 respectively (6/8/2022). Next visit with Dr. Alcantara- 9/2/22. Patient also has CLL which is managed by oncology.     Last visit with Dr. Alcantara (Rheum) 6/10/22 (virtual):  # Giant cell arteritis   1) continue 162mg once weekly injection, next dose due on Wednesday  2) Prednisone last dose was on 5/12/21   3) Continue off bactrim daily and omeprazole  4) Stop methotrexate and folic acid   5) Follow up in 3 months with  labs week prior (9/2/22)     # Long term use of high risk medication  1) Actemra 162mg weekly. No interval infections. No injection site reactions. Tolerating without issues. No evidence of toxicity by most recent labs.  2) Methotrexate 20mg weekly. No GI or other side effects.  Labs outlined above show no evidence of drug toxicity on routine screening labs.  Actemra national drug shortage has improved so we will discontinue methotrexate and folic acid now.     Visual acuity with correction was 20/20 in the right eye and 20/25 in the left eye. IOP was 15 in the right eye and 14 in the left eye. Visual fields has inferior peripheral deficits in both eyes. Ocular motility was full in all gaze directions. Color plates were 11/11 in the right eye and 11/11 in the left eye.  Pupils were equal, round and reactive to light with no RAPD. There is mild diffuse pallor of both optic nerve heads today that are stable. No new optic disc edema.    Tests ordered and interpreted today:  OCT rNFL demonstrated a mean NFL thickness of 62 in the right eye and 62 in the left eye. Stable retinal nerve fiber layer thinning in both eyes- nasal predominate in the right eye and nasal /superior thinning in the left eye.    VF: Octopus 30 degree automated visual field was performed. Test was reliable.   Right eye: reliable (but high FN 22%) circumferential constriction MD 8.4 PSD 7.7 (worse compared to last (MD 5.1 PSD 4.7)).  Overall my sense is these fields are stable.    Left eye: reliable, circumferential constriction sparing superior arcuate area and central area with inferior altitudinal defect MD 14.9, PSD 9.3 (worse compared to last MD 5.4 PSD 6.8)  Of note, does feel he had trouble with the visual field test today because of his mask. Overall my sense is these fields are stable.       Complete documentation of historical and exam elements from today's encounter can be found in the full encounter summary report (not reduplicated in  this progress note).  I personally obtained the chief complaint(s) and history of present illness.  I confirmed and edited as necessary the review of systems, past medical/surgical history, family history, social history, and examination findings as documented by others; and I examined the patient myself.  I personally reviewed the relevant tests, images, and reports as documented above.  I formulated and edited as necessary the assessment and plan and discussed the findings and management plan with the patient and family.  I personally reviewed the ophthalmic test(s) associated with this encounter, agree with the interpretation(s) as documented by the resident/fellow, and have edited the corresponding report(s) as necessary.     MD Forrest Gomez MD  Resident Physician - PGY3  Department of Ophthalmology   [facility]

## 2022-08-24 ENCOUNTER — TELEPHONE (OUTPATIENT)
Dept: RHEUMATOLOGY | Facility: CLINIC | Age: 82
End: 2022-08-24

## 2022-08-24 DIAGNOSIS — Z79.899 HIGH RISK MEDICATION USE: ICD-10-CM

## 2022-08-24 DIAGNOSIS — M31.6 GIANT CELL ARTERITIS (H): ICD-10-CM

## 2022-08-24 DIAGNOSIS — M31.6 GIANT CELL ARTERITIS (H): Primary | ICD-10-CM

## 2022-08-24 NOTE — TELEPHONE ENCOUNTER
Pt needs lab orders faxed to [facility] for upcoming appt.    Lab orders were faxed, pt has been updated.    Candis Montero RN  Rheumatology Clinic

## 2022-08-24 NOTE — TELEPHONE ENCOUNTER
Actemra      Last Written Prescription Date:  9/2/21  Last Fill Quantity: 3.6 mL,   # refills: 12  Last Office Visit: 6/2022  Future Office visit:  9/2/2022    CBC RESULTS: Recent Labs   Lab Test 05/05/21  1302   WBC 27.4*   RBC 4.31*   HGB 13.6   HCT 41.6   MCV 97   MCH 31.6   MCHC 32.7   RDW 14.7   *       Creatinine   Date Value Ref Range Status   05/05/2021 0.81 0.66 - 1.25 mg/dL Final   ]    Liver Function Studies -   Recent Labs   Lab Test 05/05/21  1302   PROTTOTAL 6.4*   ALBUMIN 3.7   BILITOTAL 0.4   ALKPHOS 45   AST 29   ALT 25       Routing refill request to provider for review/approval because:  Drug not on the [facility] or [facility] refill protocol or controlled substance    Pt is getting labs on 9/1/22.    Candis Montero RN  Rheumatology Clinic

## 2022-09-02 ENCOUNTER — TELEPHONE (OUTPATIENT)
Dept: RHEUMATOLOGY | Facility: CLINIC | Age: 82
End: 2022-09-02

## 2022-09-02 ENCOUNTER — VIRTUAL VISIT (OUTPATIENT)
Dept: RHEUMATOLOGY | Facility: CLINIC | Age: 82
End: 2022-09-02
Attending: INTERNAL MEDICINE
Payer: MEDICARE

## 2022-09-02 DIAGNOSIS — M85.80 OSTEOPENIA, UNSPECIFIED LOCATION: ICD-10-CM

## 2022-09-02 DIAGNOSIS — C91.10 CHRONIC LYMPHOCYTIC LEUKEMIA (H): ICD-10-CM

## 2022-09-02 DIAGNOSIS — Z79.899 HIGH RISK MEDICATION USE: ICD-10-CM

## 2022-09-02 DIAGNOSIS — M31.6 GIANT CELL ARTERITIS (H): Primary | ICD-10-CM

## 2022-09-02 PROCEDURE — 99442 PR PHYSICIAN TELEPHONE EVALUATION 11-20 MIN: CPT | Mod: 95 | Performed by: INTERNAL MEDICINE

## 2022-09-02 NOTE — PROGRESS NOTES
Bon is a 81 year old who is being evaluated via a billable telephone visit.      Patient declined individual allergy and medication review by support staff because medications were last reviewed on 8/19/2022.    What phone number would you like to be contacted at? [phone]  How would you like to obtain your AVS? Mail a copy     Length of phone call 18 minutes  September 2, 2022  Bib Alcantara MD  Rheumatology         TELEPHONE Rheumatology Follow-up    Name: Bon Michael    MRN [id number]   Today's date: 9/2/22            Reason for Follow-up:  GCA   Requesting physician: Guille Garcia MD        Assessment & Plan:   Assessment:  81 year old male with history of CLL referred to rheumatology for evaluation and treatment of biopsy proven giant cell arteritis with bilateral ischemic optic neuropathy clinically manifested by now stable peripheral vision loss in left>right eye. Never had HA, jaw claudication, PMR symptoms. No inflammatory arthritis symptoms. Disease currently in remission by history on once weekly actemra 162mg weekly (has missed some doses due to national drug shortage) and methotrexate 20mg weekly/folic acid 1mg daily (since feb 2022). Has completed his prednisone taper which was guided by GiACTA trial.    Most recent labs obtained yesterday 9/1/22 show normal ESR and CRP.  He has known elevation in his WBC.  His AST and ALT were both normal.  HGB of 13.5 and platelet of 119.  His creatinine was within normal limits at 0.81.  No evidence of drug toxicity on routine screening labs.  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    # Giant cell arteritis   1) continue 162mg once weekly injection, next dose due on Wednesday  2) follow-up in 3 months with labs the week prior    # Long term use of high risk medication  1) Actemra 162mg weekly. No interval infections. No injection site reactions. Tolerating without  issues. No evidence of toxicity by most recent labs.  2) Will continue routine screening drug toxicity labs. Standing orders placed. He will have these drawn along with inflammatory markers in the week prior to his follow-up in 3 months.    # Osteopenia no interval fractures: Continue alendronate. Managed by PCP.     #CLL  Managed by oncology.    # Seasonal allergies: flonase daily    Bib Alcantara MD   Rheumatology     Subjective:   Interval History September 2, 2022  Had dental work done. Saw his PCP for lower extremity rash, treated by PCP with steroids with taper. Though after a few weeks without resolution so was referred to dermatology for lesions on LE. Biopsied/cultured and told it was fungal infection, started on treatment which ended yesterday. Lesions on lower extremities have cleared. No HA/ change in vision/ scalp tenderness/ jaw claudication. No red/hot/swollen joints. Able to make a full fist upon waking in the AM. No proximal muscle pain/stiffness. No injection site reactions/ side effects. 14 point ROS collected and negative if not documented above.     Interval history 6/10/22  No HA, change in vision, scalp tenderness, jaw claudication. No proximal muscle pain/stiffness. No red/hot/swollen joints. No fevers/chills/night sweats. No side effects with methotrexate. Takes methotrexate and actemra on wednesdays. No side effects for either. No interval infections. No rashes.  Weight has been stable.  Able to make a full fist upon waking in the morning.  14 point review of systems collected and otherwise negative.    Interval History 3/11/22  Has continued on alendronate. Thinks he has more sensitivity in his teeth. He asks about need for addition of vit K2 to supplement. He plans to bring this question, along with question about green tea extract up with his primary care physician. Takes this on Wednesday evenings. Taking folic acid 1mg daily. Has only had 3 doses of actemra that were late. Has been  on methotrexate for 3 weeks now.  No headache, vision change, scalp tenderness, proximal muscle pain or stiffness, peripheral joint swelling redness warmth.  Able to make full fist upon waking in the morning.  No fevers chills or night sweats.  No interval infections.  No rash.  Is tolerating his methotrexate without any side effects.  GI or other.      Past Medical History  CLL  Hernia  Mono   Deviated septum  Osteopenia by DEXA    Past Surgical History  Tonsillectomy  Deviated septum    Medications:  Actemra 162 mg once weekly subcutaneous      Weeks Daily prednisone dose (mg) 26-week taper Weekly 162mg Actemra      1 60    2 50 11/5/2020    3 40    4 35 11/20/2020   5 30    6 25    7 20 12/10/2020   8 15 12/17/2020   9 12.5    10 12.5    11 10 1/8/2021   12 9    13 8    14 7 2/5/21   15 6 2/11/21   16 6    17 5    18 5 3/4/21   19 4    20 4    21 3    22 3 4/8/21   23 2    24 2    25 1    26 1 5/12/21     Allergies: Seasonal/ environmental allergies    Family History: No family history of autoimmune diseases like RA, sjogrens, SLE, scleroderma, IBD.    Social History: Works as [employer]. Never smoker. No ETOH use. No drug use.        Objective:   Physical exam:  No vitals or exam for telephone visit    Labs:   From care everywhere:  Most recent labs obtained yesterday 9/1/22 show normal ESR and CRP.  He has known elevation in his WBC.  His AST and ALT were both normal.  HGB of 13.5 and platelet of 119.  His creatinine was within normal limits at 0.81.    CRP  5/5/21  WBC 27.4  HGB 13.6    Cr 0.81  ALT 25  AST 29  Chol 152  Trig 88  HDL 54  LDL 80  CRP <2.9  ESR 3  UA with small blood, no cells, no protein, casts    4/8/21  ESR 4  CRP less than 2.9  WBC 30.1  Hemoglobin 13.6  Platelets 157  Creatinine 0.86  Cholesterol 179  Triglycerides 83  HDL 60      3/2/21  ESR 4  CRP <2.9  WBC 28.3  HGB 12.8    Cr 0.86  ALT 23  AST 25    2/3/21  WBC 30.6  HGB 11.9    Cr 0.83  Alk phos 40  ALT 30  AST  29    1/5/21  UA without protein, cells or casts  ESR 5  CRP <2.9  WBC 40.6  HGB 11.6    Cr 0.81  Chol 166  Trig 86  HDL 68  LDL 81    12/14/2020   CRP <2.9  ESR 4  WBC 54  HGB 11.8    Cr 0.78  Ua unremarkable  Cholesterol 168  Trig 82  HDL 70  LDL 81    11-  ESR 3  CRP less than 2.9  WBC 63.3  Hemoglobin 12  Platelets 181  Creatinine 0.98  Total protein low at 5.9  Alk phos 48  ALT 31  AST 30  UA with 30 of albumin  Cholesterol 169  Triglycerides 45  HDL 83  LDL 77    10/1/2020  WBC 84.9  HGB 12.3    IgM 36 low  IgA 124 normal  IgG 625    Cr 0.98    9/1/2020  WBC 50.2  HGB 11.6       Imaging:  MR brain and orbits 9/10/20  Impression:    1. Regarding the orbits and globes, there is no definite abnormal  contrast enhancement or mass. No evidence of leukemic infiltration.  2. Regarding the remainder of the brain, abnormal T2 hyperintense bone  marrow signal with associated enhancement in the skull, likely  representing leukemic infiltration.  3. Subcutaneous T2 hyperintensity with associated diffusion  restriction and contrast enhancement over the left zygomatic region,  question leukemic infiltration.    Pathology:  Patient Name: MARIELA WALLACE   MR#: [id number]   Specimen #: [id number]   Collected: 9/10/2020   Received: 9/10/2020   Reported: 9/16/2020 12:35   Ordering Phy(s): AGUS RETANA     For improved result formatting, select 'View Enhanced Report Format' under    Linked Documents section.     SPECIMEN(S):   Temporal artery biopsy, left     FINAL DIAGNOSIS:   Temporal artery, left, biopsy:   1. Granulomatous arteritis consistent with giant cell arteritis.   2. Chronic inflammation of the surrounding adventitia.     COMMENT:   Preliminary results were communicated to Dr. Agus Retana and Dr. Guille Garcia on 9/14/20 at 12:45pm.     I have personally reviewed all specimens and/or slides, including the   listed special stains, and used them   with my medical  "judgement to determine or confirm the final diagnosis.     Electronically signed out by:     Za García M.D., Physicians     CLINICAL HISTORY:   The patient is a 79 year old male with a history of chronic lymphocytic   leukemia who presented with bilateral   sequential optic neuropathy with associated pallid optic disc edema and   choroidal hypoperfusion, but   relatively intact visual acuity, negative elevation of acute phase   reactants, and lack of constitutional   symptoms consistent with giant cell arteritis. He also has findings of   likely leukemic infiltration in the   skull seen on MRI. He undergoes temporal artery biopsy on the left.     GROSS:   The specimen is received in formalin with proper patient identification,   labeled \"left temporal artery\".  The   specimen consists of a 3.2 cm in length by 0.3 cm in diameter tan-white   vessel segment. The specimen is   submitted intact in A1. (Dictated by: PADILLA Montalvo 9/11/2020 03:08   PM)     MICROSCOPIC:   The tissue consists of artery with narrowed lumen and intimal hyperplasia.    There is an infiltrate of   lymphocytes, plasma cells, epithelioid histiocytes, and multinucleated   giant cells throughout all layers of   the artery. There is associated loss of the internal elastic lamina on   elastic stain. Occasional focal   calcifications are seen a the level of the internal elastic lamina.   Neovascularization of the vessel wall is   present. Perivascular lymphocytic infiltrates are seen in the surrounding   adventitia. Immunohistochemistry   with CD3 and CD20 demonstrates a predominantly T-cell lymphocytic   infiltrate within the vessel wall. There is   a mixed infiltrate of T and B lymphocytes perivascular in the adventitia.   CD5 and CD43 have similar staining   patterns to CD3. There is light patchy CD23 staining in the areas positive    for CD20. CD68 highlights the   epithelioid histiocytes within the vessel wall.     The technical " component of this testing was completed at the [facility], with the professional component performed    at [facility], [address] [place] [address],   [place] [address] ([phone])     CPT Codes:   A: 81786-RP1, 75963-CIOU, 46890-GLU, 85038-ETN, 20931-OGM, 98333-CEL,   10583-TVP, 96179-JLG     COLLECTION SITE:   Client: [facility]   [place] [facility] [place]

## 2022-09-02 NOTE — LETTER
9/2/2022       RE: Bon Michael  [birth year][address] [facility] [address]     Dear Colleague,    Thank you for referring your patient, Bon Michael, to the [facility]. Please see a copy of my visit note below.    Bon is a 81 year old who is being evaluated via a billable telephone visit.      Patient declined individual allergy and medication review by support staff because medications were last reviewed on 8/19/2022.    What phone number would you like to be contacted at? [phone]  How would you like to obtain your AVS? 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 Never had HA, jaw claudication, PMR symptoms. No inflammatory arthritis symptoms. Disease currently in remission by history on once weekly actemra 162mg weekly (has missed some doses due to national drug shortage) and methotrexate 20mg weekly/folic acid 1mg daily (since feb 2022). Has completed his prednisone taper which was guided by GiACTA trial.    Most recent labs obtained yesterday 9/1/22 show normal ESR and CRP.  He has known elevation in his WBC.  His AST and ALT were both normal.  HGB of 13.5 and platelet of 119.  His creatinine was within normal limits at 0.81.  No evidence of drug toxicity on routine screening labs.  Normal inflammatory markers further support his lack of symptoms and quiet systemic  vasculitis by history today.    # Giant cell arteritis   1) continue 162mg once weekly injection, next dose due on Wednesday  2) follow-up in 3 months with labs the week prior    # Long term use of high risk medication  1) Actemra 162mg weekly. No interval infections. No injection site reactions. Tolerating without issues. No evidence of toxicity by most recent labs.  2) Will continue routine screening drug toxicity labs. Standing orders placed. He will have these drawn along with inflammatory markers in the week prior to his follow-up in 3 months.    # Osteopenia no interval fractures: Continue alendronate. Managed by PCP.     #CLL  Managed by oncology.    # Seasonal allergies: flonase daily    Bib Alcantara MD   Rheumatology     Subjective:   Interval History September 2, 2022  Had dental work done. Saw his PCP for lower extremity rash, treated by PCP with steroids with taper. Though after a few weeks without resolution so was referred to dermatology for lesions on LE. Biopsied/cultured and told it was fungal infection, started on treatment which ended yesterday. Lesions on lower extremities have cleared. No HA/ change in vision/ scalp tenderness/ jaw claudication. No red/hot/swollen joints. Able to make a full fist upon waking in the AM. No proximal muscle pain/stiffness. No injection site reactions/ side effects. 14 point ROS collected and negative if not documented above.     Interval history 6/10/22  No HA, change in vision, scalp tenderness, jaw claudication. No proximal muscle pain/stiffness. No red/hot/swollen joints. No fevers/chills/night sweats. No side effects with methotrexate. Takes methotrexate and actemra on wednesdays. No side effects for either. No interval infections. No rashes.  Weight has been stable.  Able to make a full fist upon waking in the morning.  14 point review of systems collected and otherwise negative.    Interval History 3/11/22  Has continued on alendronate. Thinks he has more  sensitivity in his teeth. He asks about need for addition of vit K2 to supplement. He plans to bring this question, along with question about green tea extract up with his primary care physician. Takes this on Wednesday evenings. Taking folic acid 1mg daily. Has only had 3 doses of actemra that were late. Has been on methotrexate for 3 weeks now.  No headache, vision change, scalp tenderness, proximal muscle pain or stiffness, peripheral joint swelling redness warmth.  Able to make full fist upon waking in the morning.  No fevers chills or night sweats.  No interval infections.  No rash.  Is tolerating his methotrexate without any side effects.  GI or other.      Past Medical History  CLL  Hernia  Mono   Deviated septum  Osteopenia by DEXA    Past Surgical History  Tonsillectomy  Deviated septum    Medications:  Actemra 162 mg once weekly subcutaneous      Weeks Daily prednisone dose (mg) 26-week taper Weekly 162mg Actemra      1 60    2 50 11/5/2020    3 40    4 35 11/20/2020   5 30    6 25    7 20 12/10/2020   8 15 12/17/2020   9 12.5    10 12.5    11 10 1/8/2021   12 9    13 8    14 7 2/5/21   15 6 2/11/21   16 6    17 5    18 5 3/4/21   19 4    20 4    21 3    22 3 4/8/21   23 2    24 2    25 1    26 1 5/12/21     Allergies: Seasonal/ environmental allergies    Family History: No family history of autoimmune diseases like RA, sjogrens, SLE, scleroderma, IBD.    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 No drug use.        Objective:   Physical exam:  No vitals or exam for telephone visit    Labs:   From care everywhere:  Most recent labs obtained yesterday 9/1/22 show normal ESR and CRP.  He has known elevation in his WBC.  His AST and ALT were both normal.  HGB of 13.5 and platelet of 119.  His creatinine was within normal limits at 0.81.    CRP  5/5/21  WBC 27.4  HGB 13.6    Cr 0.81  ALT 25  AST 29  Chol 152  Trig 88  HDL 54  LDL 80  CRP <2.9  ESR 3  UA with small blood, no cells, no  protein, casts    4/8/21  ESR 4  CRP less than 2.9  WBC 30.1  Hemoglobin 13.6  Platelets 157  Creatinine 0.86  Cholesterol 179  Triglycerides 83  HDL 60      3/2/21  ESR 4  CRP <2.9  WBC 28.3  HGB 12.8    Cr 0.86  ALT 23  AST 25    2/3/21  WBC 30.6  HGB 11.9    Cr 0.83  Alk phos 40  ALT 30  AST 29    1/5/21  UA without protein, cells or casts  ESR 5  CRP <2.9  WBC 40.6  HGB 11.6    Cr 0.81  Chol 166  Trig 86  HDL 68  LDL 81    12/14/2020   CRP <2.9  ESR 4  WBC 54  HGB 11.8    Cr 0.78  Ua unremarkable  Cholesterol 168  Trig 82  HDL 70  LDL 81    11-  ESR 3  CRP less than 2.9  WBC 63.3  Hemoglobin 12  Platelets 181  Creatinine 0.98  Total protein low at 5.9  Alk phos 48  ALT 31  AST 30  UA with 30 of albumin  Cholesterol 169  Triglycerides 45  HDL 83  LDL 77    10/1/2020  WBC 84.9  HGB 12.3    IgM 36 low  IgA 124 normal  IgG 625    Cr 0.98    9/1/2020  WBC 50.2  HGB 11.6       Imaging:  MR brain and orbits 9/10/20  Impression:    1. Regarding the orbits and globes, there is no definite abnormal  contrast enhancement or mass. No evidence of leukemic infiltration.  2. Regarding the remainder of the brain, abnormal T2 hyperintense bone  marrow signal with associated enhancement in the skull, likely  representing leukemic infiltration.  3. Subcutaneous T2 hyperintensity with associated diffusion  restriction and contrast enhancement over the left zygomatic region,  question leukemic infiltration.    Pathology:  Patient Name: MARIELA WALLACE   MR#: [id number]   Specimen #: [id number]   Collected: 9/10/2020   Received: 9/10/2020   Reported: 9/16/2020 12:35   Ordering Phy(s): AGUS RETANA     For improved result formatting, select 'View Enhanced Report Format' under    Linked Documents section.     SPECIMEN(S):   Temporal artery biopsy, left     FINAL DIAGNOSIS:   Temporal artery, left, biopsy:   1. Granulomatous arteritis consistent with giant cell arteritis.  "  2. Chronic inflammation of the surrounding adventitia.     COMMENT:   Preliminary results were communicated to Dr. Valerie Carrington and Dr. Guille Garcia on 9/14/20 at 12:45pm.     I have personally reviewed all specimens and/or slides, including the   listed special stains, and used them   with my medical judgement to determine or confirm the final diagnosis.     Electronically signed out by:     Za García M.D., [facility]     CLINICAL HISTORY:   The patient is a 79 year old male with a history of chronic lymphocytic   leukemia who presented with bilateral   sequential optic neuropathy with associated pallid optic disc edema and   choroidal hypoperfusion, but   relatively intact visual acuity, negative elevation of acute phase   reactants, and lack of constitutional   symptoms consistent with giant cell arteritis. He also has findings of   likely leukemic infiltration in the   skull seen on MRI. He undergoes temporal artery biopsy on the left.     GROSS:   The specimen is received in formalin with proper patient identification,   labeled \"left temporal artery\".  The   specimen consists of a 3.2 cm in length by 0.3 cm in diameter tan-white   vessel segment. The specimen is   submitted intact in A1. (Dictated by: PADILLA Montalvo 9/11/2020 03:08   PM)     MICROSCOPIC:   The tissue consists of artery with narrowed lumen and intimal hyperplasia.    There is an infiltrate of   lymphocytes, plasma cells, epithelioid histiocytes, and multinucleated   giant cells throughout all layers of   the artery. There is associated loss of the internal elastic lamina on   elastic stain. Occasional focal   calcifications are seen a the level of the internal elastic lamina.   Neovascularization of the vessel wall is   present. Perivascular lymphocytic infiltrates are seen in the surrounding   adventitia. Immunohistochemistry   with CD3 and CD20 demonstrates a predominantly T-cell lymphocytic   infiltrate within the " vessel wall. There is   a mixed infiltrate of T and B lymphocytes perivascular in the adventitia.   CD5 and CD43 have similar staining   patterns to CD3. There is light patchy CD23 staining in the areas positive    for CD20. CD68 highlights the   epithelioid histiocytes within the vessel wall.     The technical component of this testing was completed at the [facility], with the professional component performed    at the [facility], [address] [place] [phone] ([phone])     CPT Codes:   A: 84550-ML6, 09413-KVID, 09570-OBB, 98692-ZFP, 40869-GTG, 69333-XFT,   05739-BDZ, 37565-NBB     COLLECTION SITE:   Client: [facility]   Location: BERNADETTE FUENTES)

## 2022-09-08 ENCOUNTER — APPOINTMENT (OUTPATIENT)
Dept: URBAN - METROPOLITAN AREA CLINIC 256 | Age: 82
Setting detail: DERMATOLOGY
End: 2022-09-11

## 2022-09-08 VITALS — WEIGHT: 130 LBS | HEIGHT: 67 IN

## 2022-09-08 DIAGNOSIS — B35.4 TINEA CORPORIS: ICD-10-CM

## 2022-09-08 PROCEDURE — OTHER ADDITIONAL NOTES: OTHER

## 2022-09-08 PROCEDURE — 99212 OFFICE O/P EST SF 10 MIN: CPT

## 2022-09-08 PROCEDURE — OTHER COUNSELING: OTHER

## 2022-09-08 PROCEDURE — OTHER MIPS QUALITY: OTHER

## 2022-09-08 ASSESSMENT — LOCATION DETAILED DESCRIPTION DERM: LOCATION DETAILED: LEFT LATERAL ANKLE

## 2022-09-08 ASSESSMENT — LOCATION SIMPLE DESCRIPTION DERM: LOCATION SIMPLE: LEFT LOWER LEG

## 2022-09-08 ASSESSMENT — LOCATION ZONE DERM: LOCATION ZONE: LEG

## 2022-09-08 NOTE — HPI: INFECTION (TINEA)
How Severe Is Your Dermatophytosis?: mild
Is This A New Presentation, Or A Follow-Up?: Follow Up Tinea Corporis
Additional History: No further blisters on the legs

## 2022-09-08 NOTE — PROCEDURE: MIPS QUALITY
Quality 111:Pneumonia Vaccination Status For Older Adults: Pneumococcal vaccine administered on or after patient’s 60th birthday and before the end of the measurement period
Quality 130: Documentation Of Current Medications In The Medical Record: Current Medications Documented
Detail Level: Detailed
Quality 226: Preventive Care And Screening: Tobacco Use: Screening And Cessation Intervention: Patient screened for tobacco use and is an ex/non-smoker
Quality 431: Preventive Care And Screening: Unhealthy Alcohol Use - Screening: Patient not identified as an unhealthy alcohol user when screened for unhealthy alcohol use using a systematic screening method
Quality 110: Preventive Care And Screening: Influenza Immunization: Influenza Immunization previously received during influenza season

## 2022-09-08 NOTE — PROCEDURE: ADDITIONAL NOTES
Additional Notes: If this occurs again the patient should be double booked for follow up.
Detail Level: Simple
Render Risk Assessment In Note?: no

## 2022-09-12 ENCOUNTER — THERAPY VISIT (OUTPATIENT)
Dept: PHYSICAL THERAPY | Facility: CLINIC | Age: 82
End: 2022-09-12
Attending: FAMILY MEDICINE
Payer: MEDICARE

## 2022-09-12 DIAGNOSIS — M13.0 POLYARTHRITIS: ICD-10-CM

## 2022-09-12 DIAGNOSIS — M54.50 BILATERAL LOW BACK PAIN WITHOUT SCIATICA: ICD-10-CM

## 2022-09-12 PROCEDURE — 97112 NEUROMUSCULAR REEDUCATION: CPT | Mod: GP | Performed by: PHYSICAL THERAPIST

## 2022-09-12 PROCEDURE — 97110 THERAPEUTIC EXERCISES: CPT | Mod: GP | Performed by: PHYSICAL THERAPIST

## 2022-09-12 PROCEDURE — 97161 PT EVAL LOW COMPLEX 20 MIN: CPT | Mod: GP | Performed by: PHYSICAL THERAPIST

## 2022-09-12 NOTE — PROGRESS NOTES
[facility]    OUTPATIENT Physical Therapy ORTHOPEDIC EVALUATION  PLAN OF TREATMENT FOR OUTPATIENT REHABILITATION  (COMPLETE FOR INITIAL CLAIMS ONLY)  Patient's Last Name, First Name, M.I.  YOB: 1940  Bon Michael    Provider s Name:  [facility]   Medical Record No.  [id number]   Start of Care Date:  09/12/22   Onset Date:   05/16/22 (MD visit)   Type:     _X__PT   ___OT Medical Diagnosis:    Encounter Diagnoses   Name Primary?    Polyarthritis     Bilateral low back pain without sciatica         Treatment Diagnosis:  Polyarhtritis, low back pain        Goals:     09/12/22 0500   Body Part   Goals listed below are for multiple joints, LB   Goal #1   Goal #1 standing   Previous Functional Level No restrictions   Performance level Significant R lateral shift, pain up to 4/10 stainding 1 hour   STG Target Performance Minutes patient will be able to stand   Performance level 10 min neutral alignment- no lateral shift   Rationale for housekeeping tasks such as vacuuming, bed making, mowing, gardening;for meal preparation;for safe household ambulation;for safe community ambulation   Due date 10/03/22   LTG Target Performance Hours patient will be able to stand   Performance Level 1 hour 0/10 pain   Rationale for housekeeping tasks such as vacuuming, bed making, mowing;for meal preparation;for safe household ambulation;for safe community ambulation;for return to work duties  (patient to return to weight lifting)   Due date 11/07/22       Therapy Frequency:  1x/week  Predicted Duration of Therapy Intervention:  8 weeks    Cate Rodriguez, PT                 I CERTIFY THE NEED FOR THESE SERVICES FURNISHED UNDER        THIS PLAN OF TREATMENT AND WHILE UNDER MY CARE .             Physician Signature                Date    X_____________________________________________________                         Certification Date From:  09/12/22   Certification Date To:  11/07/22    Referring Provider:  Robinson Lopez    Initial Assessment        See Epic Evaluation SOC Date: 09/12/22

## 2022-09-12 NOTE — PROGRESS NOTES
Physical Therapy Initial Evaluation  Subjective:    Patient Health History  Bon Michael being seen for low back pain and improper alignment.     Problem began: 5/16/2022 (MD visit).   Problem occurred: Sister came over and noticed patient really leaning to the right and is not walking properly.  Patient noting has pain in the low back and alignment is off.   Pain is reported as 4/10 on pain scale.  General health as reported by patient is good.  Pertinent medical history includes: osteoporosis (leukemia, lymphoma, giant cell arteritis, hernia, allergies).   Red flags:  None as reported by patient.  Medical allergies: none.   Surgeries include:  None.    Current medications:  Bone density (Actimera).    Current occupation is Real estate.   Primary job tasks include:  Prolonged sitting and prolonged standing (intermittently works on projects).                  Therapist Generated HPI Evaluation  Problem details: Patient reports feels is weak so tries to lift and do exercises but then over does it and has pain.  Has a power rack and olympic weights at home but is now afraid to use them.  Patient loves to exercise but feels like can't do too much.    MD order state polyarthritis, patient reporting mostly just having issues with low back and alignment..         Type of problem:  Lumbar.    This is a chronic condition.      Patient reports pain:  Lumbar spine right and lumbar spine left (R>L LB).  Pain is described as other (soreness) and is intermittent (pain 3-4x/week).  Radiates to:  R>L lateral hip. Pain is worse in the A.M..  Since onset symptoms are gradually worsening.  Associated symptoms:  Loss of motion/stiffness and loss of strength. Symptoms are exacerbated by standing and lifting (patient reports wants to be able to get back to lifting weights (has not lifted heavy weights for 1-2 years).- now is afraid to.  Standing 1-1.5 hours.  Pain getting up in morning.  Sometimes pain getting up from sitting)  and  relieved by NSAID's (Ibuprofen).  Imaging testing: none.    Work activity restrictions: intermittently working.  Barriers include:  Lives alone and stairs.                        Objective:  Standing Alignment:      Shoulder/UE:  Rounded shoulders  Lumbar:  Lateral shift R                           Lumbar/SI Evaluation  ROM:    AROM Lumbar:   Flexion:        Min loss stays in R shift  Ext:                    Mod loss, stays in R shift   Side Bend:        Left:     Right:   Rotation:           Left:     Right:   Side Glide:        Left:     Right:           Lumbar Myotomes:  normal                Lumbar Dermtomes:  normal                                                                         William Lumbar Evaluation    Posture:  Sitting: poor  Standing: poor    Lateral Shift: right  Correction of Posture: no effect  Other Observations: Lumbar support makes patient feel is more upright, NE on pain- no pain.  Looking in mirror with cueing patient able to correct R lateral shift without any pain, without feedback resumes R lateral shift positioning.    Test Movements:  FIS: Pretest Movements: 0/10 pain            SGIS L: During: no effect  After: no effect    Repeat SGIS L: During: no effect  After: no effect  Mechanical Response: IncROM    Conclusion lumbar: questionable derangement, although long term R lateral shift.  Principle of Treatment:  Posture Correction: Educate use of lumbar support, slouch over-correct, be aware of postures/patterns  may fall into throughout the day and see if can adjust posture (ie sitting in favorite couch always leaning to R arm rest, or resting on center counsil in car etc.  Be aware if falls to R naturally and try to stretch/sit/stand etc more neutral.    Extension: L SGIS at wall x 10 every 2-3 hours.  L SGIS facing counter in bathroom with mirror visual feedback to neutralize posture    Other: will progress with strengthening/core stabilization                                        ROS    Assessment/Plan:    Patient is a 81 year old male with lumbar and polyarthrtitis complaints.    Patient has the following significant findings with corresponding treatment plan.                Diagnosis 1:  Polyarthritis, low back pain    Pain -  manual therapy, self management, education and home program  Decreased ROM/flexibility - manual therapy, therapeutic exercise, therapeutic activity and home program  Decreased joint mobility - manual therapy, therapeutic exercise, therapeutic activity and home program  Decreased strength - therapeutic exercise, therapeutic activities and home program  Impaired gait - gait training and home program  Decreased function - therapeutic activities and home program  Impaired posture - neuro re-education, therapeutic activities and home program    Therapy Evaluation Codes:   Cumulative Therapy Evaluation is: Low complexity.    Previous and current functional limitations:  (See Goal Flow Sheet for this information)    Short term and Long term goals: (See Goal Flow Sheet for this information)     Communication ability:  Patient appears to be able to clearly communicate and understand verbal and written communication and follow directions correctly.  Treatment Explanation - The following has been discussed with the patient:   RX ordered/plan of care  Anticipated outcomes  Possible risks and side effects  This patient would benefit from PT intervention to resume normal activities.   Rehab potential is good.    Frequency:  1 X week, once daily  Duration:  for 8 weeks  Discharge Plan:  Achieve all LTG.  Independent in home treatment program.  Reach maximal therapeutic benefit.    Please refer to the daily flowsheet for treatment today, total treatment time and time spent performing 1:1 timed codes.

## 2022-09-20 ENCOUNTER — THERAPY VISIT (OUTPATIENT)
Dept: PHYSICAL THERAPY | Facility: CLINIC | Age: 82
End: 2022-09-20
Attending: FAMILY MEDICINE
Payer: MEDICARE

## 2022-09-20 DIAGNOSIS — M54.50 BILATERAL LOW BACK PAIN WITHOUT SCIATICA: ICD-10-CM

## 2022-09-20 DIAGNOSIS — M13.0 POLYARTHRITIS: Primary | ICD-10-CM

## 2022-09-20 PROCEDURE — 97110 THERAPEUTIC EXERCISES: CPT | Mod: GP | Performed by: PHYSICAL THERAPIST

## 2022-09-20 PROCEDURE — 97112 NEUROMUSCULAR REEDUCATION: CPT | Mod: GP | Performed by: PHYSICAL THERAPIST

## 2022-09-26 ENCOUNTER — THERAPY VISIT (OUTPATIENT)
Dept: PHYSICAL THERAPY | Facility: CLINIC | Age: 82
End: 2022-09-26
Attending: FAMILY MEDICINE
Payer: MEDICARE

## 2022-09-26 DIAGNOSIS — M54.50 BILATERAL LOW BACK PAIN WITHOUT SCIATICA: ICD-10-CM

## 2022-09-26 DIAGNOSIS — M13.0 POLYARTHRITIS: Primary | ICD-10-CM

## 2022-09-26 PROCEDURE — 97110 THERAPEUTIC EXERCISES: CPT | Mod: GP | Performed by: PHYSICAL THERAPIST

## 2022-09-26 PROCEDURE — 97112 NEUROMUSCULAR REEDUCATION: CPT | Mod: GP | Performed by: PHYSICAL THERAPIST

## 2022-10-03 ENCOUNTER — THERAPY VISIT (OUTPATIENT)
Dept: PHYSICAL THERAPY | Facility: CLINIC | Age: 82
End: 2022-10-03
Payer: MEDICARE

## 2022-10-03 DIAGNOSIS — M54.50 BILATERAL LOW BACK PAIN WITHOUT SCIATICA: ICD-10-CM

## 2022-10-03 DIAGNOSIS — M13.0 POLYARTHRITIS: Primary | ICD-10-CM

## 2022-10-03 PROCEDURE — 97110 THERAPEUTIC EXERCISES: CPT | Mod: GP | Performed by: PHYSICAL THERAPIST

## 2022-10-03 PROCEDURE — 97112 NEUROMUSCULAR REEDUCATION: CPT | Mod: GP | Performed by: PHYSICAL THERAPIST

## 2022-10-10 ENCOUNTER — THERAPY VISIT (OUTPATIENT)
Dept: PHYSICAL THERAPY | Facility: CLINIC | Age: 82
End: 2022-10-10
Payer: MEDICARE

## 2022-10-10 DIAGNOSIS — M13.0 POLYARTHRITIS: Primary | ICD-10-CM

## 2022-10-10 DIAGNOSIS — M54.50 BILATERAL LOW BACK PAIN WITHOUT SCIATICA: ICD-10-CM

## 2022-10-10 PROCEDURE — 97110 THERAPEUTIC EXERCISES: CPT | Mod: GP | Performed by: PHYSICAL THERAPIST

## 2022-10-10 PROCEDURE — 97112 NEUROMUSCULAR REEDUCATION: CPT | Mod: GP | Performed by: PHYSICAL THERAPIST

## 2022-10-17 ENCOUNTER — THERAPY VISIT (OUTPATIENT)
Dept: PHYSICAL THERAPY | Facility: CLINIC | Age: 82
End: 2022-10-17
Payer: MEDICARE

## 2022-10-17 DIAGNOSIS — M13.0 POLYARTHRITIS: Primary | ICD-10-CM

## 2022-10-17 DIAGNOSIS — M54.50 BILATERAL LOW BACK PAIN WITHOUT SCIATICA: ICD-10-CM

## 2022-10-17 PROCEDURE — 97110 THERAPEUTIC EXERCISES: CPT | Mod: GP | Performed by: PHYSICAL THERAPIST

## 2022-10-17 PROCEDURE — 97112 NEUROMUSCULAR REEDUCATION: CPT | Mod: GP | Performed by: PHYSICAL THERAPIST

## 2022-10-24 ENCOUNTER — THERAPY VISIT (OUTPATIENT)
Dept: PHYSICAL THERAPY | Facility: CLINIC | Age: 82
End: 2022-10-24
Payer: MEDICARE

## 2022-10-24 DIAGNOSIS — M13.0 POLYARTHRITIS: Primary | ICD-10-CM

## 2022-10-24 DIAGNOSIS — M54.50 BILATERAL LOW BACK PAIN WITHOUT SCIATICA: ICD-10-CM

## 2022-10-24 PROCEDURE — 97110 THERAPEUTIC EXERCISES: CPT | Mod: GP | Performed by: PHYSICAL THERAPIST

## 2022-10-24 PROCEDURE — 97112 NEUROMUSCULAR REEDUCATION: CPT | Mod: GP | Performed by: PHYSICAL THERAPIST

## 2022-10-31 ENCOUNTER — THERAPY VISIT (OUTPATIENT)
Dept: PHYSICAL THERAPY | Facility: CLINIC | Age: 82
End: 2022-10-31
Payer: MEDICARE

## 2022-10-31 DIAGNOSIS — M54.50 BILATERAL LOW BACK PAIN WITHOUT SCIATICA: ICD-10-CM

## 2022-10-31 DIAGNOSIS — M13.0 POLYARTHRITIS: Primary | ICD-10-CM

## 2022-10-31 PROCEDURE — 97112 NEUROMUSCULAR REEDUCATION: CPT | Mod: GP | Performed by: PHYSICAL THERAPIST

## 2022-10-31 PROCEDURE — 97110 THERAPEUTIC EXERCISES: CPT | Mod: GP | Performed by: PHYSICAL THERAPIST

## 2022-10-31 NOTE — PROGRESS NOTES
[facility]    OUTPATIENT Physical Therapy ORTHOPEDIC EVALUATION  PLAN OF TREATMENT FOR OUTPATIENT REHABILITATION  (COMPLETE FOR INITIAL CLAIMS ONLY)  Patient's Last Name, First Name, M.I.  YOB: 1940  Bon Michael    Provider s Name:  [facility]   Medical Record No.  [id number]   Start of Care Date:  09/12/22   Onset Date:   05/16/22 (MD visit)   Treatment Diagnosis:  Polyarthritis, low back pain Medical Diagnosis:     Polyarthritis  Bilateral low back pain without sciatica       Goals:     10/31/22 0500   Body Part   Goals listed below are for multiple joints, LB   Goal #1   Goal #1 standing   Previous Functional Level No restrictions   Performance level R lateral shift remains if no awareness, but with awareness able to hold neutral 15-20 minutes 0/10 pain and improving posture during exercise, pain up to 1/10 standing 1 hour   STG Target Performance Minutes patient will be able to stand   Performance level 10 min neutral alignment- no lateral shift   Rationale for housekeeping tasks such as vacuuming, bed making, mowing, gardening;for meal preparation;for safe household ambulation;for safe community ambulation   Due date 10/03/22   Date Goal Met 09/26/22   LTG Target Performance Hours patient will be able to stand   Performance Level 1 hour 0/10 pain   Rationale for housekeeping tasks such as vacuuming, bed making, mowing;for meal preparation;for safe household ambulation;for safe community ambulation;for return to work duties  (patient to return to weight lifting)   Due date 01/03/23       Therapy Frequency:  1x/week  Predicted Duration of Therapy Intervention:  4 weeks tapering to 2x/month x 1 month    Cate Rodriguez, PT                 I CERTIFY THE NEED FOR THESE SERVICES FURNISHED UNDER        THIS PLAN OF TREATMENT AND WHILE UNDER MY CARE .              Physician Signature               Date    X_____________________________________________________                         Certification Date From:  11/08/22   Certification Date To:  01/03/23    Referring Provider:  Robinson Lopez    Initial Assessment        See Epic Evaluation SOC Date: 09/12/22

## 2022-10-31 NOTE — PROGRESS NOTES
Subjective:  HPI  Physical Exam  Oswestry Score: 0 %                 Objective:  System    Physical Exam    General     ROS    Assessment/Plan:    PROGRESS  REPORT    Progress reporting period is from 9/12/22 to 10/31/22, 8 visits.       SUBJECTIVE  Subjective changes noted by patient:  No longer is having much back pain since starting therapy.  Continues to have R lateral shift but is much more aware of posture and is very dilligent with home exercise program and is working back into strengthenign with awareness for posture and avoiding R lateral shift.    Current Pain level: 0/10.     Initial Pain level: 4/10.   Changes in function:  Yes (See Goal flowsheet attached for changes in current functional level)  Adverse reaction to treatment or activity: None    OBJECTIVE  Changes noted in objective findings:  Yes:   Continued R lateral shift.  Improves after repeated L SGIS at the wall with extension.    L side plank modified on knees 60 seconds.    Able to perform modified L side plank on knees with R LE lift.   Patient needs awareness to keep neutral spine throughout exercises (row/pulldown/squats etc) although sometimes self corrects.   Good squat technique.    Focusing therapy on functional strengthening and minimizing lumbar R lateral shift.     ASSESSMENT/PLAN  Updated problem list and treatment plan: Diagnosis 1:  Polyarhtritis, low back pain    Pain -  manual therapy, self management, education, directional preference exercise and home program  Decreased ROM/flexibility - manual therapy, therapeutic exercise, therapeutic activity and home program  Decreased strength - therapeutic exercise, therapeutic activities and home program  Decreased function - therapeutic activities and home program  Impaired posture - neuro re-education, therapeutic activities and home program  STG/LTGs have been met or progress has been made towards goals:  Yes (See Goal flow sheet completed today.)  Assessment of Progress: The  patient's condition is improving (less pain, more strength, improving functional activities and working toward goals).  R lateral lumbar shift can be temporarily improved with repeated sideglide exercises and awareness, but patient never able to fully correct the shift or maintain neutral posture.      Patient may benefit additional therapy as continues to require extensive cues in therapy and can therefore benefit from continued guidance.    Self Management Plans:  Patient has been instructed in a home treatment program.  Patient  has been instructed in self management of symptoms.  I have re-evaluated this patient and find that the nature, scope, duration and intensity of the therapy is appropriate for the medical condition of the patient.  Bon continues to require the following intervention to meet STG and LTG's:  PT    Recommendations:  This patient would benefit from continued therapy.     Frequency:  1 X week, once daily  Duration:  for 4 weeks tapering to 2 X a gia  over one month        Please refer to the daily flowsheet for treatment today, total treatment time and time spent performing 1:1 timed codes.

## 2022-11-07 ENCOUNTER — THERAPY VISIT (OUTPATIENT)
Dept: PHYSICAL THERAPY | Facility: CLINIC | Age: 82
End: 2022-11-07
Payer: MEDICARE

## 2022-11-07 DIAGNOSIS — M54.50 BILATERAL LOW BACK PAIN WITHOUT SCIATICA: ICD-10-CM

## 2022-11-07 DIAGNOSIS — M13.0 POLYARTHRITIS: Primary | ICD-10-CM

## 2022-11-07 PROCEDURE — 97112 NEUROMUSCULAR REEDUCATION: CPT | Mod: GP | Performed by: PHYSICAL THERAPIST

## 2022-11-07 PROCEDURE — 97110 THERAPEUTIC EXERCISES: CPT | Mod: GP | Performed by: PHYSICAL THERAPIST

## 2022-11-15 ENCOUNTER — THERAPY VISIT (OUTPATIENT)
Dept: PHYSICAL THERAPY | Facility: CLINIC | Age: 82
End: 2022-11-15
Payer: MEDICARE

## 2022-11-15 DIAGNOSIS — M54.50 BILATERAL LOW BACK PAIN WITHOUT SCIATICA: ICD-10-CM

## 2022-11-15 DIAGNOSIS — M13.0 POLYARTHRITIS: Primary | ICD-10-CM

## 2022-11-15 PROCEDURE — 97110 THERAPEUTIC EXERCISES: CPT | Mod: GP | Performed by: PHYSICAL THERAPIST

## 2022-11-15 PROCEDURE — 97112 NEUROMUSCULAR REEDUCATION: CPT | Mod: GP | Performed by: PHYSICAL THERAPIST

## 2022-11-21 ENCOUNTER — THERAPY VISIT (OUTPATIENT)
Dept: PHYSICAL THERAPY | Facility: CLINIC | Age: 82
End: 2022-11-21
Payer: MEDICARE

## 2022-11-21 DIAGNOSIS — M54.50 BILATERAL LOW BACK PAIN WITHOUT SCIATICA: ICD-10-CM

## 2022-11-21 DIAGNOSIS — M13.0 POLYARTHRITIS: Primary | ICD-10-CM

## 2022-11-21 PROCEDURE — 97112 NEUROMUSCULAR REEDUCATION: CPT | Mod: GP | Performed by: PHYSICAL THERAPIST

## 2022-11-21 PROCEDURE — 97110 THERAPEUTIC EXERCISES: CPT | Mod: GP | Performed by: PHYSICAL THERAPIST

## 2022-12-05 ENCOUNTER — THERAPY VISIT (OUTPATIENT)
Dept: PHYSICAL THERAPY | Facility: CLINIC | Age: 82
End: 2022-12-05
Payer: MEDICARE

## 2022-12-05 DIAGNOSIS — M13.0 POLYARTHRITIS: Primary | ICD-10-CM

## 2022-12-05 DIAGNOSIS — M54.50 BILATERAL LOW BACK PAIN WITHOUT SCIATICA: ICD-10-CM

## 2022-12-05 PROCEDURE — 97110 THERAPEUTIC EXERCISES: CPT | Mod: GP | Performed by: PHYSICAL THERAPIST

## 2022-12-05 PROCEDURE — 97112 NEUROMUSCULAR REEDUCATION: CPT | Mod: GP | Performed by: PHYSICAL THERAPIST

## 2022-12-12 ENCOUNTER — THERAPY VISIT (OUTPATIENT)
Dept: PHYSICAL THERAPY | Facility: CLINIC | Age: 82
End: 2022-12-12
Payer: MEDICARE

## 2022-12-12 DIAGNOSIS — M54.50 BILATERAL LOW BACK PAIN WITHOUT SCIATICA: ICD-10-CM

## 2022-12-12 DIAGNOSIS — M13.0 POLYARTHRITIS: Primary | ICD-10-CM

## 2022-12-12 PROCEDURE — 97112 NEUROMUSCULAR REEDUCATION: CPT | Mod: GP | Performed by: PHYSICAL THERAPIST

## 2022-12-12 PROCEDURE — 97110 THERAPEUTIC EXERCISES: CPT | Mod: GP | Performed by: PHYSICAL THERAPIST

## 2022-12-16 ENCOUNTER — TELEPHONE (OUTPATIENT)
Dept: RHEUMATOLOGY | Facility: CLINIC | Age: 82
End: 2022-12-16

## 2022-12-16 ENCOUNTER — VIRTUAL VISIT (OUTPATIENT)
Dept: RHEUMATOLOGY | Facility: CLINIC | Age: 82
End: 2022-12-16
Attending: INTERNAL MEDICINE
Payer: MEDICARE

## 2022-12-16 DIAGNOSIS — M31.6 GIANT CELL ARTERITIS (H): Primary | ICD-10-CM

## 2022-12-16 DIAGNOSIS — Z79.899 HIGH RISK MEDICATION USE: ICD-10-CM

## 2022-12-16 PROCEDURE — 99443 PR PHYSICIAN TELEPHONE EVALUATION 21-30 MIN: CPT | Mod: 95 | Performed by: INTERNAL MEDICINE

## 2022-12-16 NOTE — LETTER
12/16/2022       RE: Bon Michael  [address] [address]     Dear Colleague,    Thank you for referring your patient, Bon Michael, to the [facility] [place] at [facility]. Please see a copy of my visit note below.           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 Never had HA, jaw claudication, PMR symptoms. No inflammatory arthritis symptoms. Disease currently in remission by history on once weekly actemra 162mg weekly (had missed some doses due to national drug shortage) and methotrexate 20mg weekly/folic acid 1mg daily (since feb 2022). Has completed his prednisone taper which was guided by GiACTA trial.    Most recent labs obtained on 12/12/2022 show normal ESR and CRP.  His AST and ALT are normal.  His creatinine is normal.  Images no CLL his WBC count is elevated at baseline.  His mild chronic anemia with his hemoglobin of 12.3.  Platelet count is 119 also stable.  No evidence of toxicity on these labs obtained for assessment of active vasculitis and to assess for routine screening drug toxicity monitoring.  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    # Giant cell arteritis   1) continue 162mg once weekly injection, next dose due on Wednesday  2) follow-up in 3 months with labs the week prior    # Long term use of high risk medication: Actemra  1) Actemra 162mg weekly. olerating without issues. No  evidence of toxicity by most recent labs.  2) Will continue routine screening drug toxicity labs. Standing orders placed with today's encounter. He will have these drawn along with inflammatory markers in the week prior to his follow-up in 3 months.    # Osteopenia   Managed by PCP.     #CLL  Managed by oncology.      Bib Alcantara MD   Rheumatology     Subjective:   December 16, 2022  No HA, scalp tenderness, vision change, jaw claudication symptoms, proximal muscle pain/stiffness. Still with on/off back pain and sinus/nasal congestion, both of which have been chronic issues for years. No fevers/chills/night sweats. No unintentional weight loss. Was treated successfully for cutaneous fungal infection, no return now since this summer. No red/hot/swollen joints. Tolerating actemra injections without noticeable side effects.  Other than his cutaneous fungal infection he denies any other interval infections.  Able to get full fist with me in the morning.  Denies joint stiffness that improves with use.  He feels well.  Had his flu shot recently.  14 point review of systems collected and otherwise negative.          Interval History September 2, 2022  Had dental work done. Saw his PCP for lower extremity rash, treated by PCP with steroids with taper. Though after a few weeks without resolution so was referred to dermatology for lesions on LE. Biopsied/cultured and told it was fungal infection, started on treatment which ended yesterday. Lesions on lower extremities have cleared. No HA/ change in vision/ scalp tenderness/ jaw claudication. No red/hot/swollen joints. Able to make a full fist upon waking in the AM. No proximal muscle pain/stiffness. No injection site reactions/ side effects. 14 point ROS collected and negative if not documented above.     Interval history 6/10/22  No HA, change in vision, scalp tenderness, jaw claudication. No proximal muscle pain/stiffness. No red/hot/swollen joints. No  fevers/chills/night sweats. No side effects with methotrexate. Takes methotrexate and actemra on wednesdays. No side effects for either. No interval infections. No rashes.  Weight has been stable.  Able to make a full fist upon waking in the morning.  14 point review of systems collected and otherwise negative.    Interval History 3/11/22  Has continued on alendronate. Thinks he has more sensitivity in his teeth. He asks about need for addition of vit K2 to supplement. He plans to bring this question, along with question about green tea extract up with his primary care physician. Takes this on Wednesday evenings. Taking folic acid 1mg daily. Has only had 3 doses of actemra that were late. Has been on methotrexate for 3 weeks now.  No headache, vision change, scalp tenderness, proximal muscle pain or stiffness, peripheral joint swelling redness warmth.  Able to make full fist upon waking in the morning.  No fevers chills or night sweats.  No interval infections.  No rash.  Is tolerating his methotrexate without any side effects.  GI or other.      Past Medical History  CLL  Hernia  Mono   Deviated septum  Osteopenia by DEXA    Past Surgical History  Tonsillectomy  Deviated septum    Medications:  Actemra 162 mg once weekly subcutaneous      Weeks Daily prednisone dose (mg) 26-week taper Weekly 162mg Actemra      1 60    2 50 11/5/2020    3 40    4 35 11/20/2020   5 30    6 25    7 20 12/10/2020   8 15 12/17/2020   9 12.5    10 12.5    11 10 1/8/2021   12 9    13 8    14 7 2/5/21   15 6 2/11/21   16 6    17 5    18 5 3/4/21   19 4    20 4    21 3    22 3 4/8/21   23 2    24 2    25 1    26 1 5/12/21     Allergies: Seasonal/ environmental allergies    Family History: No family history of autoimmune diseases like RA, sjogrens, SLE, scleroderma, IBD.    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 No drug use.        Objective:   Physical exam:  No vitals or exam for telephone visit    Labs:    12/12/2022  Creatinine 0.78  CRP 0.02  ALT 21  AST 31  Albumin 4.1  ESR less than 1  WBC 31.7  Hemoglobin 12.3  Platelet 119    CRP  5/5/21  WBC 27.4  HGB 13.6    Cr 0.81  ALT 25  AST 29  Chol 152  Trig 88  HDL 54  LDL 80  CRP <2.9  ESR 3  UA with small blood, no cells, no protein, casts    4/8/21  ESR 4  CRP less than 2.9  WBC 30.1  Hemoglobin 13.6  Platelets 157  Creatinine 0.86  Cholesterol 179  Triglycerides 83  HDL 60      3/2/21  ESR 4  CRP <2.9  WBC 28.3  HGB 12.8    Cr 0.86  ALT 23  AST 25    2/3/21  WBC 30.6  HGB 11.9    Cr 0.83  Alk phos 40  ALT 30  AST 29    1/5/21  UA without protein, cells or casts  ESR 5  CRP <2.9  WBC 40.6  HGB 11.6    Cr 0.81  Chol 166  Trig 86  HDL 68  LDL 81    12/14/2020   CRP <2.9  ESR 4  WBC 54  HGB 11.8    Cr 0.78  Ua unremarkable  Cholesterol 168  Trig 82  HDL 70  LDL 81    11-  ESR 3  CRP less than 2.9  WBC 63.3  Hemoglobin 12  Platelets 181  Creatinine 0.98  Total protein low at 5.9  Alk phos 48  ALT 31  AST 30  UA with 30 of albumin  Cholesterol 169  Triglycerides 45  HDL 83  LDL 77    10/1/2020  WBC 84.9  HGB 12.3    IgM 36 low  IgA 124 normal  IgG 625    Cr 0.98    9/1/2020  WBC 50.2  HGB 11.6       Imaging:  MR brain and orbits 9/10/20  Impression:    1. Regarding the orbits and globes, there is no definite abnormal  contrast enhancement or mass. No evidence of leukemic infiltration.  2. Regarding the remainder of the brain, abnormal T2 hyperintense bone  marrow signal with associated enhancement in the skull, likely  representing leukemic infiltration.  3. Subcutaneous T2 hyperintensity with associated diffusion  restriction and contrast enhancement over the left zygomatic region,  question leukemic infiltration.    Pathology:  Patient Name: MARIELA WALLACE   MR#: [id number]   Specimen #: [id number]   Collected: 9/10/2020   Received: 9/10/2020   Reported: 9/16/2020 12:35   Ordering Phy(s): ALI  "LAINEY     For improved result formatting, select 'View Enhanced Report Format' under    Linked Documents section.     SPECIMEN(S):   Temporal artery biopsy, left     FINAL DIAGNOSIS:   Temporal artery, left, biopsy:   1. Granulomatous arteritis consistent with giant cell arteritis.   2. Chronic inflammation of the surrounding adventitia.     COMMENT:   Preliminary results were communicated to Dr. Valerie Carrington and Dr. Guille Garcia on 9/14/20 at 12:45pm.     I have personally reviewed all specimens and/or slides, including the   listed special stains, and used them   with my medical judgement to determine or confirm the final diagnosis.     Electronically signed out by:     Za García M.D., [facility]     CLINICAL HISTORY:   The patient is a 79 year old male with a history of chronic lymphocytic   leukemia who presented with bilateral   sequential optic neuropathy with associated pallid optic disc edema and   choroidal hypoperfusion, but   relatively intact visual acuity, negative elevation of acute phase   reactants, and lack of constitutional   symptoms consistent with giant cell arteritis. He also has findings of   likely leukemic infiltration in the   skull seen on MRI. He undergoes temporal artery biopsy on the left.     GROSS:   The specimen is received in formalin with proper patient identification,   labeled \"left temporal artery\".  The   specimen consists of a 3.2 cm in length by 0.3 cm in diameter tan-white   vessel segment. The specimen is   submitted intact in A1. (Dictated by: PADILLA Montalvo 9/11/2020 03:08   PM)     MICROSCOPIC:   The tissue consists of artery with narrowed lumen and intimal hyperplasia.    There is an infiltrate of   lymphocytes, plasma cells, epithelioid histiocytes, and multinucleated   giant cells throughout all layers of   the artery. There is associated loss of the internal elastic lamina on   elastic stain. Occasional focal   calcifications are seen a " the level of the internal elastic lamina.   Neovascularization of the vessel wall is   present. Perivascular lymphocytic infiltrates are seen in the surrounding   adventitia. Immunohistochemistry   with CD3 and CD20 demonstrates a predominantly T-cell lymphocytic   infiltrate within the vessel wall. There is   a mixed infiltrate of T and B lymphocytes perivascular in the adventitia.   CD5 and CD43 have similar staining   patterns to CD3. There is light patchy CD23 staining in the areas positive    for CD20. CD68 highlights the   epithelioid histiocytes within the vessel wall.     The technical component of this testing was completed at the [facility], with the professional component performed    at the [facility], [address] [place] [id number] ([phone])     CPT Codes:   A: 25784-WR7, 17858-KEEK, 06515-PZA, 80370-LLP, 36845-NII, 73973-DEA,   10458-RAO, 04506-NCC     COLLECTION SITE:   Client: [facility]   Location: BERNADETTE Crockett (B) is a 82 year old who is being evaluated via a billable telephone visit.      What phone number would you like to be contacted at? [phone]  How would you like to obtain your AVS? MyChart  Distant Location (provider location):  On-site  Phone call duration: 23 minutes  December 16, 2022  Bib Alcantara MD  Rheumatology    Bib Alcantara MD

## 2022-12-16 NOTE — NURSING NOTE
Patient notes allergy to cats from being tested in the past. Patient notes he takes calcium/vitamin d3 tablet. VF tried to review medications with patient, but patient got incoming call and he was thinking it was Dr. Alcantara. VF unable to complete rooming process as provider may have called patient during rooming process so patient had to hang up.  Estela LEVY Virtual Visit Facilitator 7:53 AM December 16, 2022

## 2022-12-16 NOTE — TELEPHONE ENCOUNTER
Provider: Bib Alcantara MD Department:  ADULT RHEUMATOLOGY     Visit Disposition    Dispositions Check-out Note   0 Return in about 12 weeks (around 3/10/2023). Please call and schedule phone visit follow-up in 12 weeks on a friday     Writer tried to contact patient to schedule follow up per provider check out notes above, but was unable to reach the patient. Writer left a generic message for patient to call back. If patient calls back, please assist with scheduling follow up per provider notes.  Left Voicemail (1st Attempt) for the patient to call back and schedule the following:    Appointment type: Telephone Return  Provider: Dr. Alcantara  Return date: Around 3/10/23  Specialty phone number: Clinic phone number: [phone]  Estela LEVY Virtual Visit Facilitator 12:35 PM December 16, 2022

## 2022-12-19 ENCOUNTER — TELEPHONE (OUTPATIENT)
Dept: RHEUMATOLOGY | Facility: CLINIC | Age: 82
End: 2022-12-19

## 2022-12-19 NOTE — TELEPHONE ENCOUNTER
Message  Received: 3 days ago  Bib Alcantara MD Kipka, Lori, DAJUAN  Hi aster, can you please send the lab orders from today's encounter to [facility].   thanks       Lab orders faxed as requested.    Aster Segovia CMA   12/19/2022 10:28 AM

## 2022-12-19 NOTE — LETTER
Date: 2022  Name: Bon Michael  YOB: 1940    Order  CBC with Platelets & Differential [[id number]] (Order [id number])  Linked Results    Procedure Abnormality Status   CBC with Platelets & Differential       Patient Name   Bon Michael MRN   [id number] Legal Sex   Male    1940     Patient Demographics    Address   [address] [place] [id number] Phone   [phone] (Home)   [phone] (Mobile) *Preferred* E-mail Address   [email or web address]     Referring Provider    Referred Self, MD            Base CPT Code (Reference Only)    Code CPT Chargeables   [id number]      Existing Charges    Charge Line Charge Code Status Charge Trigger Charge Type   None              Order Information    Order Date/Time Release Date/Time Start Date/Time End Date/Time   22 08:22 AM None 2022 None       Order Details    Frequency Duration Priority Order Class   None None Routine Lab Collect       Order Providers    Authorizing Provider Encounter Provider   Bib Alcantara MD Monteagudo, Luke Adam, MD       ABN Associated with this Order    There is no ABN associated with this order.                    Ordering Provider's NPI: [id number]  Bib Alcantara        CBC with Platelets & Differential [[id number]]    Electronically signed by: Bib Alcantara MD on 22 Status: Active   Ordering user: Bib Alcantara MD 22         Order History  Outpatient  Date/Time Action Taken User Additional Information   22 Sign Bib Alcantara MD        Standing Order Information    Remaining Occurrences Interval Next Expected Expires     10/10 Every 3 Months N/A 2023                            Reference Links    Lab Guide                    Associated Diagnoses    Giant cell arteritis (H) [M31.6]  - Primary       High risk medication use [Z79.899]         Source Order Set    Order Set Name Order ID    [id number]                Encounter    View Encounter              Order Report    View Order Information         Lab and Collection    CBC with Platelets & Differential (Order: [id number]) - 2022      External System: External ID:   EX[facility] CBCD   Outbound Lab CBCD   LABDEEAP HQI629   GICHLABEAP 45905.0   GICHLABEAP JOTV390   ATLASEAP YMS995   HE EAP IMX079   HE2FV_CUPIDF FJJ8236   HE2FV_CUPIDF ZWN955   VRF_EAP [id number]         Order Requisition    CBC with Platelets & Differential (Order #[id number]) on 22       Reprint Order Requisition    CBC with Platelets & Differential (Order #[id number]) on 22       Tracking Links    Cosign Tracking Order Transmittal Tracking       Order  Comprehensive metabolic panel [LAB17] (Order [id number])  Patient Name   Bon Michael MRN   [id number] Legal Sex   Male    1940     Patient Demographics    Address   [address] [place] [address] Phone   [phone] (Home)   [phone] (Mobile) *Preferred* E-mail Address   [email or web address]     Referring Provider    Referred MD Holland            Base CPT Code (Reference Only)    Code CPT Chargeables   LAB17 LAB17 [id number]     Existing Charges    Charge Line Charge Code Status Charge Trigger Charge Type   None              Order Information    Order Date/Time Release Date/Time Start Date/Time End Date/Time   22 08:22 AM None 2022 None       Order Details    Frequency Duration Priority Order Class   None None Routine Lab Collect       Order Providers    Authorizing Provider Encounter Provider   Bib Alcantara MD Monteagudo, Luke Adam, MD       ABN Associated with this Order    There is no ABN associated with this order.                    Ordering Provider's NPI: [id number]  Bib Alcantara        Comprehensive metabolic panel [[id number]]    Electronically signed by: Bib Alcantara MD on 22 Status: Active   Ordering user: Bib Alcantara MD 22          Order History  Outpatient  Date/Time Action Taken User Additional Information   22 Bib Jacinto MD        Standing Order Information    Remaining Occurrences Interval Next Expected Expires     10/10 Every 3 Months N/A 2023                            Reference Links    Lab Guide                    Associated Diagnoses    Giant cell arteritis (H) [M31.6]  - Primary       High risk medication use [Z79.899]         Source Order Set    Order Set Name Order ID    [id number]         Collection Information    Blood        Resulting Agency: [facility]               Encounter    View Encounter              Order Report    View Order Information         Lab and Collection    Comprehensive metabolic panel (Order: [id number]) - 2022      External System: External ID:   [facility] CCOMP   Outbound Lab CCOMP   GICHLABEAP 69544.0   LABDEEAP LAB17   HE EAP LAB17   ATLASEAP LAB17   HE2FV_CUPIDF LAB17   PHLEBIOEAP COMP   VRF_EAP DCP GENERIC CODE - Metabolic Transcribed Results   VRF_EAP [id number]   VRF_EAP [id number]   VRF_EAP [id number]         Order Requisition    Comprehensive metabolic panel (Order #[id number]) on 22       Reprint Order Requisition    Comprehensive metabolic panel (Order #[id number]) on 22       Tracking Links    Cosign Tracking Order Transmittal Tracking       Order  CRP inflammation [ACD3360] (Order [id number])  Patient Name   Bon Michael MRN   [id number] Legal Sex   Male    1940     Patient Demographics    Address   [address] [place] [address] Phone   [phone] (Home)   [phone] (Mobile) *Preferred* E-mail Address   [email or web address]     Referring Provider    Referred MD Holland            Base CPT Code (Reference Only)    Code CPT Chargeables   [id number] [id number]     Existing Charges    Charge Line Charge Code Status Charge Trigger Charge Type   None              Order Information    Order Date/Time Release Date/Time Start  Date/Time End Date/Time   12/16/22 08:22 AM None 12/16/2022 None       Order Details    Frequency Duration Priority Order Class   None None Routine Lab Collect       Order Providers    Authorizing Provider Encounter Provider   Bib Alcantara MD Monteagudo, Luke Adam, MD       ABN Associated with this Order    There is no ABN associated with this order.                    Ordering Provider's NPI: [id number]  Bib Alcantara        CRP inflammation [[id number]]    Electronically signed by: Bib Alcantara MD on 12/16/22 0822 Status: Active   Ordering user: Bib Alcantara MD 12/16/22 0822         Order History  Outpatient  Date/Time Action Taken User Additional Information   12/16/22 0822 Sign Bib Alcantara MD        Standing Order Information    Remaining Occurrences Interval Next Expected Expires     10/10 Every 3 Months N/A 12/16/2023                            Reference Links    Lab Guide                    Associated Diagnoses    Giant cell arteritis (H) [M31.6]  - Primary       High risk medication use [Z79.899]         Source Order Set    Order Set Name Order ID    [id number]         Collection Information    Blood        Resulting Agency: [facility]               Encounter    View Encounter              Order Report    View Order Information         Lab and Collection    CRP inflammation (Order: [id number]) - 12/16/2022      External System: External ID:   EXTEAP CRPI   Outbound Lab CRPI   LABDEEAP [id number]   GICHLABEAP 02081.0   HE EAP SGZ692   ATLASEAP MOE3089   HE2FV_CUPIDF RTV505   PHLEBIOEAP CRP   VRF_EAP [id number]         Order Requisition    CRP inflammation (Order #[id number]) on 12/16/22       Reprint Order Requisition    CRP inflammation (Order #[id number]) on 12/16/22       Tracking Links    Cosign Tracking Order Transmittal Tracking       Order  Erythrocyte sedimentation rate auto [LCV932] (Order [id number])  Patient Name   Bon Michael MRN   [id number] Legal Sex    Male    1940     Patient Demographics    Address    [facility] [address] Phone   [phone] (Home)   [phone] (Mobile) *Preferred* E-mail Address   [email or web address]     Referring Provider    Referred MD Holland            Base CPT Code (Reference Only)    Code CPT Chargeables   [id number] [id number] [id number]     Existing Charges    Charge Line Charge Code Status Charge Trigger Charge Type   None              Order Information    Order Date/Time Release Date/Time Start Date/Time End Date/Time   22 08:22 AM None 2022 None       Order Details    Frequency Duration Priority Order Class   None None Routine Lab Collect       Order Providers    Authorizing Provider Encounter Provider   Bib Alcantara MD Monteagudo, Luke Adam, MD       ABN Associated with this Order    There is no ABN associated with this order.                    Ordering Provider's NPI: [id number]  Bib Alcantara        Erythrocyte sedimentation rate auto [[id number]]    Electronically signed by: Bib Alcantara MD on 22 Status: Active   Ordering user: Bib Alcantara MD 22         Order History  Outpatient  Date/Time Action Taken User Additional Information   22 Sign Bib Alcantara MD        Standing Order Information    Remaining Occurrences Interval Next Expected Expires     10/10 Every 3 Months N/A 2023                            Reference Links    Lab Guide                    Associated Diagnoses    Giant cell arteritis (H) [M31.6]  - Primary       High risk medication use [Z79.899]         Source Order Set    Order Set Name Order ID    [id number]         Collection Information    Blood        Resulting Agency: [facility]               Encounter    View Encounter              Order Report    View Order Information         Lab and Collection    Erythrocyte sedimentation rate auto (Order: [id number]) - 2022      External System: External  ID:   EXTEAP WESR   Outbound Lab WESR   GICHLABEAP [id number]   LABDEEAP HGO971   HE EAP VAC992   ATLASEAP HYI489   HE2FV_CUPIDF JDK594   PHLEBIOEAP ESR   VRF_EAP Sedimentation Rate (ESR)   VRF_EAP Sed Rate (ESR) Transcribed Results   VRF_EAP [id number]   VRF_EAP [id number]   VRF_EAP [id number]   VRF_EAP DCP GENERIC CODE - Sed Rate (ESR) Transcribed Resu         Order Requisition    Erythrocyte sedimentation rate auto (Order #[id number]) on 12/16/22       Reprint Order Requisition    Erythrocyte sedimentation rate auto (Order #[id number]) on 12/16/22       Tracking Links    Cosign Tracking Order Transmittal Tracking

## 2023-01-02 ENCOUNTER — THERAPY VISIT (OUTPATIENT)
Dept: PHYSICAL THERAPY | Facility: CLINIC | Age: 83
End: 2023-01-02
Payer: MEDICARE

## 2023-01-02 DIAGNOSIS — M54.50 BILATERAL LOW BACK PAIN WITHOUT SCIATICA: ICD-10-CM

## 2023-01-02 DIAGNOSIS — M13.0 POLYARTHRITIS: Primary | ICD-10-CM

## 2023-01-02 PROCEDURE — 97110 THERAPEUTIC EXERCISES: CPT | Mod: GP | Performed by: PHYSICAL THERAPIST

## 2023-01-02 PROCEDURE — 97112 NEUROMUSCULAR REEDUCATION: CPT | Mod: GP | Performed by: PHYSICAL THERAPIST

## 2023-01-02 NOTE — PROGRESS NOTES
[facility]    OUTPATIENT Physical Therapy ORTHOPEDIC EVALUATION  PLAN OF TREATMENT FOR OUTPATIENT REHABILITATION  (COMPLETE FOR INITIAL CLAIMS ONLY)  Patient's Last Name, First Name, M.I.  YOB: 1940  Bon Michael    Provider s Name:  [facility]   Medical Record No.  [id number]   Start of Care Date:  09/12/22   Onset Date:   05/16/22 (MD visit)   Treatment Diagnosis:  Polyarthritis, low back pain Medical Diagnosis:     Polyarthritis  Bilateral low back pain without sciatica       Goals:     01/02/23 0500   Body Part   Goals listed below are for multiple joints, LB   Goal #1   Goal #1 standing   Previous Functional Level No restrictions   Performance level R lateral shift remains if no awareness, but with awareness able to hold neutral 15-20 minutes 0/10 pain and improving posture during exercise, unrestricted standing time.   STG Target Performance Minutes patient will be able to stand   Performance level 10-15 min neutral alignment- no lateral shift   Rationale for housekeeping tasks such as vacuuming, bed making, mowing, gardening;for meal preparation;for safe household ambulation;for safe community ambulation   Due date 10/03/22   Date Goal Met 09/26/22   LTG Target Performance Hours patient will be able to stand   Performance Level 1 hour 0/10 pain   Rationale for housekeeping tasks such as vacuuming, bed making, mowing;for meal preparation;for safe household ambulation;for safe community ambulation;for return to work duties  (patient to return to weight lifting)   Due date 01/03/23   Date Goal Met 01/02/23   Other Goal   Activity Home exercises and weight lifting program   Previous Functional Level no restrictions   Current Functional Level Soreness provoked 1-2/10 with strength training program to keep strong and active   STG Target Performance Perform  strength training program   Performance Level 1/10 pain at worst consistently   Rationale healthy active lifestyle, independent living   Due Date 01/23/23   LTG Target Performance Perform strength training program   Performance Level 0/10 pain consistently   Rationale healthy active lifestyle, independent living   Due Date 04/02/23         Therapy Frequency:  1x visit  Predicted Duration of Therapy Intervention:  in 3 months    Cate Rodriguez, PT                 I CERTIFY THE NEED FOR THESE SERVICES FURNISHED UNDER        THIS PLAN OF TREATMENT AND WHILE UNDER MY CARE .             Physician Signature               Date    X_____________________________________________________                         Certification Date From:  1/04/23  Certification Date To:  04/02/23    Referring Provider:  Robinson Lopez    Initial Assessment        See Epic Evaluation SOC Date: 09/12/22

## 2023-01-02 NOTE — PROGRESS NOTES
Subjective:  HPI  Physical Exam  Oswestry Score: 4.44 %                 Objective:  System    Physical Exam    General     ROS    Assessment/Plan:    PROGRESS  REPORT    Progress reporting period is from 10/31/22 to 1/2/23, .       SUBJECTIVE  Subjective changes noted by patient:  3 week lapse in therapy and patient reporting feels can progess independently with home exercise program. Has a little soreness from working out a lot yesterday.  Reports some low back soreness with EIL.  Now unrestricted standing.  Able to hold neutral posture with awareness and is much more aware of preventing R lateral lumbar shift.    Current Pain level: 1/10 (soreness).      Initial Pain level: 4/10.   Changes in function:  Yes (See Goal flowsheet attached for changes in current functional level)  Adverse reaction to treatment or activity: None    OBJECTIVE  Changes noted in objective findings:  Yes:   LROM flexion min loss, ext max loss able to extend past neutral, sideglide R no loss, L SG min loss.    Still stands in R lateral shift but with awareness able to stand more neutral (not able to correct R lateral shift and remain corrected).  Patient now self corrects.  Patient is diligent with HEP and has progressed back to core stabilization and own strengthening with squats, rows, weight lifting etc.     ASSESSMENT/PLAN  Updated problem list and treatment plan: Diagnosis 1:  Polyarthritis, low back pain    Pain -  manual therapy, self management, education, directional preference exercise and home program  Decreased ROM/flexibility - manual therapy, therapeutic exercise, therapeutic activity and home program  Decreased joint mobility - manual therapy, therapeutic exercise, therapeutic activity and home program  Decreased strength - therapeutic exercise, therapeutic activities and home program  Decreased function - therapeutic activities and home program  STG/LTGs have been met or progress has been made towards goals:  Yes (See Goal  flow sheet completed today.)  Assessment of Progress: The patient's condition is improving as better able to self manage and has returned to weight lifting routine at home.  The patient continues to have R lateral shift that can actively adjust and overall less pain with better posture, but not able to fully correct shift or prevent it.  Tried to get patient to discharge, however the patient would like to follow up in 2-3 months to recheck progress and status of back.  Self Management Plans:  Patient has been instructed in a home treatment program.  Patient  has been instructed in self management of symptoms.  I have re-evaluated this patient and find that the nature, scope, duration and intensity of the therapy is appropriate for the medical condition of the patient.  Bon continues to require the following intervention to meet STG and LTG's:  PT    Recommendations:  This patient would benefit from continued therapy.     Frequency:  1x  Duration:  In 3 months        Please refer to the daily flowsheet for treatment today, total treatment time and time spent performing 1:1 timed codes.

## 2023-02-09 ENCOUNTER — APPOINTMENT (OUTPATIENT)
Dept: URBAN - METROPOLITAN AREA CLINIC 256 | Age: 83
Setting detail: DERMATOLOGY
End: 2023-02-12

## 2023-02-09 VITALS — HEIGHT: 67 IN | WEIGHT: 129 LBS

## 2023-02-09 DIAGNOSIS — L82.0 INFLAMED SEBORRHEIC KERATOSIS: ICD-10-CM

## 2023-02-09 PROCEDURE — OTHER LIQUID NITROGEN: OTHER

## 2023-02-09 PROCEDURE — OTHER MIPS QUALITY: OTHER

## 2023-02-09 PROCEDURE — OTHER COUNSELING: OTHER

## 2023-02-09 PROCEDURE — 17110 DESTRUCT B9 LESION 1-14: CPT

## 2023-02-09 ASSESSMENT — LOCATION ZONE DERM
LOCATION ZONE: FACE
LOCATION ZONE: LEG

## 2023-02-09 ASSESSMENT — LOCATION DETAILED DESCRIPTION DERM
LOCATION DETAILED: LEFT MEDIAL KNEE
LOCATION DETAILED: RIGHT SUPERIOR LATERAL FOREHEAD

## 2023-02-09 ASSESSMENT — LOCATION SIMPLE DESCRIPTION DERM
LOCATION SIMPLE: LEFT KNEE
LOCATION SIMPLE: RIGHT FOREHEAD

## 2023-02-09 NOTE — PROCEDURE: MIPS QUALITY
Detail Level: Detailed
Quality 226: Preventive Care And Screening: Tobacco Use: Screening And Cessation Intervention: Patient screened for tobacco use and is an ex/non-smoker
Quality 111:Pneumonia Vaccination Status For Older Adults: Pneumococcal vaccine administered on or after patient’s 60th birthday and before the end of the measurement period
Quality 130: Documentation Of Current Medications In The Medical Record: Current Medications Documented
Quality 110: Preventive Care And Screening: Influenza Immunization: Influenza Immunization previously received during influenza season
Quality 431: Preventive Care And Screening: Unhealthy Alcohol Use - Screening: Patient not identified as an unhealthy alcohol user when screened for unhealthy alcohol use using a systematic screening method

## 2023-02-09 NOTE — PROCEDURE: LIQUID NITROGEN
Post-Care Instructions: - Avoid picking at any of the treated lesions.
Consent: - Verbal and written consent was obtained, and risks were reviewed prior to procedure today. \\n- Risks discussed include but are not limited to pain, crusting, scabbing, blistering, scarring, temporary or permanent darker or lighter pigmentary change, recurrence, incomplete resolution, and infection.
Render Post-Care Instructions In Note?: yes
Spray Paint Technique: No
Medical Necessity Clause: This procedure was medically necessary because the lesions that were treated were:
Spray Paint Text: The liquid nitrogen was applied to the skin utilizing a spray paint frosting technique.
Detail Level: Detailed
Duration Of Freeze Thaw-Cycle (Seconds): 5-10
Number Of Freeze-Thaw Cycles: 2 freeze-thaw cycles
Application Tool (Optional): Liquid Nitrogen Sprayer
Medical Necessity Information: It is in your best interest to select a reason for this procedure from the list below. All of these items fulfill various CMS LCD requirements except the new and changing color options.

## 2023-02-22 ENCOUNTER — TELEPHONE (OUTPATIENT)
Dept: OPHTHALMOLOGY | Facility: CLINIC | Age: 83
End: 2023-02-22
Payer: MEDICARE

## 2023-02-22 NOTE — TELEPHONE ENCOUNTER
Spoke to pt    He would like to keep his scheduled appointment on Friday 2/24. He will call if needing to reschedule.     Lucia Carranza on 2/22/2023 at 1:53 PM

## 2023-02-24 ENCOUNTER — OFFICE VISIT (OUTPATIENT)
Dept: OPHTHALMOLOGY | Facility: CLINIC | Age: 83
End: 2023-02-24
Attending: OPHTHALMOLOGY
Payer: MEDICARE

## 2023-02-24 DIAGNOSIS — H47.20 PARTIAL OPTIC ATROPHY: ICD-10-CM

## 2023-02-24 DIAGNOSIS — H46.9 OPTIC NEUROPATHY: Primary | ICD-10-CM

## 2023-02-24 DIAGNOSIS — H53.40 VISUAL FIELD DEFECT: ICD-10-CM

## 2023-02-24 PROCEDURE — 92083 EXTENDED VISUAL FIELD XM: CPT | Performed by: OPHTHALMOLOGY

## 2023-02-24 PROCEDURE — 92133 CPTRZD OPH DX IMG PST SGM ON: CPT | Performed by: OPHTHALMOLOGY

## 2023-02-24 PROCEDURE — 99214 OFFICE O/P EST MOD 30 MIN: CPT | Mod: GC | Performed by: OPHTHALMOLOGY

## 2023-02-24 PROCEDURE — G0463 HOSPITAL OUTPT CLINIC VISIT: HCPCS | Mod: 25

## 2023-02-24 RX ORDER — ATORVASTATIN CALCIUM 10 MG/1
10 TABLET, FILM COATED ORAL DAILY
COMMUNITY
Start: 2022-02-17

## 2023-02-24 RX ORDER — MULTIVITAMIN WITH IRON
TABLET ORAL
COMMUNITY
End: 2023-11-20

## 2023-02-24 ASSESSMENT — CONF VISUAL FIELD
OS_INFERIOR_NASAL_RESTRICTION: 3
METHOD: COUNTING FINGERS
OS_INFERIOR_TEMPORAL_RESTRICTION: 3

## 2023-02-24 ASSESSMENT — VISUAL ACUITY
METHOD: SNELLEN - LINEAR
OD_CC+: -2
CORRECTION_TYPE: GLASSES
OD_CC: 20/20
OS_CC: 20/30
OS_CC+: +1

## 2023-02-24 ASSESSMENT — REFRACTION_WEARINGRX
OS_CYLINDER: +0.50
SPECS_TYPE: PAL
OS_AXIS: 059
OD_SPHERE: +3.25
OS_ADD: +2.75
OD_AXIS: 134
OS_SPHERE: +6.25
OD_CYLINDER: +1.25
OD_ADD: +2.75

## 2023-02-24 ASSESSMENT — TONOMETRY
OD_IOP_MMHG: 14
IOP_METHOD: ICARE
OS_IOP_MMHG: 15

## 2023-02-24 ASSESSMENT — SLIT LAMP EXAM - LIDS
COMMENTS: NORMAL
COMMENTS: NORMAL

## 2023-02-24 ASSESSMENT — EXTERNAL EXAM - LEFT EYE: OS_EXAM: NORMAL

## 2023-02-24 ASSESSMENT — EXTERNAL EXAM - RIGHT EYE: OD_EXAM: NORMAL

## 2023-02-24 ASSESSMENT — CUP TO DISC RATIO
OS_RATIO: 0.3
OD_RATIO: 0.25

## 2023-02-24 NOTE — LETTER
"2023    RE: Bon Michael  : 1940  MRN: [id number]    Dear Providers,    I saw our mutual patient, Bon Michael, in follow-up in my clinic recently.  After a thorough neuro-ophthalmic history and examination, I came to the following conclusions:     1. 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  Vision stable in both eyes with intact / normal central vision but persistent (probably permanent) peripheral field defects in both eyes.    No exam or history indicators to suggest recurrence.   patient on Actemra since 10/28/20 and off of prednisone since May 12, 2021.  He discontinued methotrexate 2022 since he no longer has delays with Actemra (philantrophic).     2.  Borderline visually significant cataracts - observe for now    Plan:  -Note to Dr. Alcantara: I will discuss discontinuing Actemra given his stability without evidence of vasculitis recurrence now on Actemra since 10/2020.  There is no clear answer in this case when it is worthwhile trialing off of immune suppression however typically in similar situations we will begin considering after 2 years of disease quiescence.   The patient is amenable to a trial off of drug.      -Follow-up with me in 1-2 months if Actemra discontinued.  Follow-up in 6 months if we continue Actemra.  Given return precautions for worsening or new visual symptoms and patient expressed understanding.  -Order MRI at follow-up if Actemra discontinued.    For a more thorough description of the disease presentation, please see my letter from the patient's initial visit with me     History from presentation (2020 visit)  Bon Michael is a 79 year old male who presents to neuro-ophthalmology   clinic today for consultation from Jose Andino MD at [facility] for   visual disturbance of left eye. He describes it as \"his left eye not   getting enough light\". He describes noticing the change in his left eye's "   temporal visual field, inferior > superior. He notices it more first thing     in the morning. These symptoms began on 8/22/2020 and he describes it as   worsening - he is unclear if it the deficit has gotten larger or darker   however. He first noticed it while driving at the periphery of his vision.      Patient saw Dr. Lewis on August 28.  He underwent esr / crp testing as   well as mri (see below).     Labs: 9/2/20- WBC- 50.2, ESR- 28, CRP < 5     On August 31 he developed symptoms of waviness in his peripheral vision in     the right eye.  He went to Dr. Andino on 9/4/2020 who wanted him to go to   the ED but he was unable.  Dr. Andino reviewed the patient's fluorescein   angiography and felt there was choroidal filling delay in the right eye   and pallid edema in the right eye concerning for giant cell arteritis.  We     discussed the case via phone.      PRIOR IMAGING:  BRAIN MRI WITH/WO GADOLINIUM, MRA OF THE [place] OF [place] AND MRA OF THE   CAROTID ARTERIES - [facility] (9/1/2020)  IMPRESSION:    1.  No acute intracranial abnormality.  2.  No evidence of an orbital abnormalities.  3.  Mild generalized cerebral atrophy.  4.  Unremarkable MRA of the [place] of [place].  5.  Unremarkable MRA of the cervical vessels.     On my re-review I disagreed with the radiology read.  There is evidence of   patchy enhancement diffusely within the bilateral orbits and along the   bilateral intraorbital optic nerve sheaths.  Will discuss with   neuro-radiology regarding finding and repeat scan.     No bisphosphonates in the past (no osteoporosis medications for 7-8 years   per patient).      Left temporal artery biopsy 9/9/20- positive for giant cell arteritis   Prednisone (started 9/4/2020)     Patient started Actemra 10/28/20.    Interim history and exam since last visit with me on 8/19/2022  Patient reports his vision has gradually improved since his last visit. Last ESR <1, CRP 0.02 (12/12/2022). Last saw  Rheumatology 12/16/22 at which point his giant cell arteritis was stable on actemra 162 mg qweek. Next follow-up with Rheumatology 3/31/23.  Patient also has CLL which is managed by oncology, monitored every 6 months.    He denies any new/unsual headaches, jaw claudication, scalp tenderness, double vision, or fever/chills.  He has gradually lost weight; he has switched to a vegetarian diet (he has previously worked with a nutritionist); he is hoping to follow-up to talk about gaining weight back.    Last visit with Dr. Alcantara (Rheum) 12/16/22 (virtual):   # Giant cell arteritis    1) continue 162mg once weekly injection, next dose due on Wednesday   2) follow-up in 3 months with labs the week prior      # Long term use of high risk medication: Actemra   1) Actemra 162mg weekly. Tolerating without issues. No evidence of toxicity by most recent labs.   2) Will continue routine screening drug toxicity labs. Standing orders placed with today's encounter.  He will have these drawn along with inflammatory markers in the week prior to his follow-up in 3  Months.    Visual acuity with correction was 20/20-2 in the right eye and 20/30+2 in the left eye. IOP was 14 in the right eye and 15 in the left eye.  Ocular motility was full in all gaze directions. Color plates were 11/11 in the right eye and 11/11 in the left eye.  Pupils were equal, round and reactive to light with no RAPD. There is mild diffuse pallor of both optic nerve heads today that are stable. No new optic disc edema.    Tests ordered and interpreted today:  OCT rNFL demonstrated a mean NFL thickness of 59 (down from 62) in the right eye and 58 (down from 62) in the left eye. Stable retinal nerve fiber layer thinning in both eyes- nasal predominate in the right eye and nasal /superior thinning in the left eye.    VF: Octopus 30 degree automated visual field was performed.  Right eye: reliable (11% FN), inferotemporal defect, MD -3.1 (last -8.4).  This  appears improved to previous.    Left eye: fairly reliable (25% FP), inferior altitudinal defect, MD -8.2 (last 14.9).  This appears improved to previous.    Overall VF is stable in both eyes; his last results were depressed d/t mask fogging.      For further exam details, please feel free to contact our office for additional records.  If you wish to contact me regarding this patient please email me at [email or web address] or give my clinic a call to arrange a phone conversation.    Sincerely,    Guille Garcia MD  , Neuro-Ophthalmology and Adult Strabismus Surgery  The Lucy Castano Chair in Neuro-Ophthalmology  Department of Ophthalmology and Visual Neurosciences  [facility]

## 2023-02-24 NOTE — PROGRESS NOTES
"     1. 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  Vision stable in both eyes with intact / normal central vision but persistent (probably permanent) peripheral field defects in both eyes.    No exam or history indicators to suggest recurrence.   patient on Actemra since 10/28/20 and off of prednisone since May 12, 2021.  He discontinued methotrexate June 2022 since he no longer has delays with Actemra (philantrophic).     2.  Borderline visually significant cataracts - observe for now    Plan:  -Note to Dr. Alcantara: I will discuss discontinuing Actemra given his stability without evidence of vasculitis recurrence now on Actemra since 10/2020.  There is no clear answer in this case when it is worthwhile trialing off of immune suppression however typically in similar situations we will begin considering after 2 years of disease quiescence.   The patient is amenable to a trial off of drug.      -Follow-up with me in 1-2 months if Actemra discontinued.  Follow-up in 6 months if we continue Actemra.  Given return precautions for worsening or new visual symptoms and patient expressed understanding.  -Order MRI at follow-up if Actemra discontinued.    For a more thorough description of the disease presentation, please see my letter from the patient's initial visit with me     History from presentation (9/9/2020 visit)  Bon Michael is a 79 year old male who presents to neuro-ophthalmology   clinic today for consultation from Jose Andino MD at [facility] for   visual disturbance of left eye. He describes it as \"his left eye not   getting enough light\". He describes noticing the change in his left eye's   temporal visual field, inferior > superior. He notices it more first thing     in the morning. These symptoms began on 8/22/2020 and he describes it as   worsening - he is unclear if it the deficit has gotten larger or darker   however. He first noticed it while driving at the " periphery of his vision.      Patient saw Dr. Lewis on August 28.  He underwent esr / crp testing as   well as mri (see below).     Labs: 9/2/20- WBC- 50.2, ESR- 28, CRP < 5     On August 31 he developed symptoms of waviness in his peripheral vision in     the right eye.  He went to Dr. Andino on 9/4/2020 who wanted him to go to   the ED but he was unable.  Dr. Andino reviewed the patient's fluorescein   angiography and felt there was choroidal filling delay in the right eye   and pallid edema in the right eye concerning for giant cell arteritis.  We     discussed the case via phone.      PRIOR IMAGING:  BRAIN MRI WITH/WO GADOLINIUM, MRA OF THE [place] OF [place] AND MRA OF THE   CAROTID ARTERIES - [facility] (9/1/2020)  IMPRESSION:    1.  No acute intracranial abnormality.  2.  No evidence of an orbital abnormalities.  3.  Mild generalized cerebral atrophy.  4.  Unremarkable MRA of the [place] of [place].  5.  Unremarkable MRA of the cervical vessels.     On my re-review I disagreed with the radiology read.  There is evidence of   patchy enhancement diffusely within the bilateral orbits and along the   bilateral intraorbital optic nerve sheaths.  Will discuss with   neuro-radiology regarding finding and repeat scan.     No bisphosphonates in the past (no osteoporosis medications for 7-8 years   per patient).      Left temporal artery biopsy 9/9/20- positive for giant cell arteritis   Prednisone (started 9/4/2020)     Patient started Actemra 10/28/20.    Interim history and exam since last visit with me on 8/19/2022  Patient reports his vision has gradually improved since his last visit. Last ESR <1, CRP 0.02 (12/12/2022). Last saw Rheumatology 12/16/22 at which point his giant cell arteritis was stable on actemra 162 mg qweek. Next follow-up with Rheumatology 3/31/23.  Patient also has CLL which is managed by oncology, monitored every 6 months.    He denies any new/unsual headaches, jaw claudication, scalp  tenderness, double vision, or fever/chills.  He has gradually lost weight; he has switched to a vegetarian diet (he has previously worked with a nutritionist); he is hoping to follow-up to talk about gaining weight back.    Last visit with Dr. Alcantara (Rheum) 12/16/22 (virtual):   # Giant cell arteritis    1) continue 162mg once weekly injection, next dose due on Wednesday   2) follow-up in 3 months with labs the week prior      # Long term use of high risk medication: Actemra   1) Actemra 162mg weekly. Tolerating without issues. No evidence of toxicity by most recent labs.   2) Will continue routine screening drug toxicity labs. Standing orders placed with today's encounter.  He will have these drawn along with inflammatory markers in the week prior to his follow-up in 3  Months.    Visual acuity with correction was 20/20-2 in the right eye and 20/30+2 in the left eye. IOP was 14 in the right eye and 15 in the left eye.  Ocular motility was full in all gaze directions. Color plates were 11/11 in the right eye and 11/11 in the left eye.  Pupils were equal, round and reactive to light with no RAPD. There is mild diffuse pallor of both optic nerve heads today that are stable. No new optic disc edema.    Tests ordered and interpreted today:  OCT rNFL demonstrated a mean NFL thickness of 59 (down from 62) in the right eye and 58 (down from 62) in the left eye. Stable retinal nerve fiber layer thinning in both eyes- nasal predominate in the right eye and nasal /superior thinning in the left eye.    VF: Octopus 30 degree automated visual field was performed.  Right eye: reliable (11% FN), inferotemporal defect, MD -3.1 (last -8.4).  This appears improved to previous.    Left eye: fairly reliable (25% FP), inferior altitudinal defect, MD -8.2 (last 14.9).  This appears improved to previous.    Overall VF is stable in both eyes; his last results were depressed d/t mask fogging.      35 minutes were spent on the date of  the encounter by me doing chart review, history and exam, documentation, and further activities as noted above    Complete documentation of historical and exam elements from today's encounter can be found in the full encounter summary report (not reduplicated in this progress note).  I personally obtained the chief complaint(s) and history of present illness.  I confirmed and edited as necessary the review of systems, past medical/surgical history, family history, social history, and examination findings as documented by others; and I examined the patient myself.  I personally reviewed the relevant tests, images, and reports as documented above.  I formulated and edited as necessary the assessment and plan and discussed the findings and management plan with the patient and family.  I personally reviewed the ophthalmic test(s) associated with this encounter, agree with the interpretation(s) as documented by the resident/fellow, and have edited the corresponding report(s) as necessary.     MD Trenton Gomez MD  Resident Physician, PGY-3  Department of Ophthalmology     Samantha Dougherty OD

## 2023-02-24 NOTE — NURSING NOTE
Chief Complaint(s) and History of Present Illness(es)     Follow Up    In both eyes.  Since onset it is stable.  Associated symptoms include Negative for double vision, eye pain and headache.  Pain was noted as 0/10. Additional comments: Bon Michael is a 82 year old year old male with the following diagnoses:  1. 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  Borderline visually significant cataracts     Bon reports vision has been stable since his last visit.  No vision changes.  No eye pain or double vision.  ANNETTE Christiansen 2/24/2023 7:36 AM

## 2023-03-16 PROBLEM — M13.0 POLYARTHRITIS: Status: RESOLVED | Noted: 2022-09-12 | Resolved: 2023-03-16

## 2023-03-16 PROBLEM — M54.50 BILATERAL LOW BACK PAIN WITHOUT SCIATICA: Status: RESOLVED | Noted: 2022-09-12 | Resolved: 2023-03-16

## 2023-03-31 ENCOUNTER — TELEPHONE (OUTPATIENT)
Dept: RHEUMATOLOGY | Facility: CLINIC | Age: 83
End: 2023-03-31
Payer: MEDICARE

## 2023-03-31 ENCOUNTER — VIRTUAL VISIT (OUTPATIENT)
Dept: RHEUMATOLOGY | Facility: CLINIC | Age: 83
End: 2023-03-31
Attending: INTERNAL MEDICINE
Payer: MEDICARE

## 2023-03-31 DIAGNOSIS — Z79.899 HIGH RISK MEDICATION USE: ICD-10-CM

## 2023-03-31 DIAGNOSIS — M31.6 GIANT CELL ARTERITIS (H): Primary | ICD-10-CM

## 2023-03-31 PROCEDURE — 99443 PR PHYSICIAN TELEPHONE EVALUATION 21-30 MIN: CPT | Mod: 95 | Performed by: INTERNAL MEDICINE

## 2023-03-31 NOTE — PATIENT INSTRUCTIONS
1) Actemra: take one injection every 2 weeks. Next shot will be due on April 12th.  2) We will call you to schedule  -Labs at the end of April  -Labs at the end of May  -Labs at the end of June  -Labs at the of August  -Follow-up with me at the end of May  -Follow-up with me at the end of July

## 2023-03-31 NOTE — PROGRESS NOTES
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 Never had HA, jaw claudication, PMR symptoms. No peripheral inflammatory arthritis symptoms.     Disease currently in remission by history on once weekly actemra 162mg weekly.  Has completed his prednisone taper which was guided by Gi[facility] trial.      Most recent labs reviewed from care everywhere to include ESR and CRP both of which were in normal range further supporting his disease remission at this time.  His persistent leukocytosis is driven by his known CLL.  Borderline anemia with hemoglobin 12.8.  He had a chronic low-level thrombocytopenia with a current platelet count of 117.  His most recent AST and ALT were obtained on 12/12/2022 both of which were normal.     He is approaching 3 years on Actemra 162 mg once weekly.  His disease has remained in remission throughout this time.  Just his case with his ophthalmologist Dr. Gacria.  At this time based on the data and literature we will reduce the frequency of his Actemra injections from current dose of once weekly down to once every 2 weeks.  During this time that we are reducing his Actemra injection frequency we will increase the frequency of his lab draws.  We will have labs drawn at the end of April, at the end of May, at the end of June, and at the end of August.  He is going to follow-up with me at the end of May and July.  We will continue close follow-up with  ophthalmology during his time as well as his only symptom was unfortunately of acute vision loss and did not have any other characteristic symptoms like headache scalp tenderness jaw claudication or proximal muscle pain or stiffness consistent with PMR.    # Giant cell arteritis   1) currently on Actemra 100 mg once weekly.  Decreasing the frequency of his injections from once weekly down 162 mg once every 2 weeks  2) increase lab draws to every 1 month outlined above    # Long term use of high risk medication: Actemra  1) Actemra 162mg weekly currently though increasing to 1 injection every 2 weeks. tolerating without issues. No evidence of toxicity by most recent labs.  2) Will continue routine screening drug toxicity labs. Standing orders placed with today's encounter.     # Osteopenia   Managed by PCP.     #CLL  Managed by oncology.    Bib Alcantara MD   Rheumatology           Subjective:   March 31, 2023  -no change vision. No HA, scalp tenderness, jaw claudication symptoms.   -No proximal muscle pain/stiffness consistent with PMR  -No fevers/chills/night sweats.   -Has started CoQ-10  -Has continued on Actemra 162 mg once weekly.  No injection site reactions.  No interval infections.  - No unintentional weight loss.  14 point review is collected and otherwise negative    December 16, 2022  No HA, scalp tenderness, vision change, jaw claudication symptoms, proximal muscle pain/stiffness. Still with on/off back pain and sinus/nasal congestion, both of which have been chronic issues for years. No fevers/chills/night sweats. No unintentional weight loss. Was treated successfully for cutaneous fungal infection, no return now since this summer. No red/hot/swollen joints. Tolerating actemra injections without noticeable side effects.  Other than his cutaneous fungal infection he denies any other interval infections.  Able to get full fist with me in the morning.  Denies joint stiffness that improves with use.  He  feels well.  Had his flu shot recently.  14 point review of systems collected and otherwise negative.    Interval History September 2, 2022  Had dental work done. Saw his PCP for lower extremity rash, treated by PCP with steroids with taper. Though after a few weeks without resolution so was referred to dermatology for lesions on LE. Biopsied/cultured and told it was fungal infection, started on treatment which ended yesterday. Lesions on lower extremities have cleared. No HA/ change in vision/ scalp tenderness/ jaw claudication. No red/hot/swollen joints. Able to make a full fist upon waking in the AM. No proximal muscle pain/stiffness. No injection site reactions/ side effects. 14 point ROS collected and negative if not documented above.     Interval history 6/10/22  No HA, change in vision, scalp tenderness, jaw claudication. No proximal muscle pain/stiffness. No red/hot/swollen joints. No fevers/chills/night sweats. No side effects with methotrexate. Takes methotrexate and actemra on wednesdays. No side effects for either. No interval infections. No rashes.  Weight has been stable.  Able to make a full fist upon waking in the morning.  14 point review of systems collected and otherwise negative.    Interval History 3/11/22  Has continued on alendronate. Thinks he has more sensitivity in his teeth. He asks about need for addition of vit K2 to supplement. He plans to bring this question, along with question about green tea extract up with his primary care physician. Takes this on Wednesday evenings. Taking folic acid 1mg daily. Has only had 3 doses of actemra that were late. Has been on methotrexate for 3 weeks now.  No headache, vision change, scalp tenderness, proximal muscle pain or stiffness, peripheral joint swelling redness warmth.  Able to make full fist upon waking in the morning.  No fevers chills or night sweats.  No interval infections.  No rash.  Is tolerating his methotrexate without any side effects.   GI or other.      Past Medical History  CLL  Hernia  Mono   Deviated septum  Osteopenia by DEXA    Past Surgical History  Tonsillectomy  Deviated septum    Medications:  Actemra 162 mg once weekly subcutaneous      Weeks Daily prednisone dose (mg) 26-week taper Weekly 162mg Actemra      1 60    2 50 11/5/2020    3 40    4 35 11/20/2020   5 30    6 25    7 20 12/10/2020   8 15 12/17/2020   9 12.5    10 12.5    11 10 1/8/2021   12 9    13 8    14 7 2/5/21   15 6 2/11/21   16 6    17 5    18 5 3/4/21   19 4    20 4    21 3    22 3 4/8/21   23 2    24 2    25 1    26 1 5/12/21     Allergies: Seasonal/ environmental allergies    Family History: No family history of autoimmune diseases like RA, sjogrens, SLE, scleroderma, IBD.    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 No drug use.        Objective:   Physical exam:  No vitals or exam for telephone visit    Labs:   12/12/2022  Creatinine 0.78  CRP 0.02  ALT 21  AST 31  Albumin 4.1  ESR less than 1  WBC 31.7  Hemoglobin 12.3  Platelet 119    CRP  5/5/21  WBC 27.4  HGB 13.6    Cr 0.81  ALT 25  AST 29  Chol 152  Trig 88  HDL 54  LDL 80  CRP <2.9  ESR 3  UA with small blood, no cells, no protein, casts    4/8/21  ESR 4  CRP less than 2.9  WBC 30.1  Hemoglobin 13.6  Platelets 157  Creatinine 0.86  Cholesterol 179  Triglycerides 83  HDL 60      3/2/21  ESR 4  CRP <2.9  WBC 28.3  HGB 12.8    Cr 0.86  ALT 23  AST 25    2/3/21  WBC 30.6  HGB 11.9    Cr 0.83  Alk phos 40  ALT 30  AST 29    1/5/21  UA without protein, cells or casts  ESR 5  CRP <2.9  WBC 40.6  HGB 11.6    Cr 0.81  Chol 166  Trig 86  HDL 68  LDL 81    12/14/2020   CRP <2.9  ESR 4  WBC 54  HGB 11.8    Cr 0.78  Ua unremarkable  Cholesterol 168  Trig 82  HDL 70  LDL 81    11-  ESR 3  CRP less than 2.9  WBC 63.3  Hemoglobin 12  Platelets 181  Creatinine 0.98  Total protein low at 5.9  Alk phos 48  ALT 31  AST 30  UA with 30 of albumin  Cholesterol  169  Triglycerides 45  HDL 83  LDL 77    10/1/2020  WBC 84.9  HGB 12.3    IgM 36 low  IgA 124 normal  IgG 625    Cr 0.98    9/1/2020  WBC 50.2  HGB 11.6       Imaging:  MR brain and orbits 9/10/20  Impression:    1. Regarding the orbits and globes, there is no definite abnormal  contrast enhancement or mass. No evidence of leukemic infiltration.  2. Regarding the remainder of the brain, abnormal T2 hyperintense bone  marrow signal with associated enhancement in the skull, likely  representing leukemic infiltration.  3. Subcutaneous T2 hyperintensity with associated diffusion  restriction and contrast enhancement over the left zygomatic region,  question leukemic infiltration.    Pathology:  Patient Name: MARIELA WALLACE   MR#: [id number]   Specimen #: [id number]   Collected: 9/10/2020   Received: 9/10/2020   Reported: 9/16/2020 12:35   Ordering Phy(s): VALERIE CARRINGTON     For improved result formatting, select 'View Enhanced Report Format' under    Linked Documents section.     SPECIMEN(S):   Temporal artery biopsy, left     FINAL DIAGNOSIS:   Temporal artery, left, biopsy:   1. Granulomatous arteritis consistent with giant cell arteritis.   2. Chronic inflammation of the surrounding adventitia.     COMMENT:   Preliminary results were communicated to Dr. Valerie Carrington and Dr. Guille Garcia on 9/14/20 at 12:45pm.     I have personally reviewed all specimens and/or slides, including the   listed special stains, and used them   with my medical judgement to determine or confirm the final diagnosis.     Electronically signed out by:     Za García M.D., [facility]     CLINICAL HISTORY:   The patient is a 79 year old male with a history of chronic lymphocytic   leukemia who presented with bilateral   sequential optic neuropathy with associated pallid optic disc edema and   choroidal hypoperfusion, but   relatively intact visual acuity, negative elevation of acute phase   reactants, and  "lack of constitutional   symptoms consistent with giant cell arteritis. He also has findings of   likely leukemic infiltration in the   skull seen on MRI. He undergoes temporal artery biopsy on the left.     GROSS:   The specimen is received in formalin with proper patient identification,   labeled \"left temporal artery\".  The   specimen consists of a 3.2 cm in length by 0.3 cm in diameter tan-white   vessel segment. The specimen is   submitted intact in A1. (Dictated by: PADILLA Montalvo 9/11/2020 03:08   PM)     MICROSCOPIC:   The tissue consists of artery with narrowed lumen and intimal hyperplasia.    There is an infiltrate of   lymphocytes, plasma cells, epithelioid histiocytes, and multinucleated   giant cells throughout all layers of   the artery. There is associated loss of the internal elastic lamina on   elastic stain. Occasional focal   calcifications are seen a the level of the internal elastic lamina.   Neovascularization of the vessel wall is   present. Perivascular lymphocytic infiltrates are seen in the surrounding   adventitia. Immunohistochemistry   with CD3 and CD20 demonstrates a predominantly T-cell lymphocytic   infiltrate within the vessel wall. There is   a mixed infiltrate of T and B lymphocytes perivascular in the adventitia.   CD5 and CD43 have similar staining   patterns to CD3. There is light patchy CD23 staining in the areas positive    for CD20. CD68 highlights the   epithelioid histiocytes within the vessel wall.     The technical component of this testing was completed at the General acute hospital, with the professional component performed    at the [facility], [address] [place] [id number] ([phone])     CPT Codes:   A: 04162-AZ5, 75521-ABTQ, 94616-PWY, 00323-BTV, 99523-JIO, 52624-QWK,   41084-JXG, 52550-ZFH     COLLECTION SITE:   Client: [facility]" [facility] [place]   Location: BERNADETTE FUENTES)

## 2023-03-31 NOTE — NURSING NOTE
Is the patient currently in the state of [place]? YES    Visit mode:TELEPHONE    If the visit is dropped, the patient can be reconnected by: TELEPHONE VISIT: Phone number: [phone]    Will anyone else be joining the visit? NO      How would you like to obtain your AVS? Mail a copy    Are changes needed to the allergy or medication list? YES: PT has meds that he no longer takes that need to be removed    Reason for visit: 12 week follow up    Pt states that he is allergic to cats     Pt is also taking Vit C - 500 mg 1X day     Marica Raymon on 3/31/2023 at 10:11 AM

## 2023-03-31 NOTE — LETTER
3/31/2023       RE: Bon Michael  1923 [facility] [address]     Dear Colleague,    Thank you for referring your patient, Bon Michael, to the [facility]HEUMATOLOGY [facility] [place] at [facility]. Please see a copy of my visit note below.    Virtual Visit Details    Type of service:  Telephone Visit   Phone call duration: 21 minutes            TELEPHONE Rheumatology Follow-up    Name: Bon Michael    MRN [id number]   Date of service: 3/31/23          Reason for Follow-up:  GCA   Requesting physician: Guille Garcia MD        Assessment & Plan:   Assessment:  82 year old male with history of CLL who was referred to rheumatology for evaluation and treatment of biopsy proven giant cell arteritis with bilateral ischemic optic neuropathy clinically manifested by now stable peripheral vision loss in left>right eye. Never had HA, jaw claudication, PMR symptoms. No peripheral inflammatory arthritis symptoms.     Disease currently in remission by history on once weekly actemra 162mg weekly.  Has completed his prednisone taper which was guided by GiACTA trial.      Most recent labs reviewed from care everywhere to include ESR and CRP both of which were in normal range further supporting his disease remission at this time.  His persistent leukocytosis is driven by his known CLL.  Borderline anemia with hemoglobin 12.8.  He had a chronic low-level thrombocytopenia with a current platelet count of 117.  His most recent AST and ALT were obtained on 12/12/2022 both of which were normal.     He is approaching 3 years on Actemra 162 mg once weekly.  His disease has remained in remission throughout this time.  Just his case with his ophthalmologist Dr. Garcia.  At this time based on the data and literature we will reduce the frequency of his Actemra injections from current dose of once weekly down to once every 2 weeks.  During this time  that we are reducing his Actemra injection frequency we will increase the frequency of his lab draws.  We will have labs drawn at the end of April, at the end of May, at the end of June, and at the end of August.  He is going to follow-up with me at the end of May and July.  We will continue close follow-up with ophthalmology during his time as well as his only symptom was unfortunately of acute vision loss and did not have any other characteristic symptoms like headache scalp tenderness jaw claudication or proximal muscle pain or stiffness consistent with PMR.    # Giant cell arteritis   1) currently on Actemra 100 mg once weekly.  Decreasing the frequency of his injections from once weekly down 162 mg once every 2 weeks  2) increase lab draws to every 1 month outlined above    # Long term use of high risk medication: Actemra  1) Actemra 162mg weekly currently though increasing to 1 injection every 2 weeks. tolerating without issues. No evidence of toxicity by most recent labs.  2) Will continue routine screening drug toxicity labs. Standing orders placed with today's encounter.     # Osteopenia   Managed by PCP.     #CLL  Managed by oncology.    Bib Alcantara MD   Rheumatology           Subjective:   March 31, 2023  -no change vision. No HA, scalp tenderness, jaw claudication symptoms.   -No proximal muscle pain/stiffness consistent with PMR  -No fevers/chills/night sweats.   -Has started CoQ-10  -Has continued on Actemra 162 mg once weekly.  No injection site reactions.  No interval infections.  - No unintentional weight loss.  14 point review is collected and otherwise negative    December 16, 2022  No HA, scalp tenderness, vision change, jaw claudication symptoms, proximal muscle pain/stiffness. Still with on/off back pain and sinus/nasal congestion, both of which have been chronic issues for years. No fevers/chills/night sweats. No unintentional weight loss. Was treated successfully for cutaneous fungal  infection, no return now since this summer. No red/hot/swollen joints. Tolerating actemra injections without noticeable side effects.  Other than his cutaneous fungal infection he denies any other interval infections.  Able to get full fist with me in the morning.  Denies joint stiffness that improves with use.  He feels well.  Had his flu shot recently.  14 point review of systems collected and otherwise negative.    Interval History September 2, 2022  Had dental work done. Saw his PCP for lower extremity rash, treated by PCP with steroids with taper. Though after a few weeks without resolution so was referred to dermatology for lesions on LE. Biopsied/cultured and told it was fungal infection, started on treatment which ended yesterday. Lesions on lower extremities have cleared. No HA/ change in vision/ scalp tenderness/ jaw claudication. No red/hot/swollen joints. Able to make a full fist upon waking in the AM. No proximal muscle pain/stiffness. No injection site reactions/ side effects. 14 point ROS collected and negative if not documented above.     Interval history 6/10/22  No HA, change in vision, scalp tenderness, jaw claudication. No proximal muscle pain/stiffness. No red/hot/swollen joints. No fevers/chills/night sweats. No side effects with methotrexate. Takes methotrexate and actemra on wednesdays. No side effects for either. No interval infections. No rashes.  Weight has been stable.  Able to make a full fist upon waking in the morning.  14 point review of systems collected and otherwise negative.    Interval History 3/11/22  Has continued on alendronate. Thinks he has more sensitivity in his teeth. He asks about need for addition of vit K2 to supplement. He plans to bring this question, along with question about green tea extract up with his primary care physician. Takes this on Wednesday evenings. Taking folic acid 1mg daily. Has only had 3 doses of actemra that were late. Has been on methotrexate for  3 weeks now.  No headache, vision change, scalp tenderness, proximal muscle pain or stiffness, peripheral joint swelling redness warmth.  Able to make full fist upon waking in the morning.  No fevers chills or night sweats.  No interval infections.  No rash.  Is tolerating his methotrexate without any side effects.  GI or other.      Past Medical History  CLL  Hernia  Mono   Deviated septum  Osteopenia by DEXA    Past Surgical History  Tonsillectomy  Deviated septum    Medications:  Actemra 162 mg once weekly subcutaneous      Weeks Daily prednisone dose (mg) 26-week taper Weekly 162mg Actemra      1 60    2 50 11/5/2020    3 40    4 35 11/20/2020   5 30    6 25    7 20 12/10/2020   8 15 12/17/2020   9 12.5    10 12.5    11 10 1/8/2021   12 9    13 8    14 7 2/5/21   15 6 2/11/21   16 6    17 5    18 5 3/4/21   19 4    20 4    21 3    22 3 4/8/21   23 2    24 2    25 1    26 1 5/12/21     Allergies: Seasonal/ environmental allergies    Family History: No family history of autoimmune diseases like RA, sjogrens, SLE, scleroderma, IBD.    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 No drug use.        Objective:   Physical exam:  No vitals or exam for telephone visit    Labs:   12/12/2022  Creatinine 0.78  CRP 0.02  ALT 21  AST 31  Albumin 4.1  ESR less than 1  WBC 31.7  Hemoglobin 12.3  Platelet 119    CRP  5/5/21  WBC 27.4  HGB 13.6    Cr 0.81  ALT 25  AST 29  Chol 152  Trig 88  HDL 54  LDL 80  CRP <2.9  ESR 3  UA with small blood, no cells, no protein, casts    4/8/21  ESR 4  CRP less than 2.9  WBC 30.1  Hemoglobin 13.6  Platelets 157  Creatinine 0.86  Cholesterol 179  Triglycerides 83  HDL 60      3/2/21  ESR 4  CRP <2.9  WBC 28.3  HGB 12.8    Cr 0.86  ALT 23  AST 25    2/3/21  WBC 30.6  HGB 11.9    Cr 0.83  Alk phos 40  ALT 30  AST 29    1/5/21  UA without protein, cells or casts  ESR 5  CRP <2.9  WBC 40.6  HGB 11.6    Cr 0.81  Chol 166  Trig 86  HDL 68  LDL  81    12/14/2020   CRP <2.9  ESR 4  WBC 54  HGB 11.8    Cr 0.78  Ua unremarkable  Cholesterol 168  Trig 82  HDL 70  LDL 81    11-  ESR 3  CRP less than 2.9  WBC 63.3  Hemoglobin 12  Platelets 181  Creatinine 0.98  Total protein low at 5.9  Alk phos 48  ALT 31  AST 30  UA with 30 of albumin  Cholesterol 169  Triglycerides 45  HDL 83  LDL 77    10/1/2020  WBC 84.9  HGB 12.3    IgM 36 low  IgA 124 normal  IgG 625    Cr 0.98    9/1/2020  WBC 50.2  HGB 11.6       Imaging:  MR brain and orbits 9/10/20  Impression:    1. Regarding the orbits and globes, there is no definite abnormal  contrast enhancement or mass. No evidence of leukemic infiltration.  2. Regarding the remainder of the brain, abnormal T2 hyperintense bone  marrow signal with associated enhancement in the skull, likely  representing leukemic infiltration.  3. Subcutaneous T2 hyperintensity with associated diffusion  restriction and contrast enhancement over the left zygomatic region,  question leukemic infiltration.    Pathology:  Patient Name: MARIELA WALLACE   MR#: [id number]   Specimen #: [id number]   Collected: 9/10/2020   Received: 9/10/2020   Reported: 9/16/2020 12:35   Ordering Phy(s): VALERIE CARRINGTON     For improved result formatting, select 'View Enhanced Report Format' under    Linked Documents section.     SPECIMEN(S):   Temporal artery biopsy, left     FINAL DIAGNOSIS:   Temporal artery, left, biopsy:   1. Granulomatous arteritis consistent with giant cell arteritis.   2. Chronic inflammation of the surrounding adventitia.     COMMENT:   Preliminary results were communicated to Dr. Valerie Carrington and Dr. Guille Garcia on 9/14/20 at 12:45pm.     I have personally reviewed all specimens and/or slides, including the   listed special stains, and used them   with my medical judgement to determine or confirm the final diagnosis.     Electronically signed out by:     Za García M.D., Physicians  "    CLINICAL HISTORY:   The patient is a 79 year old male with a history of chronic lymphocytic   leukemia who presented with bilateral   sequential optic neuropathy with associated pallid optic disc edema and   choroidal hypoperfusion, but   relatively intact visual acuity, negative elevation of acute phase   reactants, and lack of constitutional   symptoms consistent with giant cell arteritis. He also has findings of   likely leukemic infiltration in the   skull seen on MRI. He undergoes temporal artery biopsy on the left.     GROSS:   The specimen is received in formalin with proper patient identification,   labeled \"left temporal artery\".  The   specimen consists of a 3.2 cm in length by 0.3 cm in diameter tan-white   vessel segment. The specimen is   submitted intact in A1. (Dictated by: PADILLA Montalvo 9/11/2020 03:08   PM)     MICROSCOPIC:   The tissue consists of artery with narrowed lumen and intimal hyperplasia.    There is an infiltrate of   lymphocytes, plasma cells, epithelioid histiocytes, and multinucleated   giant cells throughout all layers of   the artery. There is associated loss of the internal elastic lamina on   elastic stain. Occasional focal   calcifications are seen a the level of the internal elastic lamina.   Neovascularization of the vessel wall is   present. Perivascular lymphocytic infiltrates are seen in the surrounding   adventitia. Immunohistochemistry   with CD3 and CD20 demonstrates a predominantly T-cell lymphocytic   infiltrate within the vessel wall. There is   a mixed infiltrate of T and B lymphocytes perivascular in the adventitia.   CD5 and CD43 have similar staining   patterns to CD3. There is light patchy CD23 staining in the areas positive    for CD20. CD68 highlights the   epithelioid histiocytes within the vessel wall.     The technical component of this testing was completed at the [facility], with the " professional component performed    at the [facility], [address],   [place] [address] ([phone])     CPT Codes:   A: 32714-CQ6, 38745-CVSY, 84687-YVP, 87338-GUQ, 26755-XAC, 16679-DVO,   19147-GDR, 40563-ULR     COLLECTION SITE:   Client: [facility]   Location: BERNADETTE FUENTES)

## 2023-03-31 NOTE — TELEPHONE ENCOUNTER
[facility] Call Center    Phone Message    May a detailed message be left on voicemail: yes     Reason for Call: Call received from pt reporting that he met with Dr. Alcantara earlier today and spoke with Sally, she was waiting to get the fax number for the [facility] clinic where he would like to have his lab tests done? Pt would like to have his lab orders faxed to [phone]    Action Taken: Message routed to:  [facility] ([facility]): [facility] RHEUMATOLOGY ADULT [facility]    Travel Screening: Not Applicable

## 2023-04-04 NOTE — TELEPHONE ENCOUNTER
Lab orders have been successfully faxed to [facility] at fax number provided as requested. Confirmed sent  via [employer].     Aster Segovia CMA   4/4/2023 1:52 PM

## 2023-04-05 ENCOUNTER — TELEPHONE (OUTPATIENT)
Dept: RHEUMATOLOGY | Facility: CLINIC | Age: 83
End: 2023-04-05
Payer: MEDICARE

## 2023-04-05 NOTE — TELEPHONE ENCOUNTER
Called [facility], patient is still active. Only expires if patient has had an income change & is making over $75,000 (for single household) or if they had an insurance change & now insurance covers the medication. Liaison can call and verbally confirm they conducted the benefits investigation & patient still has a high copay

## 2023-04-28 ENCOUNTER — OFFICE VISIT (OUTPATIENT)
Dept: OPHTHALMOLOGY | Facility: CLINIC | Age: 83
End: 2023-04-28
Attending: OPHTHALMOLOGY
Payer: MEDICARE

## 2023-04-28 DIAGNOSIS — H46.9 OPTIC NEUROPATHY: ICD-10-CM

## 2023-04-28 DIAGNOSIS — H46.9 OPTIC NEUROPATHY: Primary | ICD-10-CM

## 2023-04-28 DIAGNOSIS — H53.40 VISUAL FIELD DEFECT: ICD-10-CM

## 2023-04-28 DIAGNOSIS — H47.20 PARTIAL OPTIC ATROPHY: ICD-10-CM

## 2023-04-28 PROCEDURE — G0463 HOSPITAL OUTPT CLINIC VISIT: HCPCS | Performed by: OPHTHALMOLOGY

## 2023-04-28 PROCEDURE — 92014 COMPRE OPH EXAM EST PT 1/>: CPT | Mod: GC | Performed by: OPHTHALMOLOGY

## 2023-04-28 PROCEDURE — 92133 CPTRZD OPH DX IMG PST SGM ON: CPT | Performed by: OPHTHALMOLOGY

## 2023-04-28 PROCEDURE — 92083 EXTENDED VISUAL FIELD XM: CPT | Performed by: OPHTHALMOLOGY

## 2023-04-28 ASSESSMENT — CUP TO DISC RATIO
OD_RATIO: 0.3
OS_RATIO: 0.3

## 2023-04-28 ASSESSMENT — CONF VISUAL FIELD
OD_INFERIOR_NASAL_RESTRICTION: 3
METHOD: COUNTING FINGERS
OD_INFERIOR_TEMPORAL_RESTRICTION: 0
OS_INFERIOR_NASAL_RESTRICTION: 3
OS_INFERIOR_TEMPORAL_RESTRICTION: 3

## 2023-04-28 ASSESSMENT — VISUAL ACUITY
OS_CC+: -2
CORRECTION_TYPE: GLASSES
METHOD: SNELLEN - LINEAR
OS_CC: 20/30
OD_CC: 20/20

## 2023-04-28 ASSESSMENT — REFRACTION_WEARINGRX
OD_ADD: +2.75
OS_CYLINDER: +0.50
OS_AXIS: 059
OD_CYLINDER: +1.25
OD_AXIS: 134
OS_SPHERE: +6.25
OD_SPHERE: +3.25
OS_ADD: +2.75
SPECS_TYPE: PAL

## 2023-04-28 ASSESSMENT — TONOMETRY
IOP_METHOD: ICARE
OS_IOP_MMHG: 12
OD_IOP_MMHG: 12

## 2023-04-28 ASSESSMENT — SLIT LAMP EXAM - LIDS
COMMENTS: NORMAL
COMMENTS: NORMAL

## 2023-04-28 ASSESSMENT — EXTERNAL EXAM - RIGHT EYE: OD_EXAM: NORMAL

## 2023-04-28 ASSESSMENT — EXTERNAL EXAM - LEFT EYE: OS_EXAM: NORMAL

## 2023-04-28 NOTE — LETTER
"2023    RE: Bon Michael  : 1940  MRN: [id number]    Dear Providers,    I saw our mutual patient, Bon Michael, in follow-up in my clinic recently.  After a thorough neuro-ophthalmic history and examination, I came to the following conclusions:     1. 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      Very atypical presentation without elevated acute phase reactants, no PMR symptoms, predominantly peripheral field losses. He completed an oral prednisone taper in early  and stopped methotrexate in mid- with no evidence of disease recurrence since. Patient also has CLL which is managed by oncology, monitored every 6 months.     Today giant cell arteritis remains quiescent (no evidence of new vision loss or new optic nerve ischemia and no symptoms of giant cell arteritis recurrence) on actemra 162 mg, which he has decreased from weekly to every-other-week dosing.    Follow-up in 2 months.    2.  Borderline visually significant cataracts - observe for now    Plan:    -Follow-up with me in 2 months. Given return precautions for worsening or new visual symptoms and patient expressed understanding.  -Consider order MRI at follow-up if Actemra discontinued in the future.    For a more thorough description of the disease presentation, please see my letter from the patient's initial visit with me    Historical data from initial visit (2020):    Bon Michael is a 79 year old male who presents to neuro-ophthalmology   clinic today for consultation from Jose Andino MD at [facility] for   visual disturbance of left eye. He describes it as \"his left eye not   getting enough light\". He describes noticing the change in his left eye's   temporal visual field, inferior > superior. He notices it more first thing     in the morning. These symptoms began on 2020 and he describes it as   worsening - he is unclear if it the deficit has gotten larger or darker "   however. He first noticed it while driving at the periphery of his vision.      Patient saw Dr. Lewis on August 28.  He underwent esr / crp testing as   well as mri (see below).     Labs: 9/2/20- WBC- 50.2, ESR- 28, CRP < 5     On August 31 he developed symptoms of waviness in his peripheral vision in     the right eye.  He went to Dr. Andino on 9/4/2020 who wanted him to go to   the ED but he was unable.  Dr. Andino reviewed the patient's fluorescein   angiography and felt there was choroidal filling delay in the right eye   and pallid edema in the right eye concerning for giant cell arteritis.  We     discussed the case via phone.      PRIOR IMAGING:  BRAIN MRI WITH/WO GADOLINIUM, MRA OF THE [place] OF [place] AND MRA OF THE   CAROTID ARTERIES - [facility] (9/1/2020)  IMPRESSION:    1.  No acute intracranial abnormality.  2.  No evidence of an orbital abnormalities.  3.  Mild generalized cerebral atrophy.  4.  Unremarkable MRA of the [place] of [place].  5.  Unremarkable MRA of the cervical vessels.     On my re-review I disagreed with the radiology read.  There is evidence of   patchy enhancement diffusely within the bilateral orbits and along the   bilateral intraorbital optic nerve sheaths.  Will discuss with   neuro-radiology regarding finding and repeat scan.     No bisphosphonates in the past (no osteoporosis medications for 7-8 years   per patient).      Left temporal artery biopsy 9/9/20- positive for giant cell arteritis   Prednisone (started 9/4/2020)     Patient started Actemra 10/28/20.    Interim History and Exam since Last Visit (2/24/2023)  Patient feels his vision has not changed, no new symptoms.    Corrected distance visual acuity was 20/20 in the right eye and 20/30 -2 in the left eye. Intraocular pressure was 12 in the right eye and 12 in the left eye using ICare.  Color vision 11/11 right eye and 11 /11 left eye.  Pupils isocoric. Unchanged optic nerve pallor.;      Glaucoma Top OU     Essentially stable regions of field depression, predominantly temporal OD and inferior OS       OCT Optic Nerve RNFL Spectralis OU (both eyes)    Stable optic atrophy, average RNFL thickness 64/60    For further exam details, please feel free to contact our office for additional records.  If you wish to contact me regarding this patient please email me at [email or web address] or give my clinic a call to arrange a phone conversation.    Sincerely,    Guille Garcia MD  , Neuro-Ophthalmology and Adult Strabismus Surgery  The Lucy Castano Chair in Neuro-Ophthalmology  Department of Ophthalmology and Visual Neurosciences  [facility]

## 2023-04-28 NOTE — PROGRESS NOTES
"   1. 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      Very atypical presentation without elevated acute phase reactants, no PMR symptoms, predominantly peripheral field losses. He completed an oral prednisone taper in early 2021 and stopped methotrexate in mid-2022 with no evidence of disease recurrence since. Patient also has CLL which is managed by oncology, monitored every 6 months.     Today giant cell arteritis remains quiescent (no evidence of new vision loss or new optic nerve ischemia and no symptoms of giant cell arteritis recurrence) on actemra 162 mg, which he has decreased from weekly to every-other-week dosing.    Follow-up in 2 months.    2.  Borderline visually significant cataracts - observe for now    Plan:    -Follow-up with me in 2 months. Given return precautions for worsening or new visual symptoms and patient expressed understanding.  -Consider order MRI at follow-up if Actemra discontinued in the future.    For a more thorough description of the disease presentation, please see my letter from the patient's initial visit with me    Historical data from initial visit (9/2020):    Bon Michael is a 79 year old male who presents to neuro-ophthalmology   clinic today for consultation from Jose Andino MD at [facility] for   visual disturbance of left eye. He describes it as \"his left eye not   getting enough light\". He describes noticing the change in his left eye's   temporal visual field, inferior > superior. He notices it more first thing     in the morning. These symptoms began on 8/22/2020 and he describes it as   worsening - he is unclear if it the deficit has gotten larger or darker   however. He first noticed it while driving at the periphery of his vision.      Patient saw Dr. Lewis on August 28.  He underwent esr / crp testing as   well as mri (see below).     Labs: 9/2/20- WBC- 50.2, ESR- 28, CRP < 5     On August 31 he developed symptoms of " waviness in his peripheral vision in     the right eye.  He went to Dr. Andino on 9/4/2020 who wanted him to go to   the ED but he was unable.  Dr. Andino reviewed the patient's fluorescein   angiography and felt there was choroidal filling delay in the right eye   and pallid edema in the right eye concerning for giant cell arteritis.  We     discussed the case via phone.      PRIOR IMAGING:  BRAIN MRI WITH/WO GADOLINIUM, MRA OF THE [place] OF [place] AND MRA OF THE   CAROTID ARTERIES - [facility] (9/1/2020)  IMPRESSION:    1.  No acute intracranial abnormality.  2.  No evidence of an orbital abnormalities.  3.  Mild generalized cerebral atrophy.  4.  Unremarkable MRA of the [place] of [place].  5.  Unremarkable MRA of the cervical vessels.     On my re-review I disagreed with the radiology read.  There is evidence of   patchy enhancement diffusely within the bilateral orbits and along the   bilateral intraorbital optic nerve sheaths.  Will discuss with   neuro-radiology regarding finding and repeat scan.     No bisphosphonates in the past (no osteoporosis medications for 7-8 years   per patient).      Left temporal artery biopsy 9/9/20- positive for giant cell arteritis   Prednisone (started 9/4/2020)     Patient started Actemra 10/28/20.    Interim History and Exam since Last Visit (2/24/2023)  Patient feels his vision has not changed, no new symptoms.    Corrected distance visual acuity was 20/20 in the right eye and 20/30 -2 in the left eye. Intraocular pressure was 12 in the right eye and 12 in the left eye using ICare.  Color vision 11/11 right eye and 11 /11 left eye.  Pupils isocoric. Unchanged optic nerve pallor.;      Glaucoma Top OU    Essentially stable regions of field depression, predominantly temporal OD and inferior OS     OCT Optic Nerve RNFL Spectralis OU (both eyes)    Stable optic atrophy, average RNFL thickness 64/60       Complete documentation of historical and exam elements from  today's encounter can be found in the full encounter summary report (not reduplicated in this progress note).  I personally obtained the chief complaint(s) and history of present illness.  I confirmed and edited as necessary the review of systems, past medical/surgical history, family history, social history, and examination findings as documented by others; and I examined the patient myself.  I personally reviewed the relevant tests, images, and reports as documented above.  I formulated and edited as necessary the assessment and plan and discussed the findings and management plan with the patient and family.  I personally reviewed the ophthalmic test(s) associated with this encounter, agree with the interpretation(s) as documented by the resident/fellow, and have edited the corresponding report(s) as necessary.     A medical student was also involved in the care of the patient today. I was present with the medical student who participated in the service and in the documentation of the note. I have  verified the history and personally performed the physical exam and medical decision making. I extensively reviewed and edited when necessary the assessment and plan. I agree with the assessment and plan of care as documented in the note    Guille Garcia MD    Precharting:  Julián Jaimes, MS3    Neftali Medina MD PhD  Fellow, Neuro-Ophthalmology

## 2023-04-28 NOTE — NURSING NOTE
Chief Complaint(s) and History of Present Illness(es)     Follow Up    In both eyes.  Since onset it is stable.  Associated symptoms include Negative for double vision, eye pain and headache.  Pain was noted as 0/10. Additional comments: Bon Michael is a 82 year old year old male with the following diagnoses:  1.  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  Borderline visually significant cataracts    He is still taking the Actemra every other week. Bon reports no significant changes in his vision.  ANNETTE Christiansen 4/28/2023 7:31 AM

## 2023-05-15 ENCOUNTER — TELEPHONE (OUTPATIENT)
Dept: OPHTHALMOLOGY | Facility: CLINIC | Age: 83
End: 2023-05-15
Payer: MEDICARE

## 2023-05-15 NOTE — TELEPHONE ENCOUNTER
Called and spoke with patient informing that the head pain he was experiencing  does not sound concerning for giant cell arteritis recurrence given that it was so short lived. If he starts having consistent head pain then he should contact us again but such short periods that has since self resolved does not sound concerning and is probably a form of benign headache (not associated with anything bad).     Delfino Jarvis on 5/15/2023 at 3:51 PM

## 2023-05-15 NOTE — TELEPHONE ENCOUNTER
Patient left a voicemail 5/15 9:11am to inform clinic that he experienced some intermittent pain on the left side of his head , above his ear on Saturday afternoon and evening.  Pain lasted about 1-2 minutes off and on for about 30 minutes and about 40 minutes in the evening, intervals happening more frequently. He has not experienced it since then.  Patient wondering if he should be concerned.

## 2023-05-19 ENCOUNTER — VIRTUAL VISIT (OUTPATIENT)
Dept: RHEUMATOLOGY | Facility: CLINIC | Age: 83
End: 2023-05-19
Attending: INTERNAL MEDICINE
Payer: MEDICARE

## 2023-05-19 DIAGNOSIS — M31.6 GIANT CELL ARTERITIS (H): Primary | ICD-10-CM

## 2023-05-19 DIAGNOSIS — Z79.899 HIGH RISK MEDICATION USE: ICD-10-CM

## 2023-05-19 PROCEDURE — 99442 PR PHYSICIAN TELEPHONE EVALUATION 11-20 MIN: CPT | Performed by: INTERNAL MEDICINE

## 2023-05-19 RX ORDER — ATORVASTATIN CALCIUM 10 MG/1
TABLET, FILM COATED ORAL
COMMUNITY
Start: 2022-02-17 | End: 2023-11-02

## 2023-05-19 NOTE — NURSING NOTE
Patient taking Green Tea Extract  Organic Protein ([employer])     Is the patient currently in the state of [place]? YES    Visit mode:VIDEO    If the visit is dropped, the patient can be reconnected by: VIDEO VISIT: Text to cell phone: [phone]    Will anyone else be joining the visit? NO      How would you like to obtain your AVS? MyChart    Are changes needed to the allergy or medication list? NO    Reason for visit: Video Visit (12 weeks follow-up)

## 2023-05-19 NOTE — PROGRESS NOTES
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 Never had HA, jaw claudication, PMR symptoms. No peripheral inflammatory arthritis symptoms.     Disease currently in remission by history/ review of most recent ESR/CRP on 5/15 once every 2  weeks actemra 162mg weekly.  Has completed his prednisone taper which was guided by GiACT[facility] trial.    I reviewed his most recent labs which were obtained on 5/15/23 for routine screening drug toxicity. I reviewed his CMP and CBC. No evidence of drug toxicity identified on these labs.     He is approaching 3 years on Actemra 162 mg once weekly.  His disease has remained in remission throughout this time.  I have discussed his case with his ophthalmologist Dr. Garcia.  At this time based on the data and literature we will reduce the frequency of his Actemra injections from current dose of once weekly down to once every 2 weeks.  During this time the we are reducing his Actemra injection frequency we will increase the frequency of his lab draws.  We will have labs drawn at the end of May, at the end of June, and at the end of August.  He is going to follow-up with me at the end of July.  We will continue close follow-up with ophthalmology during his time as well as his only symptom was unfortunately of acute vision loss and did not have any other classic/characteristic symptoms like headache scalp tenderness jaw claudication or proximal  muscle pain or stiffness consistent with PMR. His disease is currently in remission based on lack of any symptoms consistent with vasculitis and his most recent ESR/CRP drawn on 5/15/23 being negative/normaml    # Giant cell arteritis   1) continue actemra 162mg once every 2 weeks  2) continue lab draws to every 1 month outlined above    # Long term use of high risk medication: Actemra  1) Actemra 162mg weekly every 2 weeks. tolerating without issues. No evidence of toxicity by most recent labs.  2) Will continue routine screening drug toxicity labs.     # Osteopenia   Managed by PCP.     #CLL  Managed by oncology.    Bib Alcantara MD   Rheumatology     Subjective:   May 19, 2023   -decreased actemra at the time of his last appointment down from 162mg once weekly down to 162mg q2 weeks.   -Last Saturday afternoon, about 2-3pm, period of about 30 minutes, intermittent, lasted for a few seconds, above his ear. Then that same evening, before bed, lasting 1-2 seconds. Above the left ear. Next day no symptoms nothing returned  -no fevers/chills/night sweats  -no proximal muscle pain or stiffness  -No red/hot/swollen joints in hands/feet. Able to make a full fist upon waking in the AM  -Has chronic low back pain.   -No noticeable vision changes  -No scalp tenderness  -No jaw claudication symptoms  -No interval infections, however ongoing dental issues needs root canal.   -no scalp tenderness  14 point ROS collected and negative if not documented above.     March 31, 2023  -no change vision. No HA, scalp tenderness, jaw claudication symptoms.   -No proximal muscle pain/stiffness consistent with PMR  -No fevers/chills/night sweats.   -Has started CoQ-10  -Has continued on Actemra 162 mg once weekly.  No injection site reactions.  No interval infections.  - No unintentional weight loss.  14 point review is collected and otherwise negative    December 16, 2022  No HA, scalp tenderness, vision change, jaw claudication  symptoms, proximal muscle pain/stiffness. Still with on/off back pain and sinus/nasal congestion, both of which have been chronic issues for years. No fevers/chills/night sweats. No unintentional weight loss. Was treated successfully for cutaneous fungal infection, no return now since this summer. No red/hot/swollen joints. Tolerating actemra injections without noticeable side effects.  Other than his cutaneous fungal infection he denies any other interval infections.  Able to get full fist with me in the morning.  Denies joint stiffness that improves with use.  He feels well.  Had his flu shot recently.  14 point review of systems collected and otherwise negative.    Interval History September 2, 2022  Had dental work done. Saw his PCP for lower extremity rash, treated by PCP with steroids with taper. Though after a few weeks without resolution so was referred to dermatology for lesions on LE. Biopsied/cultured and told it was fungal infection, started on treatment which ended yesterday. Lesions on lower extremities have cleared. No HA/ change in vision/ scalp tenderness/ jaw claudication. No red/hot/swollen joints. Able to make a full fist upon waking in the AM. No proximal muscle pain/stiffness. No injection site reactions/ side effects. 14 point ROS collected and negative if not documented above.     Interval history 6/10/22  No HA, change in vision, scalp tenderness, jaw claudication. No proximal muscle pain/stiffness. No red/hot/swollen joints. No fevers/chills/night sweats. No side effects with methotrexate. Takes methotrexate and actemra on wednesdays. No side effects for either. No interval infections. No rashes.  Weight has been stable.  Able to make a full fist upon waking in the morning.  14 point review of systems collected and otherwise negative.    Interval History 3/11/22  Has continued on alendronate. Thinks he has more sensitivity in his teeth. He asks about need for addition of vit K2 to  supplement. He plans to bring this question, along with question about green tea extract up with his primary care physician. Takes this on Wednesday evenings. Taking folic acid 1mg daily. Has only had 3 doses of actemra that were late. Has been on methotrexate for 3 weeks now.  No headache, vision change, scalp tenderness, proximal muscle pain or stiffness, peripheral joint swelling redness warmth.  Able to make full fist upon waking in the morning.  No fevers chills or night sweats.  No interval infections.  No rash.  Is tolerating his methotrexate without any side effects.  GI or other.      Past Medical History  CLL  Hernia  Mono   Deviated septum  Osteopenia by DEXA    Past Surgical History  Tonsillectomy  Deviated septum    Medications:  Actemra 162 mg once weekly subcutaneous      Weeks Daily prednisone dose (mg) 26-week taper Weekly 162mg Actemra      1 60    2 50 11/5/2020    3 40    4 35 11/20/2020   5 30    6 25    7 20 12/10/2020   8 15 12/17/2020   9 12.5    10 12.5    11 10 1/8/2021   12 9    13 8    14 7 2/5/21   15 6 2/11/21   16 6    17 5    18 5 3/4/21   19 4    20 4    21 3    22 3 4/8/21   23 2    24 2    25 1    26 1 5/12/21     Allergies: Seasonal/ environmental allergies    Family History: No family history of autoimmune diseases like RA, sjogrens, SLE, scleroderma, IBD.    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 No drug use.        Objective:   Physical exam:  No vitals or exam for telephone visit    Labs:   12/12/2022  Creatinine 0.78  CRP 0.02  ALT 21  AST 31  Albumin 4.1  ESR less than 1  WBC 31.7  Hemoglobin 12.3  Platelet 119    CRP  5/5/21  WBC 27.4  HGB 13.6    Cr 0.81  ALT 25  AST 29  Chol 152  Trig 88  HDL 54  LDL 80  CRP <2.9  ESR 3  UA with small blood, no cells, no protein, casts    4/8/21  ESR 4  CRP less than 2.9  WBC 30.1  Hemoglobin 13.6  Platelets 157  Creatinine 0.86  Cholesterol 179  Triglycerides 83  HDL 60      3/2/21  ESR 4  CRP  <2.9  WBC 28.3  HGB 12.8    Cr 0.86  ALT 23  AST 25    2/3/21  WBC 30.6  HGB 11.9    Cr 0.83  Alk phos 40  ALT 30  AST 29    1/5/21  UA without protein, cells or casts  ESR 5  CRP <2.9  WBC 40.6  HGB 11.6    Cr 0.81  Chol 166  Trig 86  HDL 68  LDL 81    12/14/2020   CRP <2.9  ESR 4  WBC 54  HGB 11.8    Cr 0.78  Ua unremarkable  Cholesterol 168  Trig 82  HDL 70  LDL 81    11-  ESR 3  CRP less than 2.9  WBC 63.3  Hemoglobin 12  Platelets 181  Creatinine 0.98  Total protein low at 5.9  Alk phos 48  ALT 31  AST 30  UA with 30 of albumin  Cholesterol 169  Triglycerides 45  HDL 83  LDL 77    10/1/2020  WBC 84.9  HGB 12.3    IgM 36 low  IgA 124 normal  IgG 625    Cr 0.98    9/1/2020  WBC 50.2  HGB 11.6       Imaging:  MR brain and orbits 9/10/20  Impression:    1. Regarding the orbits and globes, there is no definite abnormal  contrast enhancement or mass. No evidence of leukemic infiltration.  2. Regarding the remainder of the brain, abnormal T2 hyperintense bone  marrow signal with associated enhancement in the skull, likely  representing leukemic infiltration.  3. Subcutaneous T2 hyperintensity with associated diffusion  restriction and contrast enhancement over the left zygomatic region,  question leukemic infiltration.    Pathology:  Patient Name: MARIELA WALLACE   MR#: [id number]   Specimen #: [id number]   Collected: 9/10/2020   Received: 9/10/2020   Reported: 9/16/2020 12:35   Ordering Phy(s): AGUS RETANA     For improved result formatting, select 'View Enhanced Report Format' under    Linked Documents section.     SPECIMEN(S):   Temporal artery biopsy, left     FINAL DIAGNOSIS:   Temporal artery, left, biopsy:   1. Granulomatous arteritis consistent with giant cell arteritis.   2. Chronic inflammation of the surrounding adventitia.     COMMENT:   Preliminary results were communicated to Dr. Agus Retana and Dr. Guille Garcia on 9/14/20 at 12:45pm.  "    I have personally reviewed all specimens and/or slides, including the   listed special stains, and used them   with my medical judgement to determine or confirm the final diagnosis.     Electronically signed out by:     Za García M.D., [facility]     CLINICAL HISTORY:   The patient is a 79 year old male with a history of chronic lymphocytic   leukemia who presented with bilateral   sequential optic neuropathy with associated pallid optic disc edema and   choroidal hypoperfusion, but   relatively intact visual acuity, negative elevation of acute phase   reactants, and lack of constitutional   symptoms consistent with giant cell arteritis. He also has findings of   likely leukemic infiltration in the   skull seen on MRI. He undergoes temporal artery biopsy on the left.     GROSS:   The specimen is received in formalin with proper patient identification,   labeled \"left temporal artery\".  The   specimen consists of a 3.2 cm in length by 0.3 cm in diameter tan-white   vessel segment. The specimen is   submitted intact in A1. (Dictated by: PADILLA Montalvo 9/11/2020 03:08   PM)     MICROSCOPIC:   The tissue consists of artery with narrowed lumen and intimal hyperplasia.    There is an infiltrate of   lymphocytes, plasma cells, epithelioid histiocytes, and multinucleated   giant cells throughout all layers of   the artery. There is associated loss of the internal elastic lamina on   elastic stain. Occasional focal   calcifications are seen a the level of the internal elastic lamina.   Neovascularization of the vessel wall is   present. Perivascular lymphocytic infiltrates are seen in the surrounding   adventitia. Immunohistochemistry   with CD3 and CD20 demonstrates a predominantly T-cell lymphocytic   infiltrate within the vessel wall. There is   a mixed infiltrate of T and B lymphocytes perivascular in the adventitia.   CD5 and CD43 have similar staining   patterns to CD3. There is light patchy CD23 " staining in the areas positive    for CD20. CD68 highlights the   epithelioid histiocytes within the vessel wall.     The technical component of this testing was completed at the [facility], with the professional component performed    at the [facility] [address] [place] [id number] ([phone])     CPT Codes:   A: 84953-XS8, 30668-ZAPS, 26340-RHU, 22851-IUG, 39612-ERS, 00942-QZV,   11856-ECU, 75209-VZX     COLLECTION SITE:   Client: [facility]   Location: [place] FUENTES)

## 2023-05-19 NOTE — LETTER
5/19/2023       RE: Bon Michael  1923 [facility] [address]     Dear Colleague,    Thank you for referring your patient, Bon Michael, to the [facility] [place] [facility]. Please see a copy of my visit note below.    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 Never had HA, jaw claudication, PMR symptoms. No peripheral inflammatory arthritis symptoms.     Disease currently in remission by history/ review of most recent ESR/CRP on 5/15 once every 2  weeks actemra 162mg weekly.  Has completed his prednisone taper which was guided by GiACTA trial.    I reviewed his most recent labs which were obtained on 5/15/23 for routine screening drug toxicity. I reviewed his CMP and CBC. No evidence of drug toxicity identified on these labs.     He is approaching 3 years on Actemra 162 mg once weekly.  His disease has remained in remission throughout this time.  I have discussed his case with his ophthalmologist Dr. Garcia.  At this time based on the data and literature we will reduce the frequency of his Actemra injections from current dose of once weekly down to once every 2 weeks.  During this time the we are reducing his Actemra injection frequency we will increase the frequency of his lab draws.  We will have labs drawn at the end of May, at the end of June, and at the end  of August.  He is going to follow-up with me at the end of July.  We will continue close follow-up with ophthalmology during his time as well as his only symptom was unfortunately of acute vision loss and did not have any other classic/characteristic symptoms like headache scalp tenderness jaw claudication or proximal muscle pain or stiffness consistent with PMR. His disease is currently in remission based on lack of any symptoms consistent with vasculitis and his most recent ESR/CRP drawn on 5/15/23 being negative/normaml    # Giant cell arteritis   1) continue actemra 162mg once every 2 weeks  2) continue lab draws to every 1 month outlined above    # Long term use of high risk medication: Actemra  1) Actemra 162mg weekly every 2 weeks. tolerating without issues. No evidence of toxicity by most recent labs.  2) Will continue routine screening drug toxicity labs.     # Osteopenia   Managed by PCP.     #CLL  Managed by oncology.    Bib Alcantara MD   Rheumatology     Subjective:   May 19, 2023   -decreased actemra at the time of his last appointment down from 162mg once weekly down to 162mg q2 weeks.   -Last Saturday afternoon, about 2-3pm, period of about 30 minutes, intermittent, lasted for a few seconds, above his ear. Then that same evening, before bed, lasting 1-2 seconds. Above the left ear. Next day no symptoms nothing returned  -no fevers/chills/night sweats  -no proximal muscle pain or stiffness  -No red/hot/swollen joints in hands/feet. Able to make a full fist upon waking in the AM  -Has chronic low back pain.   -No noticeable vision changes  -No scalp tenderness  -No jaw claudication symptoms  -No interval infections, however ongoing dental issues needs root canal.   -no scalp tenderness  14 point ROS collected and negative if not documented above.     March 31, 2023  -no change vision. No HA, scalp tenderness, jaw claudication symptoms.   -No proximal muscle pain/stiffness consistent with PMR  -No  fevers/chills/night sweats.   -Has started CoQ-10  -Has continued on Actemra 162 mg once weekly.  No injection site reactions.  No interval infections.  - No unintentional weight loss.  14 point review is collected and otherwise negative    December 16, 2022  No HA, scalp tenderness, vision change, jaw claudication symptoms, proximal muscle pain/stiffness. Still with on/off back pain and sinus/nasal congestion, both of which have been chronic issues for years. No fevers/chills/night sweats. No unintentional weight loss. Was treated successfully for cutaneous fungal infection, no return now since this summer. No red/hot/swollen joints. Tolerating actemra injections without noticeable side effects.  Other than his cutaneous fungal infection he denies any other interval infections.  Able to get full fist with me in the morning.  Denies joint stiffness that improves with use.  He feels well.  Had his flu shot recently.  14 point review of systems collected and otherwise negative.    Interval History September 2, 2022  Had dental work done. Saw his PCP for lower extremity rash, treated by PCP with steroids with taper. Though after a few weeks without resolution so was referred to dermatology for lesions on LE. Biopsied/cultured and told it was fungal infection, started on treatment which ended yesterday. Lesions on lower extremities have cleared. No HA/ change in vision/ scalp tenderness/ jaw claudication. No red/hot/swollen joints. Able to make a full fist upon waking in the AM. No proximal muscle pain/stiffness. No injection site reactions/ side effects. 14 point ROS collected and negative if not documented above.     Interval history 6/10/22  No HA, change in vision, scalp tenderness, jaw claudication. No proximal muscle pain/stiffness. No red/hot/swollen joints. No fevers/chills/night sweats. No side effects with methotrexate. Takes methotrexate and actemra on wednesdays. No side effects for either. No interval  infections. No rashes.  Weight has been stable.  Able to make a full fist upon waking in the morning.  14 point review of systems collected and otherwise negative.    Interval History 3/11/22  Has continued on alendronate. Thinks he has more sensitivity in his teeth. He asks about need for addition of vit K2 to supplement. He plans to bring this question, along with question about green tea extract up with his primary care physician. Takes this on Wednesday evenings. Taking folic acid 1mg daily. Has only had 3 doses of actemra that were late. Has been on methotrexate for 3 weeks now.  No headache, vision change, scalp tenderness, proximal muscle pain or stiffness, peripheral joint swelling redness warmth.  Able to make full fist upon waking in the morning.  No fevers chills or night sweats.  No interval infections.  No rash.  Is tolerating his methotrexate without any side effects.  GI or other.      Past Medical History  CLL  Hernia  Mono   Deviated septum  Osteopenia by DEXA    Past Surgical History  Tonsillectomy  Deviated septum    Medications:  Actemra 162 mg once weekly subcutaneous      Weeks Daily prednisone dose (mg) 26-week taper Weekly 162mg Actemra      1 60    2 50 11/5/2020    3 40    4 35 11/20/2020   5 30    6 25    7 20 12/10/2020   8 15 12/17/2020   9 12.5    10 12.5    11 10 1/8/2021   12 9    13 8    14 7 2/5/21   15 6 2/11/21   16 6    17 5    18 5 3/4/21   19 4    20 4    21 3    22 3 4/8/21   23 2    24 2    25 1    26 1 5/12/21     Allergies: Seasonal/ environmental allergies    Family History: No family history of autoimmune diseases like RA, sjogrens, SLE, scleroderma, IBD.    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 No drug use.        Objective:   Physical exam:  No vitals or exam for telephone visit    Labs:   12/12/2022  Creatinine 0.78  CRP 0.02  ALT 21  AST 31  Albumin 4.1  ESR less than 1  WBC 31.7  Hemoglobin 12.3  Platelet 119    CRP  5/5/21  WBC 27.4  HGB  13.6    Cr 0.81  ALT 25  AST 29  Chol 152  Trig 88  HDL 54  LDL 80  CRP <2.9  ESR 3  UA with small blood, no cells, no protein, casts    4/8/21  ESR 4  CRP less than 2.9  WBC 30.1  Hemoglobin 13.6  Platelets 157  Creatinine 0.86  Cholesterol 179  Triglycerides 83  HDL 60      3/2/21  ESR 4  CRP <2.9  WBC 28.3  HGB 12.8    Cr 0.86  ALT 23  AST 25    2/3/21  WBC 30.6  HGB 11.9    Cr 0.83  Alk phos 40  ALT 30  AST 29    1/5/21  UA without protein, cells or casts  ESR 5  CRP <2.9  WBC 40.6  HGB 11.6    Cr 0.81  Chol 166  Trig 86  HDL 68  LDL 81    12/14/2020   CRP <2.9  ESR 4  WBC 54  HGB 11.8    Cr 0.78  Ua unremarkable  Cholesterol 168  Trig 82  HDL 70  LDL 81    11-  ESR 3  CRP less than 2.9  WBC 63.3  Hemoglobin 12  Platelets 181  Creatinine 0.98  Total protein low at 5.9  Alk phos 48  ALT 31  AST 30  UA with 30 of albumin  Cholesterol 169  Triglycerides 45  HDL 83  LDL 77    10/1/2020  WBC 84.9  HGB 12.3    IgM 36 low  IgA 124 normal  IgG 625    Cr 0.98    9/1/2020  WBC 50.2  HGB 11.6       Imaging:  MR brain and orbits 9/10/20  Impression:    1. Regarding the orbits and globes, there is no definite abnormal  contrast enhancement or mass. No evidence of leukemic infiltration.  2. Regarding the remainder of the brain, abnormal T2 hyperintense bone  marrow signal with associated enhancement in the skull, likely  representing leukemic infiltration.  3. Subcutaneous T2 hyperintensity with associated diffusion  restriction and contrast enhancement over the left zygomatic region,  question leukemic infiltration.    Pathology:  Patient Name: MARIELA WALLACE   MR#: [id number]   Specimen #: [id number]   Collected: 9/10/2020   Received: 9/10/2020   Reported: 9/16/2020 12:35   Ordering Phy(s): AGUS RETANA     For improved result formatting, select 'View Enhanced Report Format' under    Linked Documents section.     SPECIMEN(S):   Temporal artery biopsy,  "left     FINAL DIAGNOSIS:   Temporal artery, left, biopsy:   1. Granulomatous arteritis consistent with giant cell arteritis.   2. Chronic inflammation of the surrounding adventitia.     COMMENT:   Preliminary results were communicated to Dr. Valerie Carrington and Dr. Guille Garcia on 9/14/20 at 12:45pm.     I have personally reviewed all specimens and/or slides, including the   listed special stains, and used them   with my medical judgement to determine or confirm the final diagnosis.     Electronically signed out by:     Za García M.D., [facility]     CLINICAL HISTORY:   The patient is a 79 year old male with a history of chronic lymphocytic   leukemia who presented with bilateral   sequential optic neuropathy with associated pallid optic disc edema and   choroidal hypoperfusion, but   relatively intact visual acuity, negative elevation of acute phase   reactants, and lack of constitutional   symptoms consistent with giant cell arteritis. He also has findings of   likely leukemic infiltration in the   skull seen on MRI. He undergoes temporal artery biopsy on the left.     GROSS:   The specimen is received in formalin with proper patient identification,   labeled \"left temporal artery\".  The   specimen consists of a 3.2 cm in length by 0.3 cm in diameter tan-white   vessel segment. The specimen is   submitted intact in A1. (Dictated by: PADILLA Montalvo 9/11/2020 03:08   PM)     MICROSCOPIC:   The tissue consists of artery with narrowed lumen and intimal hyperplasia.    There is an infiltrate of   lymphocytes, plasma cells, epithelioid histiocytes, and multinucleated   giant cells throughout all layers of   the artery. There is associated loss of the internal elastic lamina on   elastic stain. Occasional focal   calcifications are seen a the level of the internal elastic lamina.   Neovascularization of the vessel wall is   present. Perivascular lymphocytic infiltrates are seen in the surrounding "   adventitia. Immunohistochemistry   with CD3 and CD20 demonstrates a predominantly T-cell lymphocytic   infiltrate within the vessel wall. There is   a mixed infiltrate of T and B lymphocytes perivascular in the adventitia.   CD5 and CD43 have similar staining   patterns to CD3. There is light patchy CD23 staining in the areas positive    for CD20. CD68 highlights the   epithelioid histiocytes within the vessel wall.     The technical component of this testing was completed at the [facility], with the professional component performed    at the [facility], [address] [place] [id number] ([phone])     CPT Codes:   A: 19452-GN5, 01427-DYGZ, 38260-PMU, 14520-DCJ, 46584-BZV, 52196-KVP,   05695-NNS, 88441-TFN     COLLECTION SITE:   Client: [facility] acute hospital   Location: EYE (B)

## 2023-05-22 ENCOUNTER — TELEPHONE (OUTPATIENT)
Dept: OPHTHALMOLOGY | Facility: CLINIC | Age: 83
End: 2023-05-22
Payer: MEDICARE

## 2023-05-22 NOTE — TELEPHONE ENCOUNTER
Health Call Center    Phone Message    May a detailed message be left on voicemail: yes     Reason for Call: Other: Pt would like to have an eye exam, scheduled w/ . States at his recent appt w/  they spoke and  informed him that he can get an exam w/ her.    Per protocol   doesn't do eye exam in [place]. Please confirm w/  and inform pt. Thank You!     Action Taken: Message routed to:  [facility] eye    Travel Screening: Not Applicable

## 2023-06-01 ENCOUNTER — HEALTH MAINTENANCE LETTER (OUTPATIENT)
Age: 83
End: 2023-06-01

## 2023-06-02 ENCOUNTER — TELEPHONE (OUTPATIENT)
Dept: OPHTHALMOLOGY | Facility: CLINIC | Age: 83
End: 2023-06-02
Payer: MEDICARE

## 2023-06-02 NOTE — TELEPHONE ENCOUNTER
[facility] Call Center    Phone Message    May a detailed message be left on voicemail: yes     Reason for Call: Other: Pt is still looking to get a call back for lázaro on a voicemail Evelyn left him last week. Please call Pt back to clarify what the voicemail said. This is the Pt's second attempt to return Johanna's call. Writer called [facility] Priority line and got no answer, before writing this TE.    Pt is also generall confused on the process of what happens at an eye exam. Pt states every time he's seen with Dr. Garcia he looks at an eye chart. Writer offered to schedule him for the next available Appt for a CE at the [facility], but Pt declined. States he wants to hear back about what the voicemail was talking about before he has another comittment/Appt to go to. Pt is also just hoping to get new glasses, but states Dr. Dougherty told him he needed an updated Px before he could get a new pair. Please also confirm with Pt that that is correct. Thank you!    Action Taken: Message routed to:  [facility] Ophthalmology    Travel Screening: Not Applicable

## 2023-06-06 ENCOUNTER — TELEPHONE (OUTPATIENT)
Dept: RHEUMATOLOGY | Facility: CLINIC | Age: 83
End: 2023-06-06
Payer: MEDICARE

## 2023-06-06 NOTE — TELEPHONE ENCOUNTER
HARVEY and graham sent for the patient to call back and schedule the following:    Appointment type: Telephone Visit  Provider: Dr. Alcantara  Return date: End of September 2023  Specialty phone number: [phone]  Additional appointment(s) needed:   Additonal Notes: He has an appointment with me in July already scheduled that he needs to keep. Please call him and schedule an appointment at the end of Sept, telephone, on a Friday. Can use SHANNON slot if needed. thanks

## 2023-06-21 NOTE — PROGRESS NOTES
HPI:    Patient was last seen by Dr. Garcia on 4/28/23 for biopsy proven GCA with bilateral ION.  He is here today for an updated refraction.    He denies any changes in vision since his last visit.  He reports he is now taking Actemra 162 mg every other week instead of weekly.    Assessment and Plan:  1) Presbyopia  A/P: Updated PAL SRx for ftw.  Monitor annually.    2) AAION  A/P: Continue care with Dr. Garcia as directed.    Complete documentation of historical and exam elements from today's encounter can be found in the full encounter summary report (not reduplicated in this progress note). I personally obtained the chief complaint(s) and history of present illness. I confirmed and edited as necessary the review of systems, past medical/surgical history, family history, social history, and examination findings as document by others; and I examined the patient myself. I personally reviewed the relevant tests, images, and reports as documented above. I formulated and edited as necessary the assessment and plan and discussed the findings and management plan with the patient and family.    Samantha Dougherty, OD

## 2023-06-26 ENCOUNTER — OFFICE VISIT (OUTPATIENT)
Dept: OPHTHALMOLOGY | Facility: CLINIC | Age: 83
End: 2023-06-26
Payer: MEDICARE

## 2023-06-26 DIAGNOSIS — H52.4 PRESBYOPIA: Primary | ICD-10-CM

## 2023-06-26 PROCEDURE — 99203 OFFICE O/P NEW LOW 30 MIN: CPT

## 2023-06-26 PROCEDURE — G0463 HOSPITAL OUTPT CLINIC VISIT: HCPCS

## 2023-06-26 PROCEDURE — 92015 DETERMINE REFRACTIVE STATE: CPT | Mod: GY

## 2023-06-26 RX ORDER — ATORVASTATIN CALCIUM 80 MG/1
40 TABLET, FILM COATED ORAL
COMMUNITY
Start: 2023-06-16 | End: 2023-11-02

## 2023-06-26 ASSESSMENT — VISUAL ACUITY
METHOD: SNELLEN - LINEAR
OS_CC: 20/30-2/+
CORRECTION_TYPE: GLASSES
OD_CC: 20/25-3/+3

## 2023-06-26 ASSESSMENT — REFRACTION_WEARINGRX
OS_ADD: +2.75
OD_SPHERE: +3.25
OD_AXIS: 134
OS_CYLINDER: +0.50
OS_SPHERE: +6.25
OD_ADD: +2.75
OS_AXIS: 059
SPECS_TYPE: PAL
OD_CYLINDER: +1.25

## 2023-06-26 ASSESSMENT — CONF VISUAL FIELD
OD_NORMAL: 1
OS_INFERIOR_NASAL_RESTRICTION: 3
OD_INFERIOR_NASAL_RESTRICTION: 0
OD_INFERIOR_TEMPORAL_RESTRICTION: 0
OS_INFERIOR_TEMPORAL_RESTRICTION: 3
OD_SUPERIOR_TEMPORAL_RESTRICTION: 0
OD_SUPERIOR_NASAL_RESTRICTION: 0

## 2023-06-26 ASSESSMENT — REFRACTION_MANIFEST
OS_ADD: +2.75
OD_CYLINDER: +0.75
OS_AXIS: 090
OS_SPHERE: +6.00
OD_SPHERE: +3.00
OD_ADD: +2.75
OS_CYLINDER: +0.75
OD_AXIS: 152

## 2023-06-26 ASSESSMENT — TONOMETRY
OD_IOP_MMHG: 18
OS_IOP_MMHG: 17
IOP_METHOD: TONOPEN

## 2023-06-30 ENCOUNTER — OFFICE VISIT (OUTPATIENT)
Dept: OPHTHALMOLOGY | Facility: CLINIC | Age: 83
End: 2023-06-30
Attending: OPHTHALMOLOGY
Payer: MEDICARE

## 2023-06-30 DIAGNOSIS — H46.9 OPTIC NEUROPATHY: Primary | ICD-10-CM

## 2023-06-30 DIAGNOSIS — H53.40 VISUAL FIELD DEFECT: ICD-10-CM

## 2023-06-30 PROCEDURE — 92133 CPTRZD OPH DX IMG PST SGM ON: CPT | Performed by: OPHTHALMOLOGY

## 2023-06-30 PROCEDURE — 99213 OFFICE O/P EST LOW 20 MIN: CPT | Performed by: OPHTHALMOLOGY

## 2023-06-30 PROCEDURE — 92083 EXTENDED VISUAL FIELD XM: CPT | Performed by: OPHTHALMOLOGY

## 2023-06-30 PROCEDURE — G0463 HOSPITAL OUTPT CLINIC VISIT: HCPCS | Performed by: OPHTHALMOLOGY

## 2023-06-30 ASSESSMENT — EXTERNAL EXAM - LEFT EYE: OS_EXAM: NORMAL

## 2023-06-30 ASSESSMENT — REFRACTION_WEARINGRX
SPECS_TYPE: PAL
OD_CYLINDER: +0.75
OD_AXIS: 152
OS_SPHERE: +6.00
OS_CYLINDER: +0.75
OS_AXIS: 090
OD_SPHERE: +3.00
OD_ADD: +2.75
OS_ADD: +2.75

## 2023-06-30 ASSESSMENT — CONF VISUAL FIELD
OS_INFERIOR_NASAL_RESTRICTION: 3
OD_INFERIOR_NASAL_RESTRICTION: 0
OD_INFERIOR_TEMPORAL_RESTRICTION: 0
OD_NORMAL: 1
OD_SUPERIOR_TEMPORAL_RESTRICTION: 0
OD_SUPERIOR_NASAL_RESTRICTION: 0
OS_INFERIOR_TEMPORAL_RESTRICTION: 3

## 2023-06-30 ASSESSMENT — CUP TO DISC RATIO
OS_RATIO: 0.3
OD_RATIO: 0.3

## 2023-06-30 ASSESSMENT — VISUAL ACUITY
OS_CC: 20/30
OD_CC: 20/25
OS_CC+: -2
OD_CC+: -2
CORRECTION_TYPE: GLASSES
METHOD: SNELLEN - LINEAR

## 2023-06-30 ASSESSMENT — SLIT LAMP EXAM - LIDS
COMMENTS: NORMAL
COMMENTS: NORMAL

## 2023-06-30 ASSESSMENT — EXTERNAL EXAM - RIGHT EYE: OD_EXAM: NORMAL

## 2023-06-30 ASSESSMENT — TONOMETRY
OD_IOP_MMHG: 14
OS_IOP_MMHG: 13
IOP_METHOD: TONOPEN

## 2023-06-30 NOTE — NURSING NOTE
Chief Complaints and History of Present Illnesses   Patient presents with     Follow Up     Pt here for 9 week follow up on Optic neuropathy, GCA, and partial optic atrophy.      Chief Complaint(s) and History of Present Illness(es)     Follow Up            Associated symptoms: Negative for eye pain and headache    Comments: Pt here for 9 week follow up on Optic neuropathy, GCA, and partial optic atrophy.           Comments    Pt has unchanged vision since last exam. No new concerns.   Mayi Flower, COA on 6/30/2023 at 7:27 AM                    no

## 2023-06-30 NOTE — PROGRESS NOTES
"   1. 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      Very atypical presentation without elevated acute phase reactants, no PMR symptoms, predominantly peripheral field losses. He completed an oral prednisone taper in early 2021 and stopped methotrexate in mid-2022 with no evidence of disease recurrence since. Patient also has CLL which is managed by oncology, monitored every 6 months.     Today giant cell arteritis remains quiescent (no evidence of new vision loss or new optic nerve ischemia and no symptoms of giant cell arteritis recurrence) on actemra 162 mg, which he has decreased from weekly to every-other-week dosing since April 2023.    Follow-up in 2 months with me. Patient has close follow-up also with rheumatology, Dr. Alcantara.    2.  Borderline visually significant cataracts - observe for now    Plan:    -Follow-up with me in 2 months. Given return precautions for worsening or new visual symptoms and patient expressed understanding.    For a more thorough description of the disease presentation, please see my letter from the patient's initial visit with me    Historical data from initial visit (9/2020):    Bon Michael is a 79 year old male who presents to neuro-ophthalmology   clinic today for consultation from Jose Andino MD at [facility] for   visual disturbance of left eye. He describes it as \"his left eye not   getting enough light\". He describes noticing the change in his left eye's   temporal visual field, inferior > superior. He notices it more first thing     in the morning. These symptoms began on 8/22/2020 and he describes it as   worsening - he is unclear if it the deficit has gotten larger or darker   however. He first noticed it while driving at the periphery of his vision.      Patient saw Dr. Lewis on August 28.  He underwent esr / crp testing as   well as mri (see below).     Labs: 9/2/20- WBC- 50.2, ESR- 28, CRP < 5     On August 31 he " developed symptoms of waviness in his peripheral vision in   the right eye.  He went to Dr. Andino on 9/4/2020 who wanted him to go to   the ED but he was unable.  Dr. Andino reviewed the patient's fluorescein   angiography and felt there was choroidal filling delay in the right eye   and pallid edema in the right eye concerning for giant cell arteritis.  We   discussed the case via phone.      PRIOR IMAGING:  BRAIN MRI WITH/WO GADOLINIUM, MRA OF THE [place] OF [place] AND MRA OF THE   CAROTID ARTERIES - [facility] (9/1/2020)  IMPRESSION:    1.  No acute intracranial abnormality.  2.  No evidence of an orbital abnormalities.  3.  Mild generalized cerebral atrophy.  4.  Unremarkable MRA of the [place] of [place].  5.  Unremarkable MRA of the cervical vessels.     On my re-review I disagreed with the radiology read.  There is evidence of   patchy enhancement diffusely within the bilateral orbits and along the   bilateral intraorbital optic nerve sheaths.  Will discuss with   neuro-radiology regarding finding and repeat scan.     No bisphosphonates in the past (no osteoporosis medications for 7-8 years   per patient).      Left temporal artery biopsy 9/9/20- positive for giant cell arteritis   Prednisone (started 9/4/2020)  Patient started Actemra 10/28/20.    Interim History and Exam since Last Visit 4/28/2023  Patient is here for follow up on GCA. He states that he may have lose about 3-4 lbs in the past month because he has been trying to eat more healthy. He denies any fevers, temporal pain, hip/shoulder stiffness, double vision, vision loss, or jaw claudication. He is now taking the Actemra once every other week since April of 2023, compared to once a week prior.       Most recent visit with Dr. Alcantara ([facility]he[facility]) 5/19/23  Assessment:  82 year old male with history of CLL who was referred to rheumatology for evaluation and treatment of biopsy proven giant cell arteritis with bilateral ischemic optic  neuropathy clinically manifested by now stable peripheral vision loss in left>right eye. Never had HA, jaw claudication, PMR symptoms. No peripheral inflammatory arthritis symptoms.      Disease currently in remission by history/ review of most recent ESR/CRP on 5/15 once every 2  weeks actemra 162mg weekly.  Has completed his prednisone taper which was guided by GiACTA trial.     I reviewed his most recent labs which were obtained on 5/15/23 for routine screening drug toxicity. I reviewed his CMP and CBC. No evidence of drug toxicity identified on these labs.      He is approaching 3 years on Actemra 162 mg once weekly.  His disease has remained in remission throughout this time.  I have discussed his case with his ophthalmologist Dr. Garcia.  At this time based on the data and literature we will reduce the frequency of his Actemra injections from current dose of once weekly down to once every 2 weeks.  During this time the we are reducing his Actemra injection frequency we will increase the frequency of his lab draws.  We will have labs drawn at the end of May, at the end of June, and at the end of August.  He is going to follow-up with me at the end of July.  We will continue close follow-up with ophthalmology during his time as well as his only symptom was unfortunately of acute vision loss and did not have any other classic/characteristic symptoms like headache scalp tenderness jaw claudication or proximal muscle pain or stiffness consistent with PMR. His disease is currently in remission based on lack of any symptoms consistent with vasculitis and his most recent ESR/CRP drawn on 5/15/23 being negative/normaml     # Giant cell arteritis   1) continue actemra 162mg once every 2 weeks  2) continue lab draws to every 1 month outlined above     # Long term use of high risk medication: Actemra  1) Actemra 162mg weekly every 2 weeks. tolerating without issues. No evidence of toxicity by most recent labs.  2)  Will continue routine screening drug toxicity labs.      # Osteopenia   Managed by PCP.      #CLL  Managed by oncology.    Recent ESR (6/20/23): <1 mm/hr  Recent CRP (6/20/23): <0.02 mg/dL    Corrected distance visual acuity was 20/25 -2 in the right eye and 20/30 -2 in the left eye. Intraocular pressure was 14 in the right eye and 13 in the left eye using Tonopen.  Color vision 11/11 right eye and 11/11 left eye.  Pupils with no RAPD.  Anterior segment exam with stable 2+ nuclear sclerosis cataract and 2+ cortical cataract bilaterally.  Fundus exam with diffuse pallor bilaterally (stable)    OCT RNFL with stable thinning of RNFL 6/30/30 compared to 4/28/2023.     G-TOP with stable mean deviation and defects bilaterally compared to 4/28/2023.       25 minutes were spent on the date of the encounter by me doing chart review, history and exam, documentation, and further activities as noted above    Complete documentation of historical and exam elements from today's encounter can be found in the full encounter summary report (not reduplicated in this progress note).  I personally re-obtained the chief complaint(s) and history of present illness.  I confirmed and edited as necessary the review of systems, past medical/surgical history, family history, social history, and examination findings as documented by others; and I examined the patient myself.  I personally reviewed the relevant tests, images, and reports as documented above.  I formulated and edited as necessary the assessment and plan and discussed the findings and management plan with the patient and family     A medical student was involved in the care of the patient. I was present with the medical student who participated in the service and in the documentation of the note. I have  verified the history and personally performed the physical exam and medical decision making. I extensively reviewed and edited when necessary the assessment and plan. I agree with  the assessment and plan of care as documented in the note    Guille Garcia MD    Precharting:  Shayla Swanson, MS4  Medical Student, [facility]

## 2023-06-30 NOTE — LETTER
"2023    RE: Bon Michael  : 1940  MRN: [id number]    Dear Providers,    I saw our mutual patient, Bon Michael, in follow-up in my clinic recently.  After a thorough neuro-ophthalmic history and examination, I came to the following conclusions:     1. 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      Very atypical presentation without elevated acute phase reactants, no PMR symptoms, predominantly peripheral field losses. He completed an oral prednisone taper in early  and stopped methotrexate in mid- with no evidence of disease recurrence since. Patient also has CLL which is managed by oncology, monitored every 6 months.     Today giant cell arteritis remains quiescent (no evidence of new vision loss or new optic nerve ischemia and no symptoms of giant cell arteritis recurrence) on actemra 162 mg, which he has decreased from weekly to every-other-week dosing since 2023.    Follow-up in 2 months with me. Patient has close follow-up also with rheumatology.    2.  Borderline visually significant cataracts - observe for now    Plan:    -Follow-up with me in 2 months. Given return precautions for worsening or new visual symptoms and patient expressed understanding.    For a more thorough description of the disease presentation, please see my letter from the patient's initial visit with me    Historical data from initial visit (2020):    Bon Michael is a 79 year old male who presents to neuro-ophthalmology   clinic today for consultation from Jose Andino MD at [facility] for   visual disturbance of left eye. He describes it as \"his left eye not   getting enough light\". He describes noticing the change in his left eye's   temporal visual field, inferior > superior. He notices it more first thing     in the morning. These symptoms began on 2020 and he describes it as   worsening - he is unclear if it the deficit has gotten larger or " darker   however. He first noticed it while driving at the periphery of his vision.      Patient saw Dr. Lewis on August 28.  He underwent esr / crp testing as   well as mri (see below).     Labs: 9/2/20- WBC- 50.2, ESR- 28, CRP < 5     On August 31 he developed symptoms of waviness in his peripheral vision in   the right eye.  He went to Dr. Andino on 9/4/2020 who wanted him to go to   the ED but he was unable.  Dr. Andino reviewed the patient's fluorescein   angiography and felt there was choroidal filling delay in the right eye   and pallid edema in the right eye concerning for giant cell arteritis.  We   discussed the case via phone.      PRIOR IMAGING:  BRAIN MRI WITH/WO GADOLINIUM, MRA OF THE [place] OF [place] AND MRA OF THE   CAROTID ARTERIES - [facility] (9/1/2020)  IMPRESSION:    1.  No acute intracranial abnormality.  2.  No evidence of an orbital abnormalities.  3.  Mild generalized cerebral atrophy.  4.  Unremarkable MRA of the [place] of [place].  5.  Unremarkable MRA of the cervical vessels.     On my re-review I disagreed with the radiology read.  There is evidence of   patchy enhancement diffusely within the bilateral orbits and along the   bilateral intraorbital optic nerve sheaths.  Will discuss with   neuro-radiology regarding finding and repeat scan.     No bisphosphonates in the past (no osteoporosis medications for 7-8 years   per patient).      Left temporal artery biopsy 9/9/20- positive for giant cell arteritis   Prednisone (started 9/4/2020)  Patient started Actemra 10/28/20.    Interim History and Exam since Last Visit 4/28/2023  Patient is here for follow up on GCA. He states that he may have lose about 3-4 lbs in the past month because he has been trying to eat more healthy. He denies any fevers, temporal pain, hip/shoulder stiffness, double vision, vision loss, or jaw claudication. He is now taking the Actemra once every other week since April of 2023, compared to once a week  prior.       Most recent visit with Dr. 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 Never had HA, jaw claudication, PMR symptoms. No peripheral inflammatory arthritis symptoms.      Disease currently in remission by history/ review of most recent ESR/CRP on 5/15 once every 2  weeks actemra 162mg weekly.  Has completed his prednisone taper which was guided by GiACTA trial.     I reviewed his most recent labs which were obtained on 5/15/23 for routine screening drug toxicity. I reviewed his CMP and CBC. No evidence of drug toxicity identified on these labs.      He is approaching 3 years on Actemra 162 mg once weekly.  His disease has remained in remission throughout this time.  I have discussed his case with his ophthalmologist Dr. Garcia.  At this time based on the data and literature we will reduce the frequency of his Actemra injections from current dose of once weekly down to once every 2 weeks.  During this time the we are reducing his Actemra injection frequency we will increase the frequency of his lab draws.  We will have labs drawn at the end of May, at the end of June, and at the end of August.  He is going to follow-up with me at the end of July.  We will continue close follow-up with ophthalmology during his time as well as his only symptom was unfortunately of acute vision loss and did not have any other classic/characteristic symptoms like headache scalp tenderness jaw claudication or proximal muscle pain or stiffness consistent with PMR. His disease is currently in remission based on lack of any symptoms consistent with vasculitis and his most recent ESR/CRP drawn on 5/15/23 being negative/normaml     # Giant cell arteritis   1) continue actemra  162mg once every 2 weeks  2) continue lab draws to every 1 month outlined above     # Long term use of high risk medication: Actemra  1) Actemra 162mg weekly every 2 weeks. tolerating without issues. No evidence of toxicity by most recent labs.  2) Will continue routine screening drug toxicity labs.      # Osteopenia   Managed by PCP.      #CLL  Managed by oncology.    Recent ESR (6/20/23): <1 mm/hr  Recent CRP (6/20/23): <0.02 mg/dL    Corrected distance visual acuity was 20/25 -2 in the right eye and 20/30 -2 in the left eye. Intraocular pressure was 14 in the right eye and 13 in the left eye using Tonopen.  Color vision 11/11 right eye and 11/11 left eye.  Pupils with no RAPD.  Anterior segment exam with stable 2+ nuclear sclerosis cataract and 2+ cortical cataract bilaterally.  Fundus exam with diffuse pallor bilaterally.     OCT RNFL with stable thinning of RNFL 6/30/30 compared to 4/28/2023.     G-TOP with stable mean deviation and defects bilaterally compared to 4/28/2023.    For further exam details, please feel free to contact our office for additional records.  If you wish to contact me regarding this patient please email me at [email or web address] or give my clinic a call to arrange a phone conversation.    Sincerely,  Guille Garcia MD  , Neuro-Ophthalmology and Adult Strabismus Surgery  The Lucy Castano Chair in Neuro-Ophthalmology  Department of Ophthalmology and Visual Neurosciences  [facility]

## 2023-07-28 ENCOUNTER — VIRTUAL VISIT (OUTPATIENT)
Dept: RHEUMATOLOGY | Facility: CLINIC | Age: 83
End: 2023-07-28
Attending: INTERNAL MEDICINE
Payer: MEDICARE

## 2023-07-28 VITALS — BODY MASS INDEX: 19.62 KG/M2 | WEIGHT: 125 LBS | HEIGHT: 67 IN

## 2023-07-28 DIAGNOSIS — M31.6 GIANT CELL ARTERITIS (H): Primary | ICD-10-CM

## 2023-07-28 DIAGNOSIS — C91.10 CHRONIC LYMPHOCYTIC LEUKEMIA (H): ICD-10-CM

## 2023-07-28 DIAGNOSIS — M85.80 OSTEOPENIA, UNSPECIFIED LOCATION: ICD-10-CM

## 2023-07-28 DIAGNOSIS — Z79.899 HIGH RISK MEDICATION USE: ICD-10-CM

## 2023-07-28 PROCEDURE — 99442 PR PHYSICIAN TELEPHONE EVALUATION 11-20 MIN: CPT | Mod: 95 | Performed by: INTERNAL MEDICINE

## 2023-07-28 ASSESSMENT — PAIN SCALES - GENERAL: PAINLEVEL: NO PAIN (0)

## 2023-07-28 NOTE — PROGRESS NOTES
Virtual Visit Details    Type of service:  Telephone Visit   Phone call duration: 18 minutes   July 28, 2023         TELEPHONE Rheumatology Follow-up    Name: Bon Michael    MRN [id number]   Date of service: July 28, 2023    Date of last visit: 5/19/23          Reason for Follow-up:  [facility]   Requesting physician: Guille Garcia MD        Assessment & Plan:   Assessment:  82 year old male with history of CLL who was referred to rheumatology for evaluation and treatment of biopsy proven giant cell arteritis with bilateral ischemic optic neuropathy clinically manifested by now stable peripheral vision loss in left>right eye. Never had HA, jaw claudication, PMR symptoms. No peripheral inflammatory arthritis symptoms.     Disease currently in remission by history/ review of most recent ESR/CRP on 7/25 once every 2  weeks actemra 162mg weekly.  Has completed his prednisone taper which was guided by GiACTA trial.    # Giant cell arteritis   1) continue actemra 162mg once every 2 weeks  2) continue lab draws to every 1 month outlined above  3) has appointment with Dr Garcia on 8/25 and follow-up already scheduled with me on 9/22 at 10 AM. As long as inflammatory markers and his exam with Dr Garcia remains quiet for active disease, our plan will be to stop actemra completely on 9/22. We discussed this today at length. Discussed that I will continue to follow him with appointments and labs after we stop actemra to ensure no he does not have return of his disease, especially because he did not have elevated inflammatory markers at his disease onset- only acute vision loss.     # Long term use of high risk medication: Actemra  1) Actemra 162mg weekly every 2 weeks. tolerating without issues. No evidence of toxicity by most recent labs.  2) Will continue routine screening drug toxicity labs.     # Osteopenia   Managed by PCP.     #CLL  Managed by oncology.    Bib Alcantara MD   Rheumatology      Subjective:   July 28, 2023  -has continued on actemra q2 weeks  -No HA, jaw claudication, vision changes, scalp tenderness, double vision.   -no fevers/chills/night sweats  -no proximal muscle pain/stiffness  -no unintentional weight loss  -no red/hot/swollen joints  14 point ROS collected and negative if not documented above      May 19, 2023   -decreased actemra at the time of his last appointment down from 162mg once weekly down to 162mg q2 weeks.   -Last Saturday afternoon, about 2-3pm, period of about 30 minutes, intermittent, lasted for a few seconds, above his ear. Then that same evening, before bed, lasting 1-2 seconds. Above the left ear. Next day no symptoms nothing returned  -no fevers/chills/night sweats  -no proximal muscle pain or stiffness  -No red/hot/swollen joints in hands/feet. Able to make a full fist upon waking in the AM  -Has chronic low back pain.   -No noticeable vision changes  -No scalp tenderness  -No jaw claudication symptoms  -No interval infections, however ongoing dental issues needs root canal.   -no scalp tenderness  14 point ROS collected and negative if not documented above.     March 31, 2023  -no change vision. No HA, scalp tenderness, jaw claudication symptoms.   -No proximal muscle pain/stiffness consistent with PMR  -No fevers/chills/night sweats.   -Has started CoQ-10  -Has continued on Actemra 162 mg once weekly.  No injection site reactions.  No interval infections.  - No unintentional weight loss.  14 point review is collected and otherwise negative    December 16, 2022  No HA, scalp tenderness, vision change, jaw claudication symptoms, proximal muscle pain/stiffness. Still with on/off back pain and sinus/nasal congestion, both of which have been chronic issues for years. No fevers/chills/night sweats. No unintentional weight loss. Was treated successfully for cutaneous fungal infection, no return now since this summer. No red/hot/swollen joints. Tolerating actemra  injections without noticeable side effects.  Other than his cutaneous fungal infection he denies any other interval infections.  Able to get full fist with me in the morning.  Denies joint stiffness that improves with use.  He feels well.  Had his flu shot recently.  14 point review of systems collected and otherwise negative.    Interval History September 2, 2022  Had dental work done. Saw his PCP for lower extremity rash, treated by PCP with steroids with taper. Though after a few weeks without resolution so was referred to dermatology for lesions on LE. Biopsied/cultured and told it was fungal infection, started on treatment which ended yesterday. Lesions on lower extremities have cleared. No HA/ change in vision/ scalp tenderness/ jaw claudication. No red/hot/swollen joints. Able to make a full fist upon waking in the AM. No proximal muscle pain/stiffness. No injection site reactions/ side effects. 14 point ROS collected and negative if not documented above.     Interval history 6/10/22  No HA, change in vision, scalp tenderness, jaw claudication. No proximal muscle pain/stiffness. No red/hot/swollen joints. No fevers/chills/night sweats. No side effects with methotrexate. Takes methotrexate and actemra on wednesdays. No side effects for either. No interval infections. No rashes.  Weight has been stable.  Able to make a full fist upon waking in the morning.  14 point review of systems collected and otherwise negative.    Interval History 3/11/22  Has continued on alendronate. Thinks he has more sensitivity in his teeth. He asks about need for addition of vit K2 to supplement. He plans to bring this question, along with question about green tea extract up with his primary care physician. Takes this on Wednesday evenings. Taking folic acid 1mg daily. Has only had 3 doses of actemra that were late. Has been on methotrexate for 3 weeks now.  No headache, vision change, scalp tenderness, proximal muscle pain or  stiffness, peripheral joint swelling redness warmth.  Able to make full fist upon waking in the morning.  No fevers chills or night sweats.  No interval infections.  No rash.  Is tolerating his methotrexate without any side effects.  GI or other.      Past Medical History  CLL  Hernia  Mono   Deviated septum  Osteopenia by DEXA    Past Surgical History  Tonsillectomy  Deviated septum    Medications:  Actemra 162 mg once weekly subcutaneous      Weeks Daily prednisone dose (mg) 26-week taper Weekly 162mg Actemra      1 60    2 50 11/5/2020    3 40    4 35 11/20/2020   5 30    6 25    7 20 12/10/2020   8 15 12/17/2020   9 12.5    10 12.5    11 10 1/8/2021   12 9    13 8    14 7 2/5/21   15 6 2/11/21   16 6    17 5    18 5 3/4/21   19 4    20 4    21 3    22 3 4/8/21   23 2    24 2    25 1    26 1 5/12/21     Allergies: Seasonal/ environmental allergies    Family History: No family history of autoimmune diseases like RA, sjogrens, SLE, scleroderma, IBD.    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 No drug use.        Objective:   Physical exam:  No vitals or exam for telephone visit    Labs:   7/25/23  Creatinine 0.79  AST and ALT normal  Esr and CRP normal  Chronic leukocytosis secondary to known CLL  Mild anemia with hemoglobin of 12.6, will continue to monitor  PLT normal    12/12/2022  Creatinine 0.78  CRP 0.02  ALT 21  AST 31  Albumin 4.1  ESR less than 1  WBC 31.7  Hemoglobin 12.3  Platelet 119    CRP  5/5/21  WBC 27.4  HGB 13.6    Cr 0.81  ALT 25  AST 29  Chol 152  Trig 88  HDL 54  LDL 80  CRP <2.9  ESR 3  UA with small blood, no cells, no protein, casts    4/8/21  ESR 4  CRP less than 2.9  WBC 30.1  Hemoglobin 13.6  Platelets 157  Creatinine 0.86  Cholesterol 179  Triglycerides 83  HDL 60      3/2/21  ESR 4  CRP <2.9  WBC 28.3  HGB 12.8    Cr 0.86  ALT 23  AST 25    2/3/21  WBC 30.6  HGB 11.9    Cr 0.83  Alk phos 40  ALT 30  AST 29    1/5/21  UA without protein,  cells or casts  ESR 5  CRP <2.9  WBC 40.6  HGB 11.6    Cr 0.81  Chol 166  Trig 86  HDL 68  LDL 81    12/14/2020   CRP <2.9  ESR 4  WBC 54  HGB 11.8    Cr 0.78  Ua unremarkable  Cholesterol 168  Trig 82  HDL 70  LDL 81    11-  ESR 3  CRP less than 2.9  WBC 63.3  Hemoglobin 12  Platelets 181  Creatinine 0.98  Total protein low at 5.9  Alk phos 48  ALT 31  AST 30  UA with 30 of albumin  Cholesterol 169  Triglycerides 45  HDL 83  LDL 77    10/1/2020  WBC 84.9  HGB 12.3    IgM 36 low  IgA 124 normal  IgG 625    Cr 0.98    9/1/2020  WBC 50.2  HGB 11.6       Imaging:  MR brain and orbits 9/10/20  Impression:    1. Regarding the orbits and globes, there is no definite abnormal  contrast enhancement or mass. No evidence of leukemic infiltration.  2. Regarding the remainder of the brain, abnormal T2 hyperintense bone  marrow signal with associated enhancement in the skull, likely  representing leukemic infiltration.  3. Subcutaneous T2 hyperintensity with associated diffusion  restriction and contrast enhancement over the left zygomatic region,  question leukemic infiltration.    Pathology:  Patient Name: MARIELA WALLACE   MR#: [id number]   Specimen #: [id number]   Collected: 9/10/2020   Received: 9/10/2020   Reported: 9/16/2020 12:35   Ordering Phy(s): AGUS RETANA     For improved result formatting, select 'View Enhanced Report Format' under    Linked Documents section.     SPECIMEN(S):   Temporal artery biopsy, left     FINAL DIAGNOSIS:   Temporal artery, left, biopsy:   1. Granulomatous arteritis consistent with giant cell arteritis.   2. Chronic inflammation of the surrounding adventitia.     COMMENT:   Preliminary results were communicated to Dr. Agus Retana and Dr. Guille Garcia on 9/14/20 at 12:45pm.     I have personally reviewed all specimens and/or slides, including the   listed special stains, and used them   with my medical judgement to determine or confirm  "the final diagnosis.     Electronically signed out by:     Za García M.D., [facility]     CLINICAL HISTORY:   The patient is a 79 year old male with a history of chronic lymphocytic   leukemia who presented with bilateral   sequential optic neuropathy with associated pallid optic disc edema and   choroidal hypoperfusion, but   relatively intact visual acuity, negative elevation of acute phase   reactants, and lack of constitutional   symptoms consistent with giant cell arteritis. He also has findings of   likely leukemic infiltration in the   skull seen on MRI. He undergoes temporal artery biopsy on the left.     GROSS:   The specimen is received in formalin with proper patient identification,   labeled \"left temporal artery\".  The   specimen consists of a 3.2 cm in length by 0.3 cm in diameter tan-white   vessel segment. The specimen is   submitted intact in A1. (Dictated by: PADILLA Montalvo 9/11/2020 03:08   PM)     MICROSCOPIC:   The tissue consists of artery with narrowed lumen and intimal hyperplasia.    There is an infiltrate of   lymphocytes, plasma cells, epithelioid histiocytes, and multinucleated   giant cells throughout all layers of   the artery. There is associated loss of the internal elastic lamina on   elastic stain. Occasional focal   calcifications are seen a the level of the internal elastic lamina.   Neovascularization of the vessel wall is   present. Perivascular lymphocytic infiltrates are seen in the surrounding   adventitia. Immunohistochemistry   with CD3 and CD20 demonstrates a predominantly T-cell lymphocytic   infiltrate within the vessel wall. There is   a mixed infiltrate of T and B lymphocytes perivascular in the adventitia.   CD5 and CD43 have similar staining   patterns to CD3. There is light patchy CD23 staining in the areas positive    for CD20. CD68 highlights the   epithelioid histiocytes within the vessel wall.     The technical component of this testing was completed " at the [facility], with the professional component performed    at the [facility], [address],   [place] [address] ([phone])     CPT Codes:   A: 70208-RB4, 70356-VGFB, 56860-GPK, 29573-MHV, 47459-RJA, 71688-ORK,   76340-CHW, 39250-VCB     COLLECTION SITE:   Client: [facility] acute hospital   Location: [facility])

## 2023-07-28 NOTE — NURSING NOTE
Patient declined medication review      Is the patient currently in the state of [place]? YES    Visit mode:TELEPHONE    If the visit is dropped, the patient can be reconnected by: TELEPHONE VISIT: Phone number: [phone]    Will anyone else be joining the visit? NO      How would you like to obtain your AVS? MyChart    Are changes needed to the allergy or medication list? NO    Reason for visit: RAMIRO Posada, VF

## 2023-07-28 NOTE — LETTER
7/28/2023       RE: Bon Michael  [birth year][address] [facility] [id number]     Dear Colleague,    Thank you for referring your patient, Bon Michael, to the [facility] RHEUMATOLOGY [facility]INIC [facility]. Please see a copy of my visit note below.    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 Never had HA, jaw claudication, PMR symptoms. No peripheral inflammatory arthritis symptoms.     Disease currently in remission by history/ review of most recent ESR/CRP on 7/25 once every 2  weeks actemra 162mg weekly.  Has completed his prednisone taper which was guided by GiACTA trial.    # Giant cell arteritis   1) continue actemra 162mg once every 2 weeks  2) continue lab draws to every 1 month outlined above  3) has appointment with Dr Garcia on 8/25 and follow-up already scheduled with me on 9/22 at 10 AM. As long as inflammatory markers and his exam with Dr Garcia remains quiet for active disease, our plan will be to stop actemra completely on 9/22. We discussed this today at length. Discussed that I will continue to follow him with appointments and labs after we stop actemra to ensure no he does not have return of his disease, especially because he did not have elevated inflammatory markers at his disease onset- only acute vision loss.     # Long term  use of high risk medication: Actemra  1) Actemra 162mg weekly every 2 weeks. tolerating without issues. No evidence of toxicity by most recent labs.  2) Will continue routine screening drug toxicity labs.     # Osteopenia   Managed by PCP.     #CLL  Managed by oncology.    Bib Alcantraa MD   Rheumatology     Subjective:   July 28, 2023  -has continued on actemra q2 weeks  -No HA, jaw claudication, vision changes, scalp tenderness, double vision.   -no fevers/chills/night sweats  -no proximal muscle pain/stiffness  -no unintentional weight loss  -no red/hot/swollen joints  14 point ROS collected and negative if not documented above      May 19, 2023   -decreased actemra at the time of his last appointment down from 162mg once weekly down to 162mg q2 weeks.   -Last Saturday afternoon, about 2-3pm, period of about 30 minutes, intermittent, lasted for a few seconds, above his ear. Then that same evening, before bed, lasting 1-2 seconds. Above the left ear. Next day no symptoms nothing returned  -no fevers/chills/night sweats  -no proximal muscle pain or stiffness  -No red/hot/swollen joints in hands/feet. Able to make a full fist upon waking in the AM  -Has chronic low back pain.   -No noticeable vision changes  -No scalp tenderness  -No jaw claudication symptoms  -No interval infections, however ongoing dental issues needs root canal.   -no scalp tenderness  14 point ROS collected and negative if not documented above.     March 31, 2023  -no change vision. No HA, scalp tenderness, jaw claudication symptoms.   -No proximal muscle pain/stiffness consistent with PMR  -No fevers/chills/night sweats.   -Has started CoQ-10  -Has continued on Actemra 162 mg once weekly.  No injection site reactions.  No interval infections.  - No unintentional weight loss.  14 point review is collected and otherwise negative    December 16, 2022  No HA, scalp tenderness, vision change, jaw claudication symptoms, proximal muscle  pain/stiffness. Still with on/off back pain and sinus/nasal congestion, both of which have been chronic issues for years. No fevers/chills/night sweats. No unintentional weight loss. Was treated successfully for cutaneous fungal infection, no return now since this summer. No red/hot/swollen joints. Tolerating actemra injections without noticeable side effects.  Other than his cutaneous fungal infection he denies any other interval infections.  Able to get full fist with me in the morning.  Denies joint stiffness that improves with use.  He feels well.  Had his flu shot recently.  14 point review of systems collected and otherwise negative.    Interval History September 2, 2022  Had dental work done. Saw his PCP for lower extremity rash, treated by PCP with steroids with taper. Though after a few weeks without resolution so was referred to dermatology for lesions on LE. Biopsied/cultured and told it was fungal infection, started on treatment which ended yesterday. Lesions on lower extremities have cleared. No HA/ change in vision/ scalp tenderness/ jaw claudication. No red/hot/swollen joints. Able to make a full fist upon waking in the AM. No proximal muscle pain/stiffness. No injection site reactions/ side effects. 14 point ROS collected and negative if not documented above.     Interval history 6/10/22  No HA, change in vision, scalp tenderness, jaw claudication. No proximal muscle pain/stiffness. No red/hot/swollen joints. No fevers/chills/night sweats. No side effects with methotrexate. Takes methotrexate and actemra on wednesdays. No side effects for either. No interval infections. No rashes.  Weight has been stable.  Able to make a full fist upon waking in the morning.  14 point review of systems collected and otherwise negative.    Interval History 3/11/22  Has continued on alendronate. Thinks he has more sensitivity in his teeth. He asks about need for addition of vit K2 to supplement. He plans to bring this  question, along with question about green tea extract up with his primary care physician. Takes this on Wednesday evenings. Taking folic acid 1mg daily. Has only had 3 doses of actemra that were late. Has been on methotrexate for 3 weeks now.  No headache, vision change, scalp tenderness, proximal muscle pain or stiffness, peripheral joint swelling redness warmth.  Able to make full fist upon waking in the morning.  No fevers chills or night sweats.  No interval infections.  No rash.  Is tolerating his methotrexate without any side effects.  GI or other.      Past Medical History  CLL  Hernia  Mono   Deviated septum  Osteopenia by DEXA    Past Surgical History  Tonsillectomy  Deviated septum    Medications:  Actemra 162 mg once weekly subcutaneous      Weeks Daily prednisone dose (mg) 26-week taper Weekly 162mg Actemra      1 60    2 50 11/5/2020    3 40    4 35 11/20/2020   5 30    6 25    7 20 12/10/2020   8 15 12/17/2020   9 12.5    10 12.5    11 10 1/8/2021   12 9    13 8    14 7 2/5/21   15 6 2/11/21   16 6    17 5    18 5 3/4/21   19 4    20 4    21 3    22 3 4/8/21   23 2    24 2    25 1    26 1 5/12/21     Allergies: Seasonal/ environmental allergies    Family History: No family history of autoimmune diseases like RA, sjogrens, SLE, scleroderma, IBD.    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 No drug use.        Objective:   Physical exam:  No vitals or exam for telephone visit    Labs:   7/25/23  Creatinine 0.79  AST and ALT normal  Esr and CRP normal  Chronic leukocytosis secondary to known CLL  Mild anemia with hemoglobin of 12.6, will continue to monitor  PLT normal    12/12/2022  Creatinine 0.78  CRP 0.02  ALT 21  AST 31  Albumin 4.1  ESR less than 1  WBC 31.7  Hemoglobin 12.3  Platelet 119    CRP  5/5/21  WBC 27.4  HGB 13.6    Cr 0.81  ALT 25  AST 29  Chol 152  Trig 88  HDL 54  LDL 80  CRP <2.9  ESR 3  UA with small blood, no cells, no protein, casts    4/8/21  ESR 4  CRP  less than 2.9  WBC 30.1  Hemoglobin 13.6  Platelets 157  Creatinine 0.86  Cholesterol 179  Triglycerides 83  HDL 60      3/2/21  ESR 4  CRP <2.9  WBC 28.3  HGB 12.8    Cr 0.86  ALT 23  AST 25    2/3/21  WBC 30.6  HGB 11.9    Cr 0.83  Alk phos 40  ALT 30  AST 29    1/5/21  UA without protein, cells or casts  ESR 5  CRP <2.9  WBC 40.6  HGB 11.6    Cr 0.81  Chol 166  Trig 86  HDL 68  LDL 81    12/14/2020   CRP <2.9  ESR 4  WBC 54  HGB 11.8    Cr 0.78  Ua unremarkable  Cholesterol 168  Trig 82  HDL 70  LDL 81    11-  ESR 3  CRP less than 2.9  WBC 63.3  Hemoglobin 12  Platelets 181  Creatinine 0.98  Total protein low at 5.9  Alk phos 48  ALT 31  AST 30  UA with 30 of albumin  Cholesterol 169  Triglycerides 45  HDL 83  LDL 77    10/1/2020  WBC 84.9  HGB 12.3    IgM 36 low  IgA 124 normal  IgG 625    Cr 0.98    9/1/2020  WBC 50.2  HGB 11.6       Imaging:  MR brain and orbits 9/10/20  Impression:    1. Regarding the orbits and globes, there is no definite abnormal  contrast enhancement or mass. No evidence of leukemic infiltration.  2. Regarding the remainder of the brain, abnormal T2 hyperintense bone  marrow signal with associated enhancement in the skull, likely  representing leukemic infiltration.  3. Subcutaneous T2 hyperintensity with associated diffusion  restriction and contrast enhancement over the left zygomatic region,  question leukemic infiltration.    Pathology:  Patient Name: MARIELA WALLACE   MR#: [id number]   Specimen #: [id number]   Collected: 9/10/2020   Received: 9/10/2020   Reported: 9/16/2020 12:35   Ordering Phy(s): AGUS RETANA     For improved result formatting, select 'View Enhanced Report Format' under    Linked Documents section.     SPECIMEN(S):   Temporal artery biopsy, left     FINAL DIAGNOSIS:   Temporal artery, left, biopsy:   1. Granulomatous arteritis consistent with giant cell arteritis.   2. Chronic inflammation of the  "surrounding adventitia.     COMMENT:   Preliminary results were communicated to Dr. Valerie Carrington and Dr. Guille Garcia on 9/14/20 at 12:45pm.     I have personally reviewed all specimens and/or slides, including the   listed special stains, and used them   with my medical judgement to determine or confirm the final diagnosis.     Electronically signed out by:     Za García M.D., [facility]     CLINICAL HISTORY:   The patient is a 79 year old male with a history of chronic lymphocytic   leukemia who presented with bilateral   sequential optic neuropathy with associated pallid optic disc edema and   choroidal hypoperfusion, but   relatively intact visual acuity, negative elevation of acute phase   reactants, and lack of constitutional   symptoms consistent with giant cell arteritis. He also has findings of   likely leukemic infiltration in the   skull seen on MRI. He undergoes temporal artery biopsy on the left.     GROSS:   The specimen is received in formalin with proper patient identification,   labeled \"left temporal artery\".  The   specimen consists of a 3.2 cm in length by 0.3 cm in diameter tan-white   vessel segment. The specimen is   submitted intact in A1. (Dictated by: PADILLA Montalvo 9/11/2020 03:08   PM)     MICROSCOPIC:   The tissue consists of artery with narrowed lumen and intimal hyperplasia.    There is an infiltrate of   lymphocytes, plasma cells, epithelioid histiocytes, and multinucleated   giant cells throughout all layers of   the artery. There is associated loss of the internal elastic lamina on   elastic stain. Occasional focal   calcifications are seen a the level of the internal elastic lamina.   Neovascularization of the vessel wall is   present. Perivascular lymphocytic infiltrates are seen in the surrounding   adventitia. Immunohistochemistry   with CD3 and CD20 demonstrates a predominantly T-cell lymphocytic   infiltrate within the vessel wall. There is   a mixed " infiltrate of T and B lymphocytes perivascular in the adventitia.   CD5 and CD43 have similar staining   patterns to CD3. There is light patchy CD23 staining in the areas positive    for CD20. CD68 highlights the   epithelioid histiocytes within the vessel wall.     The technical component of this testing was completed at the [facility], with the professional component performed    at the General acute hospital, [address] [place] [id number] ([phone])     CPT Codes:   A: 13630-BJ1, 37479-TFPN, 46969-PJW, 63155-NWM, 44550-WVV, 67709-KNS,   25140-CKR, 63491-TDQ     COLLECTION SITE:   Client[place] [facility]   [place] [facility]       Bib Alcantara MD

## 2023-08-02 ENCOUNTER — APPOINTMENT (OUTPATIENT)
Dept: URBAN - METROPOLITAN AREA CLINIC 259 | Age: 83
Setting detail: DERMATOLOGY
End: 2023-08-06

## 2023-08-02 DIAGNOSIS — L01.03 BULLOUS IMPETIGO: ICD-10-CM

## 2023-08-02 PROBLEM — Z85.79 PERSONAL HISTORY OF OTHER MALIGNANT NEOPLASMS OF LYMPHOID, HEMATOPOIETIC AND RELATED TISSUES: Status: ACTIVE | Noted: 2023-08-02

## 2023-08-02 PROBLEM — L08.9 LOCAL INFECTION OF THE SKIN AND SUBCUTANEOUS TISSUE, UNSPECIFIED: Status: ACTIVE | Noted: 2023-08-02

## 2023-08-02 PROCEDURE — OTHER ORDER TESTS: OTHER

## 2023-08-02 PROCEDURE — OTHER PRESCRIPTION: OTHER

## 2023-08-02 PROCEDURE — OTHER COUNSELING: OTHER

## 2023-08-02 PROCEDURE — 99213 OFFICE O/P EST LOW 20 MIN: CPT

## 2023-08-02 RX ORDER — CEPHALEXIN 500 MG/1
CAPSULE ORAL
Qty: 21 | Refills: 0 | Status: ERX | COMMUNITY
Start: 2023-08-02

## 2023-08-02 ASSESSMENT — LOCATION DETAILED DESCRIPTION DERM: LOCATION DETAILED: RIGHT POPLITEAL SKIN

## 2023-08-02 ASSESSMENT — LOCATION ZONE DERM: LOCATION ZONE: LEG

## 2023-08-02 ASSESSMENT — LOCATION SIMPLE DESCRIPTION DERM: LOCATION SIMPLE: RIGHT POPLITEAL SKIN

## 2023-08-02 NOTE — PROCEDURE: COUNSELING
Detail Level: Detailed
Patient Specific Counseling (Will Not Stick From Patient To Patient): - Discussed previous pathology result in depth.\\n- Discussed this was previously diagnosed as tinea, however looks more bacterial today.\\n- Will culture to narrow down cause.\\n- Start Cephalexin 500mg TID x 7 days.\\n- Will adjust rx based on results.

## 2023-08-02 NOTE — PROCEDURE: ORDER TESTS
Expected Date Of Service: 08/02/2023
Bill For Surgical Tray: no
Lab Facility: [id number]
Performing Laboratory: [id number]
Billing Type: Third-Party Bill

## 2023-08-15 ENCOUNTER — RX ONLY (RX ONLY)
Age: 83
End: 2023-08-15

## 2023-08-15 RX ORDER — CLOBETASOL PROPIONATE 0.5 MG/G
CREAM TOPICAL
Qty: 60 | Refills: 0 | Status: ERX | COMMUNITY
Start: 2023-08-15

## 2023-08-25 ENCOUNTER — OFFICE VISIT (OUTPATIENT)
Dept: OPHTHALMOLOGY | Facility: CLINIC | Age: 83
End: 2023-08-25
Attending: OPHTHALMOLOGY
Payer: MEDICARE

## 2023-08-25 DIAGNOSIS — H53.40 VISUAL FIELD DEFECT: ICD-10-CM

## 2023-08-25 DIAGNOSIS — H47.20 PARTIAL OPTIC ATROPHY: ICD-10-CM

## 2023-08-25 DIAGNOSIS — H46.9 OPTIC NEUROPATHY: Primary | ICD-10-CM

## 2023-08-25 PROCEDURE — 92133 CPTRZD OPH DX IMG PST SGM ON: CPT | Mod: 26 | Performed by: OPHTHALMOLOGY

## 2023-08-25 PROCEDURE — 92083 EXTENDED VISUAL FIELD XM: CPT | Performed by: OPHTHALMOLOGY

## 2023-08-25 PROCEDURE — 92133 CPTRZD OPH DX IMG PST SGM ON: CPT | Performed by: OPHTHALMOLOGY

## 2023-08-25 PROCEDURE — 92083 EXTENDED VISUAL FIELD XM: CPT | Mod: 26 | Performed by: OPHTHALMOLOGY

## 2023-08-25 PROCEDURE — G0463 HOSPITAL OUTPT CLINIC VISIT: HCPCS | Performed by: OPHTHALMOLOGY

## 2023-08-25 PROCEDURE — 92014 COMPRE OPH EXAM EST PT 1/>: CPT | Mod: GC | Performed by: OPHTHALMOLOGY

## 2023-08-25 ASSESSMENT — TONOMETRY
IOP_METHOD: ICARE
OS_IOP_MMHG: 09
OD_IOP_MMHG: 08

## 2023-08-25 ASSESSMENT — EXTERNAL EXAM - LEFT EYE: OS_EXAM: NORMAL

## 2023-08-25 ASSESSMENT — VISUAL ACUITY
OS_CC+: -3
CORRECTION_TYPE: GLASSES
OS_CC: 20/25
OD_CC: 20/20
METHOD: SNELLEN - LINEAR
OD_CC+: -2

## 2023-08-25 ASSESSMENT — CUP TO DISC RATIO
OS_RATIO: 0.3
OD_RATIO: 0.3

## 2023-08-25 ASSESSMENT — CONF VISUAL FIELD
OD_INFERIOR_TEMPORAL_RESTRICTION: 0
METHOD: COUNTING FINGERS
OD_NORMAL: 1
OD_SUPERIOR_TEMPORAL_RESTRICTION: 0
OD_SUPERIOR_NASAL_RESTRICTION: 0
OD_INFERIOR_NASAL_RESTRICTION: 0
OS_INFERIOR_NASAL_RESTRICTION: 3

## 2023-08-25 ASSESSMENT — REFRACTION_WEARINGRX
OD_ADD: +2.75
OD_AXIS: 152
OS_SPHERE: +6.00
OD_SPHERE: +3.00
OS_AXIS: 090
OS_ADD: +2.75
SPECS_TYPE: PAL
OD_CYLINDER: +0.75
OS_CYLINDER: +0.75

## 2023-08-25 ASSESSMENT — SLIT LAMP EXAM - LIDS
COMMENTS: NORMAL
COMMENTS: NORMAL

## 2023-08-25 ASSESSMENT — EXTERNAL EXAM - RIGHT EYE: OD_EXAM: NORMAL

## 2023-08-25 NOTE — LETTER
"2023    RE: Bon Michael  : 1940  MRN: [id number]    Dear Providers,    I saw our mutual patient, Bon Michael, in follow-up in my clinic recently.  After a thorough neuro-ophthalmic history and examination, I came to the following conclusions:     1. 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      Very atypical presentation without elevated acute phase reactants, no PMR symptoms, predominantly peripheral field losses. He completed an oral prednisone taper in early  and stopped methotrexate in mid- with no evidence of disease recurrence since. Patient also has CLL which is managed by oncology, monitored every 6 months.     Today giant cell arteritis remains quiescent (no evidence of new vision loss or new optic nerve ischemia and no symptoms of giant cell arteritis recurrence) on actemra 162 mg, which he has decreased from weekly to every-other-week dosing since 2023. Patient has plans per rheum to stop actemra on . Patient has been stable on exam okay to discontinue per ophtho.     Follow-up in 2 months with me. Patient has close follow-up also with rheumatology, Dr. Alcantara.    2.  Borderline visually significant cataracts - observe for now    3. 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 Present since at least 2022.     Plan:    -Follow-up with me in 2 months. Given return precautions for worsening or new visual symptoms and patient expressed understanding.      Historical data from initial visit (2020):    Bon Michael is a 79 year old male who presents to neuro-ophthalmology   clinic today for consultation from Jose Andino MD at [facility] for   visual disturbance of left eye. He describes it as \"his left eye not   getting enough light\". He describes noticing the change in his left eye's   temporal visual field, inferior > superior. He notices it more first thing     in the morning. These symptoms began on " 8/22/2020 and he describes it as   worsening - he is unclear if it the deficit has gotten larger or darker   however. He first noticed it while driving at the periphery of his vision.      Patient saw Dr. Lewis on August 28.  He underwent esr / crp testing as   well as mri (see below).     Labs: 9/2/20- WBC- 50.2, ESR- 28, CRP < 5     On August 31 he developed symptoms of waviness in his peripheral vision in   the right eye.  He went to Dr. Andino on 9/4/2020 who wanted him to go to   the ED but he was unable.  Dr. Andino reviewed the patient's fluorescein   angiography and felt there was choroidal filling delay in the right eye   and pallid edema in the right eye concerning for giant cell arteritis.  We   discussed the case via phone.      PRIOR IMAGING:  BRAIN MRI WITH/WO GADOLINIUM, MRA OF THE [place] OF SANCHEZ AND MRA OF THE   CAROTID ARTERIES - [facility] (9/1/2020)  IMPRESSION:    1.  No acute intracranial abnormality.  2.  No evidence of an orbital abnormalities.  3.  Mild generalized cerebral atrophy.  4.  Unremarkable MRA of the [place] of [place].  5.  Unremarkable MRA of the cervical vessels.     On my re-review I disagreed with the radiology read.  There is evidence of   patchy enhancement diffusely within the bilateral orbits and along the   bilateral intraorbital optic nerve sheaths.  Will discuss with   neuro-radiology regarding finding and repeat scan.     No bisphosphonates in the past (no osteoporosis medications for 7-8 years   per patient).      Left temporal artery biopsy 9/9/20- positive for giant cell arteritis   Prednisone (started 9/4/2020)  Patient started Actemra 10/28/20.    Interim History and Exam since Last Visit 6/30/2023  Patient is here for follow up on GCA. He denies any unintentional weight loss, fevers, temporal pain, hip/shoulder stiffness, double vision, vision loss, or jaw claudication. He is now taking the Actemra once every other week since April of 2023, compared  to once a week prior. There are plans per rheum to discontinue actemra on 9/22 per their last note.       Most recent visit with Dr. 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 Never had HA, jaw claudication, PMR symptoms. No peripheral inflammatory arthritis symptoms.      Disease currently in remission by history/ review of most recent ESR/CRP on 7/25 once every 2  weeks actemra 162mg weekly.  Has completed his prednisone taper which was guided by GiACTA trial.     # Giant cell arteritis   1) continue actemra 162mg once every 2 weeks  2) continue lab draws to every 1 month outlined above  3) has appointment with Dr aGrcia on 8/25 and follow-up already scheduled with me on 9/22 at 10 AM. As long as inflammatory markers and his exam with Dr Garcia remains quiet for active disease, our plan will be to stop actemra completely on 9/22. We discussed this today at length. Discussed that I will continue to follow him with appointments and labs after we stop actemra to ensure no he does not have return of his disease, especially because he did not have elevated inflammatory markers at his disease onset- only acute vision loss.     # Long term use of high risk medication: Actemra  1) Actemra 162mg weekly every 2 weeks. tolerating without issues. No evidence of toxicity by most recent labs.  2) Will continue routine screening drug toxicity labs.      # Osteopenia   Managed by PCP.      #CLL  Managed by oncology.    Exam:   Corrected distance visual acuity was 20/20 in the right eye and 20/25in the left eye. Intraocular pressure was 8 in the right eye and 9 in the left eye using Tonopen.  Color vision 11/11 right eye and 11/11 left eye.  Pupils with no RAPD.  Anterior  segment exam with stable 2+ nuclear sclerosis cataract and 2+ cortical cataract bilaterally.  Fundus exam with diffuse mild pallor bilaterally (stable)    Procedures:   OCT RNFL with stable thinning of RNFL 8/25/23 compared to 6/30/2023.     G-TOP with stable mean deviation and defects bilaterally compared to 6/30/23.        For further exam details, please feel free to contact our office for additional records.  If you wish to contact me regarding this patient please email me at [email or web address] or give my clinic a call to arrange a phone conversation.    Sincerely,    Guille Garcia MD  , Neuro-Ophthalmology and Adult Strabismus Surgery  The Lucy Castano Chair in Neuro-Ophthalmology  Department of Ophthalmology and Visual Neurosciences  [facility]

## 2023-08-25 NOTE — PROGRESS NOTES
"   1. 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      Very atypical presentation without elevated acute phase reactants, no PMR symptoms, predominantly peripheral field losses. He completed an oral prednisone taper in early 2021 and stopped methotrexate in mid-2022 with no evidence of disease recurrence since. Patient also has CLL which is managed by oncology, monitored every 6 months.     Today giant cell arteritis remains quiescent (no evidence of new vision loss or new optic nerve ischemia and no symptoms of giant cell arteritis recurrence) on actemra 162 mg, which he has decreased from weekly to every-other-week dosing since April 2023. Patient has plans per rheum to stop actemra on 9/22. Patient has been stable on exam okay to discontinue per ophtho.     Follow-up in 2 months with me. Patient has close follow-up also with rheumatology, Dr. Alcantara.    2.  Borderline visually significant cataracts - observe for now    3. 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 Present since at least 04/2022.     Plan:    -Follow-up with me in 2 months. Given return precautions for worsening or new visual symptoms and patient expressed understanding.      Historical data from initial visit (9/2020):    Bon Michael is a 79 year old male who presents to neuro-ophthalmology   clinic today for consultation from Jose Andino MD at [facility] for   visual disturbance of left eye. He describes it as \"his left eye not   getting enough light\". He describes noticing the change in his left eye's   temporal visual field, inferior > superior. He notices it more first thing     in the morning. These symptoms began on 8/22/2020 and he describes it as   worsening - he is unclear if it the deficit has gotten larger or darker   however. He first noticed it while driving at the periphery of his vision.      Patient saw Dr. Lewis on August 28.  He underwent esr / crp testing as   well as mri (see " below).     Labs: 9/2/20- WBC- 50.2, ESR- 28, CRP < 5     On August 31 he developed symptoms of waviness in his peripheral vision in   the right eye.  He went to Dr. Andino on 9/4/2020 who wanted him to go to   the ED but he was unable.  Dr. Andino reviewed the patient's fluorescein   angiography and felt there was choroidal filling delay in the right eye   and pallid edema in the right eye concerning for giant cell arteritis.  We   discussed the case via phone.      PRIOR IMAGING:  BRAIN MRI WITH/WO GADOLINIUM, MRA OF THE [place] OF [place]ANC[place] AND MRA OF THE   CAROTID ARTERIES - [facility] (9/1/2020)  IMPRESSION:    1.  No acute intracranial abnormality.  2.  No evidence of an orbital abnormalities.  3.  Mild generalized cerebral atrophy.  4.  Unremarkable MRA of the [place] of [place].  5.  Unremarkable MRA of the cervical vessels.     On my re-review I disagreed with the radiology read.  There is evidence of   patchy enhancement diffusely within the bilateral orbits and along the   bilateral intraorbital optic nerve sheaths.  Will discuss with   neuro-radiology regarding finding and repeat scan.     No bisphosphonates in the past (no osteoporosis medications for 7-8 years   per patient).      Left temporal artery biopsy 9/9/20- positive for giant cell arteritis   Prednisone (started 9/4/2020)  Patient started Actemra 10/28/20.    Interim History and Exam since Last Visit 6/30/2023  Patient is here for follow up on GCA. He denies any unintentional weight loss, fevers, temporal pain, hip/shoulder stiffness, double vision, vision loss, or jaw claudication. He is now taking the Actemra once every other week since April of 2023, compared to once a week prior. There are plans per rheum to discontinue actemra on 9/22 per their last note.       Most recent visit with Dr. 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 Never had HA, jaw claudication, PMR symptoms. No peripheral inflammatory arthritis symptoms.      Disease currently in remission by history/ review of most recent ESR/CRP on 7/25 once every 2  weeks actemra 162mg weekly.  Has completed his prednisone taper which was guided by GiACTA trial.     # Giant cell arteritis   1) continue actemra 162mg once every 2 weeks  2) continue lab draws to every 1 month outlined above  3) has appointment with Dr Garcia on 8/25 and follow-up already scheduled with me on 9/22 at 10 AM. As long as inflammatory markers and his exam with Dr Garcia remains quiet for active disease, our plan will be to stop actemra completely on 9/22. We discussed this today at length. Discussed that I will continue to follow him with appointments and labs after we stop actemra to ensure no he does not have return of his disease, especially because he did not have elevated inflammatory markers at his disease onset- only acute vision loss.     # Long term use of high risk medication: Actemra  1) Actemra 162mg weekly every 2 weeks. tolerating without issues. No evidence of toxicity by most recent labs.  2) Will continue routine screening drug toxicity labs.      # Osteopenia   Managed by PCP.      #CLL  Managed by oncology.    Exam:   Corrected distance visual acuity was 20/20 in the right eye and 20/25in the left eye. Intraocular pressure was 8 in the right eye and 9 in the left eye using Tonopen.  Color vision 11/11 right eye and 11/11 left eye.  Pupils with no RAPD.  Anterior segment exam with stable 2+ nuclear sclerosis cataract and 2+ cortical cataract bilaterally.  Fundus exam with diffuse mild pallor bilaterally (stable)    Procedures:   OCT RNFL with stable thinning of RNFL 8/25/23 compared to 6/30/2023.     G-TOP with stable mean deviation  and defects bilaterally compared to 6/30/23.         Complete documentation of historical and exam elements from today's encounter can be found in the full encounter summary report (not reduplicated in this progress note).  I personally obtained the chief complaint(s) and history of present illness.  I confirmed and edited as necessary the review of systems, past medical/surgical history, family history, social history, and examination findings as documented by others; and I examined the patient myself.  I personally reviewed the relevant tests, images, and reports as documented above.  I formulated and edited as necessary the assessment and plan and discussed the findings and management plan with the patient and family.  I personally reviewed the ophthalmic test(s) associated with this encounter, agree with the interpretation(s) as documented by the resident/fellow, and have edited the corresponding report(s) as necessary.     MD Wayne Gomez MD   Fellow, Neuro-Ophthalmology

## 2023-08-25 NOTE — NURSING NOTE
Chief Complaint(s) and History of Present Illness(es)       Follow Up    In both eyes.  Since onset it is stable.  Associated symptoms include Negative for eye pain, headache, flashes and floaters.  Pain was noted as 0/10. Additional comments: Bon Michael is a 82 year old male with the following diagnoses:  1. 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 Borderline visually significant cataracts  Bon reports vision stable, no changes from his previous visit.  No eye pain or headaches.    ANNETTE Christiansen 8/25/2023 7:23 AM

## 2023-09-22 ENCOUNTER — TELEPHONE (OUTPATIENT)
Dept: RHEUMATOLOGY | Facility: CLINIC | Age: 83
End: 2023-09-22
Payer: MEDICARE

## 2023-09-26 ENCOUNTER — VIRTUAL VISIT (OUTPATIENT)
Dept: RHEUMATOLOGY | Facility: CLINIC | Age: 83
End: 2023-09-26
Attending: INTERNAL MEDICINE
Payer: MEDICARE

## 2023-09-26 DIAGNOSIS — Z79.899 HIGH RISK MEDICATION USE: ICD-10-CM

## 2023-09-26 DIAGNOSIS — M31.6 GIANT CELL ARTERITIS (H): Primary | ICD-10-CM

## 2023-09-26 PROCEDURE — 99443 PR PHYSICIAN TELEPHONE EVALUATION 21-30 MIN: CPT | Mod: 95 | Performed by: INTERNAL MEDICINE

## 2023-09-26 RX ORDER — VITAMIN K2 90 MCG
1 CAPSULE ORAL PRN
COMMUNITY

## 2023-09-26 NOTE — NURSING NOTE
Unable to complete PHQ-2 due to time restraint.     Is the patient currently in the state of [place]? YES    Visit mode:TELEPHONE    If the visit is dropped, the patient can be reconnected by: TELEPHONE VISIT: Phone number:   Telephone Information:   Mobile [phone]       Will anyone else be joining the visit? NO  (If patient encounters technical issues they should call [phone] :[id number])    How would you like to obtain your AVS? MyChart    Are changes needed to the allergy or medication list?  Unable to review all medications due to time restraint.     Reason for visit: RAMIRO GOYAL

## 2023-09-26 NOTE — LETTER
9/26/2023       RE: Bon Michael  [address] [address] [facility] [address]     Dear Colleague,    Thank you for referring your patient, Bon Michael, to the [facility] [place] [facility]. Please see a copy of my visit note below.         TELEPHONE Rheumatology Follow-up    Name: Bon Michael    MRN [id number]   Date of service: September 26, 2023   Date of last visit :July 28, 2023          Reason for Follow-up:  GCA   Requesting physician: Guille Garcia MD        Assessment & Plan:   Assessment:  82 year old male with history of CLL who was referred to rheumatology for evaluation and treatment of biopsy proven giant cell arteritis with bilateral ischemic optic neuropathy clinically manifested by now stable peripheral vision loss in left>right eye. Never had HA, jaw claudication, PMR symptoms. No peripheral inflammatory arthritis symptoms.     No evidence of active GCA or PMR today 9/26/23 by history or review of his most recent inflammatory markers- both his ESR and CRP were negative/normal on 9/20/23. At this time, we will be stopping his actemra as 1) he has demonstrated ongoing quiet disease despite tapering actemra to every 2 weeks 2) he has been on actemra for 3 years.     As we stop actemra, I would like him to continue with frequent lab draws. I do note that he did not have increased inflammatory markers at the time of his initial biopsy proven GCA however I think still prudent to check these moving forward as other than acute vision loss this may be the only marker of subclinical disease activity prior to additional end organ damage from active vasculitis.     PLAN:  -stop actemra  -check ESR and CRP q2 weeks  -will repeat his LFTs with next lab draw (see below)  -follow-up with me on Dec 1st at 10AM  -continue follow-up with Dr Garcia as planned    # Long term use of high risk medication: Actemra  1)  Actemra 162mg weekly every 2 weeks. tolerating without issues. No evidence of toxicity by most recent labs. He did have a mild elevation in his AST on his CMP to 61 with ULN being 50. ALT was normal. Has not been elevated in the past. Do not think that it is secondary to drug toxicity. We are stopping actemra regardless. Will repeat his LFTs with his next lab draw in 2 weeks time. Will continue with lab checks, but only for ESR and CRP every 2 weeks, unless his LFTs continue to be elevated then will refer to hepatology for additional evaluation.     # Osteopenia   Managed by PCP.     #CLL  Managed by oncology.    Bib Alcantara MD   Rheumatology     Subjective:   September 26, 2023 interval history  -Mr. Yury Michael reports last dose of Actemra approximately two weeks ago, with his most recent dose taken on September 13th. He has not experienced any significant side effects. He did report a transient headache, which was localized to the back of his head on the right side. This was a new symptom for him, but it resolved spontaneously after one day without intervention.    Mr. Michael denies experiencing any jaw pain or systemic symptoms such as fevers, chills, or night sweats. He did note a change in his vision, but this was addressed with a new prescription for his glasses.     The patient reports weight loss, which he attributes to increased physical activity and dietary changes, including increased consumption of fruits and vegetables and decreased intake of carbohydrates. He is actively seeking ways to gain weight in a healthy manner.    No acute vision loss, no jaw claudication symptoms, no scalp tenderness. No proximal muscle pain/stiffness. Able to make a full fist upon waking in the AM.    14 point ROS collected and negative if not documented above.       July 28, 2023  -has continued on actemra q2 weeks  -No HA, jaw claudication, vision changes, scalp tenderness, double vision.   -no fevers/chills/night  sweats  -no proximal muscle pain/stiffness  -no unintentional weight loss  -no red/hot/swollen joints  14 point ROS collected and negative if not documented above      May 19, 2023   -decreased actemra at the time of his last appointment down from 162mg once weekly down to 162mg q2 weeks.   -Last Saturday afternoon, about 2-3pm, period of about 30 minutes, intermittent, lasted for a few seconds, above his ear. Then that same evening, before bed, lasting 1-2 seconds. Above the left ear. Next day no symptoms nothing returned  -no fevers/chills/night sweats  -no proximal muscle pain or stiffness  -No red/hot/swollen joints in hands/feet. Able to make a full fist upon waking in the AM  -Has chronic low back pain.   -No noticeable vision changes  -No scalp tenderness  -No jaw claudication symptoms  -No interval infections, however ongoing dental issues needs root canal.   -no scalp tenderness  14 point ROS collected and negative if not documented above.     March 31, 2023  -no change vision. No HA, scalp tenderness, jaw claudication symptoms.   -No proximal muscle pain/stiffness consistent with PMR  -No fevers/chills/night sweats.   -Has started CoQ-10  -Has continued on Actemra 162 mg once weekly.  No injection site reactions.  No interval infections.  - No unintentional weight loss.  14 point review is collected and otherwise negative    December 16, 2022  No HA, scalp tenderness, vision change, jaw claudication symptoms, proximal muscle pain/stiffness. Still with on/off back pain and sinus/nasal congestion, both of which have been chronic issues for years. No fevers/chills/night sweats. No unintentional weight loss. Was treated successfully for cutaneous fungal infection, no return now since this summer. No red/hot/swollen joints. Tolerating actemra injections without noticeable side effects.  Other than his cutaneous fungal infection he denies any other interval infections.  Able to get full fist with me in the  morning.  Denies joint stiffness that improves with use.  He feels well.  Had his flu shot recently.  14 point review of systems collected and otherwise negative.    Interval History September 2, 2022  Had dental work done. Saw his PCP for lower extremity rash, treated by PCP with steroids with taper. Though after a few weeks without resolution so was referred to dermatology for lesions on LE. Biopsied/cultured and told it was fungal infection, started on treatment which ended yesterday. Lesions on lower extremities have cleared. No HA/ change in vision/ scalp tenderness/ jaw claudication. No red/hot/swollen joints. Able to make a full fist upon waking in the AM. No proximal muscle pain/stiffness. No injection site reactions/ side effects. 14 point ROS collected and negative if not documented above.     Interval history 6/10/22  No HA, change in vision, scalp tenderness, jaw claudication. No proximal muscle pain/stiffness. No red/hot/swollen joints. No fevers/chills/night sweats. No side effects with methotrexate. Takes methotrexate and actemra on wednesdays. No side effects for either. No interval infections. No rashes.  Weight has been stable.  Able to make a full fist upon waking in the morning.  14 point review of systems collected and otherwise negative.    Interval History 3/11/22  Has continued on alendronate. Thinks he has more sensitivity in his teeth. He asks about need for addition of vit K2 to supplement. He plans to bring this question, along with question about green tea extract up with his primary care physician. Takes this on Wednesday evenings. Taking folic acid 1mg daily. Has only had 3 doses of actemra that were late. Has been on methotrexate for 3 weeks now.  No headache, vision change, scalp tenderness, proximal muscle pain or stiffness, peripheral joint swelling redness warmth.  Able to make full fist upon waking in the morning.  No fevers chills or night sweats.  No interval infections.  No  rash.  Is tolerating his methotrexate without any side effects.  GI or other.      Past Medical History  CLL  Hernia  Mono   Deviated septum  Osteopenia by DEXA    Past Surgical History  Tonsillectomy  Deviated septum    Medications:  Actemra 162 mg once weekly subcutaneous      Weeks Daily prednisone dose (mg) 26-week taper Weekly 162mg Actemra      1 60    2 50 11/5/2020    3 40    4 35 11/20/2020   5 30    6 25    7 20 12/10/2020   8 15 12/17/2020   9 12.5    10 12.5    11 10 1/8/2021   12 9    13 8    14 7 2/5/21   15 6 2/11/21   16 6    17 5    18 5 3/4/21   19 4    20 4    21 3    22 3 4/8/21   23 2    24 2    25 1    26 1 5/12/21     Allergies: Seasonal/ environmental allergies    Family History: No family history of autoimmune diseases like RA, sjogrens, SLE, scleroderma, IBD.    Social History: Works as [employer]. Never smoker. No ETOH use. No drug use.        Objective:   Physical exam:  No vitals or exam for telephone visit    Labs:   WBC   Date Value Ref Range Status   05/05/2021 27.4 (HH) 4.0 - 11.0 10e9/L Final     Comment:     Critical Value called to and read back by  Results confirmed by repeat test  GIVEN TO Yazmin by ARM on 5.5.21 at 1401       Hemoglobin   Date Value Ref Range Status   05/05/2021 13.6 13.3 - 17.7 g/dL Final     Platelet Count   Date Value Ref Range Status   05/05/2021 120 (L) 150 - 450 10e9/L Final     Creatinine   Date Value Ref Range Status   05/05/2021 0.81 0.66 - 1.25 mg/dL Final     Lab Results   Component Value Date    ALKPHOS 45 05/05/2021     AST   Date Value Ref Range Status   05/05/2021 29 0 - 45 U/L Final     Lab Results   Component Value Date    ALT 25 05/05/2021     Sed Rate   Date Value Ref Range Status   06/16/2021 3 0 - 20 mm/h Final     CRP Inflammation   Date Value Ref Range Status   06/16/2021 <2.9 0.0 - 8.0 mg/L Final     UA RESULTS:  Recent Labs   Lab Test 06/16/21  1331   COLOR Yellow   APPEARANCE Clear   URINEGLC Negative   URINEBILI Negative   URINEKETONE  Negative   SG 1.025   UBLD Negative   URINEPH 5.5   PROTEIN Negative   UROBILINOGEN 0.2   NITRITE Negative   LEUKEST Negative   RBCU O - 2   WBCU 0 - 5      No results found for: ANAIGG, ANAP1, MUKESH, DNA, ENASMI, RNPIGG, ENASSA, ENASSB, C3COM, C4COM, CKTOTAL, ALDOLASE, RHF, CCPIGG, ANCA, MPOIGG, PR3IGG      7/25/23  Creatinine 0.79  AST and ALT normal  Esr and CRP normal  Chronic leukocytosis secondary to known CLL  Mild anemia with hemoglobin of 12.6, will continue to monitor  PLT normal    12/12/2022  Creatinine 0.78  CRP 0.02  ALT 21  AST 31  Albumin 4.1  ESR less than 1  WBC 31.7  Hemoglobin 12.3  Platelet 119    CRP  5/5/21  WBC 27.4  HGB 13.6    Cr 0.81  ALT 25  AST 29  Chol 152  Trig 88  HDL 54  LDL 80  CRP <2.9  ESR 3  UA with small blood, no cells, no protein, casts    4/8/21  ESR 4  CRP less than 2.9  WBC 30.1  Hemoglobin 13.6  Platelets 157  Creatinine 0.86  Cholesterol 179  Triglycerides 83  HDL 60      3/2/21  ESR 4  CRP <2.9  WBC 28.3  HGB 12.8    Cr 0.86  ALT 23  AST 25    2/3/21  WBC 30.6  HGB 11.9    Cr 0.83  Alk phos 40  ALT 30  AST 29    1/5/21  UA without protein, cells or casts  ESR 5  CRP <2.9  WBC 40.6  HGB 11.6    Cr 0.81  Chol 166  Trig 86  HDL 68  LDL 81    12/14/2020   CRP <2.9  ESR 4  WBC 54  HGB 11.8    Cr 0.78  Ua unremarkable  Cholesterol 168  Trig 82  HDL 70  LDL 81    11-  ESR 3  CRP less than 2.9  WBC 63.3  Hemoglobin 12  Platelets 181  Creatinine 0.98  Total protein low at 5.9  Alk phos 48  ALT 31  AST 30  UA with 30 of albumin  Cholesterol 169  Triglycerides 45  HDL 83  LDL 77    10/1/2020  WBC 84.9  HGB 12.3    IgM 36 low  IgA 124 normal  IgG 625    Cr 0.98    9/1/2020  WBC 50.2  HGB 11.6       Imaging:  MR brain and orbits 9/10/20  Impression:    1. Regarding the orbits and globes, there is no definite abnormal  contrast enhancement or mass. No evidence of leukemic infiltration.  2. Regarding the remainder of  "the brain, abnormal T2 hyperintense bone  marrow signal with associated enhancement in the skull, likely  representing leukemic infiltration.  3. Subcutaneous T2 hyperintensity with associated diffusion  restriction and contrast enhancement over the left zygomatic region,  question leukemic infiltration.    Pathology:  Patient Name: MARIELA WALLACE   MR#: [id number]   Specimen #: [id number]   Collected: 9/10/2020   Received: 9/10/2020   Reported: 9/16/2020 12:35   Ordering Phy(s): VALERIE CARRINGTON     For improved result formatting, select 'View Enhanced Report Format' under    Linked Documents section.     SPECIMEN(S):   Temporal artery biopsy, left     FINAL DIAGNOSIS:   Temporal artery, left, biopsy:   1. Granulomatous arteritis consistent with giant cell arteritis.   2. Chronic inflammation of the surrounding adventitia.     COMMENT:   Preliminary results were communicated to Dr. Valerie Carrington and Dr. Guille Garcia on 9/14/20 at 12:45pm.     I have personally reviewed all specimens and/or slides, including the   listed special stains, and used them   with my medical judgement to determine or confirm the final diagnosis.     Electronically signed out by:     Za García M.D., [facility]     CLINICAL HISTORY:   The patient is a 79 year old male with a history of chronic lymphocytic   leukemia who presented with bilateral   sequential optic neuropathy with associated pallid optic disc edema and   choroidal hypoperfusion, but   relatively intact visual acuity, negative elevation of acute phase   reactants, and lack of constitutional   symptoms consistent with giant cell arteritis. He also has findings of   likely leukemic infiltration in the   skull seen on MRI. He undergoes temporal artery biopsy on the left.     GROSS:   The specimen is received in formalin with proper patient identification,   labeled \"left temporal artery\".  The   specimen consists of a 3.2 cm in length by 0.3 cm in diameter " tan-white   vessel segment. The specimen is   submitted intact in A1. (Dictated by: PADILLA Montalvo 9/11/2020 03:08   PM)     MICROSCOPIC:   The tissue consists of artery with narrowed lumen and intimal hyperplasia.    There is an infiltrate of   lymphocytes, plasma cells, epithelioid histiocytes, and multinucleated   giant cells throughout all layers of   the artery. There is associated loss of the internal elastic lamina on   elastic stain. Occasional focal   calcifications are seen a the level of the internal elastic lamina.   Neovascularization of the vessel wall is   present. Perivascular lymphocytic infiltrates are seen in the surrounding   adventitia. Immunohistochemistry   with CD3 and CD20 demonstrates a predominantly T-cell lymphocytic   infiltrate within the vessel wall. There is   a mixed infiltrate of T and B lymphocytes perivascular in the adventitia.   CD5 and CD43 have similar staining   patterns to CD3. There is light patchy CD23 staining in the areas positive    for CD20. CD68 highlights the   epithelioid histiocytes within the vessel wall.     The technical component of this testing was completed at the [facility], with the professional component performed    at the [facility], [address] [place] [phone] ([phone])     CPT Codes:   A: 15765-GD4, 58384-ZPOD, 31252-TFM, 23663-CBP, 42357-IIL, 10832-ETK,   68939-QLN, 12524-ANX     COLLECTION SITE:   Client: [facility]   Location: [facility] ()             Virtual Visit Details    Type of service:  Telephone Visit   Phone call duration: 22 minutes     Bib Alcantara MD

## 2023-09-26 NOTE — PROGRESS NOTES
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 Never had HA, jaw claudication, PMR symptoms. No peripheral inflammatory arthritis symptoms.     No evidence of active GCA or PMR today 9/26/23 by history or review of his most recent inflammatory markers- both his ESR and CRP were negative/normal on 9/20/23. At this time, we will be stopping his actemra as 1) he has demonstrated ongoing quiet disease despite tapering actemra to every 2 weeks 2) he has been on actemra for 3 years.     As we stop actemra, I would like him to continue with frequent lab draws. I do note that he did not have increased inflammatory markers at the time of his initial biopsy proven GCA however I think still prudent to check these moving forward as other than acute vision loss this may be the only marker of subclinical disease activity prior to additional end organ damage from active vasculitis.     PLAN:  -stop actemra  -check ESR and CRP q2 weeks  -will repeat his LFTs with next lab draw (see below)  -follow-up with me on Dec 1st at 10AM  -continue follow-up with Dr Garcia as planned    # Long term use of high risk medication: Actemra  1) Actemra 162mg weekly every 2 weeks. tolerating without issues. No evidence of toxicity by most recent labs. He did have a mild elevation in his AST on his CMP to 61 with ULN being 50. ALT was normal. Has not been elevated in the past. Do not think that it is secondary to drug toxicity. We are stopping actemra  regardless. Will repeat his LFTs with his next lab draw in 2 weeks time. Will continue with lab checks, but only for ESR and CRP every 2 weeks, unless his LFTs continue to be elevated then will refer to hepatology for additional evaluation.     # Osteopenia   Managed by PCP.     #CLL  Managed by oncology.    Bib Alcantara MD   Rheumatology     Subjective:   September 26, 2023 interval history  -Mr. Yury Michael reports last dose of Actemra approximately two weeks ago, with his most recent dose taken on September 13th. He has not experienced any significant side effects. He did report a transient headache, which was localized to the back of his head on the right side. This was a new symptom for him, but it resolved spontaneously after one day without intervention.    Mr. Michael denies experiencing any jaw pain or systemic symptoms such as fevers, chills, or night sweats. He did note a change in his vision, but this was addressed with a new prescription for his glasses.     The patient reports weight loss, which he attributes to increased physical activity and dietary changes, including increased consumption of fruits and vegetables and decreased intake of carbohydrates. He is actively seeking ways to gain weight in a healthy manner.    No acute vision loss, no jaw claudication symptoms, no scalp tenderness. No proximal muscle pain/stiffness. Able to make a full fist upon waking in the AM.    14 point ROS collected and negative if not documented above.       July 28, 2023  -has continued on actemra q2 weeks  -No HA, jaw claudication, vision changes, scalp tenderness, double vision.   -no fevers/chills/night sweats  -no proximal muscle pain/stiffness  -no unintentional weight loss  -no red/hot/swollen joints  14 point ROS collected and negative if not documented above      May 19, 2023   -decreased actemra at the time of his last appointment down from 162mg once weekly down to 162mg q2 weeks.   -Last Saturday  afternoon, about 2-3pm, period of about 30 minutes, intermittent, lasted for a few seconds, above his ear. Then that same evening, before bed, lasting 1-2 seconds. Above the left ear. Next day no symptoms nothing returned  -no fevers/chills/night sweats  -no proximal muscle pain or stiffness  -No red/hot/swollen joints in hands/feet. Able to make a full fist upon waking in the AM  -Has chronic low back pain.   -No noticeable vision changes  -No scalp tenderness  -No jaw claudication symptoms  -No interval infections, however ongoing dental issues needs root canal.   -no scalp tenderness  14 point ROS collected and negative if not documented above.     March 31, 2023  -no change vision. No HA, scalp tenderness, jaw claudication symptoms.   -No proximal muscle pain/stiffness consistent with PMR  -No fevers/chills/night sweats.   -Has started CoQ-10  -Has continued on Actemra 162 mg once weekly.  No injection site reactions.  No interval infections.  - No unintentional weight loss.  14 point review is collected and otherwise negative    December 16, 2022  No HA, scalp tenderness, vision change, jaw claudication symptoms, proximal muscle pain/stiffness. Still with on/off back pain and sinus/nasal congestion, both of which have been chronic issues for years. No fevers/chills/night sweats. No unintentional weight loss. Was treated successfully for cutaneous fungal infection, no return now since this summer. No red/hot/swollen joints. Tolerating actemra injections without noticeable side effects.  Other than his cutaneous fungal infection he denies any other interval infections.  Able to get full fist with me in the morning.  Denies joint stiffness that improves with use.  He feels well.  Had his flu shot recently.  14 point review of systems collected and otherwise negative.    Interval History September 2, 2022  Had dental work done. Saw his PCP for lower extremity rash, treated by PCP with steroids with taper. Though  after a few weeks without resolution so was referred to dermatology for lesions on LE. Biopsied/cultured and told it was fungal infection, started on treatment which ended yesterday. Lesions on lower extremities have cleared. No HA/ change in vision/ scalp tenderness/ jaw claudication. No red/hot/swollen joints. Able to make a full fist upon waking in the AM. No proximal muscle pain/stiffness. No injection site reactions/ side effects. 14 point ROS collected and negative if not documented above.     Interval history 6/10/22  No HA, change in vision, scalp tenderness, jaw claudication. No proximal muscle pain/stiffness. No red/hot/swollen joints. No fevers/chills/night sweats. No side effects with methotrexate. Takes methotrexate and actemra on wednesdays. No side effects for either. No interval infections. No rashes.  Weight has been stable.  Able to make a full fist upon waking in the morning.  14 point review of systems collected and otherwise negative.    Interval History 3/11/22  Has continued on alendronate. Thinks he has more sensitivity in his teeth. He asks about need for addition of vit K2 to supplement. He plans to bring this question, along with question about green tea extract up with his primary care physician. Takes this on Wednesday evenings. Taking folic acid 1mg daily. Has only had 3 doses of actemra that were late. Has been on methotrexate for 3 weeks now.  No headache, vision change, scalp tenderness, proximal muscle pain or stiffness, peripheral joint swelling redness warmth.  Able to make full fist upon waking in the morning.  No fevers chills or night sweats.  No interval infections.  No rash.  Is tolerating his methotrexate without any side effects.  GI or other.      Past Medical History  CLL  Hernia  Mono   Deviated septum  Osteopenia by DEXA    Past Surgical History  Tonsillectomy  Deviated septum    Medications:  Actemra 162 mg once weekly subcutaneous      Weeks Daily prednisone dose (mg)  26-week taper Weekly 162mg Actemra      1 60    2 50 11/5/2020    3 40    4 35 11/20/2020   5 30    6 25    7 20 12/10/2020   8 15 12/17/2020   9 12.5    10 12.5    11 10 1/8/2021   12 9    13 8    14 7 2/5/21   15 6 2/11/21   16 6    17 5    18 5 3/4/21   19 4    20 4    21 3    22 3 4/8/21   23 2    24 2    25 1    26 1 5/12/21     Allergies: Seasonal/ environmental allergies    Family History: No family history of autoimmune diseases like RA, sjogrens, SLE, scleroderma, IBD.    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 No drug use.        Objective:   Physical exam:  No vitals or exam for telephone visit    Labs:   WBC   Date Value Ref Range Status   05/05/2021 27.4 (HH) 4.0 - 11.0 10e9/L Final     Comment:     Critical Value called to and read back by  Results confirmed by repeat test  GIVEN TO Yazmin by ARM on 5.5.21 at 1401       Hemoglobin   Date Value Ref Range Status   05/05/2021 13.6 13.3 - 17.7 g/dL Final     Platelet Count   Date Value Ref Range Status   05/05/2021 120 (L) 150 - 450 10e9/L Final     Creatinine   Date Value Ref Range Status   05/05/2021 0.81 0.66 - 1.25 mg/dL Final     Lab Results   Component Value Date    ALKPHOS 45 05/05/2021     AST   Date Value Ref Range Status   05/05/2021 29 0 - 45 U/L Final     Lab Results   Component Value Date    ALT 25 05/05/2021     Sed Rate   Date Value Ref Range Status   06/16/2021 3 0 - 20 mm/h Final     CRP Inflammation   Date Value Ref Range Status   06/16/2021 <2.9 0.0 - 8.0 mg/L Final     UA RESULTS:  Recent Labs   Lab Test 06/16/21  1331   COLOR Yellow   APPEARANCE Clear   URINEGLC Negative   URINEBILI Negative   URINEKETONE Negative   SG 1.025   UBLD Negative   URINEPH 5.5   PROTEIN Negative   UROBILINOGEN 0.2   NITRITE Negative   LEUKEST Negative   RBCU O - 2   WBCU 0 - 5      No results found for: ANAIGG, ANAP1, MUKESH, DNA, ENASMI, RNPIGG, ENASSA, ENASSB, C3COM, C4COM, CKTOTAL, ALDOLASE, RHF, CCPIGG, ANCA, MPOIGG,  PR3IGG      7/25/23  Creatinine 0.79  AST and ALT normal  Esr and CRP normal  Chronic leukocytosis secondary to known CLL  Mild anemia with hemoglobin of 12.6, will continue to monitor  PLT normal    12/12/2022  Creatinine 0.78  CRP 0.02  ALT 21  AST 31  Albumin 4.1  ESR less than 1  WBC 31.7  Hemoglobin 12.3  Platelet 119    CRP  5/5/21  WBC 27.4  HGB 13.6    Cr 0.81  ALT 25  AST 29  Chol 152  Trig 88  HDL 54  LDL 80  CRP <2.9  ESR 3  UA with small blood, no cells, no protein, casts    4/8/21  ESR 4  CRP less than 2.9  WBC 30.1  Hemoglobin 13.6  Platelets 157  Creatinine 0.86  Cholesterol 179  Triglycerides 83  HDL 60      3/2/21  ESR 4  CRP <2.9  WBC 28.3  HGB 12.8    Cr 0.86  ALT 23  AST 25    2/3/21  WBC 30.6  HGB 11.9    Cr 0.83  Alk phos 40  ALT 30  AST 29    1/5/21  UA without protein, cells or casts  ESR 5  CRP <2.9  WBC 40.6  HGB 11.6    Cr 0.81  Chol 166  Trig 86  HDL 68  LDL 81    12/14/2020   CRP <2.9  ESR 4  WBC 54  HGB 11.8    Cr 0.78  Ua unremarkable  Cholesterol 168  Trig 82  HDL 70  LDL 81    11-  ESR 3  CRP less than 2.9  WBC 63.3  Hemoglobin 12  Platelets 181  Creatinine 0.98  Total protein low at 5.9  Alk phos 48  ALT 31  AST 30  UA with 30 of albumin  Cholesterol 169  Triglycerides 45  HDL 83  LDL 77    10/1/2020  WBC 84.9  HGB 12.3    IgM 36 low  IgA 124 normal  IgG 625    Cr 0.98    9/1/2020  WBC 50.2  HGB 11.6       Imaging:  MR brain and orbits 9/10/20  Impression:    1. Regarding the orbits and globes, there is no definite abnormal  contrast enhancement or mass. No evidence of leukemic infiltration.  2. Regarding the remainder of the brain, abnormal T2 hyperintense bone  marrow signal with associated enhancement in the skull, likely  representing leukemic infiltration.  3. Subcutaneous T2 hyperintensity with associated diffusion  restriction and contrast enhancement over the left zygomatic region,  question leukemic  "infiltration.    Pathology:  Patient Name: MARIELA WALLACE   MR#: [id number]   Specimen #: [id number]   Collected: 9/10/2020   Received: 9/10/2020   Reported: 9/16/2020 12:35   Ordering Phy(s): VALERIE CARRINGTON     For improved result formatting, select 'View Enhanced Report Format' under    Linked Documents section.     SPECIMEN(S):   Temporal artery biopsy, left     FINAL DIAGNOSIS:   Temporal artery, left, biopsy:   1. Granulomatous arteritis consistent with giant cell arteritis.   2. Chronic inflammation of the surrounding adventitia.     COMMENT:   Preliminary results were communicated to Dr. Valerie Carrington and Dr. Guille Garcia on 9/14/20 at 12:45pm.     I have personally reviewed all specimens and/or slides, including the   listed special stains, and used them   with my medical judgement to determine or confirm the final diagnosis.     Electronically signed out by:     Za García M.D., [facility]     CLINICAL HISTORY:   The patient is a 79 year old male with a history of chronic lymphocytic   leukemia who presented with bilateral   sequential optic neuropathy with associated pallid optic disc edema and   choroidal hypoperfusion, but   relatively intact visual acuity, negative elevation of acute phase   reactants, and lack of constitutional   symptoms consistent with giant cell arteritis. He also has findings of   likely leukemic infiltration in the   skull seen on MRI. He undergoes temporal artery biopsy on the left.     GROSS:   The specimen is received in formalin with proper patient identification,   labeled \"left temporal artery\".  The   specimen consists of a 3.2 cm in length by 0.3 cm in diameter tan-white   vessel segment. The specimen is   submitted intact in A1. (Dictated by: PADILLA Montalvo 9/11/2020 03:08   PM)     MICROSCOPIC:   The tissue consists of artery with narrowed lumen and intimal hyperplasia.    There is an infiltrate of   lymphocytes, plasma cells, epithelioid " histiocytes, and multinucleated   giant cells throughout all layers of   the artery. There is associated loss of the internal elastic lamina on   elastic stain. Occasional focal   calcifications are seen a the level of the internal elastic lamina.   Neovascularization of the vessel wall is   present. Perivascular lymphocytic infiltrates are seen in the surrounding   adventitia. Immunohistochemistry   with CD3 and CD20 demonstrates a predominantly T-cell lymphocytic   infiltrate within the vessel wall. There is   a mixed infiltrate of T and B lymphocytes perivascular in the adventitia.   CD5 and CD43 have similar staining   patterns to CD3. There is light patchy CD23 staining in the areas positive    for CD20. CD68 highlights the   epithelioid histiocytes within the vessel wall.     The technical component of this testing was completed at the [facility], with the professional component performed    at the [facility], [address] [place] [phone] ([phone])     CPT Codes:   A: 00863-SN3, 76409-SSGR, 47635-QAT, 97151-TOP, 44757-PUY, 95167-WGV,   86023-DZC, 10647-JOB     COLLECTION SITE:   Client[facility]   [facility]

## 2023-09-28 ENCOUNTER — TELEPHONE (OUTPATIENT)
Dept: RHEUMATOLOGY | Facility: CLINIC | Age: 83
End: 2023-09-28
Payer: MEDICARE

## 2023-09-28 NOTE — TELEPHONE ENCOUNTER
Pt returned phone call, faxed orders to [phone], [facility].  Their phone # to contact them is [phone].

## 2023-09-28 NOTE — TELEPHONE ENCOUNTER
Called pt and LVM. Per Eduardo Ch RN pt would like the lab orders from Dr. Alcantara sent to [facility]. I called to find out what [place] location he wants the labs sent to.

## 2023-09-28 NOTE — TELEPHONE ENCOUNTER
----- Message from Shruti Dee RN sent at 9/27/2023  3:09 PM CDT -----  Please fax the standing lab orders that Dr. Alcantara entered on 9/26 to [facility]:  [phone] Fax  Thank you very much!!

## 2023-10-20 ENCOUNTER — VIRTUAL VISIT (OUTPATIENT)
Dept: RHEUMATOLOGY | Facility: CLINIC | Age: 83
End: 2023-10-20
Attending: INTERNAL MEDICINE
Payer: MEDICARE

## 2023-10-20 ENCOUNTER — TELEPHONE (OUTPATIENT)
Dept: RHEUMATOLOGY | Facility: CLINIC | Age: 83
End: 2023-10-20
Payer: MEDICARE

## 2023-10-20 VITALS — BODY MASS INDEX: 18.95 KG/M2 | WEIGHT: 121 LBS

## 2023-10-20 DIAGNOSIS — M31.6 GIANT CELL ARTERITIS (H): Primary | ICD-10-CM

## 2023-10-20 PROCEDURE — 99442 PR PHYSICIAN TELEPHONE EVALUATION 11-20 MIN: CPT | Mod: 95 | Performed by: INTERNAL MEDICINE

## 2023-10-20 ASSESSMENT — PAIN SCALES - GENERAL: PAINLEVEL: NO PAIN (0)

## 2023-10-20 NOTE — PROGRESS NOTES
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 Never had HA, jaw claudication, PMR symptoms. No peripheral inflammatory arthritis symptoms.     No evidence of active GCA or PMR today 10/20/23 by history or review of his most recent inflammatory markers- both his ESR and CRP were negative/normal on 10/10/23. At the time of his last visit, we stopped actemra as 1) he has demonstrated ongoing quiet disease despite tapering actemra to every 2 weeks 2) he has been on actemra for 3 years.     As we stopped actemra, I would like him to continue with frequent lab draws. I do note that he did not have increased inflammatory markers at the time of his initial biopsy proven GCA however I think still prudent to check these moving forward as other than acute vision loss this may be the only marker of subclinical disease activity prior to additional end organ damage from active vasculitis.     PLAN:  -continue off of actemra  -check ESR and CRP q2 weeks  -Follow-up with me Dec 8th at 9AM  -continue follow-up with Dr Garcia as planned on 11/1/23    # Osteopenia   Managed by PCP.     #CLL  Managed by oncology.    Bib Alcantara MD   Rheumatology     Subjective:   October 20, 2023  -last dose of actemra taken on sept 13th 2023.   -most recent esr and crp from 10/10/23 were both negative/normal  -AST and ALT normal  -Creatinine normal  -continues with exercise routine. Getting ready for winter,  getting garden ready. Doing some painting. Working out. Doing stretching.   -no changes in vision  -no headaches  -no scalp tenderness  -no jaw claudication  -no double vision  -no proximal muscle pain or stiffness  -no red/hot/swollen joints  -no fevers/chills/night sweats  -no unintentional weight   14 point ROS collected and negative if not documented above    September 26, 2023 interval history  -Mr. Yury Michael reports last dose of Actemra approximately two weeks ago, with his most recent dose taken on September 13th. He has not experienced any significant side effects. He did report a transient headache, which was localized to the back of his head on the right side. This was a new symptom for him, but it resolved spontaneously after one day without intervention.    Mr. Michael denies experiencing any jaw pain or systemic symptoms such as fevers, chills, or night sweats. He did note a change in his vision, but this was addressed with a new prescription for his glasses.     The patient reports weight loss, which he attributes to increased physical activity and dietary changes, including increased consumption of fruits and vegetables and decreased intake of carbohydrates. He is actively seeking ways to gain weight in a healthy manner.    No acute vision loss, no jaw claudication symptoms, no scalp tenderness. No proximal muscle pain/stiffness. Able to make a full fist upon waking in the AM.    14 point ROS collected and negative if not documented above.       July 28, 2023  -has continued on actemra q2 weeks  -No HA, jaw claudication, vision changes, scalp tenderness, double vision.   -no fevers/chills/night sweats  -no proximal muscle pain/stiffness  -no unintentional weight loss  -no red/hot/swollen joints  14 point ROS collected and negative if not documented above      May 19, 2023   -decreased actemra at the time of his last appointment down from 162mg once weekly down to 162mg q2 weeks.   -Last Saturday  afternoon, about 2-3pm, period of about 30 minutes, intermittent, lasted for a few seconds, above his ear. Then that same evening, before bed, lasting 1-2 seconds. Above the left ear. Next day no symptoms nothing returned  -no fevers/chills/night sweats  -no proximal muscle pain or stiffness  -No red/hot/swollen joints in hands/feet. Able to make a full fist upon waking in the AM  -Has chronic low back pain.   -No noticeable vision changes  -No scalp tenderness  -No jaw claudication symptoms  -No interval infections, however ongoing dental issues needs root canal.   -no scalp tenderness  14 point ROS collected and negative if not documented above.     March 31, 2023  -no change vision. No HA, scalp tenderness, jaw claudication symptoms.   -No proximal muscle pain/stiffness consistent with PMR  -No fevers/chills/night sweats.   -Has started CoQ-10  -Has continued on Actemra 162 mg once weekly.  No injection site reactions.  No interval infections.  - No unintentional weight loss.  14 point review is collected and otherwise negative    December 16, 2022  No HA, scalp tenderness, vision change, jaw claudication symptoms, proximal muscle pain/stiffness. Still with on/off back pain and sinus/nasal congestion, both of which have been chronic issues for years. No fevers/chills/night sweats. No unintentional weight loss. Was treated successfully for cutaneous fungal infection, no return now since this summer. No red/hot/swollen joints. Tolerating actemra injections without noticeable side effects.  Other than his cutaneous fungal infection he denies any other interval infections.  Able to get full fist with me in the morning.  Denies joint stiffness that improves with use.  He feels well.  Had his flu shot recently.  14 point review of systems collected and otherwise negative.    Interval History September 2, 2022  Had dental work done. Saw his PCP for lower extremity rash, treated by PCP with steroids with taper. Though  after a few weeks without resolution so was referred to dermatology for lesions on LE. Biopsied/cultured and told it was fungal infection, started on treatment which ended yesterday. Lesions on lower extremities have cleared. No HA/ change in vision/ scalp tenderness/ jaw claudication. No red/hot/swollen joints. Able to make a full fist upon waking in the AM. No proximal muscle pain/stiffness. No injection site reactions/ side effects. 14 point ROS collected and negative if not documented above.     Interval history 6/10/22  No HA, change in vision, scalp tenderness, jaw claudication. No proximal muscle pain/stiffness. No red/hot/swollen joints. No fevers/chills/night sweats. No side effects with methotrexate. Takes methotrexate and actemra on wednesdays. No side effects for either. No interval infections. No rashes.  Weight has been stable.  Able to make a full fist upon waking in the morning.  14 point review of systems collected and otherwise negative.    Interval History 3/11/22  Has continued on alendronate. Thinks he has more sensitivity in his teeth. He asks about need for addition of vit K2 to supplement. He plans to bring this question, along with question about green tea extract up with his primary care physician. Takes this on Wednesday evenings. Taking folic acid 1mg daily. Has only had 3 doses of actemra that were late. Has been on methotrexate for 3 weeks now.  No headache, vision change, scalp tenderness, proximal muscle pain or stiffness, peripheral joint swelling redness warmth.  Able to make full fist upon waking in the morning.  No fevers chills or night sweats.  No interval infections.  No rash.  Is tolerating his methotrexate without any side effects.  GI or other.      Past Medical History  CLL  Hernia  Mono   Deviated septum  Osteopenia by DEXA    Past Surgical History  Tonsillectomy  Deviated septum    Medications:  Actemra 162 mg once weekly subcutaneous      Weeks Daily prednisone dose (mg)  "26-week taper Weekly 162mg Actemra      1 60    2 50 11/5/2020    3 40    4 35 11/20/2020   5 30    6 25    7 20 12/10/2020   8 15 12/17/2020   9 12.5    10 12.5    11 10 1/8/2021   12 9    13 8    14 7 2/5/21   15 6 2/11/21   16 6    17 5    18 5 3/4/21   19 4    20 4    21 3    22 3 4/8/21   23 2    24 2    25 1    26 1 5/12/21     Allergies: Seasonal/ environmental allergies    Family History: No family history of autoimmune diseases like RA, sjogrens, SLE, scleroderma, IBD.    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 No drug use.        Objective:   Physical exam:  No vitals or exam for telephone visit    Labs:   10/10/23  Creatinine 0.81  CRP <0.3  ESR <1  ALT and AST normal    WBC   Date Value Ref Range Status   05/05/2021 27.4 (HH) 4.0 - 11.0 10e9/L Final     Comment:     Critical Value called to and read back by  Results confirmed by repeat test  GIVEN TO Yazmin by ARM on 5.5.21 at 1401       Hemoglobin   Date Value Ref Range Status   05/05/2021 13.6 13.3 - 17.7 g/dL Final     Platelet Count   Date Value Ref Range Status   05/05/2021 120 (L) 150 - 450 10e9/L Final     Creatinine   Date Value Ref Range Status   05/05/2021 0.81 0.66 - 1.25 mg/dL Final     Lab Results   Component Value Date    ALKPHOS 45 05/05/2021     AST   Date Value Ref Range Status   05/05/2021 29 0 - 45 U/L Final     Lab Results   Component Value Date    ALT 25 05/05/2021     Sed Rate   Date Value Ref Range Status   06/16/2021 3 0 - 20 mm/h Final     CRP Inflammation   Date Value Ref Range Status   06/16/2021 <2.9 0.0 - 8.0 mg/L Final     UA RESULTS:  Recent Labs   Lab Test 06/16/21  1331   COLOR Yellow   APPEARANCE Clear   URINEGLC Negative   URINEBILI Negative   URINEKETONE Negative   SG 1.025   UBLD Negative   URINEPH 5.5   PROTEIN Negative   UROBILINOGEN 0.2   NITRITE Negative   LEUKEST Negative   RBCU O - 2   WBCU 0 - 5      No results found for: \"ANAIGG\", \"ANAP1\", \"MUKESH\", \"DNA\", \"ENASMI\", \"RNPIGG\", \"ENASSA\", " "\"ENASSB\", \"C3COM\", \"C4COM\", \"CKTOTAL\", \"ALDOLASE\", \"RHF\", \"CCPIGG\", \"ANCA\", \"MPOIGG\", \"PR3IGG\"      7/25/23  Creatinine 0.79  AST and ALT normal  Esr and CRP normal  Chronic leukocytosis secondary to known CLL  Mild anemia with hemoglobin of 12.6, will continue to monitor  PLT normal    12/12/2022  Creatinine 0.78  CRP 0.02  ALT 21  AST 31  Albumin 4.1  ESR less than 1  WBC 31.7  Hemoglobin 12.3  Platelet 119    CRP  5/5/21  WBC 27.4  HGB 13.6    Cr 0.81  ALT 25  AST 29  Chol 152  Trig 88  HDL 54  LDL 80  CRP <2.9  ESR 3  UA with small blood, no cells, no protein, casts    4/8/21  ESR 4  CRP less than 2.9  WBC 30.1  Hemoglobin 13.6  Platelets 157  Creatinine 0.86  Cholesterol 179  Triglycerides 83  HDL 60      3/2/21  ESR 4  CRP <2.9  WBC 28.3  HGB 12.8    Cr 0.86  ALT 23  AST 25    2/3/21  WBC 30.6  HGB 11.9    Cr 0.83  Alk phos 40  ALT 30  AST 29    1/5/21  UA without protein, cells or casts  ESR 5  CRP <2.9  WBC 40.6  HGB 11.6    Cr 0.81  Chol 166  Trig 86  HDL 68  LDL 81    12/14/2020   CRP <2.9  ESR 4  WBC 54  HGB 11.8    Cr 0.78  Ua unremarkable  Cholesterol 168  Trig 82  HDL 70  LDL 81    11-  ESR 3  CRP less than 2.9  WBC 63.3  Hemoglobin 12  Platelets 181  Creatinine 0.98  Total protein low at 5.9  Alk phos 48  ALT 31  AST 30  UA with 30 of albumin  Cholesterol 169  Triglycerides 45  HDL 83  LDL 77    10/1/2020  WBC 84.9  HGB 12.3    IgM 36 low  IgA 124 normal  IgG 625    Cr 0.98    9/1/2020  WBC 50.2  HGB 11.6       Imaging:  MR brain and orbits 9/10/20  Impression:    1. Regarding the orbits and globes, there is no definite abnormal  contrast enhancement or mass. No evidence of leukemic infiltration.  2. Regarding the remainder of the brain, abnormal T2 hyperintense bone  marrow signal with associated enhancement in the skull, likely  representing leukemic infiltration.  3. Subcutaneous T2 hyperintensity with associated " "diffusion  restriction and contrast enhancement over the left zygomatic region,  question leukemic infiltration.    Pathology:  Patient Name: MARIELA WALLACE   MR#: [id number]   Specimen #: [id number]   Collected: 9/10/2020   Received: 9/10/2020   Reported: 9/16/2020 12:35   Ordering Phy(s): VALERIE CARRINGTON     For improved result formatting, select 'View Enhanced Report Format' under    Linked Documents section.     SPECIMEN(S):   Temporal artery biopsy, left     FINAL DIAGNOSIS:   Temporal artery, left, biopsy:   1. Granulomatous arteritis consistent with giant cell arteritis.   2. Chronic inflammation of the surrounding adventitia.     COMMENT:   Preliminary results were communicated to Dr. Valerie Carrington and Dr. Guille Garcia on 9/14/20 at 12:45pm.     I have personally reviewed all specimens and/or slides, including the   listed special stains, and used them   with my medical judgement to determine or confirm the final diagnosis.     Electronically signed out by:     Za García M.D., [facility]     CLINICAL HISTORY:   The patient is a 79 year old male with a history of chronic lymphocytic   leukemia who presented with bilateral   sequential optic neuropathy with associated pallid optic disc edema and   choroidal hypoperfusion, but   relatively intact visual acuity, negative elevation of acute phase   reactants, and lack of constitutional   symptoms consistent with giant cell arteritis. He also has findings of   likely leukemic infiltration in the   skull seen on MRI. He undergoes temporal artery biopsy on the left.     GROSS:   The specimen is received in formalin with proper patient identification,   labeled \"left temporal artery\".  The   specimen consists of a 3.2 cm in length by 0.3 cm in diameter tan-white   vessel segment. The specimen is   submitted intact in A1. (Dictated by: PADILLA Montalvo 9/11/2020 03:08   PM)     MICROSCOPIC:   The tissue consists of artery with narrowed lumen and " intimal hyperplasia.    There is an infiltrate of   lymphocytes, plasma cells, epithelioid histiocytes, and multinucleated   giant cells throughout all layers of   the artery. There is associated loss of the internal elastic lamina on   elastic stain. Occasional focal   calcifications are seen a the level of the internal elastic lamina.   Neovascularization of the vessel wall is   present. Perivascular lymphocytic infiltrates are seen in the surrounding   adventitia. Immunohistochemistry   with CD3 and CD20 demonstrates a predominantly T-cell lymphocytic   infiltrate within the vessel wall. There is   a mixed infiltrate of T and B lymphocytes perivascular in the adventitia.   CD5 and CD43 have similar staining   patterns to CD3. There is light patchy CD23 staining in the areas positive    for CD20. CD68 highlights the   epithelioid histiocytes within the vessel wall.     The technical component of this testing was completed at the [facility], with the professional component performed    at the [facility], [address] [place] [id number] ([phone])     CPT Codes:   A: 11187-NF3, 14426-ZMBK, 36685-SLC, 37216-GOH, 59650-DRM, 69935-BWK,   27991-MUX, 74496-ODZ     COLLECTION SITE:   Client: [facility] acute hospital   Location: [facility])

## 2023-10-20 NOTE — NURSING NOTE
No other vitals to report today, pt does check on regular basis per pt      Is the patient currently in the state of [place]? YES    Visit mode:TELEPHONE    If the visit is dropped, the patient can be reconnected by: TELEPHONE VISIT: Phone number:   Telephone Information:   Mobile [phone]       Will anyone else be joining the visit? NO  (If patient encounters technical issues they should call [phone]    How would you like to obtain your AVS? MyChart    Are changes needed to the allergy or medication list?  Medications pt no longer taking/duplicates listed as not taking, pt states taking Zyrtec, not available to reconcile    Reason for visit: RAMIRO Wei MA VVF

## 2023-10-20 NOTE — Clinical Note
Please fax over ESR, CRP, AST, ALT q2 weeks ordered with today's encounter to [facility] and let mr vance know once they have been faxed.

## 2023-10-20 NOTE — LETTER
10/20/2023       RE: Bon Michael  1923 [facility] [address]     Dear Colleague,    Thank you for referring your patient, Bon Michael, to the [facility] RHEUMATOLOGY [facility] [place] at [facility]. Please see a copy of my visit note below.    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 Never had HA, jaw claudication, PMR symptoms. No peripheral inflammatory arthritis symptoms.     No evidence of active GCA or PMR today 10/20/23 by history or review of his most recent inflammatory markers- both his ESR and CRP were negative/normal on 10/10/23. At the time of his last visit, we stopped actemra as 1) he has demonstrated ongoing quiet disease despite tapering actemra to every 2 weeks 2) he has been on actemra for 3 years.     As we stopped actemra, I would like him to continue with frequent lab draws. I do note that he did not have increased inflammatory markers at the time of his initial biopsy proven GCA however I think still prudent to check these moving forward as other than acute vision loss this may be the only marker of subclinical disease activity prior to additional end organ damage from active vasculitis.     PLAN:  -continue off of actemra  -check ESR and CRP q2 weeks  -Follow-up with me Dec 8th at 9AM  -continue follow-up with Dr Garcia as planned on 11/1/23    # Osteopenia   Managed by PCP.      #CLL  Managed by oncology.    Bib Alcantara MD   Rheumatology     Subjective:   October 20, 2023  -last dose of actemra taken on sept 13th 2023.   -most recent esr and crp from 10/10/23 were both negative/normal  -AST and ALT normal  -Creatinine normal  -continues with exercise routine. Getting ready for winter, getting garden ready. Doing some painting. Working out. Doing stretching.   -no changes in vision  -no headaches  -no scalp tenderness  -no jaw claudication  -no double vision  -no proximal muscle pain or stiffness  -no red/hot/swollen joints  -no fevers/chills/night sweats  -no unintentional weight   14 point ROS collected and negative if not documented above    September 26, 2023 interval history  -Mr. Yury Michael reports last dose of Actemra approximately two weeks ago, with his most recent dose taken on September 13th. He has not experienced any significant side effects. He did report a transient headache, which was localized to the back of his head on the right side. This was a new symptom for him, but it resolved spontaneously after one day without intervention.    Mr. Michael denies experiencing any jaw pain or systemic symptoms such as fevers, chills, or night sweats. He did note a change in his vision, but this was addressed with a new prescription for his glasses.     The patient reports weight loss, which he attributes to increased physical activity and dietary changes, including increased consumption of fruits and vegetables and decreased intake of carbohydrates. He is actively seeking ways to gain weight in a healthy manner.    No acute vision loss, no jaw claudication symptoms, no scalp tenderness. No proximal muscle pain/stiffness. Able to make a full fist upon waking in the AM.    14 point ROS collected and negative if not documented above.       July 28, 2023  -has continued on actemra q2 weeks  -No HA, jaw claudication, vision changes, scalp tenderness, double vision.   -no  fevers/chills/night sweats  -no proximal muscle pain/stiffness  -no unintentional weight loss  -no red/hot/swollen joints  14 point ROS collected and negative if not documented above      May 19, 2023   -decreased actemra at the time of his last appointment down from 162mg once weekly down to 162mg q2 weeks.   -Last Saturday afternoon, about 2-3pm, period of about 30 minutes, intermittent, lasted for a few seconds, above his ear. Then that same evening, before bed, lasting 1-2 seconds. Above the left ear. Next day no symptoms nothing returned  -no fevers/chills/night sweats  -no proximal muscle pain or stiffness  -No red/hot/swollen joints in hands/feet. Able to make a full fist upon waking in the AM  -Has chronic low back pain.   -No noticeable vision changes  -No scalp tenderness  -No jaw claudication symptoms  -No interval infections, however ongoing dental issues needs root canal.   -no scalp tenderness  14 point ROS collected and negative if not documented above.     March 31, 2023  -no change vision. No HA, scalp tenderness, jaw claudication symptoms.   -No proximal muscle pain/stiffness consistent with PMR  -No fevers/chills/night sweats.   -Has started CoQ-10  -Has continued on Actemra 162 mg once weekly.  No injection site reactions.  No interval infections.  - No unintentional weight loss.  14 point review is collected and otherwise negative    December 16, 2022  No HA, scalp tenderness, vision change, jaw claudication symptoms, proximal muscle pain/stiffness. Still with on/off back pain and sinus/nasal congestion, both of which have been chronic issues for years. No fevers/chills/night sweats. No unintentional weight loss. Was treated successfully for cutaneous fungal infection, no return now since this summer. No red/hot/swollen joints. Tolerating actemra injections without noticeable side effects.  Other than his cutaneous fungal infection he denies any other interval infections.  Able to get full fist  with me in the morning.  Denies joint stiffness that improves with use.  He feels well.  Had his flu shot recently.  14 point review of systems collected and otherwise negative.    Interval History September 2, 2022  Had dental work done. Saw his PCP for lower extremity rash, treated by PCP with steroids with taper. Though after a few weeks without resolution so was referred to dermatology for lesions on LE. Biopsied/cultured and told it was fungal infection, started on treatment which ended yesterday. Lesions on lower extremities have cleared. No HA/ change in vision/ scalp tenderness/ jaw claudication. No red/hot/swollen joints. Able to make a full fist upon waking in the AM. No proximal muscle pain/stiffness. No injection site reactions/ side effects. 14 point ROS collected and negative if not documented above.     Interval history 6/10/22  No HA, change in vision, scalp tenderness, jaw claudication. No proximal muscle pain/stiffness. No red/hot/swollen joints. No fevers/chills/night sweats. No side effects with methotrexate. Takes methotrexate and actemra on wednesdays. No side effects for either. No interval infections. No rashes.  Weight has been stable.  Able to make a full fist upon waking in the morning.  14 point review of systems collected and otherwise negative.    Interval History 3/11/22  Has continued on alendronate. Thinks he has more sensitivity in his teeth. He asks about need for addition of vit K2 to supplement. He plans to bring this question, along with question about green tea extract up with his primary care physician. Takes this on Wednesday evenings. Taking folic acid 1mg daily. Has only had 3 doses of actemra that were late. Has been on methotrexate for 3 weeks now.  No headache, vision change, scalp tenderness, proximal muscle pain or stiffness, peripheral joint swelling redness warmth.  Able to make full fist upon waking in the morning.  No fevers chills or night sweats.  No interval  infections.  No rash.  Is tolerating his methotrexate without any side effects.  GI or other.      Past Medical History  CLL  Hernia  Mono   Deviated septum  Osteopenia by DEXA    Past Surgical History  Tonsillectomy  Deviated septum    Medications:  Actemra 162 mg once weekly subcutaneous      Weeks Daily prednisone dose (mg) 26-week taper Weekly 162mg Actemra      1 60    2 50 11/5/2020    3 40    4 35 11/20/2020   5 30    6 25    7 20 12/10/2020   8 15 12/17/2020   9 12.5    10 12.5    11 10 1/8/2021   12 9    13 8    14 7 2/5/21   15 6 2/11/21   16 6    17 5    18 5 3/4/21   19 4    20 4    21 3    22 3 4/8/21   23 2    24 2    25 1    26 1 5/12/21     Allergies: Seasonal/ environmental allergies    Family History: No family history of autoimmune diseases like RA, sjogrens, SLE, scleroderma, IBD.    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 No drug use.        Objective:   Physical exam:  No vitals or exam for telephone visit    Labs:   10/10/23  Creatinine 0.81  CRP <0.3  ESR <1  ALT and AST normal    WBC   Date Value Ref Range Status   05/05/2021 27.4 (HH) 4.0 - 11.0 10e9/L Final     Comment:     Critical Value called to and read back by  Results confirmed by repeat test  GIVEN TO Yazmin by ARM on 5.5.21 at 1401       Hemoglobin   Date Value Ref Range Status   05/05/2021 13.6 13.3 - 17.7 g/dL Final     Platelet Count   Date Value Ref Range Status   05/05/2021 120 (L) 150 - 450 10e9/L Final     Creatinine   Date Value Ref Range Status   05/05/2021 0.81 0.66 - 1.25 mg/dL Final     Lab Results   Component Value Date    ALKPHOS 45 05/05/2021     AST   Date Value Ref Range Status   05/05/2021 29 0 - 45 U/L Final     Lab Results   Component Value Date    ALT 25 05/05/2021     Sed Rate   Date Value Ref Range Status   06/16/2021 3 0 - 20 mm/h Final     CRP Inflammation   Date Value Ref Range Status   06/16/2021 <2.9 0.0 - 8.0 mg/L Final     UA RESULTS:  Recent Labs   Lab Test 06/16/21  1331   COLOR  "Yellow   APPEARANCE Clear   URINEGLC Negative   URINEBILI Negative   URINEKETONE Negative   SG 1.025   UBLD Negative   URINEPH 5.5   PROTEIN Negative   UROBILINOGEN 0.2   NITRITE Negative   LEUKEST Negative   RBCU O - 2   WBCU 0 - 5      No results found for: \"ANAIGG\", \"ANAP1\", \"MUKESH\", \"DNA\", \"ENASMI\", \"RNPIGG\", \"ENASSA\", \"ENASSB\", \"C3COM\", \"C4COM\", \"CKTOTAL\", \"ALDOLASE\", \"RHF\", \"CCPIGG\", \"ANCA\", \"MPOIGG\", \"PR3IGG\"      7/25/23  Creatinine 0.79  AST and ALT normal  Esr and CRP normal  Chronic leukocytosis secondary to known CLL  Mild anemia with hemoglobin of 12.6, will continue to monitor  PLT normal    12/12/2022  Creatinine 0.78  CRP 0.02  ALT 21  AST 31  Albumin 4.1  ESR less than 1  WBC 31.7  Hemoglobin 12.3  Platelet 119    CRP  5/5/21  WBC 27.4  HGB 13.6    Cr 0.81  ALT 25  AST 29  Chol 152  Trig 88  HDL 54  LDL 80  CRP <2.9  ESR 3  UA with small blood, no cells, no protein, casts    4/8/21  ESR 4  CRP less than 2.9  WBC 30.1  Hemoglobin 13.6  Platelets 157  Creatinine 0.86  Cholesterol 179  Triglycerides 83  HDL 60      3/2/21  ESR 4  CRP <2.9  WBC 28.3  HGB 12.8    Cr 0.86  ALT 23  AST 25    2/3/21  WBC 30.6  HGB 11.9    Cr 0.83  Alk phos 40  ALT 30  AST 29    1/5/21  UA without protein, cells or casts  ESR 5  CRP <2.9  WBC 40.6  HGB 11.6    Cr 0.81  Chol 166  Trig 86  HDL 68  LDL 81    12/14/2020   CRP <2.9  ESR 4  WBC 54  HGB 11.8    Cr 0.78  Ua unremarkable  Cholesterol 168  Trig 82  HDL 70  LDL 81    11-  ESR 3  CRP less than 2.9  WBC 63.3  Hemoglobin 12  Platelets 181  Creatinine 0.98  Total protein low at 5.9  Alk phos 48  ALT 31  AST 30  UA with 30 of albumin  Cholesterol 169  Triglycerides 45  HDL 83  LDL 77    10/1/2020  WBC 84.9  HGB 12.3    IgM 36 low  IgA 124 normal  IgG 625    Cr 0.98    9/1/2020  WBC 50.2  HGB 11.6       Imaging:  MR brain and orbits 9/10/20  Impression:    1. Regarding the orbits and globes, there is no " definite abnormal  contrast enhancement or mass. No evidence of leukemic infiltration.  2. Regarding the remainder of the brain, abnormal T2 hyperintense bone  marrow signal with associated enhancement in the skull, likely  representing leukemic infiltration.  3. Subcutaneous T2 hyperintensity with associated diffusion  restriction and contrast enhancement over the left zygomatic region,  question leukemic infiltration.    Pathology:  Patient Name: MARIELA WALLACE   MR#: [id number]   Specimen #: [id number]   Collected: 9/10/2020   Received: 9/10/2020   Reported: 9/16/2020 12:35   Ordering Phy(s): VALERIE CARRINGTON     For improved result formatting, select 'View Enhanced Report Format' under    Linked Documents section.     SPECIMEN(S):   Temporal artery biopsy, left     FINAL DIAGNOSIS:   Temporal artery, left, biopsy:   1. Granulomatous arteritis consistent with giant cell arteritis.   2. Chronic inflammation of the surrounding adventitia.     COMMENT:   Preliminary results were communicated to Dr. Valerie Carrington and Dr. Guille Garcia on 9/14/20 at 12:45pm.     I have personally reviewed all specimens and/or slides, including the   listed special stains, and used them   with my medical judgement to determine or confirm the final diagnosis.     Electronically signed out by:     Za García M.D., [facility]     CLINICAL HISTORY:   The patient is a 79 year old male with a history of chronic lymphocytic   leukemia who presented with bilateral   sequential optic neuropathy with associated pallid optic disc edema and   choroidal hypoperfusion, but   relatively intact visual acuity, negative elevation of acute phase   reactants, and lack of constitutional   symptoms consistent with giant cell arteritis. He also has findings of   likely leukemic infiltration in the   skull seen on MRI. He undergoes temporal artery biopsy on the left.     GROSS:   The specimen is received in formalin with proper patient  "identification,   labeled \"left temporal artery\".  The   specimen consists of a 3.2 cm in length by 0.3 cm in diameter tan-white   vessel segment. The specimen is   submitted intact in A1. (Dictated by: PADILLA Montalvo 9/11/2020 03:08   PM)     MICROSCOPIC:   The tissue consists of artery with narrowed lumen and intimal hyperplasia.    There is an infiltrate of   lymphocytes, plasma cells, epithelioid histiocytes, and multinucleated   giant cells throughout all layers of   the artery. There is associated loss of the internal elastic lamina on   elastic stain. Occasional focal   calcifications are seen a the level of the internal elastic lamina.   Neovascularization of the vessel wall is   present. Perivascular lymphocytic infiltrates are seen in the surrounding   adventitia. Immunohistochemistry   with CD3 and CD20 demonstrates a predominantly T-cell lymphocytic   infiltrate within the vessel wall. There is   a mixed infiltrate of T and B lymphocytes perivascular in the adventitia.   CD5 and CD43 have similar staining   patterns to CD3. There is light patchy CD23 staining in the areas positive    for CD20. CD68 highlights the   epithelioid histiocytes within the vessel wall.     The technical component of this testing was completed at the [facility], with the professional component performed    at the [facility], [address] [place] [id number] ([phone])     CPT Codes:   A: 85673-FH5, 07106-CBXU, 22703-ZGI, 67851-YUZ, 10497-RXH, 90376-HLC,   12718-ZJO, 46237-FMJ     COLLECTION SITE:   Client[place] [facility]   [place] [facility] [place]    "

## 2023-10-24 ENCOUNTER — TELEPHONE (OUTPATIENT)
Dept: RHEUMATOLOGY | Facility: CLINIC | Age: 83
End: 2023-10-24
Payer: MEDICARE

## 2023-10-24 NOTE — TELEPHONE ENCOUNTER
MTM referral from: [facility] visit (referral by provider)    MTM referral outreach attempt #2 on October 24, 2023 at 11:15 AM      Outcome: Patient not reachable after several attempts, will route to [facility] Pharmacist/Provider as an FYI.  [facility] scheduling number is [phone].  Thank you for the referral.    Use HBC  for the carrier/Plan on the flowsheet      [employer] Message Sent    Debi Gomez - [facility]      Patient called back and scheduled visit.

## 2023-11-01 ENCOUNTER — OFFICE VISIT (OUTPATIENT)
Dept: OPHTHALMOLOGY | Facility: CLINIC | Age: 83
End: 2023-11-01
Attending: OPHTHALMOLOGY
Payer: MEDICARE

## 2023-11-01 ENCOUNTER — TELEPHONE (OUTPATIENT)
Dept: RHEUMATOLOGY | Facility: CLINIC | Age: 83
End: 2023-11-01

## 2023-11-01 DIAGNOSIS — H46.9 OPTIC NEUROPATHY: Primary | ICD-10-CM

## 2023-11-01 DIAGNOSIS — H53.40 VISUAL FIELD DEFECT: ICD-10-CM

## 2023-11-01 DIAGNOSIS — H47.20 PARTIAL OPTIC ATROPHY: ICD-10-CM

## 2023-11-01 PROCEDURE — 92014 COMPRE OPH EXAM EST PT 1/>: CPT | Performed by: OPHTHALMOLOGY

## 2023-11-01 PROCEDURE — 92133 CPTRZD OPH DX IMG PST SGM ON: CPT | Performed by: OPHTHALMOLOGY

## 2023-11-01 PROCEDURE — G0463 HOSPITAL OUTPT CLINIC VISIT: HCPCS | Performed by: OPHTHALMOLOGY

## 2023-11-01 PROCEDURE — 92083 EXTENDED VISUAL FIELD XM: CPT | Performed by: OPHTHALMOLOGY

## 2023-11-01 ASSESSMENT — REFRACTION_WEARINGRX
OD_ADD: +2.75
SPECS_TYPE: PAL
OS_ADD: +2.75
OD_AXIS: 152
OD_SPHERE: +3.00
OS_SPHERE: +6.00
OD_CYLINDER: +0.75
OS_CYLINDER: +0.75
OS_AXIS: 090

## 2023-11-01 ASSESSMENT — VISUAL ACUITY
METHOD: SNELLEN - LINEAR
OS_CC+: -2
OD_CC: 20/20
OS_CC: 20/25
OD_CC+: -1
CORRECTION_TYPE: GLASSES

## 2023-11-01 ASSESSMENT — CONF VISUAL FIELD
OD_INFERIOR_TEMPORAL_RESTRICTION: 0
OD_SUPERIOR_TEMPORAL_RESTRICTION: 0
OS_INFERIOR_NASAL_RESTRICTION: 3
OD_NORMAL: 1
OD_INFERIOR_NASAL_RESTRICTION: 0
METHOD: COUNTING FINGERS
OD_SUPERIOR_NASAL_RESTRICTION: 0

## 2023-11-01 ASSESSMENT — SLIT LAMP EXAM - LIDS
COMMENTS: NORMAL
COMMENTS: NORMAL

## 2023-11-01 ASSESSMENT — CUP TO DISC RATIO
OS_RATIO: 0.3
OD_RATIO: 0.3

## 2023-11-01 ASSESSMENT — TONOMETRY
IOP_METHOD: ICARE
OS_IOP_MMHG: 08
OD_IOP_MMHG: 08

## 2023-11-01 ASSESSMENT — EXTERNAL EXAM - LEFT EYE: OS_EXAM: NORMAL

## 2023-11-01 ASSESSMENT — EXTERNAL EXAM - RIGHT EYE: OD_EXAM: NORMAL

## 2023-11-01 NOTE — LETTER
"2023    RE: Bon Michael  : 1940  MRN: [id number]    Dear Providers,    I saw our mutual patient, Bon Michael, in follow-up in my clinic recently.  After a thorough neuro-ophthalmic history and examination, I came to the following conclusions:     1. 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      Very atypical presentation without elevated acute phase reactants, no PMR symptoms, predominantly peripheral field losses. He completed an oral prednisone taper in early  and stopped methotrexate in mid- with no evidence of disease recurrence since. Patient also has CLL which is managed by oncology, monitored every 6 months.     Today giant cell arteritis remains quiescent (no evidence of new vision loss or new optic nerve ischemia and no symptoms of giant cell arteritis recurrence) off of actemra - stopped 2023.    Follow-up in 2 months with me. Patient has close follow-up also with rheumatology, Dr. Alcantara.    2.  Borderline visually significant cataracts - observe for now    3. 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 Present since at least 2022.       Historical data from initial visit (2020):    Bon Michael is a 79 year old male who presents to neuro-ophthalmology   clinic today for consultation from Jose Andino MD at [facility] for   visual disturbance of left eye. He describes it as \"his left eye not   getting enough light\". He describes noticing the change in his left eye's   temporal visual field, inferior > superior. He notices it more first thing       in the morning. These symptoms began on 2020 and he describes it as   worsening - he is unclear if it the deficit has gotten larger or darker   however. He first noticed it while driving at the periphery of his vision.        Patient saw Dr. Lewis on .  He underwent esr / crp testing as   well as mri (see below).     Labs: 20- WBC- 50.2, ESR- " 28, CRP < 5     On August 31 he developed symptoms of waviness in his peripheral vision in     the right eye.  He went to Dr. Andino on 9/4/2020 who wanted him to go to     the ED but he was unable.  Dr. Andino reviewed the patient's fluorescein   angiography and felt there was choroidal filling delay in the right eye   and pallid edema in the right eye concerning for giant cell arteritis.  We     discussed the case via phone.      PRIOR IMAGING:  BRAIN MRI WITH/WO GADOLINIUM, MRA OF THE [place] OF [place] AND MRA OF THE   CAROTID ARTERIES - [facility] (9/1/2020)  IMPRESSION:    1.  No acute intracranial abnormality.  2.  No evidence of an orbital abnormalities.  3.  Mild generalized cerebral atrophy.  4.  Unremarkable MRA of the [place] of [place].  5.  Unremarkable MRA of the cervical vessels.     On my re-review I disagreed with the radiology read.  There is evidence of   patchy enhancement diffusely within the bilateral orbits and along the   bilateral intraorbital optic nerve sheaths.  Will discuss with   neuro-radiology regarding finding and repeat scan.     No bisphosphonates in the past (no osteoporosis medications for 7-8 years   per patient).      Left temporal artery biopsy 9/9/20- positive for giant cell arteritis   Prednisone (started 9/4/2020)  Patient started Actemra 10/28/20.    Interim history and exam with me since last visit 8/25/2023     Stopped Actemra last dose on 9/13/23 after a 3 year course. He reports no new symptoms since stopping the Actemra. Patient denies headache, scalp tenderness, jaw claudication, fever, fatigue, double vision, vision loss, muscle pain in shoulders.    He has noted weight loss over the last few months - did not start after discontinuation of his Actemra. He is following up with a nutritionist.    His most recent inflammatory markers were normal from 10/31/23- CRP <0.3 and ESR < 1    Rheumatology telephone note with Dr. 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 Never had HA, jaw claudication, PMR symptoms. No peripheral inflammatory arthritis symptoms.      No evidence of active GCA or PMR today 10/20/23 by history or review of his most recent inflammatory markers- both his ESR and CRP were negative/normal on 10/10/23. At the time of his last visit, we stopped actemra as 1) he has demonstrated ongoing quiet disease despite tapering actemra to every 2 weeks 2) he has been on actemra for 3 years.      As we stopped actemra, I would like him to continue with frequent lab draws. I do note that he did not have increased inflammatory markers at the time of his initial biopsy proven GCA however I think still prudent to check these moving forward as other than acute vision loss this may be the only marker of subclinical disease activity prior to additional end organ damage from active vasculitis.      PLAN:  -continue off of actemra  -check ESR and CRP q2 weeks  -Follow-up with me Dec 8th at 9AM  -continue follow-up with Dr Garcia as planned on 11/1/23     # Osteopenia   Managed by PCP.      #CLL  Managed by oncology.     Exam today  Please see epic chart for complete exam. Stable and normal visual acuity and color vision in both eyes. 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    OCT of the optic nerve head revealed stable retinal nerve fiber layer thinning in both eyes    Octopus automated 30 degree visual fields stable in both eyes and reliable        For further exam details, please feel free to contact our office for additional records.  If you wish to contact me regarding this patient please email me at [email or web address] or give my clinic a call to arrange a phone conversation.    Sincerely,    Guille Garcia MD  Associate  Professor, Neuro-Ophthalmology and Adult Strabismus Surgery  The Lucy Castano Chair in Neuro-Ophthalmology  Department of Ophthalmology and Visual Neurosciences  [facility]

## 2023-11-01 NOTE — NURSING NOTE
Chief Complaint(s) and History of Present Illness(es)       Follow Up    In both eyes.  Associated symptoms include Negative for eye pain, headache, flashes and floaters.  Pain was noted as 0/10. Additional comments: 1. 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    Bon reports vision stable since his last visit.  No eye pain or headaches.  No flashes/floaters.  ANNETTE Christiansen 11/1/2023 7:36 AM

## 2023-11-01 NOTE — TELEPHONE ENCOUNTER
[facility] Call Center    Phone Message    May a detailed message be left on voicemail: yes     Reason for Call: Other: Pt is behind on his lab tests which should be every 2 weeks. Pt is wondering when he should get his next set up lab tests. Pt would like a call back at [phone].       Action Taken: Message routed to:  [facility] ([facility] Rheum    Travel Screening: Not Applicable

## 2023-11-01 NOTE — PROGRESS NOTES
"     1. 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      Very atypical presentation without elevated acute phase reactants, no PMR symptoms, predominantly peripheral field losses. He completed an oral prednisone taper in early 2021 and stopped methotrexate in mid-2022 with no evidence of disease recurrence since. Patient also has CLL which is managed by oncology, monitored every 6 months.     Today giant cell arteritis remains quiescent (no evidence of new vision loss or new optic nerve ischemia and no symptoms of giant cell arteritis recurrence) off of actemra - stopped September 2023.    Follow-up in 2 months with me. Patient has close follow-up also with rheumatology, Dr. Alcantara.    2.  Borderline visually significant cataracts - observe for now    3. 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 Present since at least 04/2022.       Historical data from initial visit (9/2020):    Bon Michael is a 79 year old male who presents to neuro-ophthalmology   clinic today for consultation from Jose Andino MD at [facility] for   visual disturbance of left eye. He describes it as \"his left eye not   getting enough light\". He describes noticing the change in his left eye's   temporal visual field, inferior > superior. He notices it more first thing       in the morning. These symptoms began on 8/22/2020 and he describes it as   worsening - he is unclear if it the deficit has gotten larger or darker   however. He first noticed it while driving at the periphery of his vision.        Patient saw Dr. Lewis on August 28.  He underwent esr / crp testing as   well as mri (see below).     Labs: 9/2/20- WBC- 50.2, ESR- 28, CRP < 5     On August 31 he developed symptoms of waviness in his peripheral vision in     the right eye.  He went to Dr. Andino on 9/4/2020 who wanted him to go to     the ED but he was unable.  Dr. Andino reviewed the patient's fluorescein   angiography and felt " there was choroidal filling delay in the right eye   and pallid edema in the right eye concerning for giant cell arteritis.  We     discussed the case via phone.      PRIOR IMAGING:  BRAIN MRI WITH/WO GADOLINIUM, MRA OF THE [place] OF SANCHEZ AND MRA OF THE   CAROTID ARTERIES - [facility] (9/1/2020)  IMPRESSION:    1.  No acute intracranial abnormality.  2.  No evidence of an orbital abnormalities.  3.  Mild generalized cerebral atrophy.  4.  Unremarkable MRA of the [place] of Sanchez.  5.  Unremarkable MRA of the cervical vessels.     On my re-review I disagreed with the radiology read.  There is evidence of   patchy enhancement diffusely within the bilateral orbits and along the   bilateral intraorbital optic nerve sheaths.  Will discuss with   neuro-radiology regarding finding and repeat scan.     No bisphosphonates in the past (no osteoporosis medications for 7-8 years   per patient).      Left temporal artery biopsy 9/9/20- positive for giant cell arteritis   Prednisone (started 9/4/2020)  Patient started Actemra 10/28/20.    Interim history and exam with me since last visit 8/25/2023     Stopped Actemra last dose on 9/13/23 after a 3 year course. He reports no new symptoms since stopping the Actemra. Patient denies headache, scalp tenderness, jaw claudication, fever, fatigue, double vision, vision loss, muscle pain in shoulders.    He has noted weight loss over the last few months - did not start after discontinuation of his Actemra. He is following up with a nutritionist.    His most recent inflammatory markers were normal from 10/31/23- CRP <0.3 and ESR < 1    Rheumatology telephone note with Dr. Alcantara (10/20/23)  Assessment:  82 year old male with history of CLL who was referred to rheumatology for evaluation and treatment of biopsy proven giant cell arteritis with bilateral ischemic optic neuropathy clinically manifested by now stable peripheral vision loss in left>right eye. Never had HA, jaw  claudication, PMR symptoms. No peripheral inflammatory arthritis symptoms.      No evidence of active GCA or PMR today 10/20/23 by history or review of his most recent inflammatory markers- both his ESR and CRP were negative/normal on 10/10/23. At the time of his last visit, we stopped actemra as 1) he has demonstrated ongoing quiet disease despite tapering actemra to every 2 weeks 2) he has been on actemra for 3 years.      As we stopped actemra, I would like him to continue with frequent lab draws. I do note that he did not have increased inflammatory markers at the time of his initial biopsy proven GCA however I think still prudent to check these moving forward as other than acute vision loss this may be the only marker of subclinical disease activity prior to additional end organ damage from active vasculitis.      PLAN:  -continue off of actemra  -check ESR and CRP q2 weeks  -Follow-up with me Dec 8th at 9AM  -continue follow-up with Dr Garcia as planned on 11/1/23     # Osteopenia   Managed by PCP.      #CLL  Managed by oncology.     Exam today  Please see epic chart for complete exam. Stable and normal visual acuity and color vision in both eyes. 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    OCT of the optic nerve head revealed stable retinal nerve fiber layer thinning in both eyes    Octopus automated 30 degree visual fields stable in both eyes and reliable         Complete documentation of historical and exam elements from today's encounter can be found in the full encounter summary report (not reduplicated in this progress note).  I personally obtained the chief complaint(s) and history of present illness.  I confirmed and edited as necessary the review of systems, past medical/surgical history, family history, social history, and examination findings as documented by others; and I examined the patient myself.  I personally reviewed the relevant tests, images, and reports as documented above.  I formulated and  edited as necessary the assessment and plan and discussed the findings and management plan with the patient and family.  I personally reviewed the ophthalmic test(s) associated with this encounter, agree with the interpretation(s) as documented by the resident/fellow, and have edited the corresponding report(s) as necessary.     MD Donna Gomez MD- ophthalmology resident

## 2023-11-02 ENCOUNTER — VIRTUAL VISIT (OUTPATIENT)
Dept: RHEUMATOLOGY | Facility: CLINIC | Age: 83
End: 2023-11-02
Attending: INTERNAL MEDICINE
Payer: COMMERCIAL

## 2023-11-02 DIAGNOSIS — I25.10 CALCIFICATION OF CORONARY ARTERY: Primary | ICD-10-CM

## 2023-11-02 DIAGNOSIS — Z78.9 TAKES DIETARY SUPPLEMENTS: ICD-10-CM

## 2023-11-02 RX ORDER — CETIRIZINE HYDROCHLORIDE 10 MG/1
10 TABLET ORAL DAILY PRN
COMMUNITY

## 2023-11-02 NOTE — TELEPHONE ENCOUNTER
"LM for pt to call back.    Per last notes 10/20/23, \"PLAN-check ESR and CRP q2 weeks.\"    Catrachita Santizo RN    "

## 2023-11-02 NOTE — Clinical Note
"11/2/2023       RE: Bon Michael  [address] [facility] [address]     Dear Colleague,    Thank you for referring your patient, Bon Michael, to the [facility]. Please see a copy of my visit note below.    Medication Therapy Management (MTM) Encounter    ASSESSMENT:                            Medication Adherence/Access: {adherencechoices:496315}    ***:  ***      PLAN:                            ***    Follow-up: {followuptest2:541976}    SUBJECTIVE/OBJECTIVE:                          Bon Michael is a 83 year old male called for an initial visit. He was referred to me from Bib Alcantara MD.      Reason for visit: Questions about which supplements he should be taking, if calcium is worsening heart health    Allergies/ADRs: Reviewed in chart  Past Medical History: Reviewed in chart  Tobacco: He reports that he has never smoked. He has never used smokeless tobacco.  Alcohol: not currently using    Medication Adherence/Access: {fumedadherence:481172}    Calcific Coronary Artery Disease:   Atorvastatin 10 mg daily  Aspirin 81 mg daily    Today's Vitals: There were no vitals taken for this visit.  ----------------  {INGE?:824509}    I spent {mtm total time 3:046407} with this patient today. { :231611}. A copy of the visit note was provided to the patient's provider(s).    A summary of these recommendations {GIVEN/NOT GIVEN:451820}.    ***    Telemedicine Visit Details  Type of service:  {telemedvisitmtm:972071::\"Telephone visit\"}  Start Time: {video/phone visit start time:152948}  End Time: {video/phone visit end time:152948}     Medication Therapy Recommendations  No medication therapy recommendations to display       Medication Therapy Management (MTM) Encounter    ASSESSMENT:                            Medication Adherence/Access: No issues identified    Calcific Coronary Artery Disease: Reviewed at " length possible contributing factors to calcific coronary artery disease. Also discussed at length ways patient could help now to maintain his health including diet and continuing to take a statin to prevent progression. Recommend continuing current medications as prescribed.    Supplements: Due to time constraints, unable to assess all supplements today. Will plan to assess all supplements at upcoming visit. Reviewed calcium supplementation alone does not cause calcific coronary artery disease. Due to current diet, recommend patient continue with calcium 600 mg daily and consistently take vitamin D 1000 units daily to aid in absorption. Discussed nutritionist referral to assess diet however patient would like to consider this before agreeing to referral.    PLAN:                            1. Start taking your vitamin D 1000 units daily with your calcium supplement to improve absorption.    2. Continue taking atorvastatin and aspirin as they will be most helpful to treat the calcific coronary artery disease.    Follow-up: Return in 1 week (on 11/9/2023) for MTM Pharmacist Visit.    SUBJECTIVE/OBJECTIVE:                          Bon Michael is a 83 year old male called for an initial visit. He was referred to me from Bib Alcantara MD.      Reason for visit: Questions about which supplements he should be taking, if calcium is worsening heart health    Allergies/ADRs: Reviewed in chart  Past Medical History: Reviewed in chart  Tobacco: He reports that he has never smoked. He has never used smokeless tobacco.  Alcohol: not currently using    Medication Adherence/Access: no issues reported    Calcific Coronary Artery Disease:   Atorvastatin 10 mg daily  Aspirin 81 mg daily    Reports he had a coronary calcium screening done earlier this year that showed him at high risk (75th percentile for age and sex). Has not had any angina and no CHF per cardiologist. Wondering what caused this as he is very careful about his  health and tries to follow all guidance he hears about supporting a healthy lifestyle. Notes he watches a couple TV programs by doctors and dietitians that are his main source of information ([employer]).     Recent Labs   Lab Test 05/25/21  1237 05/05/21  1302 04/06/21  1304   CHOL 166 152 179   HDL  --  54 60   LDL 93 80 102*   TRIG 117 88 83     Supplements:  Calcium 600 mg daily  Vitamin D 1000 units daily (takes when he remembers 2-3 times weekly)    Reports he started these supplements after being on high dose prednisone and a DEXA scan resulting in osteopenia. Concerned that these supplements could have caused his calcific coronary artery disease. Would prefer to get all calcium from diet instead of supplements. Gets a salad with green leafy vegetables daily and usually some milk with cereal or oatmeal in the morning.    Today's Vitals: There were no vitals taken for this visit.  ----------------    I spent 40 minutes with this patient today. All changes were made via collaborative practice agreement with Bib Alcantara. A copy of the visit note was provided to the patient's provider(s).    A summary of these recommendations was sent via bVisual.    Gladys Rolon PharmD  Medication Therapy Management Pharmacist  [facility]  Phone: [phone]    Telemedicine Visit Details  Type of service:  Telephone visit  Start Time: 11:20 AM  End Time: 12:00 PM     Medication Therapy Recommendations  No medication therapy recommendations to display         Again, thank you for allowing me to participate in the care of your patient.      Sincerely,    GLADYS ROLON [facility]

## 2023-11-06 NOTE — TELEPHONE ENCOUNTER
Patient called and aware.  Email sent to Ember to open schedule.     Patient aware to have every 2 week labs.  Last 10/31/23.    Catrachita Santizo RN

## 2023-11-06 NOTE — TELEPHONE ENCOUNTER
----- Message from Bib Alcantara MD sent at 11/1/2023  2:35 PM CDT -----  Can offer him 7AM on that same date. Telephone.   thanks  ----- Message -----  From: Catrachita Santizo RN  Sent: 11/1/2023  10:59 AM CDT  To: Bib Alcantara MD      ----- Message -----  From: Aletha Crockett  Sent: 10/31/2023   4:20 PM CDT  To: Catrachita Santizo RN      ----- Message -----  From: Vamsi Johnson  Sent: 10/31/2023   2:30 PM CDT  To: Bib Alcantara MD; #    Hi,    Per checkout notes for this pt, a follow up was supposed to be scheduled in December 8th at 9am. However, that slot has been scheduled into. Please advise on scheduling for this patient.    Thanks,  Myc           0 = understands/communicates without difficulty

## 2023-11-07 ENCOUNTER — TELEPHONE (OUTPATIENT)
Dept: RHEUMATOLOGY | Facility: CLINIC | Age: 83
End: 2023-11-07
Payer: MEDICARE

## 2023-11-07 NOTE — TELEPHONE ENCOUNTER
Patient Contacted for the patient to call back and schedule the following:    Appointment type: Telephone  Provider: Dr. Alcantara  Return date: December 8th 2023  Specialty phone number: [phone]  Additional appointment(s) needed: NA  Additonal Notes: Scheduled per message from Dr. Alcantara

## 2023-11-09 ENCOUNTER — VIRTUAL VISIT (OUTPATIENT)
Dept: RHEUMATOLOGY | Facility: CLINIC | Age: 83
End: 2023-11-09
Attending: INTERNAL MEDICINE
Payer: COMMERCIAL

## 2023-11-09 DIAGNOSIS — M85.80 OSTEOPENIA, UNSPECIFIED LOCATION: ICD-10-CM

## 2023-11-09 DIAGNOSIS — Z78.9 TAKES DIETARY SUPPLEMENTS: Primary | ICD-10-CM

## 2023-11-09 NOTE — Clinical Note
11/9/2023       RE: Bon Michael  [address] [address]     Dear Colleague,    Thank you for referring your patient, Bon Michael, to the [facility] [place] [facility]. Please see a copy of my visit note below.    Medication Therapy Management (MTM) Encounter    ASSESSMENT:                            Medication Adherence/Access: {adherencechoices:289993}    ***:  ***      PLAN:                            ***    Follow-up: {followuptest2:650640}    SUBJECTIVE/OBJECTIVE:                          Bon Michael is a 83 year old male called for a follow-up visit from 11/2/23.       Reason for visit: Supplement Review - would like to know which supplements to continue vs stop    Allergies/ADRs: Reviewed in chart  Past Medical History: Reviewed in chart  Tobacco: He reports that he has never smoked. He has never used smokeless tobacco.  Alcohol: not currently using    Medication Adherence/Access: no issues reported    Supplements:  Co-Enzyme Q 10 100 mg daily  Green Tea Extract 300 mg daily  Magnesium 250 mg daily  Fish Oil 1200 mg daily  Cranberry 450 mg daily  Vitamin C 500 mg daily  Vitamin B Complex daily  Iodine 150 mg daily  Zinc 50 mg daily    Reports he has slowly added several of these supplements after hearing they were good for general health or covid prevention. Starting to get to be too much to manage and would like to reduce total tablets per day. Would like to know which he should continue and which are not beneficial and should be stopped.    Osteopenia:  Calcium 600 mg daily  Vitamin D 1000 units daily     Reports he started these supplements after being on high dose prednisone and a DEXA scan resulting in osteopenia. Concerned that these supplements could have caused his calcific coronary artery disease. Would prefer to get all calcium from diet instead of supplements. Gets a salad with green leafy  vegetables daily and usually some milk with cereal or oatmeal in the morning.    Today's Vitals: There were no vitals taken for this visit.  ----------------    I spent 60 minutes with this patient today. All changes were made via collaborative practice agreement with Bib Alcantara. A copy of the visit note was provided to the patient's provider(s).    A summary of these recommendations was mailed to the patient.    Estela Rolon, PharmD  Medication Therapy Management Pharmacist  [facility]  Phone: [phone]    Telemedicine Visit Details  Type of service:  Telephone visit  Start Time: 11:00 AM  End Time: 12:00 PM     Medication Therapy Recommendations  Takes dietary supplements    Current Medication: Cholecalciferol (VITAMIN D3) 1000 units CAPS   Rationale: Does not understand instructions - Adherence - Adherence   Recommendation: Provide Education - Take daily with calcium supplement   Status: Patient Agreed - Adherence/Education                Medication Therapy Management (MTM) Encounter    ASSESSMENT:                            Medication Adherence/Access: No issues identified    Supplements: Reviewed all supplements for indication and benefit. Recommend discontinuing green tea extract, cranberry supplement, vitamin C, iodine, and zinc supplements due to lack of indication. Also reviewed interaction between green tea extract and atorvastatin. Recommend continuing all other supplements at this time.    Osteopenia: Discussed approximate calcium amounts in food patient eats regularly including milk and spinach/kale (green leafy vegetables). Determined patient is getting at least 600 mg of calcium consistently and discussed adding more milk or other dairy product would likely increase calcium intake to recommended level. Patient interested in trying this. Recommend omitting calcium supplement on days when he has 2+ glasses of milk with salad and taking on days when he has 1  glass of milk and a salad.    PLAN:                            1. Stop taking the following supplements:  Green Tea Extract  Cranberry supplement  Vitamin C  Iodine  Zinc    2. Continue all other supplements as recommended.    3. If you increase your milk intake to ~16 ounces, you can skip your calcium supplement that day. If you only have milk once daily ~8 ounces, please take your calcium supplement.    Follow-up: Return in about 3 months (around 2/9/2024) for MTM Pharmacist Visit.    SUBJECTIVE/OBJECTIVE:                          Bon Michael is a 83 year old male called for a follow-up visit from 11/2/23.       Reason for visit: Supplement Review - would like to know which supplements to continue vs stop    Allergies/ADRs: Reviewed in chart  Past Medical History: Reviewed in chart  Tobacco: He reports that he has never smoked. He has never used smokeless tobacco.  Alcohol: not currently using    Medication Adherence/Access: no issues reported    Supplements:  Co-Enzyme Q 10 100 mg daily  Green Tea Extract 300 mg daily  Magnesium 250 mg daily  Fish Oil 1200 mg daily  Cranberry 450 mg daily  Vitamin C 500 mg daily  Vitamin B Complex daily  Iodine 150 mg daily  Zinc 50 mg daily    Reports he has slowly added several of these supplements after hearing they were good for general health or covid prevention. Starting to get to be too much to manage and would like to reduce total tablets per day. Would like to know which he should continue and which are not beneficial and should be stopped.    Osteopenia:  Calcium 600 mg daily  Vitamin D 1000 units daily     Reports he has been taking these consistently over the last week without any issue. Still would like to look into getting all calcium from diet but concerned as he doesn't drink enough milk daily to get to his RDA.    Today's Vitals: There were no vitals taken for this visit.  ----------------    I spent 60 minutes with this patient today. All changes were made  via collaborative practice agreement with Bib Alcantara. A copy of the visit note was provided to the patient's provider(s).    A summary of these recommendations was mailed to the patient.    Gladys Rolon PharmD  Medication Therapy Management Pharmacist  [facility]  Phone: [phone]    Telemedicine Visit Details  Type of service:  Telephone visit  Start Time: 11:00 AM  End Time: 12:00 PM     Medication Therapy Recommendations  Takes dietary supplements    Current Medication: Cholecalciferol (VITAMIN D3) 1000 units CAPS   Rationale: Does not understand instructions - Adherence - Adherence   Recommendation: Provide Education - Take daily with calcium supplement   Status: Patient Agreed - Adherence/Education                  Again, thank you for allowing me to participate in the care of your patient.      Sincerely,    GLADYS ROLON RPH

## 2023-11-20 RX ORDER — UBIDECARENONE 100 MG
100 CAPSULE ORAL DAILY
COMMUNITY
End: 2024-04-19

## 2023-11-20 NOTE — PROGRESS NOTES
Medication Therapy Management (MTM) Encounter    ASSESSMENT:                            Medication Adherence/Access: No issues identified    Supplements: Reviewed all supplements for indication and benefit. Recommend discontinuing green tea extract, cranberry supplement, vitamin C, iodine, and zinc supplements due to lack of indication. Also reviewed interaction between green tea extract and atorvastatin. Recommend continuing all other supplements at this time.    Osteopenia: Discussed approximate calcium amounts in food patient eats regularly including milk and spinach/kale (green leafy vegetables). Determined patient is getting at least 600 mg of calcium consistently and discussed adding more milk or other dairy product would likely increase calcium intake to recommended level. Patient interested in trying this. Recommend omitting calcium supplement on days when he has 2+ glasses of milk with salad and taking on days when he has 1 glass of milk and a salad.    PLAN:                            1. Stop taking the following supplements:  Green Tea Extract  Cranberry supplement  Vitamin C  Iodine  Zinc    2. Continue all other supplements as recommended.    3. If you increase your milk intake to ~16 ounces, you can skip your calcium supplement that day. If you only have milk once daily ~8 ounces, please take your calcium supplement.    Follow-up: Return in about 3 months (around 2/9/2024) for MTM Pharmacist Visit.    SUBJECTIVE/OBJECTIVE:                          Bon Michael is a 83 year old male called for a follow-up visit from 11/2/23.       Reason for visit: Supplement Review - would like to know which supplements to continue vs stop    Allergies/ADRs: Reviewed in chart  Past Medical History: Reviewed in chart  Tobacco: He reports that he has never smoked. He has never used smokeless tobacco.  Alcohol: not currently using    Medication Adherence/Access: no issues reported    Supplements:  Co-Enzyme Q 10 100 mg  daily  Green Tea Extract 300 mg daily  Magnesium 250 mg daily  Fish Oil 1200 mg daily  Cranberry 450 mg daily  Vitamin C 500 mg daily  Vitamin B Complex daily  Iodine 150 mg daily  Zinc 50 mg daily    Reports he has slowly added several of these supplements after hearing they were good for general health or covid prevention. Starting to get to be too much to manage and would like to reduce total tablets per day. Would like to know which he should continue and which are not beneficial and should be stopped.    Osteopenia:  Calcium 600 mg daily  Vitamin D 1000 units daily     Reports he has been taking these consistently over the last week without any issue. Still would like to look into getting all calcium from diet but concerned as he doesn't drink enough milk daily to get to his RDA.    Today's Vitals: There were no vitals taken for this visit.  ----------------    I spent 60 minutes with this patient today. All changes were made via collaborative practice agreement with Bib Alcantara. A copy of the visit note was provided to the patient's provider(s).    A summary of these recommendations was mailed to the patient.    Estela Rolon, PharmD  Medication Therapy Management Pharmacist  [facility]  Phone: [phone]    Telemedicine Visit Details  Type of service:  Telephone visit  Start Time: 11:00 AM  End Time: 12:00 PM     Medication Therapy Recommendations  Takes dietary supplements    Current Medication: Cranberry 405 MG CAPS (Discontinued)   Rationale: No medical indication at this time - Unnecessary medication therapy - Indication   Recommendation: Discontinue Medication - Stop taking green tea extract, cranberry, vitamin C, iodine, and zinc   Status: Accepted - no CPA Needed

## 2023-11-20 NOTE — PATIENT INSTRUCTIONS
"Recommendations from today's MTM visit:                                                       1. Stop taking the following supplements:  Green Tea Extract  Cranberry supplement  Vitamin C  Iodine  Zinc    2. Continue all other supplements as recommended.    3. If you increase your milk intake to ~16 ounces, you can skip your calcium supplement that day. If you only have milk once daily ~8 ounces, please take your calcium supplement.    Follow-up: Return in about 3 months (around 2/9/2024) for MTM Pharmacist Visit.    It was great speaking with you today.  I value your experience and would be very thankful for your time in providing feedback in our clinic survey. In the next few days, you may receive an email or text message from [employer] with a link to a survey related to your  clinical pharmacist.\"     To schedule another MTM appointment, please call the clinic directly or you may call the MTM scheduling line at [phone] or toll-free at [phone].     My Clinical Pharmacist's contact information:                                                      Please feel free to contact me with any questions or concerns you have.      Estela Rolon, PharmD  Medication Therapy Management Pharmacist  [facility]  Phone: [phone]     "

## 2023-11-20 NOTE — PROGRESS NOTES
Medication Therapy Management (MTM) Encounter    ASSESSMENT:                            Medication Adherence/Access: No issues identified    Calcific Coronary Artery Disease: Reviewed at length possible contributing factors to calcific coronary artery disease. Also discussed at length ways patient could help now to maintain his health including diet and continuing to take a statin to prevent progression. Recommend continuing current medications as prescribed.    Supplements: Due to time constraints, unable to assess all supplements today. Will plan to assess all supplements at upcoming visit. Reviewed calcium supplementation alone does not cause calcific coronary artery disease. Due to current diet, recommend patient continue with calcium 600 mg daily and consistently take vitamin D 1000 units daily to aid in absorption. Discussed nutritionist referral to assess diet however patient would like to consider this before agreeing to referral.    PLAN:                            1. Start taking your vitamin D 1000 units daily with your calcium supplement to improve absorption.    2. Continue taking atorvastatin and aspirin as they will be most helpful to treat the calcific coronary artery disease.    Follow-up: Return in 1 week (on 11/9/2023) for MTM Pharmacist Visit.    SUBJECTIVE/OBJECTIVE:                          Bon Michael is a 83 year old male called for an initial visit. He was referred to me from Bib Alcantara MD.      Reason for visit: Questions about which supplements he should be taking, if calcium is worsening heart health    Allergies/ADRs: Reviewed in chart  Past Medical History: Reviewed in chart  Tobacco: He reports that he has never smoked. He has never used smokeless tobacco.  Alcohol: not currently using    Medication Adherence/Access: no issues reported    Calcific Coronary Artery Disease:   Atorvastatin 10 mg daily  Aspirin 81 mg daily    Reports he had a coronary calcium screening done  earlier this year that showed him at high risk (75th percentile for age and sex). Has not had any angina and no CHF per cardiologist. Wondering what caused this as he is very careful about his health and tries to follow all guidance he hears about supporting a healthy lifestyle. Notes he watches a couple TV programs by doctors and dietitians that are his main source of information ([employer]).     Recent Labs   Lab Test 05/25/21  1237 05/05/21  1302 04/06/21  1304   CHOL 166 152 179   HDL  --  54 60   LDL 93 80 102*   TRIG 117 88 83     Supplements:  Calcium 600 mg daily  Vitamin D 1000 units daily (takes when he remembers 2-3 times weekly)    Reports he started these supplements after being on high dose prednisone and a DEXA scan resulting in osteopenia. Concerned that these supplements could have caused his calcific coronary artery disease. Would prefer to get all calcium from diet instead of supplements. Gets a salad with green leafy vegetables daily and usually some milk with cereal or oatmeal in the morning.    Today's Vitals: There were no vitals taken for this visit.  ----------------    I spent 40 minutes with this patient today. All changes were made via collaborative practice agreement with Bib Alcantara. A copy of the visit note was provided to the patient's provider(s).    A summary of these recommendations was sent via [employer].    Estela Rolon, PharmD  Medication Therapy Management Pharmacist  [facility]  Phone: [phone]    Telemedicine Visit Details  Type of service:  Telephone visit  Start Time: 11:20 AM  End Time: 12:00 PM     Medication Therapy Recommendations  Takes dietary supplements    Current Medication: Cholecalciferol (VITAMIN D3) 1000 units CAPS   Rationale: Does not understand instructions - Adherence - Adherence   Recommendation: Provide Education - Take daily with calcium supplement   Status: Patient Agreed - Adherence/Education

## 2023-11-20 NOTE — PATIENT INSTRUCTIONS
"Recommendations from today's MTM visit:                                                       1. Start taking your vitamin D 1000 units daily with your calcium supplement to improve absorption.    2. Continue taking atorvastatin and aspirin as they will be most helpful to treat the calcific coronary artery disease.    Follow-up: Return in 1 week (on 11/9/2023) for MTM Pharmacist Visit.    It was great speaking with you today.  I value your experience and would be very thankful for your time in providing feedback in our clinic survey. In the next few days, you may receive an email or text message from [employer] with a link to a survey related to your  clinical pharmacist.\"     To schedule another MTM appointment, please call the clinic directly or you may call the MTM scheduling line at [phone] or toll-free at [phone].     My Clinical Pharmacist's contact information:                                                      Please feel free to contact me with any questions or concerns you have.      Estela Rolon, PharmD  Medication Therapy Management Pharmacist  [facility]  Phone: [phone]     "

## 2023-11-29 NOTE — TELEPHONE ENCOUNTER
Pt states he is doing well. Tolerating diet. No fever or chills. BM+, asked him to call Dr. Don to see about moving his apt up and to check in about the cardizem. I also educated him to be checking his b/p and pulse which he said he will find a cuff.   Returned call to patient and he explained further about his pain. He reduced the dosage of Actemra and would like to know if it can be a side effect. I will forward message to Dr. Garcia.

## 2023-12-08 ENCOUNTER — VIRTUAL VISIT (OUTPATIENT)
Dept: RHEUMATOLOGY | Facility: CLINIC | Age: 83
End: 2023-12-08
Attending: INTERNAL MEDICINE
Payer: MEDICARE

## 2023-12-08 DIAGNOSIS — M31.6 GCA (GIANT CELL ARTERITIS) (H): Primary | ICD-10-CM

## 2023-12-08 PROCEDURE — 99442 PR PHYSICIAN TELEPHONE EVALUATION 11-20 MIN: CPT | Performed by: INTERNAL MEDICINE

## 2023-12-08 ASSESSMENT — PAIN SCALES - GENERAL: PAINLEVEL: NO PAIN (0)

## 2023-12-08 NOTE — LETTER
12/8/2023       RE: Bon Michael  [birth year][address] [facility] [address]     Dear Colleague,    Thank you for referring your patient, Bon Michael, to the [facility]HE[facility]OLOG[facility]. Please see a copy of my visit note below.    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 Never had HA, jaw claudication, PMR symptoms. No peripheral inflammatory arthritis symptoms.     No evidence of active GCA or PMR today, December 8, 2023, by history or review of his most recent inflammatory markers- both his ESR and CRP were negative/normal on 11/28/23. On Sept 23rd 2023, we stopped actemra as 1) he has demonstrated ongoing quiet disease despite tapering actemra to every 2 weeks 2) he has been on actemra/immunosuppression (short period of methotrexate while there was actemra national drug shortage due to covid) for 3 years.     As we stopped actemra, I would like him to continue with frequent lab draws. I do note that he did not have increased inflammatory markers at the time of his initial biopsy proven GCA however I think still prudent to check these moving forward as other than acute vision loss this may be the only marker of subclinical disease activity prior to additional end organ damage from active  "vasculitis which in his case could be additional permanent vision loss.     He continues to follow closely with Dr Garcia, with his next appointment scheduled on 1/10/24.    PLAN:  -continue off of actemra  -check ESR and CRP q4 weeks for the next 3 months. At that time (when he follows up with rheumatology again) we discussed that we will extend out to every 6-8 weeks. At next follow-up after that, if all remains quiet, will extend further out to every 12 weeks.   -Follow-up with rheumatology in March  -continue follow-up with Dr Garcia as planned on 1/10/24    # Osteopenia   Managed by PCP.     #CLL  Managed by oncology.    Bib Alcantara MD   Rheumatology         Subjective:   December 8, 2023  Mr. Michael has recently recovered from a viral illness that occurred a few weeks ago, characterized by symptoms of coughing and a runny nose. He underwent testing for COVID-19, which yielded a negative result. The persistence of his cough prompted a visit to his primary care physician. Diagnostic imaging, specifically a chest x-ray, identified a \"spot\" in one of his lungs. He was subsequently treated with doxycycline, which resolved his symptoms. A follow-up chest x-ray was conducted after a period of five to six weeks.    During physical activity, Mr. Michael has not experienced any shortness of breath or coughing. He has a known history of arthritis in his back, which continues to cause him some discomfort. Since his last appointment with Dr. Garcia on 11/1/23 at which time Dr Garcia's assessment was that his disease remained in remission at that time. He has not had any headaches or changes in his vision. His condition of Giant Cell Arteritis (GCA) remains asymptomatic; he reports no scalp tenderness, jaw pain, double vision, pain or stiffness in the shoulders or hips, red or swollen joints, fevers, chills, night sweats, or unintentional weight loss.    During his recent illness, Mr. Michael's oxygen " saturation levels were noted to be slightly reduced, ranging from 94-95, but have since improved to a normal level of 100 when he went to visit his oncologist. His latest laboratory tests, including ESR and CRP, from  fall within normal ranges, corroborating the lack of active symptoms.    October 20, 2023  -last dose of actemra taken on sept 13th 2023.   -most recent esr and crp from 10/10/23 were both negative/normal  -AST and ALT normal  -Creatinine normal  -continues with exercise routine. Getting ready for winter, getting garden ready. Doing some painting. Working out. Doing stretching.   -no changes in vision  -no headaches  -no scalp tenderness  -no jaw claudication  -no double vision  -no proximal muscle pain or stiffness  -no red/hot/swollen joints  -no fevers/chills/night sweats  -no unintentional weight   14 point ROS collected and negative if not documented above    September 26, 2023 interval history  -Mr. Yury Michael reports last dose of Actemra approximately two weeks ago, with his most recent dose taken on September 13th. He has not experienced any significant side effects. He did report a transient headache, which was localized to the back of his head on the right side. This was a new symptom for him, but it resolved spontaneously after one day without intervention.    Mr. Michael denies experiencing any jaw pain or systemic symptoms such as fevers, chills, or night sweats. He did note a change in his vision, but this was addressed with a new prescription for his glasses.     The patient reports weight loss, which he attributes to increased physical activity and dietary changes, including increased consumption of fruits and vegetables and decreased intake of carbohydrates. He is actively seeking ways to gain weight in a healthy manner.    No acute vision loss, no jaw claudication symptoms, no scalp tenderness. No proximal muscle pain/stiffness. Able to make a full fist upon waking in the AM.    14  point ROS collected and negative if not documented above.       July 28, 2023  -has continued on actemra q2 weeks  -No HA, jaw claudication, vision changes, scalp tenderness, double vision.   -no fevers/chills/night sweats  -no proximal muscle pain/stiffness  -no unintentional weight loss  -no red/hot/swollen joints  14 point ROS collected and negative if not documented above      May 19, 2023   -decreased actemra at the time of his last appointment down from 162mg once weekly down to 162mg q2 weeks.   -Last Saturday afternoon, about 2-3pm, period of about 30 minutes, intermittent, lasted for a few seconds, above his ear. Then that same evening, before bed, lasting 1-2 seconds. Above the left ear. Next day no symptoms nothing returned  -no fevers/chills/night sweats  -no proximal muscle pain or stiffness  -No red/hot/swollen joints in hands/feet. Able to make a full fist upon waking in the AM  -Has chronic low back pain.   -No noticeable vision changes  -No scalp tenderness  -No jaw claudication symptoms  -No interval infections, however ongoing dental issues needs root canal.   -no scalp tenderness  14 point ROS collected and negative if not documented above.     March 31, 2023  -no change vision. No HA, scalp tenderness, jaw claudication symptoms.   -No proximal muscle pain/stiffness consistent with PMR  -No fevers/chills/night sweats.   -Has started CoQ-10  -Has continued on Actemra 162 mg once weekly.  No injection site reactions.  No interval infections.  - No unintentional weight loss.  14 point review is collected and otherwise negative    December 16, 2022  No HA, scalp tenderness, vision change, jaw claudication symptoms, proximal muscle pain/stiffness. Still with on/off back pain and sinus/nasal congestion, both of which have been chronic issues for years. No fevers/chills/night sweats. No unintentional weight loss. Was treated successfully for cutaneous fungal infection, no return now since this summer.  No red/hot/swollen joints. Tolerating actemra injections without noticeable side effects.  Other than his cutaneous fungal infection he denies any other interval infections.  Able to get full fist with me in the morning.  Denies joint stiffness that improves with use.  He feels well.  Had his flu shot recently.  14 point review of systems collected and otherwise negative.    Interval History September 2, 2022  Had dental work done. Saw his PCP for lower extremity rash, treated by PCP with steroids with taper. Though after a few weeks without resolution so was referred to dermatology for lesions on LE. Biopsied/cultured and told it was fungal infection, started on treatment which ended yesterday. Lesions on lower extremities have cleared. No HA/ change in vision/ scalp tenderness/ jaw claudication. No red/hot/swollen joints. Able to make a full fist upon waking in the AM. No proximal muscle pain/stiffness. No injection site reactions/ side effects. 14 point ROS collected and negative if not documented above.     Interval history 6/10/22  No HA, change in vision, scalp tenderness, jaw claudication. No proximal muscle pain/stiffness. No red/hot/swollen joints. No fevers/chills/night sweats. No side effects with methotrexate. Takes methotrexate and actemra on wednesdays. No side effects for either. No interval infections. No rashes.  Weight has been stable.  Able to make a full fist upon waking in the morning.  14 point review of systems collected and otherwise negative.    Interval History 3/11/22  Has continued on alendronate. Thinks he has more sensitivity in his teeth. He asks about need for addition of vit K2 to supplement. He plans to bring this question, along with question about green tea extract up with his primary care physician. Takes this on Wednesday evenings. Taking folic acid 1mg daily. Has only had 3 doses of actemra that were late. Has been on methotrexate for 3 weeks now.  No headache, vision change,  scalp tenderness, proximal muscle pain or stiffness, peripheral joint swelling redness warmth.  Able to make full fist upon waking in the morning.  No fevers chills or night sweats.  No interval infections.  No rash.  Is tolerating his methotrexate without any side effects.  GI or other.      Past Medical History  CLL  Hernia  Mono   Deviated septum  Osteopenia by DEXA    Past Surgical History  Tonsillectomy  Deviated septum    Medications:  Actemra 162 mg once weekly subcutaneous      Weeks Daily prednisone dose (mg) 26-week taper Weekly 162mg Actemra      1 60    2 50 11/5/2020    3 40    4 35 11/20/2020   5 30    6 25    7 20 12/10/2020   8 15 12/17/2020   9 12.5    10 12.5    11 10 1/8/2021   12 9    13 8    14 7 2/5/21   15 6 2/11/21   16 6    17 5    18 5 3/4/21   19 4    20 4    21 3    22 3 4/8/21   23 2    24 2    25 1    26 1 5/12/21     Allergies: Seasonal/ environmental allergies    Family History: No family history of autoimmune diseases like RA, sjogrens, SLE, scleroderma, IBD.    Social History: Works as [employer]. Never smoker. No ETOH use. No drug use.        Objective:   Physical exam:  No vitals or exam for telephone visit    Labs:   11/28/23  ESR and CRP negative/normal  AST, ALT, creatinine normal    10/10/23  Creatinine 0.81  CRP <0.3  ESR <1  ALT and AST normal    WBC   Date Value Ref Range Status   05/05/2021 27.4 (HH) 4.0 - 11.0 10e9/L Final     Comment:     Critical Value called to and read back by  Results confirmed by repeat test  GIVEN TO Yazmin by ARM on 5.5.21 at 1401       Hemoglobin   Date Value Ref Range Status   05/05/2021 13.6 13.3 - 17.7 g/dL Final     Platelet Count   Date Value Ref Range Status   05/05/2021 120 (L) 150 - 450 10e9/L Final     Creatinine   Date Value Ref Range Status   05/05/2021 0.81 0.66 - 1.25 mg/dL Final     Lab Results   Component Value Date    ALKPHOS 45 05/05/2021     AST   Date Value Ref Range Status   05/05/2021 29 0 - 45 U/L Final     Lab Results  "  Component Value Date    ALT 25 05/05/2021     Sed Rate   Date Value Ref Range Status   06/16/2021 3 0 - 20 mm/h Final     CRP Inflammation   Date Value Ref Range Status   06/16/2021 <2.9 0.0 - 8.0 mg/L Final     UA RESULTS:  Recent Labs   Lab Test 06/16/21  1331   COLOR Yellow   APPEARANCE Clear   URINEGLC Negative   URINEBILI Negative   URINEKETONE Negative   SG 1.025   UBLD Negative   URINEPH 5.5   PROTEIN Negative   UROBILINOGEN 0.2   NITRITE Negative   LEUKEST Negative   RBCU O - 2   WBCU 0 - 5      No results found for: \"ANAIGG\", \"ANAP1\", \"MUKESH\", \"DNA\", \"ENASMI\", \"RNPIGG\", \"ENASSA\", \"ENASSB\", \"C3COM\", \"C4COM\", \"CKTOTAL\", \"ALDOLASE\", \"RHF\", \"CCPIGG\", \"ANCA\", \"MPOIGG\", \"PR3IGG\"      7/25/23  Creatinine 0.79  AST and ALT normal  Esr and CRP normal  Chronic leukocytosis secondary to known CLL  Mild anemia with hemoglobin of 12.6, will continue to monitor  PLT normal    12/12/2022  Creatinine 0.78  CRP 0.02  ALT 21  AST 31  Albumin 4.1  ESR less than 1  WBC 31.7  Hemoglobin 12.3  Platelet 119    CRP  5/5/21  WBC 27.4  HGB 13.6    Cr 0.81  ALT 25  AST 29  Chol 152  Trig 88  HDL 54  LDL 80  CRP <2.9  ESR 3  UA with small blood, no cells, no protein, casts    4/8/21  ESR 4  CRP less than 2.9  WBC 30.1  Hemoglobin 13.6  Platelets 157  Creatinine 0.86  Cholesterol 179  Triglycerides 83  HDL 60      3/2/21  ESR 4  CRP <2.9  WBC 28.3  HGB 12.8    Cr 0.86  ALT 23  AST 25    2/3/21  WBC 30.6  HGB 11.9    Cr 0.83  Alk phos 40  ALT 30  AST 29    1/5/21  UA without protein, cells or casts  ESR 5  CRP <2.9  WBC 40.6  HGB 11.6    Cr 0.81  Chol 166  Trig 86  HDL 68  LDL 81    12/14/2020   CRP <2.9  ESR 4  WBC 54  HGB 11.8    Cr 0.78  Ua unremarkable  Cholesterol 168  Trig 82  HDL 70  LDL 81    11-  ESR 3  CRP less than 2.9  WBC 63.3  Hemoglobin 12  Platelets 181  Creatinine 0.98  Total protein low at 5.9  Alk phos 48  ALT 31  AST 30  UA with 30 of albumin  Cholesterol " 169  Triglycerides 45  HDL 83  LDL 77    10/1/2020  WBC 84.9  HGB 12.3    IgM 36 low  IgA 124 normal  IgG 625    Cr 0.98    9/1/2020  WBC 50.2  HGB 11.6       Imaging:  MR brain and orbits 9/10/20  Impression:    1. Regarding the orbits and globes, there is no definite abnormal  contrast enhancement or mass. No evidence of leukemic infiltration.  2. Regarding the remainder of the brain, abnormal T2 hyperintense bone  marrow signal with associated enhancement in the skull, likely  representing leukemic infiltration.  3. Subcutaneous T2 hyperintensity with associated diffusion  restriction and contrast enhancement over the left zygomatic region,  question leukemic infiltration.    Pathology:  Patient Name: MARIELA WALLACE   MR#: [id number]   Specimen #: [id number]   Collected: 9/10/2020   Received: 9/10/2020   Reported: 9/16/2020 12:35   Ordering Phy(s): VALERIE CARRINGTON     For improved result formatting, select 'View Enhanced Report Format' under    Linked Documents section.     SPECIMEN(S):   Temporal artery biopsy, left     FINAL DIAGNOSIS:   Temporal artery, left, biopsy:   1. Granulomatous arteritis consistent with giant cell arteritis.   2. Chronic inflammation of the surrounding adventitia.     COMMENT:   Preliminary results were communicated to Dr. Valerie Carrington and Dr. Guille Garcia on 9/14/20 at 12:45pm.     I have personally reviewed all specimens and/or slides, including the   listed special stains, and used them   with my medical judgement to determine or confirm the final diagnosis.     Electronically signed out by:     Za García M.D., [facility]     CLINICAL HISTORY:   The patient is a 79 year old male with a history of chronic lymphocytic   leukemia who presented with bilateral   sequential optic neuropathy with associated pallid optic disc edema and   choroidal hypoperfusion, but   relatively intact visual acuity, negative elevation of acute phase   reactants, and  "lack of constitutional   symptoms consistent with giant cell arteritis. He also has findings of   likely leukemic infiltration in the   skull seen on MRI. He undergoes temporal artery biopsy on the left.     GROSS:   The specimen is received in formalin with proper patient identification,   labeled \"left temporal artery\".  The   specimen consists of a 3.2 cm in length by 0.3 cm in diameter tan-white   vessel segment. The specimen is   submitted intact in A1. (Dictated by: PADILLA Montalvo 9/11/2020 03:08   PM)     MICROSCOPIC:   The tissue consists of artery with narrowed lumen and intimal hyperplasia.    There is an infiltrate of   lymphocytes, plasma cells, epithelioid histiocytes, and multinucleated   giant cells throughout all layers of   the artery. There is associated loss of the internal elastic lamina on   elastic stain. Occasional focal   calcifications are seen a the level of the internal elastic lamina.   Neovascularization of the vessel wall is   present. Perivascular lymphocytic infiltrates are seen in the surrounding   adventitia. Immunohistochemistry   with CD3 and CD20 demonstrates a predominantly T-cell lymphocytic   infiltrate within the vessel wall. There is   a mixed infiltrate of T and B lymphocytes perivascular in the adventitia.   CD5 and CD43 have similar staining   patterns to CD3. There is light patchy CD23 staining in the areas positive    for CD20. CD68 highlights the   epithelioid histiocytes within the vessel wall.     The technical component of this testing was completed at the [facility], with the professional component performed    at the [facility], [address] [place] [phone] ([phone])     CPT Codes:   A: 37687-VX7, 42035-VDQF, 80198-RIR, 91666-CSJ, 75274-CLL, 96107-MZV,   63181-WOC, 70165-WSK     COLLECTION SITE:   Client: [facility]" [facility]   Location: [facility] [place]         Bib Alcantara MD

## 2023-12-08 NOTE — PROGRESS NOTES
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 Never had HA, jaw claudication, PMR symptoms. No peripheral inflammatory arthritis symptoms.     No evidence of active GCA or PMR today, December 8, 2023, by history or review of his most recent inflammatory markers- both his ESR and CRP were negative/normal on 11/28/23. On Sept 23rd 2023, we stopped actemra as 1) he has demonstrated ongoing quiet disease despite tapering actemra to every 2 weeks 2) he has been on actemra/immunosuppression (short period of methotrexate while there was actemra national drug shortage due to covid) for 3 years.     As we stopped actemra, I would like him to continue with frequent lab draws. I do note that he did not have increased inflammatory markers at the time of his initial biopsy proven GCA however I think still prudent to check these moving forward as other than acute vision loss this may be the only marker of subclinical disease activity prior to additional end organ damage from active vasculitis which in his case could be additional permanent vision loss.     He continues to follow closely with Dr Garcia, with his next appointment scheduled on 1/10/24.    PLAN:  -continue off of actemra  -check ESR and CRP q4 weeks for the next 3 months. At that time (when he follows up with rheumatology again) we  "discussed that we will extend out to every 6-8 weeks. At next follow-up after that, if all remains quiet, will extend further out to every 12 weeks.   -Follow-up with rheumatology in March  -continue follow-up with Dr Garcia as planned on 1/10/24    # Osteopenia   Managed by PCP.     #CLL  Managed by oncology.    Bib Alcantara MD   Rheumatology         Subjective:   December 8, 2023  Mr. Michael has recently recovered from a viral illness that occurred a few weeks ago, characterized by symptoms of coughing and a runny nose. He underwent testing for COVID-19, which yielded a negative result. The persistence of his cough prompted a visit to his primary care physician. Diagnostic imaging, specifically a chest x-ray, identified a \"spot\" in one of his lungs. He was subsequently treated with doxycycline, which resolved his symptoms. A follow-up chest x-ray was conducted after a period of five to six weeks.    During physical activity, Mr. Michael has not experienced any shortness of breath or coughing. He has a known history of arthritis in his back, which continues to cause him some discomfort. Since his last appointment with Dr. Garcia on 11/1/23 at which time Dr Garcia's assessment was that his disease remained in remission at that time. He has not had any headaches or changes in his vision. His condition of Giant Cell Arteritis (GCA) remains asymptomatic; he reports no scalp tenderness, jaw pain, double vision, pain or stiffness in the shoulders or hips, red or swollen joints, fevers, chills, night sweats, or unintentional weight loss.    During his recent illness, Mr. Michael's oxygen saturation levels were noted to be slightly reduced, ranging from 94-95, but have since improved to a normal level of 100 when he went to visit his oncologist. His latest laboratory tests, including ESR and CRP, from  fall within normal ranges, corroborating the lack of active symptoms.    October 20, 2023  -last dose of actemra " taken on sept 13th 2023.   -most recent esr and crp from 10/10/23 were both negative/normal  -AST and ALT normal  -Creatinine normal  -continues with exercise routine. Getting ready for winter, getting garden ready. Doing some painting. Working out. Doing stretching.   -no changes in vision  -no headaches  -no scalp tenderness  -no jaw claudication  -no double vision  -no proximal muscle pain or stiffness  -no red/hot/swollen joints  -no fevers/chills/night sweats  -no unintentional weight   14 point ROS collected and negative if not documented above    September 26, 2023 interval history  -Mr. Yury Michael reports last dose of Actemra approximately two weeks ago, with his most recent dose taken on September 13th. He has not experienced any significant side effects. He did report a transient headache, which was localized to the back of his head on the right side. This was a new symptom for him, but it resolved spontaneously after one day without intervention.    Mr. Michael denies experiencing any jaw pain or systemic symptoms such as fevers, chills, or night sweats. He did note a change in his vision, but this was addressed with a new prescription for his glasses.     The patient reports weight loss, which he attributes to increased physical activity and dietary changes, including increased consumption of fruits and vegetables and decreased intake of carbohydrates. He is actively seeking ways to gain weight in a healthy manner.    No acute vision loss, no jaw claudication symptoms, no scalp tenderness. No proximal muscle pain/stiffness. Able to make a full fist upon waking in the AM.    14 point ROS collected and negative if not documented above.       July 28, 2023  -has continued on actemra q2 weeks  -No HA, jaw claudication, vision changes, scalp tenderness, double vision.   -no fevers/chills/night sweats  -no proximal muscle pain/stiffness  -no unintentional weight loss  -no red/hot/swollen joints  14 point ROS  collected and negative if not documented above      May 19, 2023   -decreased actemra at the time of his last appointment down from 162mg once weekly down to 162mg q2 weeks.   -Last Saturday afternoon, about 2-3pm, period of about 30 minutes, intermittent, lasted for a few seconds, above his ear. Then that same evening, before bed, lasting 1-2 seconds. Above the left ear. Next day no symptoms nothing returned  -no fevers/chills/night sweats  -no proximal muscle pain or stiffness  -No red/hot/swollen joints in hands/feet. Able to make a full fist upon waking in the AM  -Has chronic low back pain.   -No noticeable vision changes  -No scalp tenderness  -No jaw claudication symptoms  -No interval infections, however ongoing dental issues needs root canal.   -no scalp tenderness  14 point ROS collected and negative if not documented above.     March 31, 2023  -no change vision. No HA, scalp tenderness, jaw claudication symptoms.   -No proximal muscle pain/stiffness consistent with PMR  -No fevers/chills/night sweats.   -Has started CoQ-10  -Has continued on Actemra 162 mg once weekly.  No injection site reactions.  No interval infections.  - No unintentional weight loss.  14 point review is collected and otherwise negative    December 16, 2022  No HA, scalp tenderness, vision change, jaw claudication symptoms, proximal muscle pain/stiffness. Still with on/off back pain and sinus/nasal congestion, both of which have been chronic issues for years. No fevers/chills/night sweats. No unintentional weight loss. Was treated successfully for cutaneous fungal infection, no return now since this summer. No red/hot/swollen joints. Tolerating actemra injections without noticeable side effects.  Other than his cutaneous fungal infection he denies any other interval infections.  Able to get full fist with me in the morning.  Denies joint stiffness that improves with use.  He feels well.  Had his flu shot recently.  14 point review of  systems collected and otherwise negative.    Interval History September 2, 2022  Had dental work done. Saw his PCP for lower extremity rash, treated by PCP with steroids with taper. Though after a few weeks without resolution so was referred to dermatology for lesions on LE. Biopsied/cultured and told it was fungal infection, started on treatment which ended yesterday. Lesions on lower extremities have cleared. No HA/ change in vision/ scalp tenderness/ jaw claudication. No red/hot/swollen joints. Able to make a full fist upon waking in the AM. No proximal muscle pain/stiffness. No injection site reactions/ side effects. 14 point ROS collected and negative if not documented above.     Interval history 6/10/22  No HA, change in vision, scalp tenderness, jaw claudication. No proximal muscle pain/stiffness. No red/hot/swollen joints. No fevers/chills/night sweats. No side effects with methotrexate. Takes methotrexate and actemra on wednesdays. No side effects for either. No interval infections. No rashes.  Weight has been stable.  Able to make a full fist upon waking in the morning.  14 point review of systems collected and otherwise negative.    Interval History 3/11/22  Has continued on alendronate. Thinks he has more sensitivity in his teeth. He asks about need for addition of vit K2 to supplement. He plans to bring this question, along with question about green tea extract up with his primary care physician. Takes this on Wednesday evenings. Taking folic acid 1mg daily. Has only had 3 doses of actemra that were late. Has been on methotrexate for 3 weeks now.  No headache, vision change, scalp tenderness, proximal muscle pain or stiffness, peripheral joint swelling redness warmth.  Able to make full fist upon waking in the morning.  No fevers chills or night sweats.  No interval infections.  No rash.  Is tolerating his methotrexate without any side effects.  GI or other.      Past Medical History  CLL  Hernia  [place]    Deviated septum  Osteopenia by DEXA    Past Surgical History  Tonsillectomy  Deviated septum    Medications:  Actemra 162 mg once weekly subcutaneous      Weeks Daily prednisone dose (mg) 26-week taper Weekly 162mg Actemra      1 60    2 50 11/5/2020    3 40    4 35 11/20/2020   5 30    6 25    7 20 12/10/2020   8 15 12/17/2020   9 12.5    10 12.5    11 10 1/8/2021   12 9    13 8    14 7 2/5/21   15 6 2/11/21   16 6    17 5    18 5 3/4/21   19 4    20 4    21 3    22 3 4/8/21   23 2    24 2    25 1    26 1 5/12/21     Allergies: Seasonal/ environmental allergies    Family History: No family history of autoimmune diseases like RA, sjogrens, SLE, scleroderma, IBD.    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 No drug use.        Objective:   Physical exam:  No vitals or exam for telephone visit    Labs:   11/28/23  ESR and CRP negative/normal  AST, ALT, creatinine normal    10/10/23  Creatinine 0.81  CRP <0.3  ESR <1  ALT and AST normal    WBC   Date Value Ref Range Status   05/05/2021 27.4 (HH) 4.0 - 11.0 10e9/L Final     Comment:     Critical Value called to and read back by  Results confirmed by repeat test  GIVEN TO Yazmin by MERCEDES on 5.5.21 at 1401       Hemoglobin   Date Value Ref Range Status   05/05/2021 13.6 13.3 - 17.7 g/dL Final     Platelet Count   Date Value Ref Range Status   05/05/2021 120 (L) 150 - 450 10e9/L Final     Creatinine   Date Value Ref Range Status   05/05/2021 0.81 0.66 - 1.25 mg/dL Final     Lab Results   Component Value Date    ALKPHOS 45 05/05/2021     AST   Date Value Ref Range Status   05/05/2021 29 0 - 45 U/L Final     Lab Results   Component Value Date    ALT 25 05/05/2021     Sed Rate   Date Value Ref Range Status   06/16/2021 3 0 - 20 mm/h Final     CRP Inflammation   Date Value Ref Range Status   06/16/2021 <2.9 0.0 - 8.0 mg/L Final     UA RESULTS:  Recent Labs   Lab Test 06/16/21  1331   COLOR Yellow   APPEARANCE Clear   URINEGLC Negative   URINEBILI Negative  "  URINEKETONE Negative   SG 1.025   UBLD Negative   URINEPH 5.5   PROTEIN Negative   UROBILINOGEN 0.2   NITRITE Negative   LEUKEST Negative   RBCU O - 2   WBCU 0 - 5      No results found for: \"ANAIGG\", \"ANAP1\", \"MUKESH\", \"DNA\", \"ENASMI\", \"RNPIGG\", \"ENASSA\", \"ENASSB\", \"C3COM\", \"C4COM\", \"CKTOTAL\", \"ALDOLASE\", \"RHF\", \"CCPIGG\", \"ANCA\", \"MPOIGG\", \"PR3IGG\"      7/25/23  Creatinine 0.79  AST and ALT normal  Esr and CRP normal  Chronic leukocytosis secondary to known CLL  Mild anemia with hemoglobin of 12.6, will continue to monitor  PLT normal    12/12/2022  Creatinine 0.78  CRP 0.02  ALT 21  AST 31  Albumin 4.1  ESR less than 1  WBC 31.7  Hemoglobin 12.3  Platelet 119    CRP  5/5/21  WBC 27.4  HGB 13.6    Cr 0.81  ALT 25  AST 29  Chol 152  Trig 88  HDL 54  LDL 80  CRP <2.9  ESR 3  UA with small blood, no cells, no protein, casts    4/8/21  ESR 4  CRP less than 2.9  WBC 30.1  Hemoglobin 13.6  Platelets 157  Creatinine 0.86  Cholesterol 179  Triglycerides 83  HDL 60      3/2/21  ESR 4  CRP <2.9  WBC 28.3  HGB 12.8    Cr 0.86  ALT 23  AST 25    2/3/21  WBC 30.6  HGB 11.9    Cr 0.83  Alk phos 40  ALT 30  AST 29    1/5/21  UA without protein, cells or casts  ESR 5  CRP <2.9  WBC 40.6  HGB 11.6    Cr 0.81  Chol 166  Trig 86  HDL 68  LDL 81    12/14/2020   CRP <2.9  ESR 4  WBC 54  HGB 11.8    Cr 0.78  Ua unremarkable  Cholesterol 168  Trig 82  HDL 70  LDL 81    11-  ESR 3  CRP less than 2.9  WBC 63.3  Hemoglobin 12  Platelets 181  Creatinine 0.98  Total protein low at 5.9  Alk phos 48  ALT 31  AST 30  UA with 30 of albumin  Cholesterol 169  Triglycerides 45  HDL 83  LDL 77    10/1/2020  WBC 84.9  HGB 12.3    IgM 36 low  IgA 124 normal  IgG 625    Cr 0.98    9/1/2020  WBC 50.2  HGB 11.6       Imaging:  MR brain and orbits 9/10/20  Impression:    1. Regarding the orbits and globes, there is no definite abnormal  contrast enhancement or mass. No evidence of " "leukemic infiltration.  2. Regarding the remainder of the brain, abnormal T2 hyperintense bone  marrow signal with associated enhancement in the skull, likely  representing leukemic infiltration.  3. Subcutaneous T2 hyperintensity with associated diffusion  restriction and contrast enhancement over the left zygomatic region,  question leukemic infiltration.    Pathology:  Patient Name: MARIELA WALLACE   MR#: [id number]   Specimen #: [id number]   Collected: 9/10/2020   Received: 9/10/2020   Reported: 9/16/2020 12:35   Ordering Phy(s): VALERIE CARRINGTON     For improved result formatting, select 'View Enhanced Report Format' under    Linked Documents section.     SPECIMEN(S):   Temporal artery biopsy, left     FINAL DIAGNOSIS:   Temporal artery, left, biopsy:   1. Granulomatous arteritis consistent with giant cell arteritis.   2. Chronic inflammation of the surrounding adventitia.     COMMENT:   Preliminary results were communicated to Dr. Valerie Carrington and Dr. Guille Garcia on 9/14/20 at 12:45pm.     I have personally reviewed all specimens and/or slides, including the   listed special stains, and used them   with my medical judgement to determine or confirm the final diagnosis.     Electronically signed out by:     Za García M.D., [facility]     CLINICAL HISTORY:   The patient is a 79 year old male with a history of chronic lymphocytic   leukemia who presented with bilateral   sequential optic neuropathy with associated pallid optic disc edema and   choroidal hypoperfusion, but   relatively intact visual acuity, negative elevation of acute phase   reactants, and lack of constitutional   symptoms consistent with giant cell arteritis. He also has findings of   likely leukemic infiltration in the   skull seen on MRI. He undergoes temporal artery biopsy on the left.     GROSS:   The specimen is received in formalin with proper patient identification,   labeled \"left temporal artery\".  The   specimen " consists of a 3.2 cm in length by 0.3 cm in diameter tan-white   vessel segment. The specimen is   submitted intact in A1. (Dictated by: PADILLA Montalvo 9/11/2020 03:08   PM)     MICROSCOPIC:   The tissue consists of artery with narrowed lumen and intimal hyperplasia.    There is an infiltrate of   lymphocytes, plasma cells, epithelioid histiocytes, and multinucleated   giant cells throughout all layers of   the artery. There is associated loss of the internal elastic lamina on   elastic stain. Occasional focal   calcifications are seen a the level of the internal elastic lamina.   Neovascularization of the vessel wall is   present. Perivascular lymphocytic infiltrates are seen in the surrounding   adventitia. Immunohistochemistry   with CD3 and CD20 demonstrates a predominantly T-cell lymphocytic   infiltrate within the vessel wall. There is   a mixed infiltrate of T and B lymphocytes perivascular in the adventitia.   CD5 and CD43 have similar staining   patterns to CD3. There is light patchy CD23 staining in the areas positive    for CD20. CD68 highlights the   epithelioid histiocytes within the vessel wall.     The technical component of this testing was completed at the [facility], with the professional component performed    at the [facility], [address] [place] [id number] ([phone])     CPT Codes:   A: 50419-UN4, 65997-MFRL, 13601-THO, 78434-IXM, 27425-KHJ, 03522-EVA,   41794-TSA, 27682-MEX     COLLECTION SITE:   Client: [facility]   Location: [place] (B)

## 2023-12-08 NOTE — NURSING NOTE
Is the patient currently in the state of [place]? YES    Visit mode:TELEPHONE    If the visit is dropped, the patient can be reconnected by: TELEPHONE VISIT: Phone number:   Telephone Information:   Mobile [phone]       Will anyone else be joining the visit? NO  (If patient encounters technical issues they should call [phone]    How would you like to obtain your AVS? MyChart    Are changes needed to the allergy or medication list? No    Reason for visit: No chief complaint on file.    Lakisha GOYAL

## 2023-12-13 ENCOUNTER — TELEPHONE (OUTPATIENT)
Dept: RHEUMATOLOGY | Facility: CLINIC | Age: 83
End: 2023-12-13
Payer: MEDICARE

## 2023-12-13 NOTE — TELEPHONE ENCOUNTER
Left Voicemail (1st Attempt) for the patient to call back and schedule the following:    Appointment type: New Pt  Provider: Dr. Abdul  Return date: March 2024  Specialty phone number: [phone]  Additional appointment(s) needed: NA  Additonal Notes: FORMER [facility] PT. SCHEDULE 1HR NEW PT SLOT WITH DR. ABDUL PER [facility] SCHEDULING INSTRUCTIONS.

## 2023-12-14 NOTE — TELEPHONE ENCOUNTER
Pt calling back requesting to speak to care team regarding appt, will like input from his eye doctor.

## 2023-12-15 ENCOUNTER — TELEPHONE (OUTPATIENT)
Dept: RHEUMATOLOGY | Facility: CLINIC | Age: 83
End: 2023-12-15
Payer: MEDICARE

## 2023-12-15 NOTE — TELEPHONE ENCOUNTER
Left Voicemail (2nd Attempt) for the patient to call back and schedule the following:    Appointment type: New Pt  Provider: Dr. Rice  Return date: March 2024  Specialty phone number: [phone]  Additional appointment(s) needed: NA  Additonal Notes: Schedule with Dwayne per Mar[facility] check out notes. Schedule pt 1hr new pt appt per Maricruz rescheduling appts.

## 2024-01-10 ENCOUNTER — OFFICE VISIT (OUTPATIENT)
Dept: OPHTHALMOLOGY | Facility: CLINIC | Age: 84
End: 2024-01-10
Attending: OPHTHALMOLOGY
Payer: MEDICARE

## 2024-01-10 DIAGNOSIS — H47.20 PARTIAL OPTIC ATROPHY: Primary | ICD-10-CM

## 2024-01-10 DIAGNOSIS — M31.6 GIANT CELL ARTERITIS (H): ICD-10-CM

## 2024-01-10 DIAGNOSIS — H46.9 OPTIC NEUROPATHY: ICD-10-CM

## 2024-01-10 PROCEDURE — 92014 COMPRE OPH EXAM EST PT 1/>: CPT | Performed by: OPHTHALMOLOGY

## 2024-01-10 PROCEDURE — 92133 CPTRZD OPH DX IMG PST SGM ON: CPT | Performed by: OPHTHALMOLOGY

## 2024-01-10 PROCEDURE — 92133 CPTRZD OPH DX IMG PST SGM ON: CPT | Mod: 26 | Performed by: OPHTHALMOLOGY

## 2024-01-10 PROCEDURE — G0463 HOSPITAL OUTPT CLINIC VISIT: HCPCS | Performed by: OPHTHALMOLOGY

## 2024-01-10 ASSESSMENT — VISUAL ACUITY
OD_CC: 20/20
CORRECTION_TYPE: GLASSES
OD_CC+: -2
OS_CC+: -2
OS_CC: 20/25
METHOD: SNELLEN - LINEAR

## 2024-01-10 ASSESSMENT — REFRACTION_WEARINGRX
OS_AXIS: 090
OD_CYLINDER: +0.75
OS_ADD: +2.75
SPECS_TYPE: PAL
OD_AXIS: 152
OD_SPHERE: +3.00
OS_SPHERE: +6.00
OD_ADD: +2.75
OS_CYLINDER: +0.75

## 2024-01-10 ASSESSMENT — TONOMETRY
OD_IOP_MMHG: 09
OS_IOP_MMHG: 09
IOP_METHOD: ICARE

## 2024-01-10 ASSESSMENT — CONF VISUAL FIELD
OD_SUPERIOR_TEMPORAL_RESTRICTION: 0
METHOD: COUNTING FINGERS
OD_SUPERIOR_NASAL_RESTRICTION: 0
OS_INFERIOR_NASAL_RESTRICTION: 3
OD_NORMAL: 1
OD_INFERIOR_TEMPORAL_RESTRICTION: 0
OD_INFERIOR_NASAL_RESTRICTION: 0

## 2024-01-10 ASSESSMENT — EXTERNAL EXAM - RIGHT EYE: OD_EXAM: NORMAL

## 2024-01-10 ASSESSMENT — EXTERNAL EXAM - LEFT EYE: OS_EXAM: NORMAL

## 2024-01-10 ASSESSMENT — CUP TO DISC RATIO
OD_RATIO: 0.3
OS_RATIO: 0.3

## 2024-01-10 ASSESSMENT — SLIT LAMP EXAM - LIDS
COMMENTS: NORMAL
COMMENTS: NORMAL

## 2024-01-10 NOTE — PROGRESS NOTES
"   1. 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      Very atypical presentation without elevated acute phase reactants, no PMR symptoms, predominantly peripheral field losses. He completed an oral prednisone taper in early 2021 and stopped methotrexate in mid-2022 with no evidence of disease recurrence since. Patient also has CLL which is managed by oncology, monitored every 6 months.     Today giant cell arteritis remains in apparent remission (no evidence of new vision loss or new optic nerve ischemia and no symptoms of giant cell arteritis recurrence) off of actemra - stopped September 2023.    Patient is transitioning to a new rheumatologist- Dr. Rice with next appointment 3/12/24 as Dr. Alcantara is leaving [facility]. Letter to Dr. Rice.    2.  Borderline visually significant cataracts - observe for now    3. 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 Present since at least 04/2022.     Follow-up in 2 months with neuro-ophthalmology. If unremarkable exam then will stretch out additional follow-up    Historical data from initial visit (9/2020):    Bon Michael is a 79 year old male who presents to neuro-ophthalmology   clinic today for consultation from Jose Andino MD at [facility] for   visual disturbance of left eye. He describes it as \"his left eye not   getting enough light\". He describes noticing the change in his left eye's   temporal visual field, inferior > superior. He notices it more first thing   in the morning. These symptoms began on 8/22/2020 and he describes it as   worsening - he is unclear if it the deficit has gotten larger or darker   however. He first noticed it while driving at the periphery of his vision.     Patient saw Dr. Lewis on August 28.  He underwent esr / crp testing as   well as mri (see below).     Labs: 9/2/20- WBC- 50.2, ESR- 28, CRP < 5     On August 31 he developed symptoms of waviness in his peripheral " vision in   the right eye.  He went to Dr. Andino on 9/4/2020 who wanted him to go to   the ED but he was unable.  Dr. Andino reviewed the patient's fluorescein   angiography and felt there was choroidal filling delay in the right eye   and pallid edema in the right eye concerning for giant cell arteritis.  We   discussed the case via phone.      PRIOR IMAGING:  BRAIN MRI WITH/WO GADOLINIUM, MRA OF THE [place] OF [place]ANCHEZ AND MRA OF THE   CAROTID ARTERIES - [facility] (9/1/2020)  IMPRESSION:    1.  No acute intracranial abnormality.  2.  No evidence of an orbital abnormalities.  3.  Mild generalized cerebral atrophy.  4.  Unremarkable MRA of the [place] of [place].  5.  Unremarkable MRA of the cervical vessels.     On my re-review I disagreed with the radiology read.  There is evidence of   patchy enhancement diffusely within the bilateral orbits and along the   bilateral intraorbital optic nerve sheaths.  Will discuss with   neuro-radiology regarding finding and repeat scan.     No bisphosphonates in the past (no osteoporosis medications for 7-8 years   per patient).      Left temporal artery biopsy 9/9/20- positive for giant cell arteritis   Prednisone (started 9/4/2020)  Patient started Actemra 10/28/20.    Stopped Actemra last dose on 9/13/23 after a 3 year course.     Interim history and exam with me since last visit 11/1/2023     Next appointment with rheumatology- Dr. Rice - 3/12/24.  Appointment with oncology- Dr. Wall 6/6/24  No new visual complaints.  The patient had 1 day where he felt like his right eye was wet but this self resolved.  He is getting repeat acute phase reactant labs next month.  Most recent ESR and CRP were checked on December 27, 2023 and were completely normal.  He remains off of Actemra    Virtual visit with rheumatology- Dr. Alcantara- 12/8/23  Assessment:  83 year old male with history of CLL who was referred to rheumatology for evaluation and treatment of biopsy  proven giant cell arteritis with bilateral ischemic optic neuropathy clinically manifested by now stable peripheral vision loss in left>right eye. Never had HA, jaw claudication, PMR symptoms. No peripheral inflammatory arthritis symptoms.      No evidence of active GCA or PMR today, December 8, 2023, by history or review of his most recent inflammatory markers- both his ESR and CRP were negative/normal on 11/28/23. On Sept 23rd 2023, we stopped actemra as 1) he has demonstrated ongoing quiet disease despite tapering actemra to every 2 weeks 2) he has been on actemra/immunosuppression (short period of methotrexate while there was actemra national drug shortage due to covid) for 3 years.      As we stopped actemra, I would like him to continue with frequent lab draws. I do note that he did not have increased inflammatory markers at the time of his initial biopsy proven GCA however I think still prudent to check these moving forward as other than acute vision loss this may be the only marker of subclinical disease activity prior to additional end organ damage from active vasculitis which in his case could be additional permanent vision loss.      He continues to follow closely with Dr Garcia, with his next appointment scheduled on 1/10/24.     PLAN:  -continue off of actemra  -check ESR and CRP q4 weeks for the next 3 months. At that time (when he follows up with rheumatology again) we discussed that we will extend out to every 6-8 weeks. At next follow-up after that, if all remains quiet, will extend further out to every 12 weeks.   -Follow-up with rheumatology in March  -continue follow-up with Dr Garcia as planned on 1/10/24     Exam:  20/20 visual acuity with glasses correction right eye and 20/25-2 in the left eye. No afferent pupillary defect. 11/11 Ishihara color plates in both eyes. Stable overall afferent visual function.    OCT of the optic nerve head revealed stable retinal nerve fiber layer atrophy  in both eyes (sequelae of original attacks without evidence of new or active ischemic optic neuropathy).        Complete documentation of historical and exam elements from today's encounter can be found in the full encounter summary report (not reduplicated in this progress note).  I personally obtained the chief complaint(s) and history of present illness.  I confirmed and edited as necessary the review of systems, past medical/surgical history, family history, social history, and examination findings as documented by others; and I examined the patient myself.  I personally reviewed the relevant tests, images, and reports as documented above.  I formulated and edited as necessary the assessment and plan and discussed the findings and management plan with the patient and family     Guille Garcia MD

## 2024-01-10 NOTE — NURSING NOTE
Chief Complaint(s) and History of Present Illness(es)       Follow Up    In both eyes.  Since onset it is stable.  Associated symptoms include Negative for eye pain and headache.  Pain was noted as 0/10. Additional comments: Bon Michael is a 83 year old male with the following diagnoses:  1. 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    Bon reports no new vision changes.  No eye pain or headaches.    ANNETTE Christiansen 1/10/2024 7:35 AM

## 2024-02-12 ENCOUNTER — APPOINTMENT (OUTPATIENT)
Dept: URBAN - METROPOLITAN AREA CLINIC 259 | Age: 84
Setting detail: DERMATOLOGY
End: 2024-02-13

## 2024-02-12 DIAGNOSIS — Z71.89 OTHER SPECIFIED COUNSELING: ICD-10-CM

## 2024-02-12 DIAGNOSIS — L81.4 OTHER MELANIN HYPERPIGMENTATION: ICD-10-CM

## 2024-02-12 DIAGNOSIS — D22 MELANOCYTIC NEVI: ICD-10-CM

## 2024-02-12 DIAGNOSIS — L72.0 EPIDERMAL CYST: ICD-10-CM

## 2024-02-12 DIAGNOSIS — D18.0 HEMANGIOMA: ICD-10-CM

## 2024-02-12 DIAGNOSIS — Z85.828 PERSONAL HISTORY OF OTHER MALIGNANT NEOPLASM OF SKIN: ICD-10-CM

## 2024-02-12 DIAGNOSIS — L73.8 OTHER SPECIFIED FOLLICULAR DISORDERS: ICD-10-CM

## 2024-02-12 DIAGNOSIS — L57.8 OTHER SKIN CHANGES DUE TO CHRONIC EXPOSURE TO NONIONIZING RADIATION: ICD-10-CM

## 2024-02-12 DIAGNOSIS — L82.1 OTHER SEBORRHEIC KERATOSIS: ICD-10-CM

## 2024-02-12 PROBLEM — D22.5 MELANOCYTIC NEVI OF TRUNK: Status: ACTIVE | Noted: 2024-02-12

## 2024-02-12 PROBLEM — D18.01 HEMANGIOMA OF SKIN AND SUBCUTANEOUS TISSUE: Status: ACTIVE | Noted: 2024-02-12

## 2024-02-12 PROCEDURE — OTHER COUNSELING: OTHER

## 2024-02-12 PROCEDURE — OTHER REASSURANCE: OTHER

## 2024-02-12 PROCEDURE — 99213 OFFICE O/P EST LOW 20 MIN: CPT

## 2024-02-12 PROCEDURE — OTHER MIPS QUALITY: OTHER

## 2024-02-12 PROCEDURE — OTHER EXTRACTIONS: OTHER

## 2024-02-12 ASSESSMENT — LOCATION SIMPLE DESCRIPTION DERM
LOCATION SIMPLE: CHIN
LOCATION SIMPLE: LEFT NOSE
LOCATION SIMPLE: NECK
LOCATION SIMPLE: LEFT CHEEK
LOCATION SIMPLE: LEFT UPPER BACK
LOCATION SIMPLE: RIGHT UPPER BACK

## 2024-02-12 ASSESSMENT — LOCATION ZONE DERM
LOCATION ZONE: TRUNK
LOCATION ZONE: FACE
LOCATION ZONE: NOSE
LOCATION ZONE: NECK

## 2024-02-12 ASSESSMENT — LOCATION DETAILED DESCRIPTION DERM
LOCATION DETAILED: RIGHT INFERIOR MEDIAL UPPER BACK
LOCATION DETAILED: LEFT NASAL ALA
LOCATION DETAILED: LEFT CENTRAL LATERAL NECK
LOCATION DETAILED: LEFT SUPERIOR CENTRAL MALAR CHEEK
LOCATION DETAILED: LEFT SUPERIOR MEDIAL UPPER BACK
LOCATION DETAILED: LEFT MEDIAL UPPER BACK
LOCATION DETAILED: RIGHT CHIN

## 2024-02-12 NOTE — HPI: FULL BODY SKIN EXAMINATION
How Severe Are Your Spot(S)?: mild
What Type Of Note Output Would You Prefer (Optional)?: Standard Output
What Is The Reason For Today's Visit?: Full Body Skin Examination
What Is The Reason For Today's Visit? (Being Monitored For X): concerning skin lesions on an annual basis
Additional History: Pt has a spot of concern under his left eye that has been there for years.

## 2024-02-12 NOTE — PROCEDURE: EXTRACTIONS
Render Number Of Lesions Treated: no
Detail Level: Detailed
Acne Type: A milial cyst
Extraction Method: 11 blade and q-tip
Render Post-Care Instructions In Note?: yes
Post-Care Instructions: I reviewed with the patient in detail post-care instructions. Patient is to keep the treatment areas dry overnight, and then apply bacitracin twice daily until healed. Patient may apply hydrogen peroxide soaks to remove any crusting.
Consent was obtained and risks were reviewed including but not limited to scarring, infection, bleeding, scabbing, incomplete removal, and allergy to anesthesia.
Prep Text (Optional): Prior to removal the treatment areas were prepped in the usual fashion.

## 2024-02-19 ENCOUNTER — VIRTUAL VISIT (OUTPATIENT)
Dept: RHEUMATOLOGY | Facility: CLINIC | Age: 84
End: 2024-02-19
Attending: INTERNAL MEDICINE
Payer: COMMERCIAL

## 2024-02-19 DIAGNOSIS — M85.80 OSTEOPENIA, UNSPECIFIED LOCATION: ICD-10-CM

## 2024-02-19 DIAGNOSIS — Z78.9 TAKES DIETARY SUPPLEMENTS: Primary | ICD-10-CM

## 2024-02-19 NOTE — Clinical Note
2/19/2024       RE: Bon Michael  [birth year][address] [address]     Dear Colleague,    Thank you for referring your patient, Bon Michael, to the [facility]. Please see a copy of my visit note below.    Medication Therapy Management (MTM) Encounter    ASSESSMENT:                            Medication Adherence/Access: {adherencechoices:344596}    ***:  ***      PLAN:                            ***    Follow-up: {followuptest2:016676}    SUBJECTIVE/OBJECTIVE:                          Bon Michael is a 83 year old male called for a follow-up visit from 11/9/23.       Reason for visit: ***.    Allergies/ADRs: {1/2/3/4/5:599953}  Past Medical History: {1/2/3/4/5:615192}  Tobacco: He reports that he has never smoked. He has never used smokeless tobacco.  Alcohol: {ALCOHOL CONSUMPTION HX:381245}  {Social and Goals:626076}    Medication Adherence/Access: {fumedadherence:373804}    Supplements:  Co-Enzyme Q 10 100 mg daily  Green Tea Extract 300 mg daily  Magnesium 250 mg daily  Fish Oil 1200 mg daily  Cranberry 450 mg daily  Vitamin C 500 mg daily  Vitamin B Complex daily  Iodine 150 mg daily  Zinc 50 mg daily     Reports he has slowly added several of these supplements after hearing they were good for general health or covid prevention. Starting to get to be too much to manage and would like to reduce total tablets per day. Would like to know which he should continue and which are not beneficial and should be stopped.     Osteopenia:  Calcium 600 mg daily  Vitamin D 1000 units daily     Reports he has been taking these consistently over the last week without any issue. Still would like to look into getting all calcium from diet but concerned as he doesn't drink enough milk daily to get to his RDA.    Today's Vitals: There were no vitals taken for this visit.  ----------------  {INGE?:465896}    I spent  "{mtm total time 3:355683} with this patient today. { :807863}. A copy of the visit note was provided to the patient's provider(s).    A summary of these recommendations {GIVEN/NOT GIVEN:321301}.    ***    Telemedicine Visit Details  Type of service:  {telemedvisitmtm:451365::\"Telephone visit\"}  Start Time: {video/phone visit start time:152948}  End Time: {video/phone visit end time:152948}     Medication Therapy Recommendations  No medication therapy recommendations to display       Medication Therapy Management (MTM) Encounter    ASSESSMENT:                            Medication Adherence/Access: No issues identified    Supplements: Discussed since patient is doing well with sleep no change in magnesium supplementation is needed. Did review that aspirin and fish oil both can cause anticoagulation and discussed increased risk of bleeding after a cut or fall. Would recommend continuing current supplements even with this risk as long as patient seeks care if he falls. Patient agreeable to this.     Osteopenia: Reviewed patient is getting ~1100 mg of calcium daily through diet which is very close to goal of 1200 mg daily. Recommend patient continue this diet and discussed he could be getting additional calcium from other items in his diet due to him eating several fresh fruits and vegetables daily.    PLAN:                            1. Continue your current diet to get your recommended daily calcium intake. As a reminder our goal is 1200 mg daily.    2. The fish oil and aspirin can make it easier for you to bleed more than normal if you fall or accidentally cut yourself. Please seek care if you hit your head or have a cut/injury that will not stop bleeding.    3. Continue all current supplements and medications.    Follow-up: {followuptest2:371394}    SUBJECTIVE/OBJECTIVE:                          Bon Michael is a 83 year old male called for a follow-up visit from 11/9/23.       Reason for visit: Questions about " calcium intake and other supplements    Allergies/ADRs: Reviewed in chart  Past Medical History: Reviewed in chart  Tobacco: He reports that he has never smoked. He has never used smokeless tobacco.  Alcohol: not currently using    Medication Adherence/Access: no issues reported    Supplements:  Co-Enzyme Q 10 100 mg daily  Magnesium 250 mg daily  Fish Oil 1200 mg daily  Vitamin B Complex daily     Reports he has done some research about magnesium and is wondering if he should be taking this at a higher dose to help support sleep. Is sleeping fine with current medications. Would also like to see if he should be taking any other supplements and make sure nothing interacts.     Osteopenia:  Vitamin D 1000 units daily     Reports he has been able to change his diet to get all his calcium from food and was able to discontinue calcium supplements. Would like to make sure this will work long term. Notes his daily calcium regimen is:  Cheerios or Fiber One and milk 505 mg  Yogurt 150 mg  Oranges 120 mg  Figs 60 mg  Kale and Spinach salad 70 mg  Milk and protein powder smoothie 200 mg    Today's Vitals: There were no vitals taken for this visit.  ---------------    I spent 60 minutes with this patient today. All changes were made via collaborative practice agreement with Roman Rice MD. A copy of the visit note was provided to the patient's provider(s).    A summary of these recommendations was sent via [employer].    Danica MckeonD  Medication Therapy Management Pharmacist  [facility]  Phone: [phone]    Telemedicine Visit Details  Type of service:  Telephone visit  Start Time: 2:00 PM  End Time: 3:00 PM     Medication Therapy Recommendations  Takes dietary supplements    Current Medication: fish oil-omega-3 fatty acids 1000 MG capsule   Rationale: Medication interaction - Adverse medication event - Safety   Recommendation: Provide Education - Seek care if bleeding or after a  fall due to aspirin and fish oil interaction   Status: Patient Agreed - Adherence/Education          Current Medication: magnesium 250 MG tablet   Rationale: Does not understand instructions - Adherence - Adherence   Recommendation: Provide Education - Continue 250 mg daily   Status: Patient Agreed - Adherence/Education                  Again, thank you for allowing me to participate in the care of your patient.      Sincerely,    GLADYS CASTILLO [facility]

## 2024-02-27 NOTE — PROGRESS NOTES
Medication Therapy Management (MTM) Encounter    ASSESSMENT:                            Medication Adherence/Access: No issues identified    Supplements: Discussed since patient is doing well with sleep no change in magnesium supplementation is needed. Did review that aspirin and fish oil both can cause anticoagulation and discussed increased risk of bleeding after a cut or fall. Would recommend continuing current supplements even with this risk as long as patient seeks care if he falls. Patient agreeable to this.     Osteopenia: Reviewed patient is getting ~1100 mg of calcium daily through diet which is very close to goal of 1200 mg daily. Recommend patient continue this diet and discussed he could be getting additional calcium from other items in his diet due to him eating several fresh fruits and vegetables daily.    PLAN:                            1. Continue your current diet to get your recommended daily calcium intake. As a reminder our goal is 1200 mg daily.    2. The fish oil and aspirin can make it easier for you to bleed more than normal if you fall or accidentally cut yourself. Please seek care if you hit your head or have a cut/injury that will not stop bleeding.    3. Continue all current supplements and medications.    Follow-up: Return in about 3 months (around 5/19/2024) for MTM Pharmacist Visit.    SUBJECTIVE/OBJECTIVE:                          Bon Michael is a 83 year old male called for a follow-up visit from 11/9/23.       Reason for visit: Questions about calcium intake and other supplements    Allergies/ADRs: Reviewed in chart  Past Medical History: Reviewed in chart  Tobacco: He reports that he has never smoked. He has never used smokeless tobacco.  Alcohol: not currently using    Medication Adherence/Access: no issues reported    Supplements:  Co-Enzyme Q 10 100 mg daily  Magnesium 250 mg daily  Fish Oil 1200 mg daily  Vitamin B Complex daily     Reports he has done some research about  magnesium and is wondering if he should be taking this at a higher dose to help support sleep. Is sleeping fine with current medications. Would also like to see if he should be taking any other supplements and make sure nothing interacts.     Osteopenia:  Vitamin D 1000 units daily     Reports he has been able to change his diet to get all his calcium from food and was able to discontinue calcium supplements. Would like to make sure this will work long term. Notes his daily calcium regimen is:  Cheerios or Fiber One and milk 505 mg  Yogurt 150 mg  Oranges 120 mg  Figs 60 mg  Kale and Spinach salad 70 mg  Milk and protein powder smoothie 200 mg    Today's Vitals: There were no vitals taken for this visit.  ---------------    I spent 60 minutes with this patient today. All changes were made via collaborative practice agreement with Roman Rice MD. A copy of the visit note was provided to the patient's provider(s).    A summary of these recommendations was sent via [employer].    Danica MckeonD  Medication Therapy Management Pharmacist  [facility]  Phone: [phone]    Telemedicine Visit Details  Type of service:  Telephone visit  Start Time: 2:00 PM  End Time: 3:00 PM     Medication Therapy Recommendations  Takes dietary supplements    Current Medication: fish oil-omega-3 fatty acids 1000 MG capsule   Rationale: Medication interaction - Adverse medication event - Safety   Recommendation: Provide Education - Seek care if bleeding or after a fall due to aspirin and fish oil interaction   Status: Patient Agreed - Adherence/Education          Current Medication: magnesium 250 MG tablet   Rationale: Does not understand instructions - Adherence - Adherence   Recommendation: Provide Education - Continue 250 mg daily   Status: Patient Agreed - Adherence/Education

## 2024-02-28 NOTE — PATIENT INSTRUCTIONS
"Recommendations from today's MTM visit:                                                       1. Continue your current diet to get your recommended daily calcium intake. As a reminder our goal is 1200 mg daily.    2. The fish oil and aspirin can make it easier for you to bleed more than normal if you fall or accidentally cut yourself. Please seek care if you hit your head or have a cut/injury that will not stop bleeding.    3. Continue all current supplements and medications.    Follow-up: Return in about 3 months (around 5/19/2024) for MTM Pharmacist Visit.    It was great speaking with you today.  I value your experience and would be very thankful for your time in providing feedback in our clinic survey. In the next few days, you may receive an email or text message from [employer] with a link to a survey related to your  clinical pharmacist.\"     To schedule another MTM appointment, please call the clinic directly or you may call the MTM scheduling line at [phone].    My Clinical Pharmacist's contact information:                                                      Please feel free to contact me with any questions or concerns you have.      Estela Rolon, PharmD  Medication Therapy Management Pharmacist  [facility]  Phone: [phone]     "

## 2024-03-12 ENCOUNTER — TELEPHONE (OUTPATIENT)
Dept: RHEUMATOLOGY | Facility: CLINIC | Age: 84
End: 2024-03-12

## 2024-03-12 ENCOUNTER — VIRTUAL VISIT (OUTPATIENT)
Dept: RHEUMATOLOGY | Facility: CLINIC | Age: 84
End: 2024-03-12
Attending: STUDENT IN AN ORGANIZED HEALTH CARE EDUCATION/TRAINING PROGRAM
Payer: MEDICARE

## 2024-03-12 VITALS — WEIGHT: 120 LBS | BODY MASS INDEX: 18.83 KG/M2 | HEIGHT: 67 IN

## 2024-03-12 DIAGNOSIS — Z79.899 HIGH RISK MEDICATION USE: ICD-10-CM

## 2024-03-12 DIAGNOSIS — J98.4 OTHER DISORDERS OF LUNG: ICD-10-CM

## 2024-03-12 DIAGNOSIS — M31.6 GIANT CELL ARTERITIS (H): ICD-10-CM

## 2024-03-12 DIAGNOSIS — C91.10 CHRONIC LYMPHOCYTIC LEUKEMIA (H): ICD-10-CM

## 2024-03-12 DIAGNOSIS — M31.8 SYSTEMIC VASCULITIS (H): Primary | ICD-10-CM

## 2024-03-12 DIAGNOSIS — M31.6 GCA (GIANT CELL ARTERITIS) (H): ICD-10-CM

## 2024-03-12 PROCEDURE — 99215 OFFICE O/P EST HI 40 MIN: CPT | Mod: 95 | Performed by: STUDENT IN AN ORGANIZED HEALTH CARE EDUCATION/TRAINING PROGRAM

## 2024-03-12 PROCEDURE — G2211 COMPLEX E/M VISIT ADD ON: HCPCS | Performed by: STUDENT IN AN ORGANIZED HEALTH CARE EDUCATION/TRAINING PROGRAM

## 2024-03-12 ASSESSMENT — ENCOUNTER SYMPTOMS
SHORTNESS OF BREATH: 0
CLAUDICATION: 0
BLOOD IN STOOL: 0
HEMOPTYSIS: 0
ABDOMINAL PAIN: 0
COUGH: 0
DOUBLE VISION: 0
SINUS PAIN: 1
HEADACHES: 0
FEVER: 0
EYE REDNESS: 0
HEMATURIA: 0
WEIGHT LOSS: 0

## 2024-03-12 ASSESSMENT — PAIN SCALES - GENERAL: PAINLEVEL: NO PAIN (0)

## 2024-03-12 NOTE — LETTER
3/12/2024       RE: Bon Michael  1923 [facility] [address]     Dear Colleague,    Thank you for referring your patient, Bon Mihcael, to the [facility] RHEUMATOLOGY at [facility]. Please see a copy of my visit note below.    Virtual Visit Details    Type of service:  phone Visit   40 minutes on the phone     Initially scheduled as video visit however due to connection issues the visit was switched to phone visit.     Originating Location (pt. Location): Home    Distant Location (provider location):  On-site  Platform used for Video Visit: [facility]    RHEUMATOLOGY OUTPATIENT CLINIC NOTE     Referring Provider: Bib Alcantara MD     Rheumatology history:  Cabrera has been following with [facility] rheumatology Dr. Alcantara [[id number]] for biopsy-proven giant cell arteritis.  Onset: 2020  Involvement: Cranial symptoms, visual symptoms, bilateral ischemic optic neuropathy, temporal artery biopsy positive for GCA,  Comorbid conditions: CLL  Treatment included: Prednisone, tocilizumab [[id number]]    Lab review    ANCA immunofluorescence: Negative.    Temporal artery biopsy, 9/2020, Temporal artery, left, biopsy:   1. Granulomatous arteritis consistent with giant cell arteritis.   2. Chronic inflammation of the surrounding adventitia.     Imaging review    MR orbits, 9/2020:   1.There is no definite abnormal contrast enhancement or mass. No evidence of leukemic infiltration.  2. Regarding the remainder of the brain, abnormal T2 hyperintense bone marrow signal with associated enhancement in the skull, likely representing leukemic infiltration.  3. Subcutaneous T2 hyperintensity with associated diffusion restriction and contrast enhancement over the left zygomatic region, question leukemic infiltration.    CT sinus, 10/2019:  Left maxillary sinusitis with small air-fluid level, new since 6/17/2015. In the appropriate clinical setting, this could  "reflect acute sinusitis     Subjective    Visit date March 12, 2024    Bon Michael is a 83 year old male who presents today for follow up. His last appointment with Dr. Alcantara was in 12/2023.     He has been having issues with sinus stuffiness, he denies epistaxis, nasal crusting, he does not take prednisone or antibiotics.   He denies new symptoms suggestive of giant cell arteritis. He has been evaluated by Dr. Garcia in Ophthalmology and he was felt to be in remission.   He denies scalp tenderness, jaw claudications, shoulder or hip stiffness.     Review of Systems   Constitutional:  Negative for fever and weight loss.   HENT:  Positive for congestion and sinus pain. Negative for hearing loss and nosebleeds.    Eyes:  Negative for double vision and redness.   Respiratory:  Negative for cough, hemoptysis and shortness of breath.    Cardiovascular:  Negative for chest pain and claudication.   Gastrointestinal:  Negative for abdominal pain and blood in stool.   Genitourinary:  Negative for hematuria.   Skin:  Negative for rash.   Neurological:  Negative for headaches.     Current Medications   None     Objective  Ht 1.702 m (5' 7\")   Wt 54.4 kg (120 lb)   BMI 18.79 kg/m      PHYSICAL EXAMINATION  Physical Exam  Phone visit     Assessment & Plan    Cabrera has been following with [facility] rheumatology Dr. Alcantara [[id number]] for biopsy-proven giant cell arteritis.  Onset: 2020  Involvement:  Visual symptoms, bilateral ischemic optic neuropathy, temporal artery biopsy positive for GCA. No headaches, scalp tenderness, PMR   Comorbid conditions: CLL  Treatment included: Prednisone, tocilizumab [[id number]]    Giant cell arteritis, biopsy proven  Clinically doing well without symptoms suggestive of recurrence.  Currently off immunosuppressives.   Labs reviewed showing normal ESR, CRP.    Plan:  1- Continue to watch off immunosuppressive therapy  2- We will screen for large vessel involvement with CTA chest, " abdomen and pelvis   3- We will complete autoimmune serologies with ANCA send out and IgG4 levels.   4- If he develop recurrence of vision loss then emergent evaluation at the emergency room.     Recurrent sinus obstruction   - Repeat CT sinus.   - ENT consult     Chronic lymphocytic leukemia   Follows with oncology     Screening for chronic infections  2020  Hepatitis B surface antigen: Negative  Hepatitis B core antibody: Negative  HIV antibody: Negative  QuantiFERON gold: Negative    Longitudinal care   The longitudinal plan of care for the diagnosis giant cell arteritis as documented were addressed during this visit. Due to the added complexity in care, I will continue to support Bon in the subsequent management and with ongoing continuity of care.      53 minutes spent by me on the date of the encounter doing chart review, history and exam, documentation and further activities per the note      Return in about 11 weeks (around 5/28/2024).    Roman Rice MD  [facility] RHEUMATOLOGY

## 2024-03-12 NOTE — PROGRESS NOTES
Virtual Visit Details    Type of service:  phone Visit   40 minutes on the phone     Initially scheduled as video visit however due to connection issues the visit was switched to phone visit.     Originating Location (pt. Location): Home    Distant Location (provider location):  On-site  Platform used for Video Visit: [facility]    RHEUMATOLOGY OUTPATIENT CLINIC NOTE     Referring Provider: Bib Alcantara MD     Rheumatology history:  Cabrera has been following with [facility] rheumatology Dr. Alcantara [[id number]] for biopsy-proven giant cell arteritis.  Onset: 2020  Involvement: Cranial symptoms, visual symptoms, bilateral ischemic optic neuropathy, temporal artery biopsy positive for GCA,  Comorbid conditions: CLL  Treatment included: Prednisone, tocilizumab [[id number]]    Lab review    ANCA immunofluorescence: Negative.    Temporal artery biopsy, 9/2020, Temporal artery, left, biopsy:   1. Granulomatous arteritis consistent with giant cell arteritis.   2. Chronic inflammation of the surrounding adventitia.     Imaging review    MR orbits, 9/2020:   1.There is no definite abnormal contrast enhancement or mass. No evidence of leukemic infiltration.  2. Regarding the remainder of the brain, abnormal T2 hyperintense bone marrow signal with associated enhancement in the skull, likely representing leukemic infiltration.  3. Subcutaneous T2 hyperintensity with associated diffusion restriction and contrast enhancement over the left zygomatic region, question leukemic infiltration.    CT sinus, 10/2019:  Left maxillary sinusitis with small air-fluid level, new since 6/17/2015. In the appropriate clinical setting, this could reflect acute sinusitis     Subjective     Visit date March 12, 2024    Bon Michael is a 83 year old male who presents today for follow up. His last appointment with Dr. Alcantara was in 12/2023.     He has been having issues with sinus stuffiness, he denies epistaxis, nasal crusting, he does not  "take prednisone or antibiotics.   He denies new symptoms suggestive of giant cell arteritis. He has been evaluated by Dr. Garcia in Ophthalmology and he was felt to be in remission.   He denies scalp tenderness, jaw claudications, shoulder or hip stiffness.     Review of Systems   Constitutional:  Negative for fever and weight loss.   HENT:  Positive for congestion and sinus pain. Negative for hearing loss and nosebleeds.    Eyes:  Negative for double vision and redness.   Respiratory:  Negative for cough, hemoptysis and shortness of breath.    Cardiovascular:  Negative for chest pain and claudication.   Gastrointestinal:  Negative for abdominal pain and blood in stool.   Genitourinary:  Negative for hematuria.   Skin:  Negative for rash.   Neurological:  Negative for headaches.     Current Medications   None     Objective   Ht 1.702 m (5' 7\")   Wt 54.4 kg (120 lb)   BMI 18.79 kg/m      PHYSICAL EXAMINATION  Physical Exam  Phone visit     Assessment & Plan     Cabrera has been following with [facility] rheumatology Dr. Alcantara [[id number]] for biopsy-proven giant cell arteritis.  Onset: 2020  Involvement:  Visual symptoms, bilateral ischemic optic neuropathy, temporal artery biopsy positive for GCA. No headaches, scalp tenderness, PMR   Comorbid conditions: CLL  Treatment included: Prednisone, tocilizumab [[id number]]    Giant cell arteritis, biopsy proven  Clinically doing well without symptoms suggestive of recurrence.  Currently off immunosuppressives.   Labs reviewed showing normal ESR, CRP.    Plan:  1- Continue to watch off immunosuppressive therapy  2- We will screen for large vessel involvement with CTA chest, abdomen and pelvis   3- We will complete autoimmune serologies with ANCA send out and IgG4 levels.   4- If he develop recurrence of vision loss then emergent evaluation at the emergency room.     Recurrent sinus obstruction   - Repeat CT sinus.   - ENT consult     Chronic lymphocytic leukemia   Follows " with oncology     Screening for chronic infections  2020  Hepatitis B surface antigen: Negative  Hepatitis B core antibody: Negative  HIV antibody: Negative  QuantiFERON gold: Negative    Longitudinal care   The longitudinal plan of care for the diagnosis giant cell arteritis as documented were addressed during this visit. Due to the added complexity in care, I will continue to support Bon in the subsequent management and with ongoing continuity of care.      53 minutes spent by me on the date of the encounter doing chart review, history and exam, documentation and further activities per the note      Return in about 11 weeks (around 5/28/2024).    Roman Rice MD  [facility] RHEUMATOLOGY

## 2024-03-12 NOTE — NURSING NOTE
Is the patient currently in the state of [place]? YES    Visit mode:VIDEO    If the visit is dropped, the patient can be reconnected by: VIDEO VISIT: Text to cell phone:   Telephone Information:   Mobile [phone]       Will anyone else be joining the visit? NO  (If patient encounters technical issues they should call [phone]    How would you like to obtain your AVS? MyChart    Are changes needed to the allergy or medication list? No    Reason for visit: Consult    Medications and allergies have been reviewed.      Feroz GOYAL

## 2024-03-12 NOTE — TELEPHONE ENCOUNTER
Scheduled pt for appts per Dr. Rice: follow up 5/28, labs on 3/14 and 5/23. Imaging scheduling #[id number] was provided also for pt to schedule at his convenience.  Rosalinda GARCIA

## 2024-03-14 ENCOUNTER — TELEPHONE (OUTPATIENT)
Dept: RHEUMATOLOGY | Facility: CLINIC | Age: 84
End: 2024-03-14

## 2024-03-14 ENCOUNTER — LAB (OUTPATIENT)
Dept: LAB | Facility: CLINIC | Age: 84
End: 2024-03-14
Payer: MEDICARE

## 2024-03-14 DIAGNOSIS — J98.4 OTHER DISORDERS OF LUNG: ICD-10-CM

## 2024-03-14 DIAGNOSIS — M31.8 SYSTEMIC VASCULITIS (H): ICD-10-CM

## 2024-03-14 LAB
ALBUMIN UR-MCNC: NEGATIVE MG/DL
APPEARANCE UR: CLEAR
BACTERIA #/AREA URNS HPF: ABNORMAL /HPF
BASOPHILS # BLD AUTO: 0.1 10E3/UL (ref 0–0.2)
BASOPHILS # BLD MANUAL: 0 10E3/UL (ref 0–0.2)
BASOPHILS NFR BLD AUTO: 0 %
BASOPHILS NFR BLD MANUAL: 0 %
BILIRUB UR QL STRIP: NEGATIVE
COLOR UR AUTO: YELLOW
EOSINOPHIL # BLD AUTO: 0.1 10E3/UL (ref 0–0.7)
EOSINOPHIL # BLD MANUAL: 0 10E3/UL (ref 0–0.7)
EOSINOPHIL NFR BLD AUTO: 0 %
EOSINOPHIL NFR BLD MANUAL: 0 %
ERYTHROCYTE [DISTWIDTH] IN BLOOD BY AUTOMATED COUNT: 13.1 % (ref 10–15)
GLUCOSE UR STRIP-MCNC: NEGATIVE MG/DL
HCT VFR BLD AUTO: 39.8 % (ref 40–53)
HGB BLD-MCNC: 12.9 G/DL (ref 13.3–17.7)
HGB UR QL STRIP: ABNORMAL
IMM GRANULOCYTES # BLD: 0.1 10E3/UL
IMM GRANULOCYTES NFR BLD: 1 %
KETONES UR STRIP-MCNC: NEGATIVE MG/DL
LEUKOCYTE ESTERASE UR QL STRIP: NEGATIVE
LYMPHOCYTES # BLD AUTO: 18.8 10E3/UL (ref 0.8–5.3)
LYMPHOCYTES # BLD MANUAL: 20.3 10E3/UL (ref 0.8–5.3)
LYMPHOCYTES NFR BLD AUTO: 68 %
LYMPHOCYTES NFR BLD MANUAL: 73 %
MCH RBC QN AUTO: 31 PG (ref 26.5–33)
MCHC RBC AUTO-ENTMCNC: 32.4 G/DL (ref 31.5–36.5)
MCV RBC AUTO: 96 FL (ref 78–100)
MONOCYTES # BLD AUTO: 2.4 10E3/UL (ref 0–1.3)
MONOCYTES # BLD MANUAL: 1.4 10E3/UL (ref 0–1.3)
MONOCYTES NFR BLD AUTO: 9 %
MONOCYTES NFR BLD MANUAL: 5 %
MUCOUS THREADS #/AREA URNS LPF: PRESENT /LPF
NEUTROPHILS # BLD AUTO: 6.3 10E3/UL (ref 1.6–8.3)
NEUTROPHILS # BLD MANUAL: 6.1 10E3/UL (ref 1.6–8.3)
NEUTROPHILS NFR BLD AUTO: 23 %
NEUTROPHILS NFR BLD MANUAL: 22 %
NITRATE UR QL: NEGATIVE
NRBC # BLD AUTO: 0 10E3/UL
NRBC BLD AUTO-RTO: 0 /100
PH UR STRIP: 5 [PH] (ref 5–7)
PLAT MORPH BLD: ABNORMAL
PLATELET # BLD AUTO: 156 10E3/UL (ref 150–450)
RBC # BLD AUTO: 4.16 10E6/UL (ref 4.4–5.9)
RBC #/AREA URNS AUTO: ABNORMAL /HPF
RBC MORPH BLD: ABNORMAL
SMUDGE CELLS BLD QL SMEAR: PRESENT
SP GR UR STRIP: 1.02 (ref 1–1.03)
SQUAMOUS #/AREA URNS AUTO: ABNORMAL /LPF
UROBILINOGEN UR STRIP-ACNC: 0.2 E.U./DL
VARIANT LYMPHS BLD QL SMEAR: PRESENT
WBC # BLD AUTO: 27.8 10E3/UL (ref 4–11)
WBC #/AREA URNS AUTO: ABNORMAL /HPF

## 2024-03-14 PROCEDURE — 85025 COMPLETE CBC W/AUTO DIFF WBC: CPT | Mod: 59

## 2024-03-14 PROCEDURE — 83516 IMMUNOASSAY NONANTIBODY: CPT | Mod: 90

## 2024-03-14 PROCEDURE — 36415 COLL VENOUS BLD VENIPUNCTURE: CPT

## 2024-03-14 PROCEDURE — 82787 IGG 1 2 3 OR 4 EACH: CPT

## 2024-03-14 PROCEDURE — 82565 ASSAY OF CREATININE: CPT

## 2024-03-14 PROCEDURE — 99000 SPECIMEN HANDLING OFFICE-LAB: CPT

## 2024-03-14 PROCEDURE — 81001 URINALYSIS AUTO W/SCOPE: CPT

## 2024-03-14 PROCEDURE — 84460 ALANINE AMINO (ALT) (SGPT): CPT

## 2024-03-14 PROCEDURE — 86140 C-REACTIVE PROTEIN: CPT

## 2024-03-14 PROCEDURE — 84450 TRANSFERASE (AST) (SGOT): CPT

## 2024-03-14 NOTE — TELEPHONE ENCOUNTER
Called pt in regards to lab results and LVM for him to follow up with his PCP.    Julianna Ramírez RN  Adult Rheumatology Clinic

## 2024-03-14 NOTE — TELEPHONE ENCOUNTER
Talked to pt in regards to his critical lab values. Pt stated he is aware and it's due to him having leukemia. Pt denies having any fever or chills, runny nose or sore throat.     Julianna Ramírez RN  Adult Rheumatology Clinic

## 2024-03-15 LAB
ALT SERPL W P-5'-P-CCNC: 27 U/L (ref 0–70)
AST SERPL W P-5'-P-CCNC: 40 U/L (ref 0–45)
CREAT SERPL-MCNC: 0.75 MG/DL (ref 0.67–1.17)
CRP SERPL-MCNC: <3 MG/L
EGFRCR SERPLBLD CKD-EPI 2021: >90 ML/MIN/1.73M2
Lab: NORMAL
Lab: NORMAL
MAYO MISC RESULT: NORMAL
MAYO MISC RESULT: NORMAL
PERFORMING LABORATORY: NORMAL
PERFORMING LABORATORY: NORMAL
SPECIMEN STATUS: NORMAL
SPECIMEN STATUS: NORMAL
TEST NAME: NORMAL
TEST NAME: NORMAL

## 2024-03-27 ENCOUNTER — OFFICE VISIT (OUTPATIENT)
Dept: OPHTHALMOLOGY | Facility: CLINIC | Age: 84
End: 2024-03-27
Attending: OPHTHALMOLOGY
Payer: MEDICARE

## 2024-03-27 DIAGNOSIS — H47.20 OPTIC ATROPHY: ICD-10-CM

## 2024-03-27 DIAGNOSIS — H46.9 OPTIC NEUROPATHY: Primary | ICD-10-CM

## 2024-03-27 PROCEDURE — 92014 COMPRE OPH EXAM EST PT 1/>: CPT | Mod: GC | Performed by: OPHTHALMOLOGY

## 2024-03-27 PROCEDURE — G0463 HOSPITAL OUTPT CLINIC VISIT: HCPCS | Performed by: OPHTHALMOLOGY

## 2024-03-27 PROCEDURE — 92133 CPTRZD OPH DX IMG PST SGM ON: CPT | Performed by: OPHTHALMOLOGY

## 2024-03-27 ASSESSMENT — VISUAL ACUITY
METHOD: SNELLEN - LINEAR
OS_CC+: -2
CORRECTION_TYPE: GLASSES
OD_CC+: -2
OS_CC: 20/25
OD_CC: 20/20

## 2024-03-27 ASSESSMENT — EXTERNAL EXAM - RIGHT EYE: OD_EXAM: NORMAL

## 2024-03-27 ASSESSMENT — TONOMETRY
OD_IOP_MMHG: 09
IOP_METHOD: ICARE
OS_IOP_MMHG: 08

## 2024-03-27 ASSESSMENT — CONF VISUAL FIELD
OS_SUPERIOR_TEMPORAL_RESTRICTION: 0
OD_SUPERIOR_TEMPORAL_RESTRICTION: 0
OD_SUPERIOR_NASAL_RESTRICTION: 0
OS_INFERIOR_NASAL_RESTRICTION: 3
OD_INFERIOR_NASAL_RESTRICTION: 0
OD_INFERIOR_TEMPORAL_RESTRICTION: 0
OD_NORMAL: 1
METHOD: COUNTING FINGERS
OS_SUPERIOR_NASAL_RESTRICTION: 0
OS_INFERIOR_TEMPORAL_RESTRICTION: 0

## 2024-03-27 ASSESSMENT — REFRACTION_WEARINGRX
OD_SPHERE: +3.00
SPECS_TYPE: PAL
OS_AXIS: 090
OD_ADD: +2.75
OS_SPHERE: +6.00
OS_ADD: +2.75
OD_AXIS: 152
OS_CYLINDER: +0.75
OD_CYLINDER: +0.75

## 2024-03-27 ASSESSMENT — CUP TO DISC RATIO
OD_RATIO: 0.4
OS_RATIO: 0.45

## 2024-03-27 ASSESSMENT — EXTERNAL EXAM - LEFT EYE: OS_EXAM: NORMAL

## 2024-03-27 ASSESSMENT — SLIT LAMP EXAM - LIDS
COMMENTS: NORMAL
COMMENTS: NORMAL

## 2024-03-27 NOTE — NURSING NOTE
Chief Complaint(s) and History of Present Illness(es)       Follow Up    In both eyes.  Since onset it is stable.  Associated symptoms include Negative for eye pain and headache.  Pain was noted as 0/10. Additional comments: 2 month follow-up for bilateral ischemic optic neuropathy.  Vision stable since last visit.  Has questions about going forward - stretching out future neuro-ophth appointments.  No eye pain or headaches.  ANNETTE Christiansen 3/27/2024 7:25 AM

## 2024-03-27 NOTE — LETTER
"2024    RE: Bon Michael  : 1940  MRN: [id number]    Dear Providers,    I saw our mutual patient, Bon Michael, in follow-up in my clinic recently.  After a thorough neuro-ophthalmic history and examination, I came to the following conclusions:     1. 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      Atypical presentation without elevated acute phase reactants, no PMR symptoms, predominantly peripheral field losses. He completed an oral prednisone taper in early  and stopped methotrexate in mid- (when Actemra in shortage) with no evidence of disease recurrence since. Patient also has CLL which is managed by oncology, monitored every 6 months.   Treated with Actemra which was stopped 2023.    Today still no signs of active giant cell arteritis  (no evidence of new vision loss or new optic nerve ischemia and no symptoms of giant cell arteritis recurrence) off of actemra.    Rheumatology is performing further vasculitis work up that so far has been negative.  While atypical for the lack of other presenting symptoms beyond vision loss, it is my impression that giant cell arteritis is the correct diagnosis in that he had classic pallid optic disc edema at presentation and choroidal hypoperfusion changes typical of giant cell arteritis.      2.  Borderline visually significant cataracts - observe for now    3. 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 Present since at least 2022.     Follow-up in 4 months with neuro-ophthalmology. Notify us as soon as possible for any acute vision changes.      Historical data from initial visit (2020):    Bon Michael is a 79 year old male who presents to neuro-ophthalmology   clinic today for consultation from Jose Andino MD at [facility] for   visual disturbance of left eye. He describes it as \"his left eye not   getting enough light\". He describes noticing the change in his " left eye's   temporal visual field, inferior > superior. He notices it more first thing   in the morning. These symptoms began on 8/22/2020 and he describes it as   worsening - he is unclear if it the deficit has gotten larger or darker   however. He first noticed it while driving at the periphery of his vision.     Patient saw Dr. Lewis on August 28.  He underwent esr / crp testing as   well as mri (see below).     Labs: 9/2/20- WBC- 50.2, ESR- 28, CRP < 5     On August 31 he developed symptoms of waviness in his peripheral vision in   the right eye.  He went to Dr. Andino on 9/4/2020 who wanted him to go to   the ED but he was unable.  Dr. Andino reviewed the patient's fluorescein   angiography and felt there was choroidal filling delay in the right eye   and pallid edema in the right eye concerning for giant cell arteritis.  We   discussed the case via phone.      PRIOR IMAGING:  BRAIN MRI WITH/WO GADOLINIUM, MRA OF THE [place] OF [place]ANCHE[place] AND MRA OF THE   CAROTID ARTERIES - [facility] (9/1/2020)  IMPRESSION:    1.  No acute intracranial abnormality.  2.  No evidence of an orbital abnormalities.  3.  Mild generalized cerebral atrophy.  4.  Unremarkable MRA of the [place] of [place].  5.  Unremarkable MRA of the cervical vessels.     On my re-review I disagreed with the radiology read.  There is evidence of   patchy enhancement diffusely within the bilateral orbits and along the   bilateral intraorbital optic nerve sheaths.  Will discuss with   neuro-radiology regarding finding and repeat scan.     No bisphosphonates in the past (no osteoporosis medications for 7-8 years   per patient).      Left temporal artery biopsy 9/9/20- positive for giant cell arteritis   Prednisone (started 9/4/2020)  Patient started Actemra 10/28/20.    Stopped Actemra last dose on 9/13/23 after a 3 year course.     Interim history and exam with me since last visit 1/10/23  Patient continues to be off actimera and today patient  denies any subjective vision changes, headaches, double vision, jaw claudication, night sweats/fevers, unintentional weight loss. Patient had repeat CRP 3/14/2024 that was normal. Rheumatology had repeated ANCA and IgG4 levels which came back normal. He's also scheduled for CTA chest/abdomen/pelvis and CT Sinus.     Last appointment with rheumatology Dr. Rice - 3/12/24.    Assessment & Plan   Cabrera has been following with [facility] rheumatology Dr. Alcantara [[id number]] for biopsy-proven giant cell arteritis.  Onset: 2020  Involvement:  Visual symptoms, bilateral ischemic optic neuropathy, temporal artery biopsy positive for GCA. No headaches, scalp tenderness, PMR   Comorbid conditions: CLL  Treatment included: Prednisone, tocilizumab [[id number]]     Giant cell arteritis, biopsy proven  Clinically doing well without symptoms suggestive of recurrence.  Currently off immunosuppressives.   Labs reviewed showing normal ESR, CRP.     Plan:  1- Continue to watch off immunosuppressive therapy  2- We will screen for large vessel involvement with CTA chest, abdomen and pelvis   3- We will complete autoimmune serologies with ANCA send out and IgG4 levels.   4- If he develop recurrence of vision loss then emergent evaluation at the emergency room.      Recurrent sinus obstruction   - Repeat CT sinus.   - ENT consult      Chronic lymphocytic leukemia   Follows with oncology      Appointment with oncology- Dr. Wall 6/6/24     Exam:  VA 20/20 OD 20/25 OS.  No afferent pupillary defect. 11/11 Ishihara color plates in both eyes. Stable overall afferent visual function.    OCT RNFL:  stable atrophy in both eyes (sequelae of original attacks without evidence of new or active ischemic optic neuropathy).         For further exam details, please feel free to contact our office for additional records.  If you wish to contact me regarding this patient please email me at [email or web address] or give my clinic a call to arrange a phone  conversation.    Sincerely,    Guille Garcia MD  , Neuro-Ophthalmology and Adult Strabismus Surgery  The Lucy Castano Chair in Neuro-Ophthalmology  Department of Ophthalmology and Visual Neurosciences  [facility]

## 2024-03-27 NOTE — PROGRESS NOTES
"   1. 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      Atypical presentation without elevated acute phase reactants, no PMR symptoms, predominantly peripheral field losses. He completed an oral prednisone taper in early 2021 and stopped methotrexate in mid-2022 (when Actemra in shortage) with no evidence of disease recurrence since. Patient also has CLL which is managed by oncology, monitored every 6 months.   Treated with Actemra which was stopped September 2023.    Today still no signs of active giant cell arteritis  (no evidence of new vision loss or new optic nerve ischemia and no symptoms of giant cell arteritis recurrence) off of actemra.    Rheumatology is performing further vasculitis work up that so far has been negative.  While atypical for the lack of other presenting symptoms beyond vision loss, it is my impression that giant cell arteritis is the correct diagnosis in that he had classic pallid optic disc edema at presentation and choroidal hypoperfusion changes typical of giant cell arteritis.      2.  Borderline visually significant cataracts - observe for now    3. 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 Present since at least 04/2022.     Follow-up in 4 months with neuro-ophthalmology. Notify us as soon as possible for any acute vision changes.      Historical data from initial visit (9/2020):    Bon Michael is a 79 year old male who presents to neuro-ophthalmology   clinic today for consultation from Jose Andino MD at [facility] for   visual disturbance of left eye. He describes it as \"his left eye not   getting enough light\". He describes noticing the change in his left eye's   temporal visual field, inferior > superior. He notices it more first thing   in the morning. These symptoms began on 8/22/2020 and he describes it as   worsening - he is unclear if it the deficit has gotten larger or darker   however. He first noticed it while " driving at the periphery of his vision.     Patient saw Dr. Lewis on August 28.  He underwent esr / crp testing as   well as mri (see below).     Labs: 9/2/20- WBC- 50.2, ESR- 28, CRP < 5     On August 31 he developed symptoms of waviness in his peripheral vision in   the right eye.  He went to Dr. Andino on 9/4/2020 who wanted him to go to   the ED but he was unable.  Dr. Andino reviewed the patient's fluorescein   angiography and felt there was choroidal filling delay in the right eye   and pallid edema in the right eye concerning for giant cell arteritis.  We   discussed the case via phone.      PRIOR IMAGING:  BRAIN MRI WITH/WO GADOLINIUM, MRA OF THE [place] OF SANCHEZ AND MRA OF THE   CAROTID ARTERIES - [facility] (9/1/2020)  IMPRESSION:    1.  No acute intracranial abnormality.  2.  No evidence of an orbital abnormalities.  3.  Mild generalized cerebral atrophy.  4.  Unremarkable MRA of the [place] of [place].  5.  Unremarkable MRA of the cervical vessels.     On my re-review I disagreed with the radiology read.  There is evidence of   patchy enhancement diffusely within the bilateral orbits and along the   bilateral intraorbital optic nerve sheaths.  Will discuss with   neuro-radiology regarding finding and repeat scan.     No bisphosphonates in the past (no osteoporosis medications for 7-8 years   per patient).      Left temporal artery biopsy 9/9/20- positive for giant cell arteritis   Prednisone (started 9/4/2020)  Patient started Actemra 10/28/20.    Stopped Actemra last dose on 9/13/23 after a 3 year course.     Interim history and exam with me since last visit 1/10/23  Patient continues to be off actimera and today patient denies any subjective vision changes, headaches, double vision, jaw claudication, night sweats/fevers, unintentional weight loss. Patient had repeat CRP 3/14/2024 that was normal. Rheumatology had repeated ANCA and IgG4 levels which came back normal. He's also scheduled for  CTA chest/abdomen/pelvis and CT Sinus.     Last appointment with rheumatology Dr. Rice - 3/12/24.    Assessment & Plan   Cabrera has been following with [facility] rheumatology Dr. Alcantara [[id number]] for biopsy-proven giant cell arteritis.  Onset: 2020  Involvement:  Visual symptoms, bilateral ischemic optic neuropathy, temporal artery biopsy positive for GCA. No headaches, scalp tenderness, PMR   Comorbid conditions: CLL  Treatment included: Prednisone, tocilizumab [[id number]]     Giant cell arteritis, biopsy proven  Clinically doing well without symptoms suggestive of recurrence.  Currently off immunosuppressives.   Labs reviewed showing normal ESR, CRP.     Plan:  1- Continue to watch off immunosuppressive therapy  2- We will screen for large vessel involvement with CTA chest, abdomen and pelvis   3- We will complete autoimmune serologies with ANCA send out and IgG4 levels.   4- If he develop recurrence of vision loss then emergent evaluation at the emergency room.      Recurrent sinus obstruction   - Repeat CT sinus.   - ENT consult      Chronic lymphocytic leukemia   Follows with oncology      Appointment with oncology- Dr. Wall 6/6/24     Exam:  VA 20/20 OD 20/25 OS.  No afferent pupillary defect. 11/11 Ishihara color plates in both eyes. Stable overall afferent visual function.    OCT RNFL:  stable atrophy in both eyes (sequelae of original attacks without evidence of new or active ischemic optic neuropathy).          Wayne Keating MD   Fellow, Neuro-Ophthalmology     Complete documentation of historical and exam elements from today's encounter can be found in the full encounter summary report (not reduplicated in this progress note).  I personally obtained the chief complaint(s) and history of present illness.  I confirmed and edited as necessary the review of systems, past medical/surgical history, family history, social history, and examination findings as documented by others; and I examined  the patient myself.  I personally reviewed the relevant tests, images, and reports as documented above.  I formulated and edited as necessary the assessment and plan and discussed the findings and management plan with the patient and family.  I personally reviewed the ophthalmic test(s) associated with this encounter, agree with the interpretation(s) as documented by the resident/fellow, and have edited the corresponding report(s) as necessary.     Guille Garcia MD

## 2024-04-03 ENCOUNTER — ANCILLARY PROCEDURE (OUTPATIENT)
Dept: CT IMAGING | Facility: CLINIC | Age: 84
End: 2024-04-03
Attending: STUDENT IN AN ORGANIZED HEALTH CARE EDUCATION/TRAINING PROGRAM
Payer: MEDICARE

## 2024-04-03 DIAGNOSIS — M31.8 SYSTEMIC VASCULITIS (H): ICD-10-CM

## 2024-04-03 PROCEDURE — 250N000011 HC RX IP 250 OP 636: Performed by: STUDENT IN AN ORGANIZED HEALTH CARE EDUCATION/TRAINING PROGRAM

## 2024-04-03 PROCEDURE — 250N000009 HC RX 250: Performed by: STUDENT IN AN ORGANIZED HEALTH CARE EDUCATION/TRAINING PROGRAM

## 2024-04-03 PROCEDURE — 70486 CT MAXILLOFACIAL W/O DYE: CPT | Mod: MG

## 2024-04-03 PROCEDURE — 74174 CTA ABD&PLVS W/CONTRAST: CPT | Mod: MG

## 2024-04-03 RX ORDER — IOPAMIDOL 755 MG/ML
72 INJECTION, SOLUTION INTRAVASCULAR ONCE
Status: COMPLETED | OUTPATIENT
Start: 2024-04-03 | End: 2024-04-03

## 2024-04-03 RX ADMIN — IOPAMIDOL 72 ML: 755 INJECTION, SOLUTION INTRAVENOUS at 07:06

## 2024-04-03 RX ADMIN — SODIUM CHLORIDE 70 ML: 9 INJECTION, SOLUTION INTRAVENOUS at 07:06

## 2024-04-05 ENCOUNTER — MYC MEDICAL ADVICE (OUTPATIENT)
Dept: RHEUMATOLOGY | Facility: CLINIC | Age: 84
End: 2024-04-05
Payer: MEDICARE

## 2024-04-05 ENCOUNTER — TELEPHONE (OUTPATIENT)
Dept: RHEUMATOLOGY | Facility: CLINIC | Age: 84
End: 2024-04-05
Payer: MEDICARE

## 2024-04-05 DIAGNOSIS — R91.8 GROUND GLASS OPACITY PRESENT ON IMAGING OF LUNG: ICD-10-CM

## 2024-04-05 DIAGNOSIS — I72.8 CELIAC ARTERY DILATATION (H): Primary | ICD-10-CM

## 2024-04-05 NOTE — TELEPHONE ENCOUNTER
Hi  Pt was wondering about the results from his CT scans, any comments you may have about them  Thanks!

## 2024-04-05 NOTE — TELEPHONE ENCOUNTER
Pt returned my call. Message routed to provider.     Julianna Ramírez RN  Adult Rheumatology Clinic

## 2024-04-05 NOTE — RESULT ENCOUNTER NOTE
CTA did not show large vessel vasculitis however showed dilatation in the celiac and distal right renal artery. There was also mild ground glass opacities in the lung.     Recommendations;  1- Vascular consult for arterial dilatation, orders placed  2- Pulmonary consult for lung opacities, orders placed    Roman Rice MD

## 2024-04-05 NOTE — TELEPHONE ENCOUNTER
Called pt to follow up but call was not answered. Will send a [employer] message.    Julianna Ramírez RN  Adult Rheumatology Clinic

## 2024-04-08 ENCOUNTER — TELEPHONE (OUTPATIENT)
Dept: RHEUMATOLOGY | Facility: CLINIC | Age: 84
End: 2024-04-08
Payer: MEDICARE

## 2024-04-08 NOTE — TELEPHONE ENCOUNTER
Pt called regarding Vascular referral place by Dr. Rice on 4/5. Pt is wondering if referral Is location dependent as he is curious as to whether he can be [facility]. [facility] will [facility] to [place]. Please advise.

## 2024-04-08 NOTE — TELEPHONE ENCOUNTER
Called pt and LVM stating that when vascular calls him to schedule the appointment he can let them know the location he prefers.    Julianna Ramírez RN  Adult Rheumatology Clinic

## 2024-04-09 ENCOUNTER — TELEPHONE (OUTPATIENT)
Dept: OTHER | Facility: CLINIC | Age: 84
End: 2024-04-09
Payer: MEDICARE

## 2024-04-09 NOTE — TELEPHONE ENCOUNTER
Referral received via [employer] on 4/8/24.    Pt referred to [facility] by Roman Rice MD for celiac artery dilatation    Routing to scheduling to coordinate the following:  NEW VASCULAR PATIENT consult with Vascular Medicine  Please schedule this at next available    Appt note:  Ref by Dr. Roman Rice for celiac artery dilation; history of systemic vasculitis; notes in Epic.    KATHERIN AnguloN, RN, CV-[facility] Vascular [facility]

## 2024-04-10 ENCOUNTER — TELEPHONE (OUTPATIENT)
Dept: PULMONOLOGY | Facility: CLINIC | Age: 84
End: 2024-04-10
Payer: MEDICARE

## 2024-04-10 DIAGNOSIS — R91.8 GROUND GLASS OPACITY PRESENT ON IMAGING OF LUNG: Primary | ICD-10-CM

## 2024-04-10 PROBLEM — H61.23 CERUMINOSIS, BILATERAL: Status: ACTIVE | Noted: 2024-04-10

## 2024-04-10 RX ORDER — VITAMIN A 2400 MCG
CAPSULE ORAL
COMMUNITY
Start: 2022-11-14 | End: 2024-04-19

## 2024-04-10 RX ORDER — ASPIRIN 81 MG
TABLET, DELAYED RELEASE (ENTERIC COATED) ORAL
COMMUNITY
End: 2024-04-19

## 2024-04-10 RX ORDER — ZINC CITRATE/PHYTASE 25MG-500MG
1 CAPSULE ORAL
COMMUNITY
End: 2024-04-19

## 2024-04-10 NOTE — TELEPHONE ENCOUNTER
Patient Contacted for the patient to call back and schedule the following:    Appointment type: NPULM REFERRAL  Provider: NONE  Return date: NEXT AVAILABLE  Specialty phone number: [phone]  Additional appointment(s) needed: FPFT, CXR  Additonal Notes: Per Nela Corok RN, ILD and IP clinics reviewed and ok'd to start pt in gen pulm.

## 2024-04-10 NOTE — TELEPHONE ENCOUNTER
Future Appointments   Date Time Provider Department Center   4/19/2024  8:00 AM Delaney Franklin MD [facility]

## 2024-04-19 ENCOUNTER — OFFICE VISIT (OUTPATIENT)
Dept: OTHER | Facility: CLINIC | Age: 84
End: 2024-04-19
Attending: INTERNAL MEDICINE
Payer: MEDICARE

## 2024-04-19 VITALS
OXYGEN SATURATION: 99 % | BODY MASS INDEX: 19.61 KG/M2 | WEIGHT: 125.2 LBS | SYSTOLIC BLOOD PRESSURE: 129 MMHG | HEART RATE: 82 BPM | DIASTOLIC BLOOD PRESSURE: 71 MMHG

## 2024-04-19 DIAGNOSIS — E78.5 HYPERLIPIDEMIA LDL GOAL <70: ICD-10-CM

## 2024-04-19 DIAGNOSIS — C91.10 CLL (CHRONIC LYMPHOCYTIC LEUKEMIA) (H): ICD-10-CM

## 2024-04-19 DIAGNOSIS — M31.6 GIANT CELL ARTERITIS (H): ICD-10-CM

## 2024-04-19 DIAGNOSIS — I72.8 CELIAC ARTERY DILATATION (H): Primary | ICD-10-CM

## 2024-04-19 DIAGNOSIS — R93.1 AGATSTON CORONARY ARTERY CALCIUM SCORE GREATER THAN 400: ICD-10-CM

## 2024-04-19 PROCEDURE — G0463 HOSPITAL OUTPT CLINIC VISIT: HCPCS | Performed by: INTERNAL MEDICINE

## 2024-04-19 PROCEDURE — 99417 PROLNG OP E/M EACH 15 MIN: CPT | Performed by: INTERNAL MEDICINE

## 2024-04-19 PROCEDURE — 99205 OFFICE O/P NEW HI 60 MIN: CPT | Performed by: INTERNAL MEDICINE

## 2024-04-19 PROCEDURE — G2211 COMPLEX E/M VISIT ADD ON: HCPCS | Performed by: INTERNAL MEDICINE

## 2024-04-19 NOTE — PROGRESS NOTES
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      HPI: Bon Michael is a 83 year old very pleasant male non-smoker, nondiabetic , history of CLL, hyperlipidemia with very high coronary calcium score currently on statin developed visual symptoms and bilateral ischemic optic neuropathy underwent temporal artery biopsy in 2020 which was proven giant cell arteritis and he was treated with prednisone and tocilizumab currently off of all the immunosuppressive medications closely following up with rheumatologist.  Has been getting frequent inflammatory markers and vasculitis panel multiple occasions inflammatory markers are normal and vasculitis panel negative and IgG4 was normal.     He underwent CTA chest abdomen pelvis on April 3, 2024  Which revealed mild dilatation of the distal celiac trunk measuring 1.1 cm and also mild dilatation of the right renal artery distal portion 0.9 cm    \"IMPRESSION:  1.  No focal vascular wall thickening or perivascular inflammation.   2.  Focal vascular dilation of the distal celiac trunk measuring up to 1.1 cm.   3.  Mild vascular dilation of the distal right renal artery measuring up to 0.9 cm in diameter.  4.  Mild groundglass opacity in the right upper lobe. May represent a focal infectious process.  5.  4 mm pulmonary nodule in the posterior right lung base and 2 mm pulmonary nodule in the " "anterior right upper lobe. Recommend follow-up per Fleischner Society criteria as described below.  6.  Prominent/mildly enlarged axillary and right hilar lymph nodes.\"     Currently he has been following up with hematology oncologist for CLL , rheumatologist, ophthalmologist , cardiologist and primary care provider.  All of them work in different institutes.    He brought blood pressure readings  Outside records  CT reports etc. with him    He is new to me reviewed available extensive records in the epic and updated chart      PAST MEDICAL HISTORY  Past Medical History:   Diagnosis Date    Agatston coronary artery calcium score greater than 400, 2023 but stress echo negative     CLL (chronic lymphocytic leukemia) (H)     Giant cell arteritis (H), Tempral artery BX proven and treated with steroids etc follows Rheum     Osteopenia        CURRENT MEDICATIONS  Current Outpatient Medications   Medication Sig Dispense Refill    aspirin (ASA) 81 MG EC tablet Take 81 mg by mouth daily      atorvastatin (LIPITOR) 10 MG tablet Take 10 mg by mouth daily      B Complex Vitamins (VITAMIN B COMPLEX 100 IJ) Take 1 tablet by mouth daily      cetirizine (ZYRTEC) 10 MG tablet Take 10 mg by mouth daily as needed      Cholecalciferol (VITAMIN D3) 1000 units CAPS Take 1 capsule by mouth as needed      fish oil-omega-3 fatty acids 1000 MG capsule Take 1,200 mg by mouth twice a week      fluticasone (FLONASE) 50 MCG/ACT nasal spray       magnesium 250 MG tablet 1 tablet daily        No current facility-administered medications for this visit.       PAST SURGICAL HISTORY:  Past Surgical History:   Procedure Laterality Date    OTHER SURGICAL HISTORY      Hernia repair bilateral inguinal    OTHER SURGICAL HISTORY      nasal surgery for deviated septum       ALLERGIES   No Known Allergies    FAMILY HISTORY  Family History   Problem Relation Age of Onset    Cerebrovascular Disease Brother        VASCULAR FAMILY HISTORY  1st order relative " with atherosclerotic PAD: No  1st order relative with AAA: No  Family history of Familial Hyperlipidemia No  Family History of Hypercoagulable state:No    VASCULAR RISK FACTORS  1. Diabetes:No   2. Smoking: has never smoked.  3. HTN: normotensive  4.Hyperlipidemia: Yes - controlled      SOCIAL HISTORY  Social History     Socioeconomic History    Marital status: Single     Spouse name: Not on file    Number of children: Not on file    Years of education: Not on file    Highest education level: Not on file   Occupational History    Not on file   Tobacco Use    Smoking status: Never     Passive exposure: Never    Smokeless tobacco: Never   Substance and Sexual Activity    Alcohol use: Not Currently    Drug use: Never    Sexual activity: Not on file   Other Topics Concern    Not on file   Social History Narrative    Not on file     Social Determinants of Health     Financial Resource Strain: High Risk (12/22/2021)    [facility]    Financial Resource Strain     Difficulty of Paying Living Expenses: Not on file     Difficulty of Paying Living Expenses: Not on file   Food Insecurity: Not on file   Transportation Needs: Not on file   Physical Activity: Not on file   Stress: Not on file   Social Connections: Unknown (12/22/2021)    Received from [facility], [facility]    Social Connections     Frequency of Communication with Friends and Family: Not on file   Interpersonal Safety: Not At Risk (1/7/2024)    [facility]     Humiliation, Afraid, Rape, and Kick questionnaire     Fear of Current or Ex-Partner: No     Emotionally Abused: No     Physically Abused: No     Sexually Abused: No   Housing Stability: Not on file       ROS:   General: No change in weight, sleep or appetite.  Normal energy.  No fever or chills  Eyes:  Negative for vision changes or eye problems  ENT: No problems with ears, nose or throat.  No difficulty swallowing.  Resp: No coughing, wheezing or shortness of breath  CV: No chest pains or palpitations  GI: No nausea, vomiting,  heartburn, abdominal pain, diarrhea, constipation or change in bowel habits  : No urinary frequency or dysuria, bladder or kidney problems  Musculoskeletal: No significant muscle or joint pains  Neurologic: No headaches, numbness, tingling, weakness, problems with balance or coordination  Psychiatric: No problems with anxiety, depression or mental health  Heme/immune/allergy: No history of bleeding or clotting problems or anemia.  No allergies or immune system problems  Endocrine: No history of thyroid disease, diabetes or other endocrine disorders  Skin: No rashes,worrisome lesions or skin problems  Vascular:  No claudication, lifestyle limiting or otherwise; no ischemic rest pain; no non-healing ulcers. No weakness, No loss of sensation      EXAM:  /71 (BP Location: Left arm, Patient Position: Chair, Cuff Size: Adult Regular)   Pulse 82   Wt 125 lb 3.2 oz (56.8 kg)   SpO2 99%   BMI 19.61 kg/m    In general, the patient is a pleasant male in no apparent distress.    HEENT: NC/AT.  PERRLA.  EOMI.  Sclerae white, not injected.  Nares clear.  Pharynx without erythema or exudate.  Dentition intact.    Neck: No adenopathy.  No thyromegaly. Carotids +2/2 bilaterally without bruits.  No jugular venous distension.   Heart: RRR. Normal S1, S2 splits physiologically. No murmur, rub, click, or gallop. The PMI is in the 5th ICS in the midclavicular line. There is no heave.    Lungs: CTA.  No ronchi, wheezes, rales.  No dullness to percussion.   Abdomen: Soft, nontender, nondistended. No organomegaly. No AAA.  No bruits.   Extremities: No clubbing, cyanosis, or edema.  No wounds.       Labs:  LIPID RESULTS:  Lab Results   Component Value Date    CHOL 166 05/25/2021    HDL 54 05/05/2021     LDL 93 05/25/2021    LDL 80 05/05/2021    TRIG 117 05/25/2021    TRIG 88 05/05/2021       LIVER ENZYME RESULTS:  Lab Results   Component Value Date    AST 40 03/14/2024    AST 29 05/05/2021    ALT 27 03/14/2024    ALT 25 05/05/2021       CBC RESULTS:  Lab Results   Component Value Date    WBC 27.8 (HH) 03/14/2024    WBC 27.4 (HH) 05/05/2021    RBC 4.16 (L) 03/14/2024    RBC 4.31 (L) 05/05/2021    HGB 12.9 (L) 03/14/2024    HGB 13.6 05/05/2021    HCT 39.8 (L) 03/14/2024    HCT 41.6 05/05/2021    MCV 96 03/14/2024    MCV 97 05/05/2021    MCH 31.0 03/14/2024    MCH 31.6 05/05/2021    MCHC 32.4 03/14/2024    MCHC 32.7 05/05/2021    RDW 13.1 03/14/2024    RDW 14.7 05/05/2021     03/14/2024     (L) 05/05/2021       BMP RESULTS:  Lab Results   Component Value Date     05/05/2021    POTASSIUM 4.2 05/05/2021    CHLORIDE 103 09/20/2023    CHLORIDE 109 05/05/2021    CO2 32 05/05/2021    ANIONGAP 1 (L) 05/05/2021    GLC 56 (L) 05/05/2021    BUN 26 05/05/2021    CR 0.75 03/14/2024    CR 0.81 05/05/2021    GFRESTIMATED >90 03/14/2024    GFRESTIMATED 85 05/05/2021    GFRESTBLACK >90 05/05/2021    BALBIR 8.8 05/05/2021          Procedures:       EXAM: CTA CHEST ABDOMEN PELVIS W CONTRAST  LOCATION: [facility]  DATE: 4/3/2024     INDICATION: patient with biopsy proven giant cell arteritis and CLL. evaluate for aoritis, large vessel vasculitis  COMPARISON: None.  TECHNIQUE: CT angiogram chest abdomen pelvis during arterial phase of injection of IV contrast. 2D and 3D MIP reconstructions were performed by the CT technologist. Dose reduction techniques were used.   CONTRAST: 72ml Isovue from a single use vial     FINDINGS:   CT ANGIOGRAM CHEST, ABDOMEN, AND PELVIS: Pertinent measurements are as follows: Aortic valve 3.1 x 3.2 x 3.7 cm; sinotubular junction 3.2 x 2.7 cm; ascending thoracic aorta 3.4 x 3.4 cm; aortic arch 2.7 x 2.6 cm; proximal descending thoracic aorta 2.2 x   2.3 cm; mid  descending thoracic aorta measuring 2.1 x 2.2 cm; distal descending thoracic aorta 2.3 x 2.3 cm.     Thoracic aorta is patent with mild calcified atheromatous chronic disease. No stenosis or dissection. Normal great vessel branching pattern. The visualized great vessels appear normal where seen.     The abdominal aorta is patent without stenosis or dissection. Scattered atheromatous and atherosclerotic disease. No definite aortic wall and formation seen. Focal dilation of the distal celiac trunk measuring up to 1.1 cm. Celiac trunk branches appear   normal. SMA and ROSALES vessels appear normal. Slight dilation of the distal right renal artery measuring up to 9 mm in diameter. Iliac arteries and visualized lower extremity arteries are normal.     LUNGS AND PLEURA: 2 mm nodule in the anterior right upper lobe on series 6 image 164. The posterior right lung base, there is a 4 mm nodule. Groundglass opacity noted in the right upper lobe. No pleural effusion or pneumothorax. Biapical scarring.     MEDIASTINUM/AXILLAE: Prominent/enlarged axillary lymph nodes bilaterally. Enlarged right hilar lymph node measuring 1.4 x 1.9 cm.     CORONARY ARTERY CALCIFICATION: Mild.     HEPATOBILIARY: Normal.     PANCREAS: Normal.     SPLEEN: Normal.     ADRENAL GLANDS: Normal.     KIDNEYS/BLADDER: Normal.     BOWEL: Moderate stool burden.     ABDOMINAL/PELVIC LYMPH NODES: Normal.     PELVIC ORGANS: Enlarged prostate. Bladder is collapsed.     MUSCULOSKELETAL: Degenerative disease of the visualized cervical spine. Significant degenerative disease lumbar spine and hips.                                                                      IMPRESSION:  1.  No focal vascular wall thickening or perivascular inflammation.   2.  Focal vascular dilation of the distal celiac trunk measuring up to 1.1 cm.   3.  Mild vascular dilation of the distal right renal artery measuring up to 0.9 cm in diameter.  4.  Mild groundglass opacity in the right upper  lobe. May represent a focal infectious process.  5.  4 mm pulmonary nodule in the posterior right lung base and 2 mm pulmonary nodule in the anterior right upper lobe. Recommend follow-up per Fleischner Society criteria as described below.  6.  Prominent/mildly enlarged axillary and right hilar lymph nodes.     REFERENCE:  Guidelines for Management of Incidental Pulmonary Nodules Detected on CT Images: From the Fleischner Society 2017.   Guidelines apply to incidental nodules in patients who are 35 years or older.  Guidelines do not apply to lung cancer screening, patients with immunosuppression, or patients with known primary cancer.     MULTIPLE NODULES  Nodule size less than or equal to 6 mm  Low-risk patients: No follow-up needed.  High-risk patients: Optional follow-up at 12 months.     Nodule size 6 mm or larger  Low-risk patients: Follow-up CT at 3-6 months, then consider CT at 18-24 months.  High-risk patients: Follow-up CT at 3-6 months, then at 18-24 months if no change.  -Use most suspicious nodule as guide to management.          Assessment and Plan:     1. 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 Hx  of Giant cell arteritis  with ischemic neuropathy  bilateral  (H) Temporal  Bx proven and  treated with steroidsand tocilucimab 9/2020 ?    This is a very pleasant 83-year-old male with complex and complicated past medical history diagnosed giant cell arteritis in 2020 which was temporal biopsy-proven and at that time he had a cranial symptoms, vision problems and developed bilateral ischemic optic neuropathy.  He was seen multiple specialists rheumatologist, ophthalmologist and treated with a prednisone and monoclonal antibody from 2020 to  2023  Currently off of all the immunosuppressive medications, clinically stable.  Multiple times underwent inflammatory markers all of them are normal, vasculitis panel is negative.   IgG4 was normal .  He  recently underwent CT scan of the chest abdomen pelvis as delineated above  There is no radiological evidence of vascular wall thickening or perivascular inflammation  Incidental finding of mild dilatation of the distal celiac trunk 1.1 cm and mild dilatation of the right renal artery 0.9 cm.,  No other previous CT scans to compare.  He is asymptomatic  Exam is unremarkable, lab tests are normal, vital signs are stable, no symptoms of abdominal pain or unexplained weight loss or loss of appetite etc    He has been closely followed by rheumatologist and getting periodic inflammatory markers  Given his clinical situation with no evidence of radiological or biochemical or clinical signs of vasculitis or GCA , I will repeat CTA of the chest, abdomen and pelvis with leg runoff in 6 months or sooner if any symptoms    3. CLL (chronic lymphocytic leukemia) (H)    Follow-up with hematology oncology service and follow the recommendations    4. Hyperlipidemia LDL goal <70  Lipids are well-controlled with atorvastatin    5. Agatston coronary artery calcium score greater than 400 but stress ECHO normal in 8/2023   He does not smoke, blood pressure is well-controlled, LDL is excellent range, taking aspirin and statin, continue the same follow-up with his cardiologist as scheduled    75  minutes spent on the date of the encounter doing chart review, history and exam, documentation, and further activities as noted above.  Prolonged services due to medical complexity and review of extensive records in the [facility] Everywhere, He was seen and evaluated by multiple specialists at multiple institutes    The longitudinal care of plan for the above diagnoses was addressed during this visit. Due to added complexity of care, we will continue to supprt Bon Michael and the subsequent management of this/these conditions and with ongoing continuity of care for this/these conditions.       KAMILLA with written instructions given    Thank you  for the consultation !    This note was dictated by [employer] Dragon software    Copy of this note to primary care physician and referring physician    Delaney Franklin MD,FAHA,FSVM,FNLA, FACP  Vascular Medicine  Clinical Hypertension Specialist   Clinical Lipidologist

## 2024-04-19 NOTE — PATIENT INSTRUCTIONS
Plan for repeat CTA , chest , abdomen and pelvis with leg run off in 6 months , our staff will schedule test     Continue to follow up with Rheumatologist and get inflammatory markers per their plan and  protocol     See Hematologist for CLL etc     Continue baby aspirin and atorvastatin

## 2024-04-19 NOTE — PROGRESS NOTES
[facility]        Patient is here for a consult.    Pt is currently taking Aspirin and Statin.    /71 (BP Location: Left arm, Patient Position: Chair, Cuff Size: Adult Regular)   Pulse 82   Wt 125 lb 3.2 oz (56.8 kg)   SpO2 99%   BMI 19.61 kg/m      The provider has been notified that the patient has no concerns.     Questions patient would like addressed today are: N/A.    Refills are needed: N/A    Has homecare services and agency name:  Allie Hagan MA

## 2024-05-03 NOTE — TELEPHONE ENCOUNTER
[facility] VASCULAR [facility] CENTER    Who is the name of the provider?:  [facility] NURSE   What is the location you see this provider at/preferred location?: [place]  Person calling / Facility: Bon Michael  Phone number:  [phone] (home)   Nurse call back needed:  YES     Reason for call:  Patient called to get clarification on diagnosis and what he is seeing Dr. Franklin regarding. Patient is aware of follow up due in 6 months but is unsure of what he is being seen for. Routing to RN to contact patient to discuss diagnosis and review AVS.     Pharmacy location:     N/a  Outside Imaging: n/a   Can we leave a detailed message on this number?  NO     5/3/2024, 2:18 PM

## 2024-05-06 NOTE — TELEPHONE ENCOUNTER
"[employer]" message sent to patient.    Evy Ayala, KATHERINN, RN, CV-[facility] Vascular [facility]

## 2024-05-17 ENCOUNTER — TELEPHONE (OUTPATIENT)
Dept: RHEUMATOLOGY | Facility: CLINIC | Age: 84
End: 2024-05-17
Payer: MEDICARE

## 2024-05-17 NOTE — TELEPHONE ENCOUNTER
[facility] Call Center    Phone Message    May a detailed message be left on voicemail: yes     Reason for Call: Other: Can current follow up with Dr. Rice be changed to telephone on 5/28/2024 .      Action Taken: Other: Rheum     Travel Screening: Not Applicable

## 2024-05-23 ENCOUNTER — TELEPHONE (OUTPATIENT)
Dept: RHEUMATOLOGY | Facility: CLINIC | Age: 84
End: 2024-05-23

## 2024-05-23 ENCOUNTER — LAB (OUTPATIENT)
Dept: LAB | Facility: CLINIC | Age: 84
End: 2024-05-23
Payer: MEDICARE

## 2024-05-23 DIAGNOSIS — M31.8 SYSTEMIC VASCULITIS (H): ICD-10-CM

## 2024-05-23 LAB
ALBUMIN UR-MCNC: NEGATIVE MG/DL
ALT SERPL W P-5'-P-CCNC: 23 U/L (ref 0–70)
APPEARANCE UR: CLEAR
AST SERPL W P-5'-P-CCNC: 39 U/L (ref 0–45)
BASOPHILS # BLD MANUAL: 0 10E3/UL (ref 0–0.2)
BASOPHILS NFR BLD MANUAL: 0 %
BILIRUB UR QL STRIP: NEGATIVE
COLOR UR AUTO: YELLOW
CREAT SERPL-MCNC: 0.76 MG/DL (ref 0.67–1.17)
CRP SERPL-MCNC: <3 MG/L
EGFRCR SERPLBLD CKD-EPI 2021: 90 ML/MIN/1.73M2
EOSINOPHIL # BLD MANUAL: 0 10E3/UL (ref 0–0.7)
EOSINOPHIL NFR BLD MANUAL: 0 %
ERYTHROCYTE [DISTWIDTH] IN BLOOD BY AUTOMATED COUNT: 12.9 % (ref 10–15)
GLUCOSE UR STRIP-MCNC: NEGATIVE MG/DL
HCT VFR BLD AUTO: 38.6 % (ref 40–53)
HGB BLD-MCNC: 12.4 G/DL (ref 13.3–17.7)
HGB UR QL STRIP: ABNORMAL
KETONES UR STRIP-MCNC: NEGATIVE MG/DL
LEUKOCYTE ESTERASE UR QL STRIP: NEGATIVE
LYMPHOCYTES # BLD MANUAL: 21.9 10E3/UL (ref 0.8–5.3)
LYMPHOCYTES NFR BLD MANUAL: 84 %
MCH RBC QN AUTO: 30.9 PG (ref 26.5–33)
MCHC RBC AUTO-ENTMCNC: 32.1 G/DL (ref 31.5–36.5)
MCV RBC AUTO: 96 FL (ref 78–100)
MONOCYTES # BLD MANUAL: 0.3 10E3/UL (ref 0–1.3)
MONOCYTES NFR BLD MANUAL: 1 %
NEUTROPHILS # BLD MANUAL: 3.9 10E3/UL (ref 1.6–8.3)
NEUTROPHILS NFR BLD MANUAL: 15 %
NITRATE UR QL: NEGATIVE
NRBC # BLD AUTO: 0 10E3/UL
NRBC BLD AUTO-RTO: 0 /100
PH UR STRIP: 5.5 [PH] (ref 5–7)
PLAT MORPH BLD: ABNORMAL
PLATELET # BLD AUTO: 132 10E3/UL (ref 150–450)
RBC # BLD AUTO: 4.01 10E6/UL (ref 4.4–5.9)
RBC #/AREA URNS AUTO: NORMAL /HPF
RBC MORPH BLD: ABNORMAL
SMUDGE CELLS BLD QL SMEAR: PRESENT
SP GR UR STRIP: 1.02 (ref 1–1.03)
UROBILINOGEN UR STRIP-ACNC: 0.2 E.U./DL
WBC # BLD AUTO: 26.1 10E3/UL (ref 4–11)
WBC #/AREA URNS AUTO: NORMAL /HPF

## 2024-05-23 PROCEDURE — 84450 TRANSFERASE (AST) (SGOT): CPT

## 2024-05-23 PROCEDURE — 36415 COLL VENOUS BLD VENIPUNCTURE: CPT

## 2024-05-23 PROCEDURE — 84460 ALANINE AMINO (ALT) (SGPT): CPT

## 2024-05-23 PROCEDURE — 86140 C-REACTIVE PROTEIN: CPT

## 2024-05-23 PROCEDURE — 82565 ASSAY OF CREATININE: CPT

## 2024-05-23 PROCEDURE — 81001 URINALYSIS AUTO W/SCOPE: CPT

## 2024-05-23 PROCEDURE — 85025 COMPLETE CBC W/AUTO DIFF WBC: CPT

## 2024-05-28 ENCOUNTER — VIRTUAL VISIT (OUTPATIENT)
Dept: RHEUMATOLOGY | Facility: CLINIC | Age: 84
End: 2024-05-28
Attending: STUDENT IN AN ORGANIZED HEALTH CARE EDUCATION/TRAINING PROGRAM
Payer: MEDICARE

## 2024-05-28 DIAGNOSIS — I72.8 CELIAC ARTERY DILATATION (H): ICD-10-CM

## 2024-05-28 DIAGNOSIS — R91.8 GROUND GLASS OPACITY PRESENT ON IMAGING OF LUNG: ICD-10-CM

## 2024-05-28 DIAGNOSIS — M31.6 GCA (GIANT CELL ARTERITIS) (H): Primary | ICD-10-CM

## 2024-05-28 PROCEDURE — G2211 COMPLEX E/M VISIT ADD ON: HCPCS | Performed by: STUDENT IN AN ORGANIZED HEALTH CARE EDUCATION/TRAINING PROGRAM

## 2024-05-28 PROCEDURE — 99443 PR PHYSICIAN TELEPHONE EVALUATION 21-30 MIN: CPT | Mod: 93 | Performed by: STUDENT IN AN ORGANIZED HEALTH CARE EDUCATION/TRAINING PROGRAM

## 2024-05-28 NOTE — LETTER
5/28/2024       RE: Bon Michael  [birth year][address] [facility] [address]     Dear Colleague,    Thank you for referring your patient, Bon Michael, to the [facility] RHEUMATOLOGY at [facility]. Please see a copy of my visit note below.    Virtual Visit Details    The visit was initially planned for video visit however due to connection issues it was switched to phone visit based on patient request    Telephone visit: 25 minutes     Originating Location (pt. Location): Home    Distant Location (provider location):  On-site    RHEUMATOLOGY OUTPATIENT CLINIC NOTE     Referring Provider: Roman Rice MD    Rheumatology history:  Cabrera has been following with [facility] rheumatology Dr. Alcantara [[id number]] for biopsy-proven giant cell arteritis.  Onset: 2020  Involvement: Cranial symptoms, visual symptoms, bilateral ischemic optic neuropathy, temporal artery biopsy positive for GCA,  Comorbid conditions: CLL  Treatment included: Prednisone, tocilizumab [[id number]]     Lab review     ANCA immunofluorescence: Negative.  ANCA serology [facility]: Negative  Serum IgG 4 level: Normal    Temporal artery biopsy, 9/2020, Temporal artery, left, biopsy:   1. Granulomatous arteritis consistent with giant cell arteritis.   2. Chronic inflammation of the surrounding adventitia.      Imaging review  CTA chest, abdomen, pelvis 2024 focal dilatation of the distal celiac, distal renal, mild groundglass opacity in the right upper lobe    CT sinus 2024 deviated nasal septum without mucosal thickening or osteitis    MR orbits, 9/2020:   1.There is no definite abnormal contrast enhancement or mass. No evidence of leukemic infiltration.  2. Regarding the remainder of the brain, abnormal T2 hyperintense bone marrow signal with associated enhancement in the skull, likely representing leukemic infiltration.  3. Subcutaneous T2 hyperintensity with associated diffusion  restriction and contrast enhancement over the left zygomatic region, question leukemic infiltration.     CT sinus, 10/2019:  Left maxillary sinusitis with small air-fluid level, new since 6/17/2015. In the appropriate clinical setting, this could reflect acute sinusitis        Subjective    Visit date May 28, 2024    Bon Michael is a 83 year old male who presents today for follow up.    Since the last visit,    He continues to remain off immunosuppression.    CTA chest, abdomen, pelvis showed focal dilatation of the distal celiac, distal renal, mild groundglass opacity in the right upper lobe    CT sinus showed deviated nasal septum without mucosal thickening or osteitis.    ANCA serology negative.    Serum IgG 4 level: Normal.    He was evaluated by our colleagues in vascular medicine and repeat imaging in 6-month was recommended.    He is following with oncology, next appointment in June    Today, he shares that he denies headache, jaw claudications, scalp tenderness, vision changes, shoulder/hip stiffness. He denies fever, chills, unintentional weight loss. He denies cough or shortness of breath.     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      PHYSICAL EXAMINATION  Physical Exam  Phone visit     Assessment & Plan    Cabrera has been following with [facility] rheumatology Dr. Alcantara [[id number]] for biopsy-proven giant cell arteritis.  Onset: 2020  Involvement:  Visual symptoms, bilateral ischemic optic neuropathy, temporal artery biopsy positive for GCA. No headaches, scalp tenderness, PMR.    Comorbid conditions: CLL  following with oncology    Treatment included: Prednisone, tocilizumab [[id number]].  Currently off therapy since mid 2023     Giant cell arteritis, biopsy proven  Clinically doing well without symptoms suggestive of recurrence.  Currently off immunosuppressives.   Labs reviewed showing normal ESR, CRP.    - Celiac artery dilatation, distal renal artery dilatation, evaluated by vascular, repeat imaging recommended  - Ground glass opacities, unclear if related to post nasal drip, pulmonary evaluation is pending     Plan:  1- Continue to watch off immunosuppressive therapy   2- If he develop recurrence of vision loss then emergent evaluation at the emergency room.   3- Repeat imaging in 6 months as recommended by vascular medicine   4- Await pulmonary/ENT consult      Recurrent sinus obstruction   - CT sinus showing deviated septum  - ENT consult pending     Chronic lymphocytic leukemia   Follows with oncology      Screening for chronic infections  2020  Hepatitis B surface antigen: Negative  Hepatitis B core antibody: Negative  HIV antibody: Negative  QuantiFERON gold: Negative     Longitudinal care   The longitudinal plan of care for the diagnosis giant cell arteritis as documented were addressed during this visit. Due to the added complexity in care, I will continue to support Bon in the subsequent management and with ongoing continuity of care.      33 minutes spent by me on the date of the encounter doing chart review, history and exam, documentation and further activities per the note      Return in about 3 months (around 8/28/2024) for Follow up.    Roman Rice MD  [facility] RHEUMATOLOGY

## 2024-05-28 NOTE — NURSING NOTE
Is the patient currently in the state of [place]? YES    Visit mode:TELEPHONE    If the visit is dropped, the patient can be reconnected by: TELEPHONE VISIT: Phone number:   Telephone Information:   Mobile [phone]       Will anyone else be joining the visit? NO  (If patient encounters technical issues they should call [phone]    How would you like to obtain your AVS? MyChart    Are changes needed to the allergy or medication list? No    Are refills needed on medications prescribed by this physician? NO    Reason for visit: RAMIRO GOYAL

## 2024-05-28 NOTE — PROGRESS NOTES
Virtual Visit Details    The visit was initially planned for video visit however due to connection issues it was switched to phone visit based on patient request    Telephone visit: 25 minutes     Originating Location (pt. Location): Home    Distant Location (provider location):  On-site    RHEUMATOLOGY OUTPATIENT CLINIC NOTE     Referring Provider: Roman Rice MD    Rheumatology history:  Cabrera has been following with [facility] rheumatology Dr. Alcantara [[id number]] for biopsy-proven giant cell arteritis.  Onset: 2020  Involvement: Cranial symptoms, visual symptoms, bilateral ischemic optic neuropathy, temporal artery biopsy positive for GCA,  Comorbid conditions: CLL  Treatment included: Prednisone, tocilizumab [[id number]]     Lab review     ANCA immunofluorescence: Negative.  ANCA serology [facility]: Negative  Serum IgG 4 level: Normal    Temporal artery biopsy, 9/2020, Temporal artery, left, biopsy:   1. Granulomatous arteritis consistent with giant cell arteritis.   2. Chronic inflammation of the surrounding adventitia.      Imaging review  CTA chest, abdomen, pelvis 2024 focal dilatation of the distal celiac, distal renal, mild groundglass opacity in the right upper lobe    CT sinus 2024 deviated nasal septum without mucosal thickening or osteitis    MR orbits, 9/2020:   1.There is no definite abnormal contrast enhancement or mass. No evidence of leukemic infiltration.  2. Regarding the remainder of the brain, abnormal T2 hyperintense bone marrow signal with associated enhancement in the skull, likely representing leukemic infiltration.  3. Subcutaneous T2 hyperintensity with associated diffusion restriction and contrast enhancement over the left zygomatic region, question leukemic infiltration.     CT sinus, 10/2019:  Left maxillary sinusitis with small air-fluid level, new since 6/17/2015. In the appropriate clinical setting, this could reflect acute sinusitis        Subjective     Visit date May 28,  2024    Bon Michael is a 83 year old male who presents today for follow up.    Since the last visit,    He continues to remain off immunosuppression.    CTA chest, abdomen, pelvis showed focal dilatation of the distal celiac, distal renal, mild groundglass opacity in the right upper lobe    CT sinus showed deviated nasal septum without mucosal thickening or osteitis.    ANCA serology negative.    Serum IgG 4 level: Normal.    He was evaluated by our colleagues in vascular medicine and repeat imaging in 6-month was recommended.    He is following with oncology, next appointment in June    Today, he shares that he denies headache, jaw claudications, scalp tenderness, vision changes, shoulder/hip stiffness. He denies fever, chills, unintentional weight loss. He denies cough or shortness of breath.     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      PHYSICAL EXAMINATION  Physical Exam  Phone visit     Assessment & Plan     Cabrera has been following with [facility] rheumatology Dr. Alcantara [[id number]] for biopsy-proven giant cell arteritis.  Onset: 2020  Involvement:  Visual symptoms, bilateral ischemic optic neuropathy, temporal artery biopsy positive for GCA. No headaches, scalp tenderness, PMR.    Comorbid conditions: CLL following with oncology    Treatment included: Prednisone, tocilizumab [[id number]].  Currently off therapy since mid 2023     Giant cell arteritis, biopsy proven  Clinically doing well without symptoms suggestive of recurrence.  Currently off immunosuppressives.   Labs reviewed showing normal ESR, CRP.    -  Celiac artery dilatation, distal renal artery dilatation, evaluated by vascular, repeat imaging recommended  - Ground glass opacities, unclear if related to post nasal drip, pulmonary evaluation is pending     Plan:  1- Continue to watch off immunosuppressive therapy   2- If he develop recurrence of vision loss then emergent evaluation at the emergency room.   3- Repeat imaging in 6 months as recommended by vascular medicine   4- Await pulmonary/ENT consult      Recurrent sinus obstruction   - CT sinus showing deviated septum  - ENT consult pending     Chronic lymphocytic leukemia   Follows with oncology      Screening for chronic infections  2020  Hepatitis B surface antigen: Negative  Hepatitis B core antibody: Negative  HIV antibody: Negative  QuantiFERON gold: Negative     Longitudinal care   The longitudinal plan of care for the diagnosis giant cell arteritis as documented were addressed during this visit. Due to the added complexity in care, I will continue to support Bon in the subsequent management and with ongoing continuity of care.      33 minutes spent by me on the date of the encounter doing chart review, history and exam, documentation and further activities per the note      Return in about 3 months (around 8/28/2024) for Follow up.    Roman Rice MD  [facility] RHEUMATOLOGY

## 2024-05-28 NOTE — Clinical Note
Can we try to schedule ENT consult for him.  Follow up in 3 months, vasculitis pt, labs 1 weeks before appt,  CT scan to be scheduled in October Thank you

## 2024-06-15 ENCOUNTER — HEALTH MAINTENANCE LETTER (OUTPATIENT)
Age: 84
End: 2024-06-15

## 2024-06-21 ENCOUNTER — VIRTUAL VISIT (OUTPATIENT)
Dept: RHEUMATOLOGY | Facility: CLINIC | Age: 84
End: 2024-06-21
Attending: STUDENT IN AN ORGANIZED HEALTH CARE EDUCATION/TRAINING PROGRAM
Payer: COMMERCIAL

## 2024-06-21 DIAGNOSIS — M85.80 OSTEOPENIA, UNSPECIFIED LOCATION: Primary | ICD-10-CM

## 2024-06-21 DIAGNOSIS — Z78.9 TAKES DIETARY SUPPLEMENTS: ICD-10-CM

## 2024-06-21 NOTE — Clinical Note
6/21/2024       RE: Bon Michael  [birth year][address] [facility] [address]     Dear Colleague,    Thank you for referring your patient, Bon Michael, to the [facility] RHEUMATOLOGY CLINIC [place] at [facility]. Please see a copy of my visit note below.    Medication Therapy Management (MTM) Encounter    ASSESSMENT:                            Medication Adherence/Access: No issues identified    Supplements: Stable.    Osteopenia: Reviewed osteopenia goal of 1200 mg of calcium daily. Discussed adding a glass of milk to breakfast when having oatmeal would bring up the calcium content to near goal. Due to patient having several nutrition related questions, recommend patient meet with a nutritionist for further discussion. Patient requests for a referral for a [place] nutritionist, will discuss request with rheumatologist.     PLAN:                            1. Try adding a glass of milk to breakfast when you have oatmeal to increase your calcium intake those days.    2. I will talk to Dr. Rice about getting a nutritionist referral placed for you.    Follow-up: {[id number]}    SUBJECTIVE/OBJECTIVE:                          Bon Michael is a 83 year old male seen for a follow-up visit.       Reason for visit: Questions about calcium and fiber intake    Allergies/ADRs: Reviewed in chart  Past Medical History: Reviewed in chart  Tobacco: He reports that he has never smoked. He has never been exposed to tobacco smoke. He has never used smokeless tobacco.  Alcohol: not currently using    Medication Adherence/Access: no issues reported    Supplements:  Magnesium 250 mg daily  Fish Oil 1200 mg daily  Vitamin B Complex daily     Reports he is doing well on current supplements. No side effects or concerns noted.     Osteopenia:  Vitamin D 1000 units daily     Reports he has been able to change his diet to get all his calcium from food and was able  to discontinue calcium supplements. Would like to make sure this will work long term. Notes he has started alternating his breakfast between cheerios and oatmeal - trying to balance getting enough fiber and calcium. Concerned he is not getting enough calcium again. Has several other nutrition related questions as well.    Notes his daily calcium regimen is:  Cheerios or Fiber One and milk 505 mg or Oatmeal 52 mg  Yogurt 150 mg  Oranges 120 mg  Figs 60 mg  Kale and Spinach salad 70 mg  Milk and protein powder smoothie 200 mg    Today's Vitals: There were no vitals taken for this visit.  ----------------    I spent 30 minutes with this patient today. All changes were made via collaborative practice agreement with Roman Rice MD. A copy of the visit note was provided to the patient's provider(s).    A summary of these recommendations was sent via [employer].    Gladys Rolon PharmD  Medication Therapy Management Pharmacist  [facility]  Phone: [phone]     Telemedicine Visit Details  Type of service:  Telephone visit  Start Time: 9:00 AM  End Time: 9:30 AM     Medication Therapy Recommendations  No medication therapy recommendations to display         Again, thank you for allowing me to participate in the care of your patient.      Sincerely,    GLADYS ROLON RPH

## 2024-06-21 NOTE — Clinical Note
Would you be open to a nutritionist referral for this patient? He wants one through [facility] but his PCP is at a different system.  Thanks! Estela

## 2024-07-03 NOTE — PROGRESS NOTES
Medication Therapy Management (MTM) Encounter    ASSESSMENT:                            Medication Adherence/Access: No issues identified    Supplements: Stable.    Osteopenia: Reviewed osteopenia goal of 1200 mg of calcium daily. Discussed adding a glass of milk to breakfast when having oatmeal would bring up the calcium content to near goal. Due to patient having several nutrition related questions, recommend patient meet with a nutritionist for further discussion. Patient requests for a referral for a [place] nutritionist, will discuss request with rheumatologist.     PLAN:                            1. Try adding a glass of milk to breakfast when you have oatmeal to increase your calcium intake those days.    2. I will talk to Dr. Rice about getting a nutritionist referral placed for you.    Follow-up: Return in about 1 year (around 6/21/2025) for MTM Pharmacist Visit.    SUBJECTIVE/OBJECTIVE:                          Bon Michael is a 83 year old male seen for a follow-up visit.       Reason for visit: Questions about calcium and fiber intake    Allergies/ADRs: Reviewed in chart  Past Medical History: Reviewed in chart  Tobacco: He reports that he has never smoked. He has never been exposed to tobacco smoke. He has never used smokeless tobacco.  Alcohol: not currently using    Medication Adherence/Access: no issues reported    Supplements:  Magnesium 250 mg daily  Fish Oil 1200 mg daily  Vitamin B Complex daily     Reports he is doing well on current supplements. No side effects or concerns noted.     Osteopenia:  Vitamin D 1000 units daily     Reports he has been able to change his diet to get all his calcium from food and was able to discontinue calcium supplements. Would like to make sure this will work long term. Notes he has started alternating his breakfast between cheerios and oatmeal - trying to balance getting enough fiber and calcium. Concerned he is not getting enough calcium again. Has  several other nutrition related questions as well.    Notes his daily calcium regimen is:  Cheerios or Fiber One and milk 505 mg or Oatmeal 52 mg  Yogurt 150 mg  Oranges 120 mg  Figs 60 mg  Kale and Spinach salad 70 mg  Milk and protein powder smoothie 200 mg    Today's Vitals: There were no vitals taken for this visit.  ----------------    I spent 30 minutes with this patient today. All changes were made via collaborative practice agreement with Roman Rice MD. A copy of the visit note was provided to the patient's provider(s).    A summary of these recommendations was sent via [employer].    Estela Rolon PharmD  Medication Therapy Management Pharmacist  [facility]  Phone: [phone]     Telemedicine Visit Details  Type of service:  Telephone visit  Start Time: 9:00 AM  End Time: 9:30 AM     Medication Therapy Recommendations  No medication therapy recommendations to display

## 2024-07-03 NOTE — PATIENT INSTRUCTIONS
"Recommendations from today's MTM visit:                                                       1. Try adding a glass of milk to breakfast when you have oatmeal to increase your calcium intake those days.    2. I will talk to Dr. Rice about getting a nutritionist referral placed for you.    Follow-up: Return in about 1 year (around 6/21/2025) for MTM Pharmacist Visit.    It was great speaking with you today.  I value your experience and would be very thankful for your time in providing feedback in our clinic survey. In the next few days, you may receive an email or text message from [employer] with a link to a survey related to your  clinical pharmacist.\"     To schedule another MTM appointment, please call the clinic directly or you may call the MTM scheduling line at [phone].    My Clinical Pharmacist's contact information:                                                      Please feel free to contact me with any questions or concerns you have.      Estela Rolon, PharmD  Medication Therapy Management Pharmacist  [facility]  Phone: [phone]     "

## 2024-07-05 DIAGNOSIS — I25.10 CALCIFICATION OF CORONARY ARTERY: ICD-10-CM

## 2024-07-05 DIAGNOSIS — M85.80 OSTEOPENIA, UNSPECIFIED LOCATION: Primary | ICD-10-CM

## 2024-07-05 DIAGNOSIS — M31.8 SYSTEMIC VASCULITIS (H): ICD-10-CM

## 2024-07-08 ENCOUNTER — APPOINTMENT (OUTPATIENT)
Dept: URBAN - METROPOLITAN AREA CLINIC 259 | Age: 84
Setting detail: DERMATOLOGY
End: 2024-07-09

## 2024-07-08 DIAGNOSIS — D69.2 OTHER NONTHROMBOCYTOPENIC PURPURA: ICD-10-CM

## 2024-07-08 DIAGNOSIS — T07XXXA INSECT BITE, NONVENOMOUS, OF OTHER, MULTIPLE, AND UNSPECIFIED SITES, WITHOUT MENTION OF INFECTION: ICD-10-CM

## 2024-07-08 PROBLEM — L30.9 DERMATITIS, UNSPECIFIED: Status: ACTIVE | Noted: 2024-07-08

## 2024-07-08 PROCEDURE — OTHER OTC TREATMENT REGIMEN: OTHER

## 2024-07-08 PROCEDURE — 99213 OFFICE O/P EST LOW 20 MIN: CPT

## 2024-07-08 PROCEDURE — OTHER COUNSELING: OTHER

## 2024-07-08 PROCEDURE — OTHER PRESCRIPTION: OTHER

## 2024-07-08 PROCEDURE — OTHER MIPS QUALITY: OTHER

## 2024-07-08 RX ORDER — CLOBETASOL PROPIONATE 0.5 MG/G
OINTMENT TOPICAL
Qty: 60 | Refills: 0 | Status: ERX | COMMUNITY
Start: 2024-07-08

## 2024-07-08 ASSESSMENT — LOCATION SIMPLE DESCRIPTION DERM: LOCATION SIMPLE: RIGHT BUTTOCK

## 2024-07-08 ASSESSMENT — LOCATION ZONE DERM: LOCATION ZONE: TRUNK

## 2024-07-08 ASSESSMENT — LOCATION DETAILED DESCRIPTION DERM: LOCATION DETAILED: RIGHT BUTTOCK

## 2024-07-08 NOTE — HPI: RASH
Is This A New Presentation, Or A Follow-Up?: Rash
Additional History: Pt is here for a rash on his buttock it has been there for a couple of days. It is red and itchy but hasnt tried anything otc for treatment. Denies any pain or drainage.

## 2024-07-08 NOTE — PROCEDURE: OTC TREATMENT REGIMEN
Detail Level: Zone
Patient Specific Otc Recommendations (Will Not Stick From Patient To Patient): Citrus bioflavonoid 1000 mg bid

## 2024-07-15 ENCOUNTER — OFFICE VISIT (OUTPATIENT)
Dept: NURSING | Facility: CLINIC | Age: 84
End: 2024-07-15
Attending: INTERNAL MEDICINE
Payer: MEDICARE

## 2024-07-15 ENCOUNTER — OFFICE VISIT (OUTPATIENT)
Dept: PULMONOLOGY | Facility: CLINIC | Age: 84
End: 2024-07-15
Attending: INTERNAL MEDICINE
Payer: MEDICARE

## 2024-07-15 VITALS
BODY MASS INDEX: 18.95 KG/M2 | WEIGHT: 121 LBS | SYSTOLIC BLOOD PRESSURE: 110 MMHG | OXYGEN SATURATION: 99 % | DIASTOLIC BLOOD PRESSURE: 70 MMHG | HEART RATE: 68 BPM

## 2024-07-15 VITALS — WEIGHT: 121.1 LBS | OXYGEN SATURATION: 100 % | HEART RATE: 72 BPM | BODY MASS INDEX: 18.97 KG/M2

## 2024-07-15 DIAGNOSIS — R91.8 GROUND GLASS OPACITY PRESENT ON IMAGING OF LUNG: Primary | ICD-10-CM

## 2024-07-15 DIAGNOSIS — R91.8 GROUND GLASS OPACITY PRESENT ON IMAGING OF LUNG: ICD-10-CM

## 2024-07-15 PROCEDURE — 94729 DIFFUSING CAPACITY: CPT | Performed by: INTERNAL MEDICINE

## 2024-07-15 PROCEDURE — 94375 RESPIRATORY FLOW VOLUME LOOP: CPT | Performed by: INTERNAL MEDICINE

## 2024-07-15 PROCEDURE — 99215 OFFICE O/P EST HI 40 MIN: CPT | Mod: 25 | Performed by: INTERNAL MEDICINE

## 2024-07-15 PROCEDURE — 94726 PLETHYSMOGRAPHY LUNG VOLUMES: CPT | Performed by: INTERNAL MEDICINE

## 2024-07-15 ASSESSMENT — PAIN SCALES - GENERAL: PAINLEVEL: NO PAIN (0)

## 2024-07-15 NOTE — PROGRESS NOTES
Pulmonary Clinic New Patient Consult  Reason for Consult: Abnormal CT chest  History of Present Illness  I had the pleasure of seeing Bon Michael, who is a pleasant 83-year-old male with a history of CLL and biopsy-proven giant cell arteritis status post steroid treatments who presents for evaluation of an abnormal CT chest.  To briefly review, Bon was diagnosed with biopsy-proven giant cell arteritis performing a temporal artery biopsy, interestingly his inflammatory markers were not elevated at the time.  He was treated with steroids which was followed with immunosuppression and significant improvement in his eye related symptoms.  He did not seem to have other joints or skin manifestations of vasculitis. As part of his evaluations for vasculitis, he underwent a CTA pulmonary, chest and abdomen to check for other systemic/organ manifestations of vasculitis, he was found with right upper lobe groundglass opacities.  He denies any pulmonary related symptoms, he denies shortness of breath, chest pains, wheezing etc.  Prior history of smoking quit 47 years ago.  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  No family history of lung cancer.  He denies any radon exposures.     Discussed and reviewed with patient.  Normal spirometry, normal lung volumes and diffusion capacity  Review of Systems:  10 of 14 systems reviewed and are negative unless otherwise stated in HPI.    Past Medical History:   Diagnosis Date    Agatston coronary artery calcium score greater than 400, 2023 but stress echo negative     CLL (chronic lymphocytic leukemia) (H)     Giant cell arteritis (H), Tempral artery BX proven and treated with steroids etc follows Rheum     Osteopenia        Past Surgical History:   Procedure Laterality Date    OTHER SURGICAL HISTORY      Hernia repair bilateral inguinal    OTHER SURGICAL HISTORY      nasal surgery for deviated septum       Family History    Problem Relation Age of Onset    Cerebrovascular Disease Brother        Social History     Socioeconomic History    Marital status: Single     Spouse name: None    Number of children: None    Years of education: None    Highest education level: None   Tobacco Use    Smoking status: Never     Passive exposure: Never    Smokeless tobacco: Never   Substance and Sexual Activity    Alcohol use: Not Currently    Drug use: Never     Social Determinants of Health      [facility]    Financial Resource Strain    Received from [facility] [employer] [facility], [facility] [employer] [facility]    Social Connections   Interpersonal Safety: Not At Risk (1/7/2024)    [facility]     Humiliation, Afraid, Rape, and Kick questionnaire     Fear of Current or Ex-Partner: No     Emotionally Abused: No     Physically Abused: No     Sexually Abused: No         No Known Allergies      Current Outpatient Medications:     aspirin (ASA) 81 MG EC tablet, Take 81 mg by mouth daily, Disp: , Rfl:     atorvastatin (LIPITOR) 10 MG tablet, Take 10 mg by mouth daily, Disp: , Rfl:     B Complex Vitamins (VITAMIN B COMPLEX 100 IJ), Take 1 tablet by mouth daily, Disp: , Rfl:     cetirizine (ZYRTEC) 10 MG tablet, Take 10 mg by mouth daily as needed, Disp: , Rfl:     Cholecalciferol (VITAMIN D3) 1000 units CAPS, Take 1 capsule by mouth as needed, Disp: , Rfl:     fish oil-omega-3 fatty acids 1000 MG capsule, Take 1,200 mg by mouth twice a week, Disp: , Rfl:     fluticasone (FLONASE) 50 MCG/ACT nasal spray, , Disp: , Rfl:     magnesium 250 MG tablet, 1 tablet daily , Disp: , Rfl:       Physical Exam:  /70   Pulse 68   Wt 54.9 kg (121 lb)   SpO2 99%   BMI 18.95 kg/m    GENERAL: Well developed, well nourished, alert, and in no apparent distress.  HEENT: Normocephalic, atraumatic.  "PERRL, EOMI. Oral mucosa is moist. No perioral cyanosis.  NECK: supple, no masses, no thyromegaly.  RESP:  Normal respiratory effort.  CTAB.  No rales, wheezes, rhonchi.  No cyanosis or clubbing.  CV: Normal S1, S2, regular rhythm, normal rate. No murmur.  No LE edema.   ABDOMEN:  Soft, non-tender, non-distended.   SKIN: warm and dry. No rash.  NEURO: AAOx3.  Normal gait.  Fluent speech.  PSYCH: mentation appears normal.       Results:  PFTs: Reviewed and discussed with patient.  Normal spirometry, normal lung volumes and diffusion capacity  Most Recent [facility] Pulmonary Function Testing    FVC-Pred   Date Value Ref Range Status   07/15/2024 2.90 L      FVC-Pre   Date Value Ref Range Status   07/15/2024 3.03 L      FVC-%Pred-Pre   Date Value Ref Range Status   07/15/2024 104 %      FEV1-Pre   Date Value Ref Range Status   07/15/2024 2.52 L      FEV1-%Pred-Pre   Date Value Ref Range Status   07/15/2024 114 %      FEV1FVC-Pred   Date Value Ref Range Status   07/15/2024 76 %      FEV1FVC-Pre   Date Value Ref Range Status   07/15/2024 83 %      No results found for: \"20029\"  FEFMax-Pred   Date Value Ref Range Status   07/15/2024 5.83 L/sec      FEFMax-Pre   Date Value Ref Range Status   07/15/2024 7.60 L/sec      FEFMax-%Pred-Pre   Date Value Ref Range Status   07/15/2024 130 %      ExpTime-Pre   Date Value Ref Range Status   07/15/2024 5.35 sec      FIFMax-Pre   Date Value Ref Range Status   07/15/2024 3.43 L/sec      FEV1FEV6-Pred   Date Value Ref Range Status   07/15/2024 76 %      FEV1FEV6-Pre   Date Value Ref Range Status   07/15/2024 83 %      No results found for: \"20055\"   Imaging (personally reviewed in clinic today):      Assessment and Plan:   Abnormal imaging/CT chest  Subtle findings of groundglass opacities in the lower portions of the right upper lobe.  He has a very low risk for lung cancer.  Given the subtle mass, it is unclear if this has any relation to his recently diagnosed vasculitis/giant cell " arteritis.  We discussed that adenocarcinoma in situ of the lungs can symptoms appears groundglass opacities which may be slow-growing, however very less likely.  A CT chest has already been ordered in 6 months and we will continue to follow closely.  Given his advanced age and the fact that even if this is cancer it is likely going to be slow-growing, and he did not see the clinical utility for continued surveillance if the repeat CT scan scans in 6 months showing stability from a malignancy standpoint.   Questions and concerns were answered to the patient's satisfaction.  he was provided with my contact information should new questions or concerns arise in the interim.  he should return to clinic in 3 months with repeat CT chest  Up to date on vaccination    I spent 60 minutes on the date of the encounter doing chart review, history and exam, documentation and further coordination as noted above exclusive of time interpreting PFT, Chest Xray, CT Chest.  Renee Ordaz MD  Pulmonary, Critical Care and Sleep Medicine  [facility]  Office: [phone]      The above note was dictated using voice recognition software and may include typographical errors. Please contact the author for any clarifications.

## 2024-07-16 LAB
DLCOUNC-%PRED-PRE: 92 %
DLCOUNC-PRE: 18.92 ML/MIN/MMHG
DLCOUNC-PRED: 20.46 ML/MIN/MMHG
ERV-%PRED-PRE: 118 %
ERV-PRE: 1.2 L
ERV-PRED: 1.01 L
EXPTIME-PRE: 5.35 SEC
FEF2575-%PRED-PRE: 161 %
FEF2575-PRE: 2.63 L/SEC
FEF2575-PRED: 1.63 L/SEC
FEFMAX-%PRED-PRE: 130 %
FEFMAX-PRE: 7.6 L/SEC
FEFMAX-PRED: 5.83 L/SEC
FEV1-%PRED-PRE: 114 %
FEV1-PRE: 2.52 L
FEV1FEV6-PRE: 83 %
FEV1FEV6-PRED: 76 %
FEV1FVC-PRE: 83 %
FEV1FVC-PRED: 76 %
FEV1SVC-PRE: 89 %
FEV1SVC-PRED: 67 %
FIFMAX-PRE: 3.43 L/SEC
FRCPLETH-%PRED-PRE: 101 %
FRCPLETH-PRE: 3.54 L
FRCPLETH-PRED: 3.48 L
FVC-%PRED-PRE: 104 %
FVC-PRE: 3.03 L
FVC-PRED: 2.9 L
IC-%PRED-PRE: 79 %
IC-PRE: 1.62 L
IC-PRED: 2.03 L
RVPLETH-%PRED-PRE: 86 %
RVPLETH-PRE: 2.34 L
RVPLETH-PRED: 2.71 L
TLCPLETH-%PRED-PRE: 85 %
TLCPLETH-PRE: 5.16 L
TLCPLETH-PRED: 6.01 L
VA-%PRED-PRE: 84 %
VA-PRE: 4.36 L
VC-%PRED-PRE: 85 %
VC-PRE: 2.82 L
VC-PRED: 3.29 L

## 2024-07-31 ENCOUNTER — OFFICE VISIT (OUTPATIENT)
Dept: OPHTHALMOLOGY | Facility: CLINIC | Age: 84
End: 2024-07-31
Attending: OPHTHALMOLOGY
Payer: MEDICARE

## 2024-07-31 DIAGNOSIS — H47.20 PARTIAL OPTIC ATROPHY: Primary | ICD-10-CM

## 2024-07-31 DIAGNOSIS — H53.40 VISUAL FIELD DEFECT: ICD-10-CM

## 2024-07-31 PROCEDURE — G0463 HOSPITAL OUTPT CLINIC VISIT: HCPCS | Performed by: OPHTHALMOLOGY

## 2024-07-31 PROCEDURE — 92014 COMPRE OPH EXAM EST PT 1/>: CPT | Mod: GC | Performed by: OPHTHALMOLOGY

## 2024-07-31 PROCEDURE — 92133 CPTRZD OPH DX IMG PST SGM ON: CPT | Performed by: OPHTHALMOLOGY

## 2024-07-31 ASSESSMENT — REFRACTION_WEARINGRX
OS_SPHERE: +6.00
OD_SPHERE: +3.00
OD_AXIS: 152
SPECS_TYPE: PAL
OS_CYLINDER: +0.75
OD_CYLINDER: +0.75
OS_ADD: +2.75
OS_AXIS: 090
OD_ADD: +2.75

## 2024-07-31 ASSESSMENT — EXTERNAL EXAM - RIGHT EYE: OD_EXAM: NORMAL

## 2024-07-31 ASSESSMENT — TONOMETRY
OS_IOP_MMHG: 13
IOP_METHOD: ICARE
OD_IOP_MMHG: 12

## 2024-07-31 ASSESSMENT — CONF VISUAL FIELD
METHOD: COUNTING FINGERS
OD_SUPERIOR_NASAL_RESTRICTION: 0
OS_INFERIOR_NASAL_RESTRICTION: 3
OD_NORMAL: 1
OD_INFERIOR_TEMPORAL_RESTRICTION: 0
OD_SUPERIOR_TEMPORAL_RESTRICTION: 0
OD_INFERIOR_NASAL_RESTRICTION: 0

## 2024-07-31 ASSESSMENT — VISUAL ACUITY
METHOD: SNELLEN - LINEAR
OS_CC: 20/25
OS_CC+: -2
CORRECTION_TYPE: GLASSES
OD_CC: 20/20
OD_CC+: -3

## 2024-07-31 ASSESSMENT — EXTERNAL EXAM - LEFT EYE: OS_EXAM: NORMAL

## 2024-07-31 ASSESSMENT — CUP TO DISC RATIO
OD_RATIO: 0.4
OS_RATIO: 0.45

## 2024-07-31 ASSESSMENT — SLIT LAMP EXAM - LIDS
COMMENTS: MGD
COMMENTS: MGD

## 2024-07-31 NOTE — LETTER
"2024    RE: Bon Michael  : 1940  MRN: [id number]    Dear Providers,    I saw our mutual patient, Bon Michael, in follow-up in my clinic recently.  After a thorough neuro-ophthalmic history and examination, I came to the following conclusions:     1. 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      Atypical presentation without elevated acute phase reactants, no PMR symptoms, predominantly peripheral field losses. He completed an oral prednisone taper in early  and stopped methotrexate in mid- (when Actemra in shortage) with no evidence of disease recurrence since. Patient also has CLL which is managed by oncology, monitored every 6 months.   Treated with Actemra which was stopped 2023.    Today 24, still no signs of active giant cell arteritis  (no evidence of new vision loss or new optic nerve ischemia and no symptoms of giant cell arteritis recurrence) off of actemra.     He continues to follow with Rheumatology for large vessel involvement and they plan to repeat imaging without immunosuppression.       2.  Borderline visually significant cataracts - observe for now.    3. 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 Present since at least 2022.     Follow-up in 6 months with neuro-ophthalmology. Notify us as soon as possible for any acute vision changes.      Historical data from initial visit (2020):    Bon Michael is a 79 year old male who presents to neuro-ophthalmology clinic today for consultation from Jose Andino MD at [facility] for visual disturbance of left eye. He describes it as \"his left eye not getting enough light\". He describes noticing the change in his left eye's temporal visual field, inferior > superior. He notices it more first thing in the morning. These symptoms began on 2020 and he describes it as worsening - he is unclear if it the deficit has gotten larger or darker " however. He first noticed it while driving at the periphery of his vision. Patient saw Dr. Lewis on August 28.  He underwent esr / crp testing as well as mri (see below).  Labs: 9/2/20- WBC- 50.2, ESR- 28, CRP < 5     On August 31 he developed symptoms of waviness in his peripheral vision in   the right eye.  He went to Dr. Andino on 9/4/2020 who wanted him to go to   the ED but he was unable.  Dr. Andino reviewed the patient's fluorescein   angiography and felt there was choroidal filling delay in the right eye   and pallid edema in the right eye concerning for giant cell arteritis.  We   discussed the case via phone.      PRIOR IMAGING:  BRAIN MRI WITH/WO GADOLINIUM, MRA OF THE [place] OF [place]ANCHE[place] AND MRA OF THE   CAROTID ARTERIES - [facility] (9/1/2020)  IMPRESSION:    1.  No acute intracranial abnormality.  2.  No evidence of an orbital abnormalities.  3.  Mild generalized cerebral atrophy.  4.  Unremarkable MRA of the [place] of [place].  5.  Unremarkable MRA of the cervical vessels.     On my re-review I disagreed with the radiology read.  There is evidence of   patchy enhancement diffusely within the bilateral orbits and along the   bilateral intraorbital optic nerve sheaths.  Will discuss with   neuro-radiology regarding finding and repeat scan.     No bisphosphonates in the past (no osteoporosis medications for 7-8 years   per patient).      Left temporal artery biopsy 9/9/20- positive for giant cell arteritis   Prednisone (started 9/4/2020)  Patient started Actemra 10/28/20.    Stopped Actemra last dose on 9/13/23 after a 3 year course.     Interim history and exam with me since last visit 3/27/24  He had follow up with Rheumatology 5/28/24 with plans to continue off immunosuppression, oncology 6/6/24 who recommended continued observation of CLL and met with pulmonology 7/15/24 for ground glass opacities found on CT. Pulmonology planned on repeated chest CT in 3 months. If stability on scan they  plan not to pursue further surveillance. Today he reports no changes in vision and everything is stable from his perspective.    5/28/24- Dr. Rice- Rheumatology  Cabrera has been following with [facility] rheumatology Dr. Alcantara [[id number]] for biopsy-proven giant cell arteritis.  Onset: 2020  Involvement:  Visual symptoms, bilateral ischemic optic neuropathy, temporal artery biopsy positive for GCA. No headaches, scalp tenderness, PMR.     Comorbid conditions: CLL following with oncology     Treatment included: Prednisone, tocilizumab [[id number]].  Currently off therapy since mid 2023     Giant cell arteritis, biopsy proven  Clinically doing well without symptoms suggestive of recurrence.  Currently off immunosuppressives.   Labs reviewed showing normal ESR, CRP.     - Celiac artery dilatation, distal renal artery dilatation, evaluated by vascular, repeat imaging recommended  - Ground glass opacities, unclear if related to post nasal drip, pulmonary evaluation is pending      Plan:  1- Continue to watch off immunosuppressive therapy   2- If he develop recurrence of vision loss then emergent evaluation at the emergency room.   3- Repeat imaging in 6 months as recommended by vascular medicine   4- Await pulmonary/ENT consult      Recurrent sinus obstruction   - CT sinus showing deviated septum  - ENT consult pending     Chronic lymphocytic leukemia   Follows with oncology      Screening for chronic infections  2020  Hepatitis B surface antigen: Negative  Hepatitis B core antibody: Negative  HIV antibody: Negative  QuantiFERON gold: Negative     5/23/24 CRP <3    Exam:  [facility] 20/20 -3 OD 20/25-2 OS.  No afferent pupillary defect. 11/11 Ishihara color plates in both eyes. Stable overall afferent visual function.    OCT RNFL:  59 right eye (58 in Apr 2024), 58 left eye (57 in Apr 2024) , stable atrophy in both eyes (sequelae of original attacks without evidence of new or active ischemic optic neuropathy).      For further  exam details, please feel free to contact our office for additional records.  If you wish to contact me regarding this patient please email me at [email or web address] or give my clinic a call to arrange a phone conversation.    Sincerely,    Guille Garcia MD  , Neuro-Ophthalmology and Adult Strabismus Surgery  The Lucy Castano Chair in Neuro-Ophthalmology  Department of Ophthalmology and Visual Neurosciences  [facility]

## 2024-07-31 NOTE — PROGRESS NOTES
"   1. 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      Atypical presentation without elevated acute phase reactants, no PMR symptoms, predominantly peripheral field losses. He completed an oral prednisone taper in early 2021 and stopped methotrexate in mid-2022 (when Actemra in shortage) with no evidence of disease recurrence since. Patient also has CLL which is managed by oncology, monitored every 6 months.   Treated with Actemra which was stopped September 2023.    Today 7/31/24, still no signs of active giant cell arteritis  (no evidence of new vision loss or new optic nerve ischemia and no symptoms of giant cell arteritis recurrence) off of actemra.     He continues to follow with Rheumatology for large vessel involvement and they plan to repeat imaging without immunosuppression.       2.  Borderline visually significant cataracts - observe for now    3. 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 Present since at least 04/2022.     Follow-up in 6 months with neuro-ophthalmology. Notify us as soon as possible for any acute vision changes.      Historical data from initial visit (9/2020):    Bon Michael is a 79 year old male who presents to neuro-ophthalmology   clinic today for consultation from Jose Andino MD at [facility] for   visual disturbance of left eye. He describes it as \"his left eye not   getting enough light\". He describes noticing the change in his left eye's   temporal visual field, inferior > superior. He notices it more first thing   in the morning. These symptoms began on 8/22/2020 and he describes it as   worsening - he is unclear if it the deficit has gotten larger or darker   however. He first noticed it while driving at the periphery of his vision.     Patient saw Dr. Lewis on August 28.  He underwent esr / crp testing as   well as mri (see below).     Labs: 9/2/20- WBC- 50.2, ESR- 28, CRP < 5     On August 31 he developed symptoms of " waviness in his peripheral vision in   the right eye.  He went to Dr. Andino on 9/4/2020 who wanted him to go to   the ED but he was unable.  Dr. Andino reviewed the patient's fluorescein   angiography and felt there was choroidal filling delay in the right eye   and pallid edema in the right eye concerning for giant cell arteritis.  We   discussed the case via phone.      PRIOR IMAGING:  BRAIN MRI WITH/WO GADOLINIUM, MRA OF THE [place] OF SANCHEZ AND MRA OF THE   CAROTID ARTERIES - [facility] (9/1/2020)  IMPRESSION:    1.  No acute intracranial abnormality.  2.  No evidence of an orbital abnormalities.  3.  Mild generalized cerebral atrophy.  4.  Unremarkable MRA of the [place] of [place].  5.  Unremarkable MRA of the cervical vessels.     On my re-review I disagreed with the radiology read.  There is evidence of   patchy enhancement diffusely within the bilateral orbits and along the   bilateral intraorbital optic nerve sheaths.  Will discuss with   neuro-radiology regarding finding and repeat scan.     No bisphosphonates in the past (no osteoporosis medications for 7-8 years   per patient).      Left temporal artery biopsy 9/9/20- positive for giant cell arteritis   Prednisone (started 9/4/2020)  Patient started Actemra 10/28/20.    Stopped Actemra last dose on 9/13/23 after a 3 year course.     Interim history and exam with me since last visit 3/27/24  He had follow up with Rheumatology 5/28/24 with plans to continue off immunosuppression, oncology 6/6/24 who recommended continued observation of CLL and met with pulmonology 7/15/24 for ground glass opacities found on CT. Pulmonology planned on repeated chest CT in 3 months. If stability on scan they plan not to pursue further surveillance. Today he reports no changes in vision and everything is stable from his perspective.    5/28/24- Dr. Rice- Rheumatology  Cabrera has been following with [facility] rheumatology Dr. Alcantara [[id number]] for  biopsy-proven giant cell arteritis.  Onset: 2020  Involvement:  Visual symptoms, bilateral ischemic optic neuropathy, temporal artery biopsy positive for GCA. No headaches, scalp tenderness, PMR.     Comorbid conditions: CLL following with oncology     Treatment included: Prednisone, tocilizumab [[id number]].  Currently off therapy since mid 2023     Giant cell arteritis, biopsy proven  Clinically doing well without symptoms suggestive of recurrence.  Currently off immunosuppressives.   Labs reviewed showing normal ESR, CRP.     - Celiac artery dilatation, distal renal artery dilatation, evaluated by vascular, repeat imaging recommended  - Ground glass opacities, unclear if related to post nasal drip, pulmonary evaluation is pending      Plan:  1- Continue to watch off immunosuppressive therapy   2- If he develop recurrence of vision loss then emergent evaluation at the emergency room.   3- Repeat imaging in 6 months as recommended by vascular medicine   4- Await pulmonary/ENT consult      Recurrent sinus obstruction   - CT sinus showing deviated septum  - ENT consult pending     Chronic lymphocytic leukemia   Follows with oncology      Screening for chronic infections  2020  Hepatitis B surface antigen: Negative  Hepatitis B core antibody: Negative  HIV antibody: Negative  QuantiFERON gold: Negative     5/23/24 CRP <3    Exam:  VA 20/20 -3 OD 20/25-2 OS.  No afferent pupillary defect. 11/11 Ishihara color plates in both eyes. Stable overall afferent visual function.    OCT RNFL:  59 right eye (58 in Apr 2024), 58 left eye (57 in Apr 2024) , stable atrophy in both eyes (sequelae of original attacks without evidence of new or active ischemic optic neuropathy).           This document was prepared with the help of MAK Marcelino, who attends [employer] [facility] [employer].     Precharting   Desean oMrales MD  Resident Physician, PGY-3  Department of Ophthalmology    Complete documentation of  historical and exam elements from today's encounter can be found in the full encounter summary report (not reduplicated in this progress note).  I personally re-obtained the chief complaint(s) and history of present illness.  I confirmed and edited as necessary the review of systems, past medical/surgical history, family history, social history, and examination findings as documented by others; and I examined the patient myself.  I personally reviewed the relevant tests, images, and reports as documented above.  I formulated and edited as necessary the assessment and plan and discussed the findings and management plan with the patient and family     A medical student was involved in the care of the patient. I was present with the medical student who participated in the service and in the documentation of the note. I have  verified the history and personally performed the physical exam and medical decision making. I extensively reviewed and edited when necessary the assessment and plan. I agree with the assessment and plan of care as documented in the note    Guille Garcia MD

## 2024-07-31 NOTE — NURSING NOTE
Chief Complaint(s) and History of Present Illness(es)       Follow Up    In both eyes.  Since onset it is stable.  Associated symptoms include Negative for eye pain and headache.  Pain was noted as 0/10. Additional comments: 4 month follow-up for bilateral ischemic optic neuropathy.  Patient reports vision about the same as last visit.   ANNETTE Christiansen 7/31/2024 7:24 AM

## 2024-08-02 ENCOUNTER — TRANSFERRED RECORDS (OUTPATIENT)
Dept: HEALTH INFORMATION MANAGEMENT | Facility: CLINIC | Age: 84
End: 2024-08-02

## 2024-08-21 ENCOUNTER — LAB (OUTPATIENT)
Dept: LAB | Facility: CLINIC | Age: 84
End: 2024-08-21
Payer: MEDICARE

## 2024-08-21 ENCOUNTER — TELEPHONE (OUTPATIENT)
Dept: RHEUMATOLOGY | Facility: CLINIC | Age: 84
End: 2024-08-21

## 2024-08-21 DIAGNOSIS — M31.6 GCA (GIANT CELL ARTERITIS) (H): ICD-10-CM

## 2024-08-21 DIAGNOSIS — M31.8 SYSTEMIC VASCULITIS (H): ICD-10-CM

## 2024-08-21 LAB
ALBUMIN UR-MCNC: NEGATIVE MG/DL
ALT SERPL W P-5'-P-CCNC: 13 U/L (ref 0–70)
APPEARANCE UR: CLEAR
AST SERPL W P-5'-P-CCNC: 41 U/L (ref 0–45)
BASOPHILS # BLD MANUAL: 0 10E3/UL (ref 0–0.2)
BASOPHILS NFR BLD MANUAL: 0 %
BILIRUB UR QL STRIP: NEGATIVE
COLOR UR AUTO: YELLOW
CREAT SERPL-MCNC: 0.74 MG/DL (ref 0.67–1.17)
CRP SERPL-MCNC: <3 MG/L
EGFRCR SERPLBLD CKD-EPI 2021: >90 ML/MIN/1.73M2
EOSINOPHIL # BLD MANUAL: 0 10E3/UL (ref 0–0.7)
EOSINOPHIL NFR BLD MANUAL: 0 %
ERYTHROCYTE [DISTWIDTH] IN BLOOD BY AUTOMATED COUNT: 12.3 % (ref 10–15)
GLUCOSE UR STRIP-MCNC: NEGATIVE MG/DL
HCT VFR BLD AUTO: 35.7 % (ref 40–53)
HGB BLD-MCNC: 11.6 G/DL (ref 13.3–17.7)
HGB UR QL STRIP: ABNORMAL
KETONES UR STRIP-MCNC: NEGATIVE MG/DL
LEUKOCYTE ESTERASE UR QL STRIP: NEGATIVE
LYMPHOCYTES # BLD MANUAL: 18.4 10E3/UL (ref 0.8–5.3)
LYMPHOCYTES NFR BLD MANUAL: 75 %
MCH RBC QN AUTO: 31.4 PG (ref 26.5–33)
MCHC RBC AUTO-ENTMCNC: 32.5 G/DL (ref 31.5–36.5)
MCV RBC AUTO: 97 FL (ref 78–100)
MONOCYTES # BLD MANUAL: 0.7 10E3/UL (ref 0–1.3)
MONOCYTES NFR BLD MANUAL: 3 %
NEUTROPHILS # BLD MANUAL: 5.4 10E3/UL (ref 1.6–8.3)
NEUTROPHILS NFR BLD MANUAL: 22 %
NITRATE UR QL: NEGATIVE
NRBC # BLD AUTO: 0 10E3/UL
NRBC BLD AUTO-RTO: 0 /100
PH UR STRIP: 5 [PH] (ref 5–7)
PLAT MORPH BLD: ABNORMAL
PLATELET # BLD AUTO: 152 10E3/UL (ref 150–450)
RBC # BLD AUTO: 3.7 10E6/UL (ref 4.4–5.9)
RBC #/AREA URNS AUTO: NORMAL /HPF
RBC MORPH BLD: ABNORMAL
SMUDGE CELLS BLD QL SMEAR: PRESENT
SP GR UR STRIP: 1.02 (ref 1–1.03)
UROBILINOGEN UR STRIP-ACNC: 0.2 E.U./DL
WBC # BLD AUTO: 24.5 10E3/UL (ref 4–11)
WBC #/AREA URNS AUTO: NORMAL /HPF

## 2024-08-21 PROCEDURE — 85027 COMPLETE CBC AUTOMATED: CPT

## 2024-08-21 PROCEDURE — 85007 BL SMEAR W/DIFF WBC COUNT: CPT

## 2024-08-21 PROCEDURE — 81001 URINALYSIS AUTO W/SCOPE: CPT

## 2024-08-21 PROCEDURE — 36415 COLL VENOUS BLD VENIPUNCTURE: CPT

## 2024-08-21 PROCEDURE — 86140 C-REACTIVE PROTEIN: CPT

## 2024-08-21 PROCEDURE — 82565 ASSAY OF CREATININE: CPT

## 2024-08-21 PROCEDURE — 84460 ALANINE AMINO (ALT) (SGPT): CPT

## 2024-08-21 PROCEDURE — 84450 TRANSFERASE (AST) (SGOT): CPT

## 2024-08-21 NOTE — TELEPHONE ENCOUNTER
[facility] Call Center    Phone Message    May a detailed message be left on voicemail: yes     Reason for Call: Other: Per pt requesting to switch his apt with Dr. Rice on 08/27/2024 be switch to VV, if possible.       Action Taken: Other: rheum     Travel Screening: Not Applicable     Date of Service:

## 2024-08-22 NOTE — TELEPHONE ENCOUNTER
Thank you for your help Aster. I sent you off a message as well relaying that I had caller Yury and changed the appt but maybe I didn't route it properly. It's all taken care of, thank you so much for your help!

## 2024-08-22 NOTE — TELEPHONE ENCOUNTER
"Called pt to relay message about upcoming 8/27 appt being virtual. I have changed it to virtual. However, pt is concerned with instruction about follow up appt's being on [facility] as \"he's afraid his car will get stolen\". I reassured him we have security resources in place and we would discuss the plan to see him in person after his follow-up.  "

## 2024-08-27 ENCOUNTER — VIRTUAL VISIT (OUTPATIENT)
Dept: RHEUMATOLOGY | Facility: CLINIC | Age: 84
End: 2024-08-27
Attending: STUDENT IN AN ORGANIZED HEALTH CARE EDUCATION/TRAINING PROGRAM
Payer: MEDICARE

## 2024-08-27 VITALS
DIASTOLIC BLOOD PRESSURE: 64 MMHG | SYSTOLIC BLOOD PRESSURE: 112 MMHG | BODY MASS INDEX: 18.99 KG/M2 | WEIGHT: 121 LBS | HEIGHT: 67 IN

## 2024-08-27 DIAGNOSIS — I72.8 CELIAC ARTERY DILATATION (H): ICD-10-CM

## 2024-08-27 DIAGNOSIS — M31.6 GIANT CELL ARTERITIS (H): ICD-10-CM

## 2024-08-27 DIAGNOSIS — C91.10 CHRONIC LYMPHOCYTIC LEUKEMIA (H): ICD-10-CM

## 2024-08-27 DIAGNOSIS — M31.6 GCA (GIANT CELL ARTERITIS) (H): Primary | ICD-10-CM

## 2024-08-27 PROCEDURE — 99443 PR PHYSICIAN TELEPHONE EVALUATION 21-30 MIN: CPT | Mod: 93 | Performed by: STUDENT IN AN ORGANIZED HEALTH CARE EDUCATION/TRAINING PROGRAM

## 2024-08-27 ASSESSMENT — PAIN SCALES - GENERAL: PAINLEVEL: NO PAIN (0)

## 2024-08-27 NOTE — Clinical Note
PET scan, to be scheduled along with his CTA scheduled in October 2 months, vasculitis slot. Labs 1 week prior.Thank you

## 2024-08-27 NOTE — LETTER
8/27/2024       RE: Bon Michael  1923 [facility] [address]     Dear Colleague,    Thank you for referring your patient, Bon Michael, to the [facility] RHEUMATOLOGY at [facility]. Please see a copy of my visit note below.    Virtual Visit Details    Type of service:  Phone Visit   Initially scheduled for video visit but switched for phone visit given connectivity issues     Phone visit: total time on call, 28 minutes     Originating Location (pt. Location): Home    Distant Location (provider location):  On-site    RHEUMATOLOGY OUTPATIENT CLINIC NOTE     Referring Provider: Roman Rice MD     Lab review     ANCA immunofluorescence: Negative.  ANCA serology [facility]: Negative  Serum IgG 4 level: Normal    Temporal artery biopsy, 9/2020, Temporal artery, left, biopsy:   1. Granulomatous arteritis consistent with giant cell arteritis.   2. Chronic inflammation of the surrounding adventitia.      Imaging review  CTA chest, abdomen, pelvis 2024 focal dilatation of the distal celiac, distal renal, mild groundglass opacity in the right upper lobe    CT sinus 2024 deviated nasal septum without mucosal thickening or osteitis    MR orbits, 9/2020:   1.There is no definite abnormal contrast enhancement or mass. No evidence of leukemic infiltration.  2. Regarding the remainder of the brain, abnormal T2 hyperintense bone marrow signal with associated enhancement in the skull, likely representing leukemic infiltration.  3. Subcutaneous T2 hyperintensity with associated diffusion restriction and contrast enhancement over the left zygomatic region, question leukemic infiltration.     CT sinus, 10/2019:  Left maxillary sinusitis with small air-fluid level, new since 6/17/2015. In the appropriate clinical setting, this could reflect acute sinusitis        Subjective    Visit date May 28, 2024    Bon Michael is a 83 year old male who  "presents today for follow up.    Since the last visit,    He continues to remain off immunosuppression.    Labs on 8/21/2024    Hemoglobin mildly low  WBC  mildly low  Platelets  overall stable   CRP  unremarkable  AST  overall stable  Creatinine  overall stable     Today, Bon mentioned the following     he shares that he denies headache, jaw claudications, scalp tenderness, vision changes, shoulder/hip stiffness in the morning. He denies fever, chills, unintentional weight loss. He denies cough or shortness of breath.     He had been evaluated by ENT within the last month, he was not found to have inflammation per patient and he was recommended to have nasal spray. 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 Alcantara [[id number]] for biopsy-proven giant cell arteritis.  Onset: 2020  Involvement:  Visual symptoms, bilateral ischemic optic neuropathy, temporal artery biopsy positive for GCA.   No headaches, scalp tenderness, PMR.  Comorbid conditions: CLL following with oncology    Treatment included: Prednisone, tocilizumab [[id number]].  Currently off therapy since mid 2023     Giant cell arteritis, biopsy proven  Clinically doing well without symptoms suggestive " of recurrence.  Currently off immunosuppressives.   Labs reviewed showing normal CRP.    - Celiac artery dilatation, distal renal artery dilatation, evaluated by vascular, repeat imaging recommended  - Ground glass opacities, not concerning per pulmonary, repeat scan is pending     Plan:  - Continue to watch off immunosuppressive therapy   - If he develop recurrence of vision loss then emergent evaluation at the emergency room.   - Repeat imaging as recommended by vascular medicine   - PET scan for evaluation of large vessel vasculitis and discern the findings noted on CTA  - Pulmonary follow up with repeat scan  - Await ENT consult      Recurrent sinus obstruction   - CT sinus showing deviated septum  - ENT consult outside done and he is on nasal spray  - recommended to send records for review      Chronic lymphocytic leukemia   Follows with oncology      Screening for chronic infections  2020  Hepatitis B surface antigen: Negative  Hepatitis B core antibody: Negative  HIV antibody: Negative  QuantiFERON gold: Negative     Longitudinal care   The longitudinal plan of care for the diagnosis giant cell arteritis as documented were addressed during this visit. Due to the added complexity in care, I will continue to support Bon in the subsequent management and with ongoing continuity of care.    32 minutes spent by me on the date of the encounter doing chart review, history and exam, documentation and further activities per the note      Return in about 2 months (around 10/27/2024) for Follow up.    Roman Rice MD  [facility] RHEUMATOLOGY      Again, thank you for allowing me to participate in the care of your patient.      Sincerely,    Roman Rice MD

## 2024-08-27 NOTE — NURSING NOTE
Is the patient currently in the state of [place]? YES    Visit mode:VIDEO    If the visit is dropped, the patient can be reconnected by: VIDEO VISIT: Send to e-mail at: [email or web address]    Will anyone else be joining the visit? NO  (If patient encounters technical issues they should call [phone]    How would you like to obtain your AVS? MyChart    Are changes needed to the allergy or medication list? No  Questionnaire(s) completed      Reason for visit: Video Visit (Follow Up)    Charito GOYAL

## 2024-08-27 NOTE — PROGRESS NOTES
Virtual Visit Details    Type of service:  Phone Visit   Initially scheduled for video visit but switched for phone visit given connectivity issues     Phone visit: total time on call, 28 minutes     Originating Location (pt. Location): Home    Distant Location (provider location):  On-site    RHEUMATOLOGY OUTPATIENT CLINIC NOTE     Referring Provider: Roman Rice MD     Lab review     ANCA immunofluorescence: Negative.  ANCA serology [facility]: Negative  Serum IgG 4 level: Normal    Temporal artery biopsy, 9/2020, Temporal artery, left, biopsy:   1. Granulomatous arteritis consistent with giant cell arteritis.   2. Chronic inflammation of the surrounding adventitia.      Imaging review  CTA chest, abdomen, pelvis 2024 focal dilatation of the distal celiac, distal renal, mild groundglass opacity in the right upper lobe    CT sinus 2024 deviated nasal septum without mucosal thickening or osteitis    MR orbits, 9/2020:   1.There is no definite abnormal contrast enhancement or mass. No evidence of leukemic infiltration.  2. Regarding the remainder of the brain, abnormal T2 hyperintense bone marrow signal with associated enhancement in the skull, likely representing leukemic infiltration.  3. Subcutaneous T2 hyperintensity with associated diffusion restriction and contrast enhancement over the left zygomatic region, question leukemic infiltration.     CT sinus, 10/2019:  Left maxillary sinusitis with small air-fluid level, new since 6/17/2015. In the appropriate clinical setting, this could reflect acute sinusitis        Subjective     Visit date 8/27/2024    Bon Michael is a 83 year old male who presents today for follow up.    Since the last visit,    He continues to remain off immunosuppression.    Labs on 8/21/2024    Hemoglobin mildly low  WBC  mildly low  Platelets  overall stable   CRP  unremarkable  AST  overall stable  Creatinine  overall stable     Today, Bon mentioned the following  "    he shares that he denies headache, jaw claudications, scalp tenderness, vision changes, shoulder/hip stiffness in the morning. He denies fever, chills, unintentional weight loss. He denies cough or shortness of breath.     He had been evaluated by ENT within the last month, he was not found to have inflammation per patient and he was recommended to have nasal spray. 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 Alcantara [[id number]] for biopsy-proven giant cell arteritis.  Onset: 2020  Involvement:  Visual symptoms, bilateral ischemic optic neuropathy, temporal artery biopsy positive for GCA.   No headaches, scalp tenderness, PMR.  Comorbid conditions: CLL following with oncology    Treatment included: Prednisone, tocilizumab [[id number]].  Currently off therapy since mid 2023     Giant cell arteritis, biopsy proven  Clinically doing well without symptoms suggestive of recurrence.  Currently off immunosuppressives.   Labs reviewed showing normal CRP.    - Celiac artery dilatation, distal renal artery dilatation, evaluated by vascular, repeat imaging recommended  - Ground glass opacities, not concerning per pulmonary, repeat scan is pending     Plan:  - Continue to watch " off immunosuppressive therapy   - If he develop recurrence of vision loss then emergent evaluation at the emergency room.   - Repeat imaging as recommended by vascular medicine   - PET scan for evaluation of large vessel vasculitis and discern the findings noted on CTA  - Pulmonary follow up with repeat scan  - Await ENT consult      Recurrent sinus obstruction   - CT sinus showing deviated septum  - ENT consult outside done and he is on nasal spray  - recommended to send records for review      Chronic lymphocytic leukemia   Follows with oncology      Screening for chronic infections  2020  Hepatitis B surface antigen: Negative  Hepatitis B core antibody: Negative  HIV antibody: Negative  QuantiFERON gold: Negative     Longitudinal care   The longitudinal plan of care for the diagnosis giant cell arteritis as documented were addressed during this visit. Due to the added complexity in care, I will continue to support Bon in the subsequent management and with ongoing continuity of care.    32 minutes spent by me on the date of the encounter doing chart review, history and exam, documentation and further activities per the note      Return in about 2 months (around 10/27/2024) for Follow up.    Roman Rice MD  [facility] RHEUMATOLOGY

## 2024-08-28 ENCOUNTER — TELEPHONE (OUTPATIENT)
Dept: RHEUMATOLOGY | Facility: CLINIC | Age: 84
End: 2024-08-28
Payer: MEDICARE

## 2024-08-28 NOTE — TELEPHONE ENCOUNTER
Called pt ti schedule 2 mo follow-up in-person. Schedule 10/25, labs tbd on 10/18. Pt reports having questions about follow-up with both vascular and pulmonology specialties.  [facility] requested also, mailed to pt. Please advise about vasc/pulm as pt seems to want follow-up but is also unsure.

## 2024-08-29 NOTE — TELEPHONE ENCOUNTER
Patient requesting a call back to discuss appts. He states his imaging appts on 10/7 were changed, and he is not sure why. Please call him back asap.

## 2024-08-30 ENCOUNTER — TELEPHONE (OUTPATIENT)
Dept: RHEUMATOLOGY | Facility: CLINIC | Age: 84
End: 2024-08-30
Payer: MEDICARE

## 2024-08-30 NOTE — TELEPHONE ENCOUNTER
"Called pt to get clarification on imaging cancellations/reschedules. Pt states he received a text stating his CT appt on 10/7 has been cancelled. Pt states reasoning behind cancellation was \"no reason\". The CT and PET that were cancelled were requested to be done together. There is no documentation of any of these conversations.     Consulting with Dr. Rice to come up with a better plan for pt.  "

## 2024-10-07 ENCOUNTER — HOSPITAL ENCOUNTER (OUTPATIENT)
Dept: CT IMAGING | Facility: CLINIC | Age: 84
Discharge: HOME | End: 2024-10-07
Attending: STUDENT IN AN ORGANIZED HEALTH CARE EDUCATION/TRAINING PROGRAM | Admitting: STUDENT IN AN ORGANIZED HEALTH CARE EDUCATION/TRAINING PROGRAM
Payer: MEDICARE

## 2024-10-07 DIAGNOSIS — R91.8 GROUND GLASS OPACITY PRESENT ON IMAGING OF LUNG: ICD-10-CM

## 2024-10-07 DIAGNOSIS — I72.8 CELIAC ARTERY DILATATION (H): ICD-10-CM

## 2024-10-07 LAB
CREAT BLD-MCNC: 0.7 MG/DL (ref 0.7–1.3)
EGFRCR SERPLBLD CKD-EPI 2021: >60 ML/MIN/1.73M2

## 2024-10-07 PROCEDURE — G1010 CDSM STANSON: HCPCS

## 2024-10-07 PROCEDURE — 82565 ASSAY OF CREATININE: CPT

## 2024-10-07 PROCEDURE — 250N000011 HC RX IP 250 OP 636: Performed by: STUDENT IN AN ORGANIZED HEALTH CARE EDUCATION/TRAINING PROGRAM

## 2024-10-07 PROCEDURE — 250N000009 HC RX 250: Performed by: STUDENT IN AN ORGANIZED HEALTH CARE EDUCATION/TRAINING PROGRAM

## 2024-10-07 RX ORDER — IOPAMIDOL 755 MG/ML
100 INJECTION, SOLUTION INTRAVASCULAR ONCE
Status: COMPLETED | OUTPATIENT
Start: 2024-10-07 | End: 2024-10-07

## 2024-10-07 RX ORDER — IOPAMIDOL 755 MG/ML
72 INJECTION, SOLUTION INTRAVASCULAR ONCE
Status: DISCONTINUED | OUTPATIENT
Start: 2024-10-07 | End: 2024-10-07

## 2024-10-07 RX ADMIN — SODIUM CHLORIDE 40 ML: 9 INJECTION, SOLUTION INTRAVENOUS at 08:09

## 2024-10-07 RX ADMIN — IOPAMIDOL 100 ML: 755 INJECTION, SOLUTION INTRAVENOUS at 08:09

## 2024-10-11 ENCOUNTER — TELEPHONE (OUTPATIENT)
Dept: RHEUMATOLOGY | Facility: CLINIC | Age: 84
End: 2024-10-11
Payer: MEDICARE

## 2024-10-11 NOTE — TELEPHONE ENCOUNTER
[facility] Call Center    Phone Message    May a detailed message be left on voicemail: yes     Reason for Call: Call received from Kavita with  Imaging department requesting to speak with Dr. Rice's care team regarding urgent findings seen on pt's PET scan completed earlier today (10/11/24)--unable to reach RN on priority line.   Imaging department would like a call back from care team to ensure that Dr. Rice has been made aware of these findings. Please call imaging department back at [phone].    Action Taken: Other: Rheum    Travel Screening: Not Applicable

## 2024-10-11 NOTE — TELEPHONE ENCOUNTER
Returned call to Kavita. She reports that PET scan results came back with urgent findings, please see report and advise. Message routed to on call provider. Kavita reports will also send a page to on call.     Annetta Pena RN

## 2024-10-11 NOTE — TELEPHONE ENCOUNTER
I got a page about PET scan result since Dr. Rice - his primary rheumatologist is on leave.     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  Scattered foci of uptake throughout musculature as well.  May be early or developing diverticulitis with focal uptake in left pelvis.    I have called the patient and left him a voice message to give us a call back to discuss these results.  I have also called the alternate  and chart his sister twice with no response.    When he calls back, it is prudent to assess for any symptoms of diverticulitis including abdominal pain, diarrhea, blood in his stools, fevers, chills.  If he is asymptomatic I would let him know about the findings on PET scan and to watch out for diverticulitis symptoms.  On chart review I did not see any previous history of diverticulitis.    It is also important if he does not have active diverticulitis to initiate prednisone for his active vasculitis.  His last weight which was recorded on 8/2024 is 54 kg.  Therefore he can be started on 50 mg of prednisone daily for 1 week followed by 40 mg of prednisone for 1 week, followed by 35 mg of prednisone for 1 week, followed by 30 mg of prednisone for 1 week, followed by 25 mg of prednisone for 1 week.      His last labs on chart 10/7/2024 showed normal creatinine, LFTs normal. Back in August 2024 he had an elevated white count of 24.5, lymphocytic, hemoglobin 11.6.  ESR was normal back in 2023.  CRP 8/2024 normal.  Hep B, HIV, TB negative in 2020. He would need a new set of labs with CBC, BMP, LFT, CRP, ESR.     He has an upcoming appointment with  on 10/25/2024.  I will forward this note to Dr Rice as well as let the on-call fellow know in case the patient gives a call back.    Plan:  -Assess for any  symptoms of diverticulitis, if active diverticulitis might need to come to ED to assess for antibiotic / surgical need  -If no active diverticulitis symptoms, please let the patient know for symptoms to watch out and initiate on prednisone 50 mg daily for 1 week followed by 40 mg of prednisone for 1 week, followed by 35 mg of prednisone for 1 week, followed by 30 mg of prednisone for 1 week, followed by 25 mg of prednisone for 1 week, further taper will be decided  -Educate on side effects of steroids including hyperglycemia, bone health management with vitamin D and calcium supplements, GERD  -Check CBC, BMP, LFT, CRP, ESR  -Will notify Dr Rice and on-call fellow in case patient calls back this weekend

## 2024-10-14 DIAGNOSIS — M31.6 GCA (GIANT CELL ARTERITIS) (H): Primary | ICD-10-CM

## 2024-10-14 RX ORDER — PREDNISONE 20 MG/1
TABLET ORAL
Qty: 63 TABLET | Refills: 0 | Status: SHIPPED | OUTPATIENT
Start: 2024-10-14 | End: 2024-10-15

## 2024-10-14 NOTE — TELEPHONE ENCOUNTER
Patient returned call after receiving VM from Dr. Christianson. Per call center, back line unable to be reached so call came through to writer. Writer reviewed message from Dr. Rice and discussed with patient:    Thank you Maylin for the note. I agree with assessing for acute diverticulitis and initiation of prednisone but would favor to hold on starting prednisone till he heals from diverticulitis episode.    Rachell - we will need your help to call Yury and relay the following:    - Relay the findings of PET scan that showed active inflammation in the vessels for which therapy will need to be initiated and there was findings that looks like inflammation in the colon.    - Ask if he has GI symptoms currently (abdominal pain, diarrhea, bleeding per rectum, fevers) if present, please assess if the symptoms are improving or worsening.    If he has active symptoms and worsening then we will need to be evaluated in the emergency room for risk of perforation.  If his symptoms are improving then we can watch or try oral antibiotics.  If there are no symptoms then we can watch his symptoms.    - Please advocate for him to come for his next appointment as in-person as it is important for him to be evaluated.    - I will discuss with him the treatment options in the upcoming appointment but we will plan on getting him back on prednisone and additional therapy given active inflammation.    Thank you,  MME     Patient denies any diverticulitis symptoms at this time. Writer advised patient to monitor for symptoms and contact us if any developing symptoms. Patient verbalized understanding. Patient verbalized will have labs done on 10/18. Patient also stated will come in to office for 10/25 appointment. Writer emphasized importance of coming into office for this appointment. Patient expressed concerns of being on prednisone and wanted to discuss further with Dr. Rice at upcoming appointment but understands it may be  necessary to take given the circumstances. Writer sent message to provider to advise if prednisone should be initiated prior to appointment or at appointment given absence of diverticulitis symptoms.     Annetta Pena RN

## 2024-10-15 DIAGNOSIS — M31.6 GCA (GIANT CELL ARTERITIS) (H): ICD-10-CM

## 2024-10-15 RX ORDER — PREDNISONE 20 MG/1
TABLET ORAL
Qty: 63 TABLET | Refills: 0 | Status: SHIPPED | OUTPATIENT
Start: 2024-10-15 | End: 2024-11-12

## 2024-10-15 NOTE — TELEPHONE ENCOUNTER
Call to patient to relay information per Dr. Rice. Patient has lab draw on 10/18 and will start prednisone after getting drawn done. Patient advised to continue to monitor for diverticulitis symptoms. Writer advised that Dr. Rice would discuss concerns further with patient at upcoming appointment.Patient verbalized understanding.     Annetta Pena RN

## 2024-10-16 ENCOUNTER — TELEPHONE (OUTPATIENT)
Dept: RHEUMATOLOGY | Facility: CLINIC | Age: 84
End: 2024-10-16
Payer: MEDICARE

## 2024-10-16 DIAGNOSIS — M31.6 GCA (GIANT CELL ARTERITIS) (H): Primary | ICD-10-CM

## 2024-10-16 DIAGNOSIS — M31.8 SYSTEMIC VASCULITIS (H): ICD-10-CM

## 2024-10-16 NOTE — TELEPHONE ENCOUNTER
"Patient placed call to writer with concerns regarding starting prednisone. Patient expressed concern about blood glucose rising and wanted to make sure that his glucose levels would be monitored throughout his prednisone course. Patient reports needing to self monitor glucose levels when he was on prednisone in the past.  Patient also expressed he would like Dr. Rice to reach out to his ophthalmologist to notify of his prednisone treatment due to his hx of GCA treatment in the past. Writer encouraged patient to write down any pertinent questions for upcoming appointment with Dr. Rice as patient has brought up topics and questions he would like to make sure are addressed at appt regarding plan of care. Patient has lab appointment scheduled for 10/18 and appointment on 10/25 with Dr. Rice. Writer placed lab orders per provider note from 10/11 \"He would need a new set of labs with CBC, BMP, LFT, CRP, ESR.\"    Annetta Pena RN    "

## 2024-10-17 ENCOUNTER — TELEPHONE (OUTPATIENT)
Dept: RHEUMATOLOGY | Facility: CLINIC | Age: 84
End: 2024-10-17
Payer: MEDICARE

## 2024-10-17 NOTE — TELEPHONE ENCOUNTER
"Patient called writer to ask if WBC count was a part of labs tomorrow as well as wondering if he needed to wait for results to return before starting prednisone. Writer advised per Dr. Rice, \"start prednisone 50 mg daily on 10/18 after getting the labs and I will discuss with him further in the next appointment.\" Patient verbalized understanding.     Annetta Pena RN    "

## 2024-10-18 ENCOUNTER — LAB (OUTPATIENT)
Dept: LAB | Facility: CLINIC | Age: 84
End: 2024-10-18
Payer: MEDICARE

## 2024-10-18 DIAGNOSIS — M31.8 SYSTEMIC VASCULITIS (H): ICD-10-CM

## 2024-10-18 DIAGNOSIS — M31.6 GCA (GIANT CELL ARTERITIS) (H): ICD-10-CM

## 2024-10-18 LAB
ALBUMIN SERPL BCG-MCNC: 3.5 G/DL (ref 3.5–5.2)
ALBUMIN UR-MCNC: NEGATIVE MG/DL
ALP SERPL-CCNC: 69 U/L (ref 40–150)
ALT SERPL W P-5'-P-CCNC: 16 U/L (ref 0–70)
ANION GAP SERPL CALCULATED.3IONS-SCNC: 11 MMOL/L (ref 7–15)
APPEARANCE UR: CLEAR
AST SERPL W P-5'-P-CCNC: 32 U/L (ref 0–45)
BACTERIA #/AREA URNS HPF: ABNORMAL /HPF
BASOPHILS # BLD MANUAL: 0 10E3/UL (ref 0–0.2)
BASOPHILS NFR BLD MANUAL: 0 %
BILIRUB DIRECT SERPL-MCNC: <0.2 MG/DL (ref 0–0.3)
BILIRUB SERPL-MCNC: 0.3 MG/DL
BILIRUB UR QL STRIP: NEGATIVE
BUN SERPL-MCNC: 24.9 MG/DL (ref 8–23)
CALCIUM SERPL-MCNC: 9.1 MG/DL (ref 8.8–10.4)
CHLORIDE SERPL-SCNC: 101 MMOL/L (ref 98–107)
COLOR UR AUTO: YELLOW
CREAT SERPL-MCNC: 0.68 MG/DL (ref 0.67–1.17)
CRP SERPL-MCNC: 9.37 MG/L
EGFRCR SERPLBLD CKD-EPI 2021: >90 ML/MIN/1.73M2
EOSINOPHIL # BLD MANUAL: 0 10E3/UL (ref 0–0.7)
EOSINOPHIL NFR BLD MANUAL: 0 %
ERYTHROCYTE [DISTWIDTH] IN BLOOD BY AUTOMATED COUNT: 12.8 % (ref 10–15)
ERYTHROCYTE [SEDIMENTATION RATE] IN BLOOD BY WESTERGREN METHOD: 47 MM/HR (ref 0–20)
GLUCOSE SERPL-MCNC: 95 MG/DL (ref 70–99)
GLUCOSE UR STRIP-MCNC: NEGATIVE MG/DL
HCO3 SERPL-SCNC: 27 MMOL/L (ref 22–29)
HCT VFR BLD AUTO: 32.2 % (ref 40–53)
HGB BLD-MCNC: 10.1 G/DL (ref 13.3–17.7)
HGB UR QL STRIP: ABNORMAL
KETONES UR STRIP-MCNC: NEGATIVE MG/DL
LEUKOCYTE ESTERASE UR QL STRIP: NEGATIVE
LYMPHOCYTES # BLD MANUAL: 19.2 10E3/UL (ref 0.8–5.3)
LYMPHOCYTES NFR BLD MANUAL: 67 %
MCH RBC QN AUTO: 29.1 PG (ref 26.5–33)
MCHC RBC AUTO-ENTMCNC: 31.4 G/DL (ref 31.5–36.5)
MCV RBC AUTO: 93 FL (ref 78–100)
MONOCYTES # BLD MANUAL: 1.7 10E3/UL (ref 0–1.3)
MONOCYTES NFR BLD MANUAL: 6 %
MUCOUS THREADS #/AREA URNS LPF: PRESENT /LPF
NEUTROPHILS # BLD MANUAL: 7.7 10E3/UL (ref 1.6–8.3)
NEUTROPHILS NFR BLD MANUAL: 27 %
NITRATE UR QL: NEGATIVE
PH UR STRIP: 5.5 [PH] (ref 5–7)
PLAT MORPH BLD: ABNORMAL
PLAT MORPH BLD: NORMAL
PLATELET # BLD AUTO: 243 10E3/UL (ref 150–450)
POTASSIUM SERPL-SCNC: 4.4 MMOL/L (ref 3.4–5.3)
PROT SERPL-MCNC: 6.2 G/DL (ref 6.4–8.3)
RBC # BLD AUTO: 3.47 10E6/UL (ref 4.4–5.9)
RBC #/AREA URNS AUTO: ABNORMAL /HPF
RBC MORPH BLD: ABNORMAL
RBC MORPH BLD: NORMAL
SMUDGE CELLS BLD QL SMEAR: PRESENT
SODIUM SERPL-SCNC: 139 MMOL/L (ref 135–145)
SP GR UR STRIP: 1.02 (ref 1–1.03)
SQUAMOUS #/AREA URNS AUTO: ABNORMAL /LPF
UROBILINOGEN UR STRIP-ACNC: 0.2 E.U./DL
VARIANT LYMPHS BLD QL SMEAR: PRESENT
WBC # BLD AUTO: 28.6 10E3/UL (ref 4–11)
WBC #/AREA URNS AUTO: ABNORMAL /HPF

## 2024-10-18 PROCEDURE — 85027 COMPLETE CBC AUTOMATED: CPT

## 2024-10-18 PROCEDURE — 85652 RBC SED RATE AUTOMATED: CPT

## 2024-10-18 PROCEDURE — 81001 URINALYSIS AUTO W/SCOPE: CPT

## 2024-10-18 PROCEDURE — 36415 COLL VENOUS BLD VENIPUNCTURE: CPT

## 2024-10-18 PROCEDURE — 80053 COMPREHEN METABOLIC PANEL: CPT

## 2024-10-18 PROCEDURE — 86140 C-REACTIVE PROTEIN: CPT

## 2024-10-18 PROCEDURE — 85007 BL SMEAR W/DIFF WBC COUNT: CPT

## 2024-10-18 PROCEDURE — 82248 BILIRUBIN DIRECT: CPT

## 2024-10-25 ENCOUNTER — OFFICE VISIT (OUTPATIENT)
Dept: RHEUMATOLOGY | Facility: CLINIC | Age: 84
End: 2024-10-25
Attending: STUDENT IN AN ORGANIZED HEALTH CARE EDUCATION/TRAINING PROGRAM
Payer: MEDICARE

## 2024-10-25 VITALS
OXYGEN SATURATION: 99 % | BODY MASS INDEX: 18.48 KG/M2 | HEART RATE: 70 BPM | SYSTOLIC BLOOD PRESSURE: 152 MMHG | WEIGHT: 118 LBS | DIASTOLIC BLOOD PRESSURE: 72 MMHG

## 2024-10-25 DIAGNOSIS — M31.6 GCA (GIANT CELL ARTERITIS) (H): ICD-10-CM

## 2024-10-25 DIAGNOSIS — Z72.89 OTHER PROBLEMS RELATED TO LIFESTYLE: ICD-10-CM

## 2024-10-25 DIAGNOSIS — M31.8 SYSTEMIC VASCULITIS (H): Primary | ICD-10-CM

## 2024-10-25 DIAGNOSIS — Z11.59 NEED FOR HEPATITIS B SCREENING TEST: ICD-10-CM

## 2024-10-25 DIAGNOSIS — Z11.1 TUBERCULOSIS SCREENING: ICD-10-CM

## 2024-10-25 DIAGNOSIS — Z11.59 NEED FOR HEPATITIS C SCREENING TEST: ICD-10-CM

## 2024-10-25 PROCEDURE — G0463 HOSPITAL OUTPT CLINIC VISIT: HCPCS | Performed by: STUDENT IN AN ORGANIZED HEALTH CARE EDUCATION/TRAINING PROGRAM

## 2024-10-25 PROCEDURE — 99215 OFFICE O/P EST HI 40 MIN: CPT | Performed by: STUDENT IN AN ORGANIZED HEALTH CARE EDUCATION/TRAINING PROGRAM

## 2024-10-25 PROCEDURE — 99417 PROLNG OP E/M EACH 15 MIN: CPT | Performed by: STUDENT IN AN ORGANIZED HEALTH CARE EDUCATION/TRAINING PROGRAM

## 2024-10-25 PROCEDURE — G2211 COMPLEX E/M VISIT ADD ON: HCPCS | Performed by: STUDENT IN AN ORGANIZED HEALTH CARE EDUCATION/TRAINING PROGRAM

## 2024-10-25 RX ORDER — SULFAMETHOXAZOLE AND TRIMETHOPRIM 400; 80 MG/1; MG/1
1 TABLET ORAL DAILY
Qty: 60 TABLET | Refills: 0 | Status: SHIPPED | OUTPATIENT
Start: 2024-10-25 | End: 2024-12-24

## 2024-10-25 RX ORDER — PREDNISONE 10 MG/1
TABLET ORAL
Qty: 245 TABLET | Refills: 0 | Status: SHIPPED | OUTPATIENT
Start: 2024-10-25 | End: 2025-01-10

## 2024-10-25 RX ORDER — FAMOTIDINE 40 MG/1
40 TABLET, FILM COATED ORAL DAILY
Qty: 60 TABLET | Refills: 0 | Status: SHIPPED | OUTPATIENT
Start: 2024-10-25 | End: 2024-12-24

## 2024-10-25 ASSESSMENT — PAIN SCALES - GENERAL: PAINLEVEL_OUTOF10: NO PAIN (0)

## 2024-10-25 NOTE — NURSING NOTE
Chief Complaint   Patient presents with    RECHECK     BP (!) 152/72 (BP Location: Right arm, Patient Position: Sitting, Cuff Size: Adult Small)   Pulse 70   Wt 53.5 kg (118 lb)   SpO2 99%   BMI 18.48 kg/m

## 2024-10-25 NOTE — LETTER
10/25/2024       RE: Bon Michael  [address] [facility] [address]     Dear Colleague,    Thank you for referring your patient, Bon Michael, to the [facility] RHEUMATOLOGY at [facility]. Please see a copy of my visit note below.    RHEUMATOLOGY OUTPATIENT CLINIC NOTE     Referring Provider: Roman Rice MD     Lab review     ANCA immunofluorescence: Negative.  ANCA serology [facility]: Negative  Serum IgG 4 level: Normal    Temporal artery biopsy, 9/2020, Temporal artery, left, biopsy:   1. Granulomatous arteritis consistent with giant cell arteritis.   2. Chronic inflammation of the surrounding adventitia.      Imaging review  CTA chest, abdomen, pelvis 2024 focal dilatation of the distal celiac, distal renal, mild groundglass opacity in the right upper lobe    CT sinus 2024 deviated nasal septum without mucosal thickening or osteitis    MR orbits, 9/2020:   1.There is no definite abnormal contrast enhancement or mass. No evidence of leukemic infiltration.  2. Regarding the remainder of the brain, abnormal T2 hyperintense bone marrow signal with associated enhancement in the skull, likely representing leukemic infiltration.  3. Subcutaneous T2 hyperintensity with associated diffusion restriction and contrast enhancement over the left zygomatic region, question leukemic infiltration.     CT sinus, 10/2019:  Left maxillary sinusitis with small air-fluid level, new since 6/17/2015. In the appropriate clinical setting, this could reflect acute sinusitis        Subjective    Visit date October 25, 2024    Bon Michael is a 83 year old male who presents today for follow up.    Since the last visit,    PET scan on 10/11 showed   1.  Findings consistent with active vasculitis (grade 3 uptake), with heterogeneous increased avidity throughout the major vessels, which  includes the thoracic and abdominal aorta, with the most intense foci  residing within the vertebral arteries, as well as the proximal upper extremity and throughout the lower extremity vasculature, as described above.    2.  Scattered foci of increased uptake also noted throughout the musculature, which may reflect foci of vascular inflammation not well characterized due to lack of IV contrast.  3.  Focal uptake within the left pelvis, which may correlate with adjacent diverticula, and therefore may represent early or developing diverticulitis. Recommend correlation patient's symptoms.      Work up on 10/18 showed     Hemoglobin mildly low  WBC  elevated  Platelets  within normal limits   ESR  mildly elevated   CRP  mildly elevated   AST  within normal limits   Creatinine  within normal limits     Today, Bon mentioned the following     He denies headache, jaw claudications, scalp tenderness, vision changes, shoulder/hip stiffness in the morning.   He denies arm or leg claudications with activities.  He denies fever, chills, unintentional weight loss.   He denies cough or shortness of breath.   He denies abdominal pain, blood in stool,     He started on prednisone 50 mg daily on 10/19, he had mild elevation in the BP and blood sugar that he is monitoring closely, He denies stomach upset.     ROS    Current Medications   Prednisone 50 mg daily       Past Medical history   Past Medical History:   Diagnosis Date     Agatston coronary artery calcium score greater than 400, 2023 but stress echo negative      CLL (chronic lymphocytic leukemia) (H)      Giant cell arteritis (H), Tempral artery BX proven and treated with steroids etc follows Rheum      Osteopenia        Objective  BP (!) 152/72 (BP Location: Right arm, Patient Position: Sitting, Cuff Size: Adult Small)   Pulse 70   Wt 53.5 kg (118 lb)   SpO2 99%   BMI 18.48 kg/m        PHYSICAL EXAMINATION  Physical Exam  HENT:      Ears:      Comments: Temporal artery: Bilaterally palpable without tenderness     Mouth/Throat:       Mouth: Mucous membranes are moist.   Eyes:      Conjunctiva/sclera: Conjunctivae normal.   Cardiovascular:      Comments: Aortic stenosis with murmur that propagates in the chest  Pulse: DP and PT palpable  Pulmonary:      Effort: Pulmonary effort is normal.      Breath sounds: Normal breath sounds.   Abdominal:      General: Abdomen is flat. Bowel sounds are normal. There is no distension.      Palpations: Abdomen is soft.      Tenderness: There is no abdominal tenderness.   Musculoskeletal:         General: No swelling or tenderness.      Cervical back: Normal range of motion.   Skin:     Findings: No rash.   Neurological:      Mental Status: He is alert.         Assessment & Plan    # Giant cell arteritis, biopsy proven  # Recurrent sinus obstruction  # Chronic lymphocytic leukemia    # Screening for chronic infections  # Longitudinal care    Cabrera has been following with [facility] rheumatology Dr. Alcantara [[id number]] for biopsy-proven giant cell arteritis.  Onset: 2020  Involvement:  Visual symptoms, bilateral ischemic optic neuropathy, temporal artery biopsy positive for GCA.   No headaches, scalp tenderness, PMR.  Comorbid conditions: CLL following with oncology    Relapse in 9/2024 with Large vessel vasculitis on PET, Asymptomatic     Treatment included: Prednisone, tocilizumab [[id number]].  Currently off therapy since mid 2023       # Giant cell arteritis, biopsy proven  # Large vessel vasculitis, Active   # Diverticular disease on PET scan, Asymptomatic     Yury is presenting today for follow up, he has been mostly asymptomatic however PET scan showed extensive vascular uptake consistent with active vasculitis involving vertebral artery, axillary, thoracic aorta, abdominal aorta, bilateral femoral arteries consistent with active large vessel vasculitis.     His inflammatory markers most recently are elevated either from active large vessel vasculitis or diverticular disease, of note he did not historically  have elevated inflammatory markers even at diagnosis in September 2020.    We had an extensive discussion today about his disease and he was understandably overwhelmed about the diagnosis and had several questions about the PET scan results.  I tried explained to the best my ability that he has active large vessel vasculitis for which additional therapy is recommended.    At the current time we would continue with high-dose steroids with a slow taper, he will need to remain on aspirin and start Pepcid for GI prophylaxis as well as Bactrim for PJP prophylaxis.    We discussed extensively the need for steroid sparing agent either tocilizumab or methotrexate which he had used both previously and tolerated both without notable side effects that he remembers.  Side effects of both medications were discussed at length including the risk of diverticulitis and perforation with tocilizumab specially with diverticular disease noted on PET scan.    I also discussed that tocilizumab would be more potent as compared to methotrexate as steroid sparing agent and more recommended for management of patients with active and extensive large vessel vasculitis like his case nonetheless there is a risk of diverticular disease with tocilizumab which is not reported with methotrexate.    He is still undecided about the steroid sparing agent and is understandably overwhelmed so I provided him educational material about both options and I recommended that he reach out to us about his decision.  We will schedule follow-up appointment again in few weeks to discuss further and in the meantime he will continue on prednisone with GI and PJP prophylaxis.     We discussed that he needs to monitor his blood pressure and blood sugars while on high-dose prednisone and he will need to follow-up with his primary care provider in this regard for monitoring purposes.    Plan:  - Continue prednisone with tapering as below.    - Await his decision about  steroid sparing agent either Tocilizumab or methotrexate.   I would favor Tocilizumab given extensive vascular involvement however the concern is diverticular disease noted on PET scan.     - If he develop recurrence of vision loss then emergent evaluation at the emergency room.     - [facility] pharmacy follow up    - Pulmonary follow up      Prednisone tapering schedule:    Continue Prednisone 50 mg daily for another 1 week, total of 2 weeks   - Then Prednisone 40 mg daily for 2 weeks   - Then Prednisone 35 mg daily for 2 week   - Then Prednisone 30 mg daily for 2 week   - Then Prednisone 25 mg daily for 2 week   - Then Prednisone 20 mg daily for 2 week      Recurrent sinus obstruction   -  Following with ENT outside     Chronic lymphocytic leukemia   Follows with oncology. Next follow up in December      Screening for chronic infections  2020  Hepatitis B surface antigen: Negative  Hepatitis B core antibody: Negative  HIV antibody: Negative  QuantiFERON gold: Negative    Get updated Screening labs with next blood draws      Longitudinal care   The longitudinal plan of care for the diagnosis giant cell arteritis as documented were addressed during this visit. Due to the added complexity in care, I will continue to support Bon in the subsequent management and with ongoing continuity of care.    60 minutes spent by me on the date of the encounter doing chart review, history and exam, documentation and further activities per the note      Return in about 18 days (around 11/12/2024) for Follow up.    Roman Rice MD  [facility] RHEUMATOLOGY      Again, thank you for allowing me to participate in the care of your patient.      Sincerely,    Roman Rice MD

## 2024-10-25 NOTE — PROGRESS NOTES
RHEUMATOLOGY OUTPATIENT CLINIC NOTE     Referring Provider: Roman Rice MD     Lab review     ANCA immunofluorescence: Negative.  ANCA serology [facility]: Negative  Serum IgG 4 level: Normal    Temporal artery biopsy, 9/2020, Temporal artery, left, biopsy:   1. Granulomatous arteritis consistent with giant cell arteritis.   2. Chronic inflammation of the surrounding adventitia.      Imaging review  CTA chest, abdomen, pelvis 2024 focal dilatation of the distal celiac, distal renal, mild groundglass opacity in the right upper lobe    CT sinus 2024 deviated nasal septum without mucosal thickening or osteitis    MR orbits, 9/2020:   1.There is no definite abnormal contrast enhancement or mass. No evidence of leukemic infiltration.  2. Regarding the remainder of the brain, abnormal T2 hyperintense bone marrow signal with associated enhancement in the skull, likely representing leukemic infiltration.  3. Subcutaneous T2 hyperintensity with associated diffusion restriction and contrast enhancement over the left zygomatic region, question leukemic infiltration.     CT sinus, 10/2019:  Left maxillary sinusitis with small air-fluid level, new since 6/17/2015. In the appropriate clinical setting, this could reflect acute sinusitis        Subjective     Visit date October 25, 2024    Bon Michael is a 83 year old male who presents today for follow up.    Since the last visit,    PET scan on 10/11 showed   1.  Findings consistent with active vasculitis (grade 3 uptake), with heterogeneous increased avidity throughout the major vessels, which  includes the thoracic and abdominal aorta, with the most intense foci residing within the vertebral arteries, as well as the proximal upper extremity and throughout the lower extremity vasculature, as described above.    2.  Scattered foci of increased uptake also noted throughout the musculature, which may reflect foci of vascular inflammation not well characterized due to  lack of IV contrast.  3.  Focal uptake within the left pelvis, which may correlate with adjacent diverticula, and therefore may represent early or developing diverticulitis. Recommend correlation patient's symptoms.      Work up on 10/18 showed     Hemoglobin mildly low  WBC  elevated  Platelets  within normal limits   ESR  mildly elevated   CRP  mildly elevated   AST  within normal limits   Creatinine  within normal limits     Today, Bon mentioned the following     He denies headache, jaw claudications, scalp tenderness, vision changes, shoulder/hip stiffness in the morning.   He denies arm or leg claudications with activities.  He denies fever, chills, unintentional weight loss.   He denies cough or shortness of breath.   He denies abdominal pain, blood in stool,     He started on prednisone 50 mg daily on 10/19, he had mild elevation in the BP and blood sugar that he is monitoring closely, He denies stomach upset.     ROS    Current Medications   Prednisone 50 mg daily       Past Medical history   Past Medical History:   Diagnosis Date    Agatston coronary artery calcium score greater than 400, 2023 but stress echo negative     CLL (chronic lymphocytic leukemia) (H)     Giant cell arteritis (H), Tempral artery BX proven and treated with steroids etc follows Rheum     Osteopenia        Objective   BP (!) 152/72 (BP Location: Right arm, Patient Position: Sitting, Cuff Size: Adult Small)   Pulse 70   Wt 53.5 kg (118 lb)   SpO2 99%   BMI 18.48 kg/m        PHYSICAL EXAMINATION  Physical Exam  HENT:      Ears:      Comments: Temporal artery: Bilaterally palpable without tenderness     Mouth/Throat:      Mouth: Mucous membranes are moist.   Eyes:      Conjunctiva/sclera: Conjunctivae normal.   Cardiovascular:      Comments: Aortic stenosis with murmur that propagates in the chest  Pulse: DP and PT palpable  Pulmonary:      Effort: Pulmonary effort is normal.      Breath sounds: Normal breath sounds.    Abdominal:      General: Abdomen is flat. Bowel sounds are normal. There is no distension.      Palpations: Abdomen is soft.      Tenderness: There is no abdominal tenderness.   Musculoskeletal:         General: No swelling or tenderness.      Cervical back: Normal range of motion.   Skin:     Findings: No rash.   Neurological:      Mental Status: He is alert.         Assessment & Plan     # Giant cell arteritis, biopsy proven  # Recurrent sinus obstruction  # Chronic lymphocytic leukemia    # Screening for chronic infections  # Longitudinal care    Cabrera has been following with [facility] rheumatology Dr. Alcantara [[id number]] for biopsy-proven giant cell arteritis.  Onset: 2020  Involvement:  Visual symptoms, bilateral ischemic optic neuropathy, temporal artery biopsy positive for GCA.   No headaches, scalp tenderness, PMR.  Comorbid conditions: CLL following with oncology    Relapse in 9/2024 with Large vessel vasculitis on PET, Asymptomatic     Treatment included: Prednisone, tocilizumab [[id number]].  Currently off therapy since mid 2023       # Giant cell arteritis, biopsy proven  # Large vessel vasculitis, Active   # Diverticular disease on PET scan, Asymptomatic     Yury is presenting today for follow up, he has been mostly asymptomatic however PET scan showed extensive vascular uptake consistent with active vasculitis involving vertebral artery, axillary, thoracic aorta, abdominal aorta, bilateral femoral arteries consistent with active large vessel vasculitis.     His inflammatory markers most recently are elevated either from active large vessel vasculitis or diverticular disease, of note he did not historically have elevated inflammatory markers even at diagnosis in September 2020.    We had an extensive discussion today about his disease and he was understandably overwhelmed about the diagnosis and had several questions about the PET scan results.  I tried explained to the best my ability that he has  active large vessel vasculitis for which additional therapy is recommended.    At the current time we would continue with high-dose steroids with a slow taper, he will need to remain on aspirin and start Pepcid for GI prophylaxis as well as Bactrim for PJP prophylaxis.    We discussed extensively the need for steroid sparing agent either tocilizumab or methotrexate which he had used both previously and tolerated both without notable side effects that he remembers.  Side effects of both medications were discussed at length including the risk of diverticulitis and perforation with tocilizumab specially with diverticular disease noted on PET scan.    I also discussed that tocilizumab would be more potent as compared to methotrexate as steroid sparing agent and more recommended for management of patients with active and extensive large vessel vasculitis like his case nonetheless there is a risk of diverticular disease with tocilizumab which is not reported with methotrexate.    He is still undecided about the steroid sparing agent and is understandably overwhelmed so I provided him educational material about both options and I recommended that he reach out to us about his decision.  We will schedule follow-up appointment again in few weeks to discuss further and in the meantime he will continue on prednisone with GI and PJP prophylaxis.     We discussed that he needs to monitor his blood pressure and blood sugars while on high-dose prednisone and he will need to follow-up with his primary care provider in this regard for monitoring purposes.    Plan:  - Continue prednisone with tapering as below.    - Await his decision about steroid sparing agent either Tocilizumab or methotrexate.   I would favor Tocilizumab given extensive vascular involvement however the concern is diverticular disease noted on PET scan.     - If he develop recurrence of vision loss then emergent evaluation at the emergency room.     - [facility] pharmacy  follow up    - Pulmonary follow up      Prednisone tapering schedule:    Continue Prednisone 50 mg daily for another 1 week, total of 2 weeks   - Then Prednisone 40 mg daily for 2 weeks   - Then Prednisone 35 mg daily for 2 week   - Then Prednisone 30 mg daily for 2 week   - Then Prednisone 25 mg daily for 2 week   - Then Prednisone 20 mg daily for 2 week      Recurrent sinus obstruction   -  Following with ENT outside     Chronic lymphocytic leukemia   Follows with oncology. Next follow up in December      Screening for chronic infections  2020  Hepatitis B surface antigen: Negative  Hepatitis B core antibody: Negative  HIV antibody: Negative  QuantiFERON gold: Negative    Get updated Screening labs with next blood draws      Longitudinal care   The longitudinal plan of care for the diagnosis giant cell arteritis as documented were addressed during this visit. Due to the added complexity in care, I will continue to support Bon in the subsequent management and with ongoing continuity of care.    60 minutes spent by me on the date of the encounter doing chart review, history and exam, documentation and further activities per the note      Return in about 18 days (around 11/12/2024) for Follow up.    Roman Rice MD  [facility] RHEUMATOLOGY

## 2024-10-25 NOTE — PATIENT INSTRUCTIONS
Our plan for today is:    You will need to think about methotrexate or starting Actemra     - Tests:    Labs in 2 weeks and 1 week prior to next appointment     - Medications:    You will continue on prednisone 50 mg daily for another week so you received a total of prednisone 50 mg daily for 2 weeks  Then prednisone 40 mg daily for 2 weeks  Then prednisone 35 mg daily for 2 weeks  Then prednisone 30 mg daily for 2 weeks    Start Pepcid 40 mg daily    start bactrim 1 tablet daily

## 2024-10-28 ENCOUNTER — TELEPHONE (OUTPATIENT)
Dept: RHEUMATOLOGY | Facility: CLINIC | Age: 84
End: 2024-10-28
Payer: MEDICARE

## 2024-10-28 DIAGNOSIS — M31.6 GCA (GIANT CELL ARTERITIS) (H): Primary | ICD-10-CM

## 2024-10-28 NOTE — TELEPHONE ENCOUNTER
Patient LM to call back. Writer returned call and patient reports that he would like to take tocilizumab (Actemra). Patient would like to be set up with [facility] again as he was before to help cover the cost of the medication. Writer had extensive discussion with patient about prescribed medications and patient reports he will pick them up today and get started. Patient has no further questions at this time.     Annetta Pena RN

## 2024-10-30 ENCOUNTER — VIRTUAL VISIT (OUTPATIENT)
Dept: PHARMACY | Facility: CLINIC | Age: 84
End: 2024-10-30
Attending: STUDENT IN AN ORGANIZED HEALTH CARE EDUCATION/TRAINING PROGRAM
Payer: COMMERCIAL

## 2024-10-30 DIAGNOSIS — M31.6 GIANT CELL ARTERITIS (H): Primary | ICD-10-CM

## 2024-10-30 RX ORDER — TOCILIZUMAB 180 MG/ML
162 INJECTION, SOLUTION SUBCUTANEOUS WEEKLY
Qty: 3.6 ML | Refills: 5 | Status: SHIPPED | OUTPATIENT
Start: 2024-10-30

## 2024-11-04 ENCOUNTER — LAB (OUTPATIENT)
Dept: LAB | Facility: CLINIC | Age: 84
End: 2024-11-04
Payer: MEDICARE

## 2024-11-04 ENCOUNTER — TELEPHONE (OUTPATIENT)
Dept: RHEUMATOLOGY | Facility: CLINIC | Age: 84
End: 2024-11-04

## 2024-11-04 DIAGNOSIS — Z11.59 NEED FOR HEPATITIS C SCREENING TEST: ICD-10-CM

## 2024-11-04 DIAGNOSIS — M31.8 SYSTEMIC VASCULITIS (H): ICD-10-CM

## 2024-11-04 DIAGNOSIS — Z11.1 TUBERCULOSIS SCREENING: ICD-10-CM

## 2024-11-04 DIAGNOSIS — Z72.89 OTHER PROBLEMS RELATED TO LIFESTYLE: ICD-10-CM

## 2024-11-04 DIAGNOSIS — M31.6 GCA (GIANT CELL ARTERITIS) (H): ICD-10-CM

## 2024-11-04 DIAGNOSIS — Z11.59 NEED FOR HEPATITIS B SCREENING TEST: ICD-10-CM

## 2024-11-04 LAB
ALBUMIN UR-MCNC: NEGATIVE MG/DL
ALT SERPL W P-5'-P-CCNC: 23 U/L (ref 0–70)
APPEARANCE UR: CLEAR
AST SERPL W P-5'-P-CCNC: 33 U/L (ref 0–45)
BACTERIA #/AREA URNS HPF: ABNORMAL /HPF
BASOPHILS # BLD AUTO: 0.2 10E3/UL (ref 0–0.2)
BASOPHILS NFR BLD AUTO: 0 %
BILIRUB UR QL STRIP: NEGATIVE
BURR CELLS BLD QL SMEAR: SLIGHT
COLOR UR AUTO: YELLOW
CREAT SERPL-MCNC: 0.79 MG/DL (ref 0.67–1.17)
CRP SERPL-MCNC: <3 MG/L
EGFRCR SERPLBLD CKD-EPI 2021: 89 ML/MIN/1.73M2
EOSINOPHIL # BLD AUTO: 0 10E3/UL (ref 0–0.7)
EOSINOPHIL NFR BLD AUTO: 0 %
ERYTHROCYTE [DISTWIDTH] IN BLOOD BY AUTOMATED COUNT: 15.4 % (ref 10–15)
ERYTHROCYTE [SEDIMENTATION RATE] IN BLOOD BY WESTERGREN METHOD: 7 MM/HR (ref 0–20)
GLUCOSE UR STRIP-MCNC: NEGATIVE MG/DL
HBV CORE AB SERPL QL IA: NONREACTIVE
HBV SURFACE AB SERPL IA-ACNC: <3.5 M[IU]/ML
HBV SURFACE AB SERPL IA-ACNC: NONREACTIVE M[IU]/ML
HBV SURFACE AG SERPL QL IA: NONREACTIVE
HCT VFR BLD AUTO: 37.1 % (ref 40–53)
HCV AB SERPL QL IA: NONREACTIVE
HGB BLD-MCNC: 11.7 G/DL (ref 13.3–17.7)
HGB UR QL STRIP: ABNORMAL
IMM GRANULOCYTES # BLD: 0.3 10E3/UL
IMM GRANULOCYTES NFR BLD: 1 %
KETONES UR STRIP-MCNC: NEGATIVE MG/DL
LEUKOCYTE ESTERASE UR QL STRIP: NEGATIVE
LYMPHOCYTES # BLD AUTO: 36.9 10E3/UL (ref 0.8–5.3)
LYMPHOCYTES NFR BLD AUTO: 75 %
MCH RBC QN AUTO: 29.5 PG (ref 26.5–33)
MCHC RBC AUTO-ENTMCNC: 31.5 G/DL (ref 31.5–36.5)
MCV RBC AUTO: 94 FL (ref 78–100)
MONOCYTES # BLD AUTO: 1.3 10E3/UL (ref 0–1.3)
MONOCYTES NFR BLD AUTO: 3 %
NEUTROPHILS # BLD AUTO: 10.7 10E3/UL (ref 1.6–8.3)
NEUTROPHILS NFR BLD AUTO: 22 %
NITRATE UR QL: NEGATIVE
NRBC # BLD AUTO: 0 10E3/UL
NRBC BLD AUTO-RTO: 0 /100
PH UR STRIP: 5.5 [PH] (ref 5–7)
PLAT MORPH BLD: ABNORMAL
PLATELET # BLD AUTO: 175 10E3/UL (ref 150–450)
RBC # BLD AUTO: 3.96 10E6/UL (ref 4.4–5.9)
RBC #/AREA URNS AUTO: ABNORMAL /HPF
RBC MORPH BLD: ABNORMAL
SMUDGE CELLS BLD QL SMEAR: PRESENT
SP GR UR STRIP: >=1.03 (ref 1–1.03)
SQUAMOUS #/AREA URNS AUTO: ABNORMAL /LPF
UROBILINOGEN UR STRIP-ACNC: 0.2 E.U./DL
VARIANT LYMPHS BLD QL SMEAR: PRESENT
WBC # BLD AUTO: 49.3 10E3/UL (ref 4–11)
WBC #/AREA URNS AUTO: ABNORMAL /HPF

## 2024-11-04 PROCEDURE — 87340 HEPATITIS B SURFACE AG IA: CPT

## 2024-11-04 PROCEDURE — 85652 RBC SED RATE AUTOMATED: CPT

## 2024-11-04 PROCEDURE — 86706 HEP B SURFACE ANTIBODY: CPT

## 2024-11-04 PROCEDURE — 84450 TRANSFERASE (AST) (SGOT): CPT

## 2024-11-04 PROCEDURE — 86704 HEP B CORE ANTIBODY TOTAL: CPT

## 2024-11-04 PROCEDURE — 86140 C-REACTIVE PROTEIN: CPT

## 2024-11-04 PROCEDURE — 86481 TB AG RESPONSE T-CELL SUSP: CPT

## 2024-11-04 PROCEDURE — 82565 ASSAY OF CREATININE: CPT

## 2024-11-04 PROCEDURE — 81001 URINALYSIS AUTO W/SCOPE: CPT

## 2024-11-04 PROCEDURE — 36415 COLL VENOUS BLD VENIPUNCTURE: CPT

## 2024-11-04 PROCEDURE — 85025 COMPLETE CBC W/AUTO DIFF WBC: CPT

## 2024-11-04 PROCEDURE — 86803 HEPATITIS C AB TEST: CPT

## 2024-11-04 PROCEDURE — 84460 ALANINE AMINO (ALT) (SGPT): CPT

## 2024-11-06 ENCOUNTER — TELEPHONE (OUTPATIENT)
Dept: RHEUMATOLOGY | Facility: CLINIC | Age: 84
End: 2024-11-06

## 2024-11-06 ENCOUNTER — VIRTUAL VISIT (OUTPATIENT)
Dept: RHEUMATOLOGY | Facility: CLINIC | Age: 84
End: 2024-11-06
Attending: STUDENT IN AN ORGANIZED HEALTH CARE EDUCATION/TRAINING PROGRAM
Payer: MEDICARE

## 2024-11-06 VITALS — WEIGHT: 118 LBS | HEIGHT: 67 IN | BODY MASS INDEX: 18.52 KG/M2

## 2024-11-06 DIAGNOSIS — C91.10 CHRONIC LYMPHOCYTIC LEUKEMIA (H): ICD-10-CM

## 2024-11-06 DIAGNOSIS — M31.6 GCA (GIANT CELL ARTERITIS) (H): Primary | ICD-10-CM

## 2024-11-06 LAB
GAMMA INTERFERON BACKGROUND BLD IA-ACNC: 0 IU/ML
M TB IFN-G BLD-IMP: ABNORMAL
M TB IFN-G CD4+ BCKGRND COR BLD-ACNC: 0.37 IU/ML
MITOGEN IGNF BCKGRD COR BLD-ACNC: 0 IU/ML
MITOGEN IGNF BCKGRD COR BLD-ACNC: 0 IU/ML
QUANTIFERON MITOGEN: 0.37 IU/ML
QUANTIFERON NIL TUBE: 0 IU/ML
QUANTIFERON TB1 TUBE: 0 IU/ML
QUANTIFERON TB2 TUBE: 0

## 2024-11-06 PROCEDURE — 99443 PR PHYSICIAN TELEPHONE EVALUATION 21-30 MIN: CPT | Mod: 93 | Performed by: STUDENT IN AN ORGANIZED HEALTH CARE EDUCATION/TRAINING PROGRAM

## 2024-11-06 ASSESSMENT — PAIN SCALES - GENERAL: PAINLEVEL_OUTOF10: NO PAIN (0)

## 2024-11-06 NOTE — PATIENT INSTRUCTIONS
Our plan for today is:    - Tests:    - PET scan 1 week before next appointment  - Labs every 4 weeks     - Medications:    - Initiate Actemra once weekly injections      Continue Prednisone 40 mg daily for a total 2 weeks   - Then Prednisone 35 mg daily for 2 week   - Then Prednisone 30 mg daily for 2 week   - Then Prednisone 25 mg daily for 2 week   - Then Prednisone 20 mg daily for 2 week     - Continue Bactrim daily     - Continue Pepcid daily

## 2024-11-06 NOTE — PATIENT INSTRUCTIONS
"Recommendations from today's MTM visit:                                                       1. Please continue your prednisone taper as prescribed.   Prednisone 50 mg daily for total of 2 weeks   - Then Prednisone 40 mg daily for 2 weeks   - Then Prednisone 35 mg daily for 2 week   - Then Prednisone 30 mg daily for 2 week   - Then Prednisone 25 mg daily for 2 week   - Then Prednisone 20 mg daily for 2 week     2. Continue checking your blood pressure and blood sugar daily. Please contact the rheumatology clinic if you notice readings outside of your goals of BP of <140/90 mmHg and fasting BG of  mg/dL.    3. We are working on obtaining insurance coverage for Actemra. Once this is approved the pharmacy will call you to set up delivery so you can start 162 mg (1 injection) weekly.     4. A common side effect of Actemra is injection site reactions (red, raised, itchy spot at injection site). You can use hydrocortisone cream and ice to treat these reactions if they occur.    Follow-up: Return in about 3 months (around 1/30/2025) for MTM Pharmacist Visit.    It was great speaking with you today.  I value your experience and would be very thankful for your time in providing feedback in our clinic survey. In the next few days, you may receive an email or text message from [facility] with a link to a survey related to your  clinical pharmacist.\"     To schedule another MTM appointment, please call the clinic directly or you may call the MTM scheduling line at [phone].    My Clinical Pharmacist's contact information:                                                      Please feel free to contact me with any questions or concerns you have.      Estela Rolon, PharmD  Medication Therapy Management Pharmacist  [facility]  Phone: [phone]     "

## 2024-11-06 NOTE — TELEPHONE ENCOUNTER
Prior Authorization Approval    Medication: ACTEMRA 162 MG/0.9ML SC SOSY  Authorization Effective Date: 11/6/2024  Authorization Expiration Date: 11/6/2025  Approved Dose/Quantity: [id number]  Reference #: [id number]   Insurance Company: [employer] Phone [phone] Fax [phone]  Expected CoPay: $    CoPay Card Available: No    Financial Assistance Needed: yes, currently on [employer], asking if he'd like to switch to fpap  Which Pharmacy is filling the prescription: [facility] - [place], [place] - [address] [address] AVE   Pharmacy Notified: no, wiaiting for confirmation on fpap or [employer]  Patient Notified: yes

## 2024-11-06 NOTE — PROGRESS NOTES
Virtual Visit Details    Type of service:  Telephone Visit   Phone call duration: 41 minutes   Originating Location (pt. Location): Home    Distant Location (provider location):  On-site

## 2024-11-06 NOTE — PROGRESS NOTES
Medication Therapy Management (MTM) Encounter    ASSESSMENT:                            Medication Adherence/Access: No issues identified.    Giant Cell Arteritis: Discussed prescribed prednisone taper and potential side effects in depth today. Reviewed goal BP of <140/90 mmHg and goal fasting BG of  mg/dL Advised contacting the rheumatology clinic if he has readings outside of these goals. Briefly provided education on Actemra today including dosing, general administration, side effects (both common/serious), precautions, monitoring and time to efficacy. Will order prefilled syringes per patient preference. Recommend starting 162 mg (1 injection) weekly once approved by insurance.    PLAN:                            1. Please continue your prednisone taper as prescribed.   Prednisone 50 mg daily for total of 2 weeks   - Then Prednisone 40 mg daily for 2 weeks   - Then Prednisone 35 mg daily for 2 week   - Then Prednisone 30 mg daily for 2 week   - Then Prednisone 25 mg daily for 2 week   - Then Prednisone 20 mg daily for 2 week     2. Continue checking your blood pressure and blood sugar daily. Please contact the rheumatology clinic if you notice readings outside of your goals of BP of <140/90 mmHg and fasting BG of  mg/dL.    3. We are working on obtaining insurance coverage for Actemra. Once this is approved the pharmacy will call you to set up delivery so you can start 162 mg (1 injection) weekly.     4. A common side effect of Actemra is injection site reactions (red, raised, itchy spot at injection site). You can use hydrocortisone cream and ice to treat these reactions if they occur.    Follow-up: Return in about 3 months (around 1/30/2025) for MTM Pharmacist Visit.    SUBJECTIVE/OBJECTIVE:                          Yury Michael is a 83 year old male seen for a follow-up visit.       Reason for visit: Need to restart Actemra for GCA    Allergies/ADRs: Reviewed in chart  Past Medical History:  Reviewed in chart  Tobacco: He reports that he has never smoked. He has never been exposed to tobacco smoke. He has never used smokeless tobacco.  Alcohol: not currently using    Medication Adherence/Access: no issues reported.    Giant Cell Arteritis:   Prednisone 50 mg daily  Bactrim 400/80 mg daily  Famotidine 40 mg daily    Reports he was able to get started on medications last week. Would like to clarify planned prednisone taper. Having some additional anxiety and insomnia from prednisone. Has been taking blood sugar and blood pressures daily.    Fasting BG (mg/dL):  90, 91, 93, 90, 93, 95 pp 118, 111, 150, 118, 113  BP (mmHg): 121/70, 123/67 124/74, 137/74, 139/71, 136/81, 148/84, 145/78, 135/75, 137/77, 141/80, 131/76    Most concerned that blood pressure is higher than his normal readings of 110s/120s. Was also recommended to start Actemra weekly. Open to starting this - has been on it previously and only has a few questions today.    Liver Function Studies -   Recent Labs   Lab Test 10/18/24  0704   PROTTOTAL 6.2*   ALBUMIN 3.5   BILITOTAL 0.3   ALKPHOS 69   AST 32   ALT 16       CBC RESULTS:   Recent Labs   Lab Test 10/18/24  0704   WBC 28.6*   RBC 3.47*   HGB 10.1*   HCT 32.2*   MCV 93   MCH 29.1   MCHC 31.4*   RDW 12.8         Today's Vitals: There were no vitals taken for this visit.  ----------------    I spent 35 minutes with this patient today. All changes were made via collaborative practice agreement with Roman Rice A copy of the visit note was provided to the patient's provider(s).    A summary of these recommendations was sent via Telebit.    Estela Rolon, PharmD  Medication Therapy Management Pharmacist  [facility]  Phone: [phone]    Telemedicine Visit Details  The patient's medications can be safely assessed via a telemedicine encounter.  Type of service:  Telephone visit  Originating Location (pt. Location): Home    Distant Location  (provider location):  Off-site  Start Time: 2:00 PM  End Time: 2:35 PM     Medication Therapy Recommendations  Giant cell arteritis (H)   1 Current Medication: predniSONE (DELTASONE) 10 MG tablet   Current Medication Sig: Take 5 tablets (50 mg) by mouth daily for 7 days, THEN 4 tablets (40 mg) daily for 14 days, THEN 3.5 tablets (35 mg) daily for 14 days, THEN 3 tablets (30 mg) daily for 14 days, THEN 2.5 tablets (25 mg) daily for 14 days, THEN 2 tablets (20 mg) daily for 14 days.   Rationale: Does not understand instructions - Adherence - Adherence   Recommendation: Provide Education   Status: Patient Agreed - Adherence/Education   Identified Date: 10/30/2024 Completed Date: 10/30/2024         2 Current Medication: tocilizumab (ACTEMRA) 162 MG/0.9ML subcutaneous injection   Current Medication Sig: Inject 0.9 mLs (162 mg) subcutaneously once a week.   Rationale: Does not understand instructions - Adherence - Adherence   Recommendation: Provide Education   Status: Patient Agreed - Adherence/Education   Identified Date: 10/30/2024 Completed Date: 10/30/2024

## 2024-11-06 NOTE — TELEPHONE ENCOUNTER
PA Initiation    Med[facility]: [facility] 162 [facility]  Insurance Company: [employer] - Phone [phone] Fax [phone]  Pharmacy Filling the Rx: [facility] - [place], [place] - [facility] [address]  Filling Pharmacy Phone:    Filling Pharmacy Fax:    Start Date: 11/6/2024    [id number]

## 2024-11-06 NOTE — LETTER
11/6/2024       RE: Bon Michael  [address] [address]     Dear Colleague,    Thank you for referring your patient, Bon Michael, to the [facility] RHEUMATOLOGY at [facility]. Please see a copy of my visit note below.    Virtual Visit Details    Type of service:  Telephone Visit   Phone call duration: 41 minutes   Originating Location (pt. Location): Home    Distant Location (provider location):  On-site    RHEUMATOLOGY OUTPATIENT CLINIC NOTE     Referring Provider:   Roman Rice MD    Lab review     ANCA immunofluorescence: Negative.  ANCA serology [facility]: Negative  Serum IgG 4 level: Normal     Temporal artery biopsy, 9/2020, Temporal artery, left, biopsy:   1. Granulomatous arteritis consistent with giant cell arteritis.   2. Chronic inflammation of the surrounding adventitia.      Imaging review    PET scan on 10/11 showed   1.  Findings consistent with active vasculitis (grade 3 uptake), with heterogeneous increased avidity throughout the major vessels, which  includes the thoracic and abdominal aorta, with the most intense foci residing within the vertebral arteries, as well as the proximal upper extremity and throughout the lower extremity vasculature, as described above.     2.  Scattered foci of increased uptake also noted throughout the musculature, which may reflect foci of vascular inflammation not well characterized due to lack of IV contrast.  3.  Focal uptake within the left pelvis, which may correlate with adjacent diverticula, and therefore may represent early or developing diverticulitis. Recommend correlation patient's symptoms.    CTA chest, abdomen, pelvis 2024 focal dilatation of the distal celiac, distal renal, mild groundglass opacity in the right upper lobe     CT sinus 2024 deviated nasal septum without mucosal thickening or osteitis     MR orbits, 9/2020:   1.There is no definite abnormal  "contrast enhancement or mass. No evidence of leukemic infiltration.  2. Regarding the remainder of the brain, abnormal T2 hyperintense bone marrow signal with associated enhancement in the skull, likely representing leukemic infiltration.  3. Subcutaneous T2 hyperintensity with associated diffusion restriction and contrast enhancement over the left zygomatic region, question leukemic infiltration.     CT sinus, 10/2019:  Left maxillary sinusitis with small air-fluid level, new since 6/17/2015. In the appropriate clinical setting, this could reflect acute sinusitis       Subjective    Visit date November 6, 2024    Yury is a 83 year old male who presents today for follow up.    Since the last visit,    - Workup 11/4 showed:      Hemoglobin overall stable  WBC  elevated  Platelets  within normal limits   ESR  within normal limits   CRP  within normal limits   AST  within normal limits   Creatinine  within normal limits     Today, Yury mentioned the following:    He denies headache, jaw claudications, scalp tenderness, vision changes, shoulder/hip stiffness in the morning.   He denies arm or leg claudications with activities.  He denies fever, chills, unintentional weight loss.     He is tolerating prednisone so far well without notable side effects. There is some elevation in blood pressure and blood sugar but so far stable.     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, 2023 but stress echo negative      CLL (chronic lymphocytic leukemia) (H)      Giant cell arteritis (H), Tempral artery BX proven and treated with steroids etc follows Rheum      Osteopenia        Objective  Ht 1.689 m (5' 6.5\")   Wt 53.5 kg (118 lb)   BMI 18.76 kg/m        PHYSICAL EXAMINATION  Physical Exam  Phone visit "     Assessment & Plan    # Giant cell arteritis, biopsy proven  # Recurrent sinus obstruction  # Chronic lymphocytic leukemia     # Screening for chronic infections  # Longitudinal care     Cabrera has been following with [facility] rheumatology for biopsy-proven giant cell arteritis.  Onset: 2020  Involvement:  Visual symptoms, bilateral ischemic optic neuropathy, temporal artery biopsy positive for GCA.   No headaches, scalp tenderness, PMR.  Comorbid conditions: CLL following with oncology     Relapse in 9/2024 with Large vessel vasculitis on PET, Asymptomatic      Treatment included: Prednisone, tocilizumab [[id number]].      # Giant cell arteritis, biopsy proven  # Large vessel vasculitis, Active   # Diverticular disease on PET scan, asymptomatic      Yury is presenting today for follow up.    He is tolerating prednisone and there is no notable side effects.  He is asymptomatic from GCA standpoint.   He has decided to pursue Actemra as steroid sparing agent.       We discussed to pursue prednisone and Actemra as discussed along with aspirin daily along with bactrim and pepcid for GI prophylaxis.     We discussed that he needs to monitor his blood pressure and blood sugars while on high-dose prednisone and he will need to follow-up with his primary care provider in this regard for monitoring purposes.     Plan:  - Continue prednisone with tapering as below.     - Initiate Actemra once weekly injections     - If he develop recurrence of vision loss then emergent evaluation at the emergency room.      - [facility] pharmacy follow up      - Continue Bactrim daily     - Continue Pepcid daily     - Continue with aspirin 81 mg daily     Prednisone tapering schedule:    Continue Prednisone 40 mg daily for a total 2 weeks   - Then Prednisone 35 mg daily for 2 week   - Then Prednisone 30 mg daily for 2 week   - Then Prednisone 25 mg daily for 2 week   - Then Prednisone 20 mg daily for 2 week      # Recurrent sinus obstruction   -   Following with ENT outside      # Chronic lymphocytic leukemia   Follows with oncology.  WBC is elevated from prednisone.   Next follow up in December      # High risk medication requiring frequent lab tests for toxicity monitoring   Actemra   Risks and side effects including the risk of diverticulitis and perforation with tocilizumab as well as immunosuppression side effects.        # Screening for chronic infections  2024  Hepatitis B surface antigen: Negative  Hepatitis B core: Negative  Hepatitis B surface antibody: Negative   Hepatitis C antibody: Negative   2020  QuantiFERON gold: Negative    # Longitudinal care  The longitudinal plan of care for the diagnosis(es)/condition(s) as documented were addressed during this visit. Due to the added complexity in care, I will continue to support Yury in the subsequent management and with ongoing continuity of care.       43 minutes spent by me on the date of the encounter doing chart review, history and exam, documentation and further activities per the note      Return in about 10 weeks (around 1/15/2025) for Follow up.    Roman Rice MD  [facility] RHEUMATOLOGY      Again, thank you for allowing me to participate in the care of your patient.      Sincerely,    Roman Rice MD

## 2024-11-06 NOTE — NURSING NOTE
Patient declined medication review.       Current patient location: [address] [address]    Is the patient currently in the state of [place]? YES    Visit mode:TELEPHONE    If the visit is dropped, the patient can be reconnected by: TELEPHONE VISIT: Phone number:   Telephone Information:   Mobile [phone]       Will anyone else be joining the visit? NO  (If patient encounters technical issues they should call [phone]    Are changes needed to the allergy or medication list? Pt declined med review and Pt stated no med changes    Are refills needed on medications prescribed by this physician? NO    Rooming Documentation:  Questionnaire(s) completed    Reason for visit: RAMIRO Posada F

## 2024-11-06 NOTE — PROGRESS NOTES
RHEUMATOLOGY OUTPATIENT CLINIC NOTE     Referring Provider:   Roman Rice MD    Lab review     ANCA immunofluorescence: Negative.  ANCA serology [facility]: Negative  Serum IgG 4 level: Normal     Temporal artery biopsy, 9/2020, Temporal artery, left, biopsy:   1. Granulomatous arteritis consistent with giant cell arteritis.   2. Chronic inflammation of the surrounding adventitia.      Imaging review    PET scan on 10/11 showed   1.  Findings consistent with active vasculitis (grade 3 uptake), with heterogeneous increased avidity throughout the major vessels, which  includes the thoracic and abdominal aorta, with the most intense foci residing within the vertebral arteries, as well as the proximal upper extremity and throughout the lower extremity vasculature, as described above.     2.  Scattered foci of increased uptake also noted throughout the musculature, which may reflect foci of vascular inflammation not well characterized due to lack of IV contrast.  3.  Focal uptake within the left pelvis, which may correlate with adjacent diverticula, and therefore may represent early or developing diverticulitis. Recommend correlation patient's symptoms.    CTA chest, abdomen, pelvis 2024 focal dilatation of the distal celiac, distal renal, mild groundglass opacity in the right upper lobe     CT sinus 2024 deviated nasal septum without mucosal thickening or osteitis     MR orbits, 9/2020:   1.There is no definite abnormal contrast enhancement or mass. No evidence of leukemic infiltration.  2. Regarding the remainder of the brain, abnormal T2 hyperintense bone marrow signal with associated enhancement in the skull, likely representing leukemic infiltration.  3. Subcutaneous T2 hyperintensity with associated diffusion restriction and contrast enhancement over the left zygomatic region, question leukemic infiltration.     CT sinus, 10/2019:  Left maxillary sinusitis with small air-fluid level, new since 6/17/2015.  "In the appropriate clinical setting, this could reflect acute sinusitis       Subjective     Visit date November 6, 2024    Yury is a 83 year old male who presents today for follow up.    Since the last visit,    - Workup 11/4 showed:      Hemoglobin overall stable  WBC  elevated  Platelets  within normal limits   ESR  within normal limits   CRP  within normal limits   AST  within normal limits   Creatinine  within normal limits     Today, Yury mentioned the following:    He denies headache, jaw claudications, scalp tenderness, vision changes, shoulder/hip stiffness in the morning.   He denies arm or leg claudications with activities.  He denies fever, chills, unintentional weight loss.     He is tolerating prednisone so far well without notable side effects. There is some elevation in blood pressure and blood sugar but so far stable.     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, 2023 but stress echo negative     CLL (chronic lymphocytic leukemia) (H)     Giant cell arteritis (H), Tempral artery BX proven and treated with steroids etc follows Rheum     Osteopenia        Objective   Ht 1.689 m (5' 6.5\")   Wt 53.5 kg (118 lb)   BMI 18.76 kg/m        PHYSICAL EXAMINATION  Physical Exam  Phone visit     Assessment & Plan     # Giant cell arteritis, biopsy proven  # Recurrent sinus obstruction  # Chronic lymphocytic leukemia     # Screening for chronic infections  # Longitudinal care     Cabrera has been following with [facility] rheumatology for biopsy-proven giant cell arteritis.  Onset: 2020  Involvement:  Visual symptoms, bilateral ischemic optic neuropathy, temporal artery biopsy positive for GCA.   No headaches, scalp tenderness, PMR.  Comorbid conditions: CLL following with oncology     Relapse in " 9/2024 with Large vessel vasculitis on PET, Asymptomatic      Treatment included: Prednisone, tocilizumab [[id number]].      # Giant cell arteritis, biopsy proven  # Large vessel vasculitis, Active   # Diverticular disease on PET scan, asymptomatic      Yury is presenting today for follow up.    He is tolerating prednisone and there is no notable side effects.  He is asymptomatic from GCA standpoint.   He has decided to pursue Actemra as steroid sparing agent.     He had several questions that I tried to answer to the best of my ability.   We discussed to pursue prednisone and Actemra as discussed along with aspirin daily along with bactrim and pepcid for GI prophylaxis.   We discussed that he needs to monitor his blood pressure and blood sugars while on high-dose prednisone and he will need to follow-up with his primary care provider in this regard for monitoring purposes.     Plan:  - Continue prednisone with tapering as below.     - Initiate Actemra once weekly injections     - If he develop recurrence of vision loss then emergent evaluation at the emergency room.      - [facility] pharmacy follow up      - Continue Bactrim daily     - Continue Pepcid daily     - Continue with aspirin 81 mg daily     Prednisone tapering schedule:    Continue Prednisone 40 mg daily for a total 2 weeks   - Then Prednisone 35 mg daily for 2 week   - Then Prednisone 30 mg daily for 2 week   - Then Prednisone 25 mg daily for 2 week   - Then Prednisone 20 mg daily for 2 week      # Recurrent sinus obstruction   -  Following with ENT outside      # Chronic lymphocytic leukemia   Follows with oncology.  WBC is elevated from prednisone. I recommended to continue taking aspirin and to reach out to his oncologist.   Next follow up in December      # High risk medication requiring frequent lab tests for toxicity monitoring   Actemra   Risks and side effects including the risk of diverticulitis and perforation with tocilizumab as well as  immunosuppression side effects.      # Screening for chronic infections  2024  Hepatitis B surface antigen: Negative  Hepatitis B core: Negative  Hepatitis B surface antibody: Negative   Hepatitis C antibody: Negative   2020  QuantiFERON gold: Negative    # Longitudinal care  The longitudinal plan of care for the diagnosis(es)/condition(s) as documented were addressed during this visit. Due to the added complexity in care, I will continue to support Yury in the subsequent management and with ongoing continuity of care.       43 minutes spent by me on the date of the encounter doing chart review, history and exam, documentation and further activities per the note      Return in about 10 weeks (around 1/15/2025) for Follow up.    Roman Rice MD  [facility] RHEUMATOLOGY

## 2024-11-08 ENCOUNTER — TELEPHONE (OUTPATIENT)
Dept: RHEUMATOLOGY | Facility: CLINIC | Age: 84
End: 2024-11-08
Payer: MEDICARE

## 2024-11-08 NOTE — TELEPHONE ENCOUNTER
Patient called and VERNELL. Writer returned call. Patient had several questions regarding release of information, finance programs for actemra, plan of care, and follow up. Writer discussed patient's questions in detail and helped to coordinate appropriate follow up for him. Patient plans to follow up with Gracia (pharmacy specialty finance liaison) regarding tocilizumab (Actemra) and will discuss blood pressure and glucose monitoring at upcoming PCP appointment on Tuesday. Patient has no further questions at this time.     Annetta Pena RN

## 2024-11-11 ENCOUNTER — TELEPHONE (OUTPATIENT)
Dept: PULMONOLOGY | Facility: CLINIC | Age: 84
End: 2024-11-11
Payer: MEDICARE

## 2024-11-11 NOTE — TELEPHONE ENCOUNTER
[facility] Call Center    Phone Message    May a detailed message be left on voicemail: yes     Reason for Call: Other: Yury would like to change his visit from in person to a telephone appointment set for 11/13/24 please contact asap. Thank you     Action Taken: Other: Pulm    Travel Screening: Not Applicable     Date of Service:

## 2024-11-11 NOTE — TELEPHONE ENCOUNTER
Visit has been changed to telephone per pt request. Voicemail left informing pt of this information.    Tamie Guan LPN  Pulmonary Medicine:  [facility]  Phone: [phone] Fax: [phone]

## 2024-11-13 ENCOUNTER — VIRTUAL VISIT (OUTPATIENT)
Dept: PULMONOLOGY | Facility: CLINIC | Age: 84
End: 2024-11-13
Payer: MEDICARE

## 2024-11-13 ENCOUNTER — VIRTUAL VISIT (OUTPATIENT)
Dept: OTHER | Facility: CLINIC | Age: 84
End: 2024-11-13
Attending: INTERNAL MEDICINE
Payer: MEDICARE

## 2024-11-13 VITALS
HEART RATE: 66 BPM | BODY MASS INDEX: 18.99 KG/M2 | OXYGEN SATURATION: 97 % | HEIGHT: 67 IN | DIASTOLIC BLOOD PRESSURE: 78 MMHG | SYSTOLIC BLOOD PRESSURE: 127 MMHG | WEIGHT: 121 LBS

## 2024-11-13 DIAGNOSIS — R93.1 AGATSTON CORONARY ARTERY CALCIUM SCORE GREATER THAN 400: ICD-10-CM

## 2024-11-13 DIAGNOSIS — E78.5 HYPERLIPIDEMIA LDL GOAL <70: ICD-10-CM

## 2024-11-13 DIAGNOSIS — M31.6 GIANT CELL ARTERITIS (H): ICD-10-CM

## 2024-11-13 DIAGNOSIS — I72.8 CELIAC ARTERY DILATATION (H): Primary | ICD-10-CM

## 2024-11-13 DIAGNOSIS — R91.8 GROUND GLASS OPACITY PRESENT ON IMAGING OF LUNG: Primary | ICD-10-CM

## 2024-11-13 DIAGNOSIS — C91.10 CLL (CHRONIC LYMPHOCYTIC LEUKEMIA) (H): ICD-10-CM

## 2024-11-13 PROCEDURE — 99443 PR PHYSICIAN TELEPHONE EVALUATION 21-30 MIN: CPT | Mod: 93 | Performed by: INTERNAL MEDICINE

## 2024-11-13 ASSESSMENT — PAIN SCALES - GENERAL: PAINLEVEL_OUTOF10: NO PAIN (0)

## 2024-11-13 NOTE — NURSING NOTE
Current patient location: [address] [address]      Is the patient currently in the state of [place]? YES    Visit mode:TELEPHONE    If the visit is dropped, the patient can be reconnected by: TELEPHONE VISIT: Phone number:   Telephone Information:   Mobile [phone]       Will anyone else be joining the visit? NO  (If patient encounters technical issues they should call [phone]    How would you like to obtain your AVS? MyChart    Are changes needed to the allergy or medication list? Pt stated no changes to allergies and Pt stated no med changes    Are refills needed on medications prescribed by this physician? NO    Rooming Documentation:  Questionnaire(s) completed    Reason for visit: RECHECK      Jazmin GOYAL

## 2024-11-13 NOTE — PROGRESS NOTES
Yury is a 83 year old who is being evaluated via a billable telephone visit.    What phone number would you like to be contacted at? [phone]  How would you like to obtain your AVS? MyChart  Originating Location (pt. 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     Bon Michael is a 83 year old very pleasant male non-smoker, nondiabetic , history of CLL, hyperlipidemia with very high coronary calcium score currently on statin developed visual symptoms and bilateral ischemic optic neuropathy underwent temporal artery biopsy in 2020 which was proven giant cell arteritis and he was treated with prednisone and tocilizumab currently off of all the immunosuppressive medications closely following up with rheumatologist.  Has been getting frequent inflammatory markers and vasculitis panel multiple occasions inflammatory markers are normal and vasculitis panel negative and IgG4 was normal.      He underwent CTA chest abdomen pelvis on April 3, 2024  Which revealed mild dilatation of the distal celiac trunk measuring 1.1 cm and also mild dilatation of the right renal artery distal portion 0.9 cm       Currently he has been following up with hematology oncologist for CLL , rheumatologist, ophthalmologist , cardiologist and  "primary care provider.  All of them work in different institutes.     Review of systems: Reviewed all 12 point review of systems as per HPI otherwise unremarkable    Physical exam:( no physical exam done this is virtual visit)    Reviewed recent laboratory tests, imaging studies in the epic and updated chart    CTA 10/7/2024  \"IMPRESSION:  1. Celiac axis measures up to 1.1 cm, beyond the origin, unchanged  from previous exam.  2. Thoracoabdominal aorta otherwise normal in size. Visceral arteries  and bilateral lower extremity arteries are otherwise patent.  3. The distal right renal artery is unchanged, prominent focus.   4. 4 mm nodule in the right lower lung, unchanged.     ANYA SERNA MD \"    Assessment and plan:    1. 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 Hx  of Giant cell arteritis  with ischemic neuropathy  bilateral  (H) Temporal  Bx proven and  treated with steroidsand tocilucimab 9/2020 ?     For full details please see my initial consult note  Reviewed recent CTA and PET/CT unchanged celiac artery dilatation  PET/CT showed active vasculitis grade 3  Currently tapering dose of prednisone with prophylactic Bactrim closely managed by rheumatologist  Continue same plan  Scheduled to undergo repeat PET/CT in few months      He has been closely followed by rheumatologist and getting periodic inflammatory markers  Continue the same plan and follow-up virtual or office visit in 6 months     3. CLL (chronic lymphocytic leukemia) (H)     Follow-up with hematology oncology service and follow the recommendations     4. Hyperlipidemia LDL goal <70  Lipids are well-controlled with atorvastatin     5. Agatston coronary artery calcium score greater than 400 but stress ECHO normal in 8/2023   He does not smoke, blood pressure is well-controlled, LDL is excellent range, taking aspirin and statin, continue the same follow-up with his cardiologist as " scheduled    Phone visit start time: 8:46 AM  Phone visit end time; 9:08 AM  Location of the patient: At his home  Location the provider: [facility]    More than 21 minutes spent on the date of the encounter doing chart review, review of rheumatology evaluation, recent imaging studies, laboratory test, review of previous evaluation, history, documentation        This visit is being conducted as a virtual visit due to the emphasis on mitigation of the COVID-19 virus pandemic. The clinician has decided that the risk of an in-office visit outweighs the benefit for this patient.        Delaney Franklin MD,FAHA,FSVM,FNLA, FACP  Vascular Medicine  Clinical Hypertension Specialist   Clinical Lipidologist

## 2024-11-13 NOTE — PATIENT INSTRUCTIONS
As we discussed on the phone today your recent CT scan done on October 7, 2024,   Celiac axis measures 1.1 cm and it has not changed    Reviewed your rheumatologist extensive and thorough evaluation, recent CTA  etc, continue the same medications and plan outlined by rheumatologist      Virtual or office visit with me in 6 months

## 2024-11-13 NOTE — PROGRESS NOTES
Virtual Visit Details    Type of service:  Telephone Visit   Phone call duration: 30 minutes   Originating Location (pt. Location): Home    Distant Location (provider location):  On-site    Pulmonary Clinic Return Patient Visit  Reason for Visit: Abnormal CT chest  History of Present Illness  Bon Michael is a pleasant 83-year-old male with a history of CLL and biopsy-proven giant cell arteritis status post steroid treatments who presents for follow up for abnormal CT chest. I last saw him in 7/2024.  To briefly review, Bon was diagnosed with biopsy-proven giant cell arteritis performing a temporal artery biopsy, interestingly his inflammatory markers were not elevated at the time.  He was treated with steroids which was followed with immunosuppression and significant improvement in his eye related symptoms.  He did not seem to have other joints or skin manifestations of vasculitis. As part of his evaluations for vasculitis, he underwent a CTA pulmonary, chest and abdomen to check for other systemic/organ manifestations of vasculitis, he was found with right upper lobe groundglass opacities.  He denied any pulmonary related symptoms, he denied shortness of breath, chest pains, wheezing etc.  I reassured him and we decided surveillance with a  short term follow up CT chest.  Repeat CT chest shows interval resolution of previously right upper lobe ground glass opacities.   Prior history of smoking quit 47 years ago.  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  No family history of lung cancer.  He denies any radon exposures.     Discussed and reviewed with patient.  Normal spirometry, normal lung volumes and diffusion capacity  Review of Systems:  10 of 14 systems reviewed and are negative unless otherwise stated in HPI.    Past Medical History:   Diagnosis Date    Agatston coronary artery calcium score greater than 400, 2023 but stress echo negative     CLL  (chronic lymphocytic leukemia) (H)     Giant cell arteritis (H), Tempral artery BX proven and treated with steroids etc follows Rheum     Osteopenia        Past Surgical History:   Procedure Laterality Date    OTHER SURGICAL HISTORY      Hernia repair bilateral inguinal    OTHER SURGICAL HISTORY      nasal surgery for deviated septum       Family History   Problem Relation Age of Onset    Cerebrovascular Disease Brother        Social History     Socioeconomic History    Marital status: Single     Spouse name: None    Number of children: None    Years of education: None    Highest education level: None   Tobacco Use    Smoking status: Never     Passive exposure: Never    Smokeless tobacco: Never   Substance and Sexual Activity    Alcohol use: Not Currently    Drug use: Never     Social Determinants of Health      Received from [employer] & [employer]    Financial Resource Strain    Received from [employer]    Social Connections   Interpersonal Safety: Not At Risk (1/7/2024)    Received from [facility] , [facility]     Humiliation, Afraid, Rape, and Kick questionnaire     Fear of Current or Ex-Partner: No     Emotionally Abused: No     Physically Abused: No     Sexually Abused: No         No Known Allergies      Current Outpatient Medications:     aspirin (ASA) 81 MG EC tablet, Take 81 mg by mouth daily, Disp: , Rfl:     atorvastatin (LIPITOR) 10 MG tablet, Take 10 mg by mouth daily, Disp: , Rfl:     B Complex Vitamins (VITAMIN B COMPLEX 100 IJ), Take 1 tablet by mouth daily, Disp: , Rfl:     cetirizine (ZYRTEC) 10 MG tablet, Take 10 mg by mouth daily as needed, Disp: , Rfl:     Cholecalciferol (VITAMIN D3) 1000 units CAPS, Take 1 capsule by mouth as needed, Disp: , Rfl:     famotidine (PEPCID) 40 MG tablet, Take 1 tablet (40 mg) by mouth daily., Disp:  "60 tablet, Rfl: 0    fish oil-omega-3 fatty acids 1000 MG capsule, Take 1,200 mg by mouth twice a week, Disp: , Rfl:     fluticasone (FLONASE) 50 MCG/ACT nasal spray, , Disp: , Rfl:     magnesium 250 MG tablet, 1 tablet daily , Disp: , Rfl:     predniSONE (DELTASONE) 10 MG tablet, Take 5 tablets (50 mg) by mouth daily for 7 days, THEN 4 tablets (40 mg) daily for 14 days, THEN 3.5 tablets (35 mg) daily for 14 days, THEN 3 tablets (30 mg) daily for 14 days, THEN 2.5 tablets (25 mg) daily for 14 days, THEN 2 tablets (20 mg) daily for 14 days., Disp: 245 tablet, Rfl: 0    sulfamethoxazole-trimethoprim (BACTRIM) 400-80 MG tablet, Take 1 tablet by mouth daily., Disp: 60 tablet, Rfl: 0    tocilizumab (ACTEMRA) 162 MG/0.9ML subcutaneous injection, Inject 0.9 mLs (162 mg) subcutaneously once a week., Disp: 3.6 mL, Rfl: 5      Physical Exam:  /78   Pulse 66   Ht 1.689 m (5' 6.5\")   Wt 54.9 kg (121 lb)   SpO2 97%   BMI 19.24 kg/m    GENERAL: Well developed, well nourished, alert, and in no apparent distress.  HEENT: Normocephalic, atraumatic. PERRL, EOMI. Oral mucosa is moist. No perioral cyanosis.  NECK: supple, no masses, no thyromegaly.  RESP:  Normal respiratory effort.  CTAB.  No rales, wheezes, rhonchi.  No cyanosis or clubbing.  CV: Normal S1, S2, regular rhythm, normal rate. No murmur.  No LE edema.   ABDOMEN:  Soft, non-tender, non-distended.   SKIN: warm and dry. No rash.  NEURO: AAOx3.  Normal gait.  Fluent speech.  PSYCH: mentation appears normal.     Results:  PFTs: Reviewed and discussed with patient.  Normal spirometry, normal lung volumes and diffusion capacity  Most Recent [facility] Pulmonary Function Testing    FVC-Pred   Date Value Ref Range Status   07/15/2024 2.90 L      FVC-Pre   Date Value Ref Range Status   07/15/2024 3.03 L      FVC-%Pred-Pre   Date Value Ref Range Status   07/15/2024 104 %      FEV1-Pre   Date Value Ref Range Status   07/15/2024 2.52 L      FEV1-%Pred-Pre   Date Value Ref " "Range Status   07/15/2024 114 %      FEV1FVC-Pred   Date Value Ref Range Status   07/15/2024 76 %      FEV1FVC-Pre   Date Value Ref Range Status   07/15/2024 83 %      No results found for: \"20029\"  FEFMax-Pred   Date Value Ref Range Status   07/15/2024 5.83 L/sec      FEFMax-Pre   Date Value Ref Range Status   07/15/2024 7.60 L/sec      FEFMax-%Pred-Pre   Date Value Ref Range Status   07/15/2024 130 %      ExpTime-Pre   Date Value Ref Range Status   07/15/2024 5.35 sec      FIFMax-Pre   Date Value Ref Range Status   07/15/2024 3.43 L/sec      FEV1FEV6-Pred   Date Value Ref Range Status   07/15/2024 76 %      FEV1FEV6-Pre   Date Value Ref Range Status   07/15/2024 83 %      No results found for: \"20055\"   Imaging (personally reviewed in clinic today):    Assessment and Plan:   Abnormal imaging/CT chest  Subtle findings of groundglass opacities in the lower portions of the right upper lobe has since resolved and is less conspicuous on more recent CT chest suggestive of a benign process.  He has a very low risk for lung cancer.    Questions and concerns were answered to the patient's satisfaction.  he was provided with my contact information should new questions or concerns arise in the interim.  Up to date on vaccination  RTC as needed  I spent 40 minutes on the date of the encounter doing chart review, history and exam, documentation and further coordination as noted above exclusive of time interpreting PFT, Chest Xray, CT Chest.  Renee Ordaz MD  Pulmonary, Critical Care and Sleep Medicine  [facility]  Office: [phone]      The above note was dictated using voice recognition software and may include typographical errors. Please contact the author for any clarifications.                                   "

## 2024-11-21 ENCOUNTER — TELEPHONE (OUTPATIENT)
Dept: RHEUMATOLOGY | Facility: CLINIC | Age: 84
End: 2024-11-21
Payer: MEDICARE

## 2024-11-27 ENCOUNTER — APPOINTMENT (OUTPATIENT)
Dept: PHARMACY | Facility: CLINIC | Age: 84
End: 2024-11-27
Attending: STUDENT IN AN ORGANIZED HEALTH CARE EDUCATION/TRAINING PROGRAM
Payer: COMMERCIAL

## 2024-11-27 DIAGNOSIS — M31.6 GIANT CELL ARTERITIS (H): Primary | ICD-10-CM

## 2024-12-04 ENCOUNTER — LAB (OUTPATIENT)
Dept: LAB | Facility: CLINIC | Age: 84
End: 2024-12-04
Payer: MEDICARE

## 2024-12-04 DIAGNOSIS — M31.8 SYSTEMIC VASCULITIS (H): ICD-10-CM

## 2024-12-04 DIAGNOSIS — M31.6 GCA (GIANT CELL ARTERITIS) (H): ICD-10-CM

## 2024-12-04 LAB
ALBUMIN UR-MCNC: NEGATIVE MG/DL
ALT SERPL W P-5'-P-CCNC: 25 U/L (ref 0–70)
APPEARANCE UR: CLEAR
AST SERPL W P-5'-P-CCNC: 45 U/L (ref 0–45)
BACTERIA #/AREA URNS HPF: ABNORMAL /HPF
BASOPHILS # BLD MANUAL: 0 10E3/UL (ref 0–0.2)
BASOPHILS NFR BLD MANUAL: 0 %
BILIRUB UR QL STRIP: NEGATIVE
BURR CELLS BLD QL SMEAR: SLIGHT
COLOR UR AUTO: YELLOW
CREAT SERPL-MCNC: 0.85 MG/DL (ref 0.67–1.17)
CRP SERPL-MCNC: <3 MG/L
EGFRCR SERPLBLD CKD-EPI 2021: 87 ML/MIN/1.73M2
EOSINOPHIL # BLD MANUAL: 0.4 10E3/UL (ref 0–0.7)
EOSINOPHIL NFR BLD MANUAL: 1 %
ERYTHROCYTE [DISTWIDTH] IN BLOOD BY AUTOMATED COUNT: 16.6 % (ref 10–15)
ERYTHROCYTE [SEDIMENTATION RATE] IN BLOOD BY WESTERGREN METHOD: 6 MM/HR (ref 0–20)
GLUCOSE UR STRIP-MCNC: NEGATIVE MG/DL
HCT VFR BLD AUTO: 41.2 % (ref 40–53)
HGB BLD-MCNC: 13.1 G/DL (ref 13.3–17.7)
HGB UR QL STRIP: ABNORMAL
KETONES UR STRIP-MCNC: NEGATIVE MG/DL
LEUKOCYTE ESTERASE UR QL STRIP: NEGATIVE
LYMPHOCYTES # BLD MANUAL: 32.9 10E3/UL (ref 0.8–5.3)
LYMPHOCYTES NFR BLD MANUAL: 75 %
MCH RBC QN AUTO: 30.2 PG (ref 26.5–33)
MCHC RBC AUTO-ENTMCNC: 31.8 G/DL (ref 31.5–36.5)
MCV RBC AUTO: 95 FL (ref 78–100)
MONOCYTES # BLD MANUAL: 1.3 10E3/UL (ref 0–1.3)
MONOCYTES NFR BLD MANUAL: 3 %
NEUTROPHILS # BLD MANUAL: 9.2 10E3/UL (ref 1.6–8.3)
NEUTROPHILS NFR BLD MANUAL: 21 %
NITRATE UR QL: NEGATIVE
PH UR STRIP: 5.5 [PH] (ref 5–7)
PLAT MORPH BLD: ABNORMAL
PLATELET # BLD AUTO: 175 10E3/UL (ref 150–450)
RBC # BLD AUTO: 4.34 10E6/UL (ref 4.4–5.9)
RBC #/AREA URNS AUTO: ABNORMAL /HPF
RBC MORPH BLD: ABNORMAL
SMUDGE CELLS BLD QL SMEAR: PRESENT
SP GR UR STRIP: 1.02 (ref 1–1.03)
SQUAMOUS #/AREA URNS AUTO: ABNORMAL /LPF
UROBILINOGEN UR STRIP-ACNC: 0.2 E.U./DL
VARIANT LYMPHS BLD QL SMEAR: PRESENT
WBC # BLD AUTO: 43.9 10E3/UL (ref 4–11)
WBC #/AREA URNS AUTO: ABNORMAL /HPF

## 2024-12-04 PROCEDURE — 84460 ALANINE AMINO (ALT) (SGPT): CPT

## 2024-12-04 PROCEDURE — 86140 C-REACTIVE PROTEIN: CPT

## 2024-12-04 PROCEDURE — 84450 TRANSFERASE (AST) (SGOT): CPT

## 2024-12-04 PROCEDURE — 85027 COMPLETE CBC AUTOMATED: CPT

## 2024-12-04 PROCEDURE — 81001 URINALYSIS AUTO W/SCOPE: CPT

## 2024-12-04 PROCEDURE — 85652 RBC SED RATE AUTOMATED: CPT

## 2024-12-04 PROCEDURE — 36415 COLL VENOUS BLD VENIPUNCTURE: CPT

## 2024-12-04 PROCEDURE — 82565 ASSAY OF CREATININE: CPT

## 2024-12-04 PROCEDURE — 85007 BL SMEAR W/DIFF WBC COUNT: CPT

## 2024-12-08 NOTE — PROGRESS NOTES
Medication Therapy Management (MTM) Encounter    ASSESSMENT:                            Medication Adherence/Access: Reviewed average approval time for [facility] patient assistance of 7-10 business days. Did discuss this may be delayed due to Thanksgiving holiday. Reviewed patient will be called by the patient assistance program to set up first medication delivery once application is approved.    Giant Cell Arteritis: Patient finding efficacy to current prednisone treatment - reviewed current side effects are normal and increased anxiety is likely also contributing to increased BP. Recommended continuing to monitor BP and blood sugars, briefly reviewed goal BP and BG ranges and when to contact the rheumatology clinic. Would benefit from starting Actemra 162 mg weekly as soon as approved for patient assistance. Reviewed current prednisone taper will treat GCA until Actemra can be started. Discussed patient can call rheumatology clinic if he has any new symptoms.    PLAN:                            1. Continue prednisone taper as prescribed.    2. Once your Actemra patient assistance application is approved, you will receive a phone call to set up your first medication delivery. You will be taking Actemra 162 mg (1 injection) weekly.    Follow-up: Return in about 3 months (around 2/27/2025) for MTM Pharmacist Visit.    SUBJECTIVE/OBJECTIVE:                          Yury Michael is a 84 year old male seen for a follow-up visit.       Reason for visit: Questions about Actemra assistance, GCA check in    Allergies/ADRs: Reviewed in chart  Past Medical History: Reviewed in chart  Tobacco: He reports that he has never smoked. He has never been exposed to tobacco smoke. He has never used smokeless tobacco.  Alcohol: not currently using    Medication Adherence/Access: Medication barriers: affording medications. Actemra approved through insurance for a high copay. Patient has been working with pharmacy liaison to apply for  [employer] patient assistance - paperwork submitted, waiting for determination.    Giant Cell Arteritis:   Prednisone 30 mg daily  Bactrim 400/80 mg daily  Famotidine 40 mg daily     Reports he has been taking medications as prescribed. Having some additional anxiety and insomnia from prednisone. Thinks his blood pressure is also high due to this. Has been taking blood sugar and blood pressures daily. Concerned that prednisone is not enough to keep GCA under control while he waits for his Actemra approval. Not having any headaches or joint concerns currently.     Fasting BG (mg/dL):  88, 84, 97  BP (mmHg): 140/78, 120/64, 141/82, 138/81     Liver Function Studies -   Recent Labs   Lab Test 11/04/24  1052 10/18/24  0704   PROTTOTAL  --  6.2*   ALBUMIN  --  3.5   BILITOTAL  --  0.3   ALKPHOS  --  69   AST   < > 32   ALT   < > 16    < > = values in this interval not displayed.      CBC RESULTS:   Recent Labs   Lab Test 11/04/24  1052   WBC 43.9*   RBC 3.96*   HGB 11.7*   HCT 37.1*   MCV 94   MCH 29.5   MCHC 31.5   RDW 15.4*         Today's Vitals: There were no vitals taken for this visit.  ----------------  Post Discharge Medication Reconciliation Status: discharge medications reconciled, continue medications without change.    I spent 45 minutes with this patient today. All changes were made via collaborative practice agreement with Roman Rice MD. A copy of the visit note was provided to the patient's provider(s).    A summary of these recommendations was sent via [employer].    Estela Rolon PharmD  Medication Therapy Management Pharmacist  [facility]  Phone: [phone]     Telemedicine Visit Details  The patient's medications can be safely assessed via a telemedicine encounter.  Type of service:  Telephone visit  Originating Location (pt. Location): Home    Distant Location (provider location):  Off-site  Start Time: 2:00 PM  End Time: 2:45 PM     Medication Therapy  Recommendations  No medication therapy recommendations to display

## 2024-12-09 NOTE — PATIENT INSTRUCTIONS
"Recommendations from today's MTM visit:                                                       1. Continue prednisone taper as prescribed.    2. Once your Actemra patient assistance application is approved, you will receive a phone call to set up your first medication delivery. You will be taking Actemra 162 mg (1 injection) weekly.    Follow-up: Return in about 3 months (around 2/27/2025) for MTM Pharmacist Visit.    It was great speaking with you today.  I value your experience and would be very thankful for your time in providing feedback in our clinic survey. In the next few days, you may receive an email or text message from [employer] with a link to a survey related to your  clinical pharmacist.\"     To schedule another MTM appointment, please call the clinic directly or you may call the MTM scheduling line at [phone].    My Clinical Pharmacist's contact information:                                                      Please feel free to contact me with any questions or concerns you have.      Estela Rolon, PharmD  Medication Therapy Management Pharmacist  [facility]  Phone: [phone]     "

## 2024-12-12 ENCOUNTER — OFFICE VISIT (OUTPATIENT)
Dept: OPHTHALMOLOGY | Facility: CLINIC | Age: 84
End: 2024-12-12
Attending: OPHTHALMOLOGY
Payer: MEDICARE

## 2024-12-12 DIAGNOSIS — H47.10 PAPILLEDEMA: Primary | ICD-10-CM

## 2024-12-12 PROCEDURE — 92133 CPTRZD OPH DX IMG PST SGM ON: CPT | Performed by: OPHTHALMOLOGY

## 2024-12-12 ASSESSMENT — CONF VISUAL FIELD
METHOD: COUNTING FINGERS
OD_INFERIOR_TEMPORAL_RESTRICTION: 0
OD_NORMAL: 1
OD_INFERIOR_NASAL_RESTRICTION: 0
OS_INFERIOR_NASAL_RESTRICTION: 3
OD_SUPERIOR_TEMPORAL_RESTRICTION: 0
OS_SUPERIOR_TEMPORAL_RESTRICTION: 3
OD_SUPERIOR_NASAL_RESTRICTION: 0

## 2024-12-12 ASSESSMENT — EXTERNAL EXAM - LEFT EYE: OS_EXAM: NORMAL

## 2024-12-12 ASSESSMENT — REFRACTION_WEARINGRX
OS_AXIS: 090
OD_AXIS: 152
OD_SPHERE: +3.00
OS_SPHERE: +6.00
OS_CYLINDER: +0.75
SPECS_TYPE: PAL
OD_ADD: +2.75
OS_ADD: +2.75
OD_CYLINDER: +0.75

## 2024-12-12 ASSESSMENT — CUP TO DISC RATIO
OD_RATIO: 0.3
OS_RATIO: 0.3

## 2024-12-12 ASSESSMENT — VISUAL ACUITY
METHOD: SNELLEN - LINEAR
OD_CC: 20/25
OS_CC: 20/25
CORRECTION_TYPE: GLASSES
OS_CC+: -2

## 2024-12-12 ASSESSMENT — TONOMETRY
OS_IOP_MMHG: 11
OD_IOP_MMHG: 15
IOP_METHOD: ICARE

## 2024-12-12 ASSESSMENT — EXTERNAL EXAM - RIGHT EYE: OD_EXAM: NORMAL

## 2024-12-12 ASSESSMENT — SLIT LAMP EXAM - LIDS
COMMENTS: MGD
COMMENTS: MGD

## 2024-12-12 NOTE — NURSING NOTE
Chief Complaints and History of Present Illnesses   Patient presents with    Follow Up      4 month follow up giant cell arteritis with bilateral ischemic optic neuropathy.     Chief Complaint(s) and History of Present Illness(es)       Follow Up              Comments:  4 month follow up giant cell arteritis with bilateral ischemic optic neuropathy.              Comments    Pt states vision is about the same as last visit. No eye pain today. No new flashes or floaters.  No new redness or dryness.    SANDY Hernandez December 12, 2024 7:18 AM

## 2024-12-12 NOTE — PROGRESS NOTES
1. 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       Atypical presentation without elevated acute phase reactants, no PMR symptoms, predominantly peripheral field losses. He completed an oral prednisone taper in early 2021 and stopped methotrexate in mid-2022 (when Actemra in shortage) with no evidence of disease recurrence since. Patient also has CLL which is managed by oncology, monitored every 6 months.   Treated with Actemra which was stopped September 2023.    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  He was placed back on higher doses of prednisone and a plan initiated to restart Actemra which will given weekly and start next week.     Today patient has no new acute vision change complaints, still no signs of active giant cell arteritis  (no evidence of new vision loss or new optic nerve ischemia and no symptoms of giant cell arteritis recurrence).  He has had gradual mild worsening of vision that I believe is cataract related but his cataracts are still not quite sufficiently dense or debilitating to warrant surgery at this time. He may need surgery however in the next 1-2 years.      He continues to follow with Rheumatology; currently on prednisone taper with plans to re-initiate actemra.     2.  Borderline visually significant cataracts - mildly symptomatic, discussed as contributing factor to mild vision changes.     3. 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 Present since at least 04/2022.      Follow-up in 6 months with neuro-ophthalmology. Notify us as soon as possible for any acute vision changes.  Return next available for updated glasses refraction with Dr. Dougherty.       Historical data from initial visit (9/2020):     Bon Michael is a 79 year old male who presents to neuro-ophthalmology   clinic today for consultation from Jose  "MD Sina at [facility] for   visual disturbance of left eye. He describes it as \"his left eye not   getting enough light\". He describes noticing the change in his left eye's   temporal visual field, inferior > superior. He notices it more first thing   in the morning. These symptoms began on 8/22/2020 and he describes it as   worsening - he is unclear if it the deficit has gotten larger or darker   however. He first noticed it while driving at the periphery of his vision.      Patient saw Dr. Lewis on August 28.  He underwent esr / crp testing as   well as mri (see below).     Labs: 9/2/20- WBC- 50.2, ESR- 28, CRP < 5     On August 31 he developed symptoms of waviness in his peripheral vision in   the right eye.  He went to Dr. Andino on 9/4/2020 who wanted him to go to   the ED but he was unable.  Dr. Andino reviewed the patient's fluorescein   angiography and felt there was choroidal filling delay in the right eye   and pallid edema in the right eye concerning for giant cell arteritis.  We   discussed the case via phone.      PRIOR IMAGING:  BRAIN MRI WITH/WO GADOLINIUM, MRA OF THE [place] OF [place]ANCHE[place] AND MRA OF THE   CAROTID ARTERIES - [facility] (9/1/2020)  IMPRESSION:    1.  No acute intracranial abnormality.  2.  No evidence of an orbital abnormalities.  3.  Mild generalized cerebral atrophy.  4.  Unremarkable MRA of the [place] of [place].  5.  Unremarkable MRA of the cervical vessels.     On my re-review I disagreed with the radiology read.  There is evidence of   patchy enhancement diffusely within the bilateral orbits and along the   bilateral intraorbital optic nerve sheaths.  Will discuss with   neuro-radiology regarding finding and repeat scan.     No bisphosphonates in the past (no osteoporosis medications for 7-8 years   per patient).      Left temporal artery biopsy 9/9/20- positive for giant cell arteritis   Prednisone (started 9/4/2020)  Patient started Actemra 10/28/20.     Stopped " Actemra last dose on 9/13/23 after a 3 year course.      Interim history and exam with me since last visit 7/31/24  Today, Yury continues on prednisone and reports he is due to taper to 25 mg this weekend.  He reports rheumatology plans to reinitiate Actemra this Wednesday.  He reports his vision is overall stable, maybe a little blurrier in the right eye.  He denies eye pain.    ...THEN 3.5 tablets (35 mg) daily for 14 days, THEN 3 tablets (30 mg) daily for 14 days, THEN 2.5 tablets (25 mg) daily for 14 days, THEN 2 tablets (20 mg) daily for 14 days. - Oral     12/4/24 CRP <3.00, ESR 6    11/6/24- Dr. Rice- Rheumatology  Yury is a 83 year old male who presents today for follow up.     Since the last visit,     - Workup 11/4 showed:     Hemoglobin overall stable  WBC  elevated  Platelets  within normal limits   ESR  within normal limits   CRP  within normal limits   AST  within normal limits   Creatinine  within normal limits      Today, Yury mentioned the following:     He denies headache, jaw claudications, scalp tenderness, vision changes, shoulder/hip stiffness in the morning.   He denies arm or leg claudications with activities.  He denies fever, chills, unintentional weight loss.      He is tolerating prednisone so far well without notable side effects. There is some elevation in blood pressure and blood sugar but so far stable.      ROS     Current Medications   Prednisone 40 mg daily for 5 days   Actemra injection once weekly   Bactrim once daily   Pepcid once daily      RECENT PET body scan 10/11/24  IMPRESSION:   1.  Findings consistent with active vasculitis (grade 3 uptake), with  heterogeneous increased avidity throughout the major vessels, which  includes the thoracic and abdominal aorta, with the most intense foci  residing within the vertebral arteries, as well as the proximal upper  extremity and throughout the lower extremity vasculature, as described  above.  2.  Scattered foci of  increased uptake also noted throughout the  musculature, which may reflect foci of vascular inflammation not well  characterized due to lack of IV contrast.  3.  Focal uptake within the left pelvis, which may correlate with  adjacent diverticula, and therefore may represent early or developing  diverticulitis. Recommend correlation patient's symptoms.  4.  Incidental CT findings, as above       Exam:  VA 20/25 OD 20/25-2 OS.  No afferent pupillary defect. 11/11 Ishihara color plates in both eyes. Stable overall afferent visual function. Stable optic nerve head appearance of mild diffuse pallor of both optic nerve heads     OCT RNFL:  58 right eye (59 in Jul 2024), 56 left eye (58 in Jul 2024) , stable atrophy in both eyes (sequelae of original attacks without evidence of new or active ischemic optic neuropathy).     Complete documentation of historical and exam elements from today's encounter can be found in the full encounter summary report (not reduplicated in this progress note).  I personally obtained the chief complaint(s) and history of present illness.  I confirmed and edited as necessary the review of systems, past medical/surgical history, family history, social history, and examination findings as documented by others; and I examined the patient myself.  I personally reviewed the relevant tests, images, and reports as documented above.  I formulated and edited as necessary the assessment and plan and discussed the findings and management plan with the patient and family     MD Samantha Gomez, OD

## 2024-12-30 DIAGNOSIS — M31.6 GCA (GIANT CELL ARTERITIS) (H): ICD-10-CM

## 2024-12-30 DIAGNOSIS — M31.8 SYSTEMIC VASCULITIS (H): ICD-10-CM

## 2024-12-30 NOTE — TELEPHONE ENCOUNTER
LM with patient to discuss medication concern further. Awaiting return call.     Annetta Pena RN

## 2024-12-30 NOTE — TELEPHONE ENCOUNTER
[facility] Call Center    Phone Message    May a detailed message be left on voicemail: yes     Reason for Call: Medication Question or concern regarding medication   Prescription Clarification  Name of Medication: ocilizumab (ACTEMRA) 162 MG/0.9ML subcutaneous injection,  Prescribing Provider: Roman Rice MD       Pharmacy:   [place], [place] - [address]      What on the order needs clarification?     The patient would like a call from the care team to discuss concerns about the tocilizumab (ACTEMRA) 162 MG/0.9ML subcutaneous injection, patient declined to provider details thank you.      Action Taken: Message routed to:  [facility] ([facility]):    RHEUMATOLOGY          Travel Screening: Not Applicable     Date of Service:

## 2024-12-31 NOTE — TELEPHONE ENCOUNTER
Patient returned call and has questions regarding tocilizumab (Actemra). Patient reports has been taking it for 2 weeks now. Patient reports ankle swelling since starting it and is wondering if the medication may cause this.    Patient also reports also needing repeat root canal (removing infection per patient) and procedure will be on 1/7/25. Message routed to provider to see if medication needs to be held.    Patient also reports an increase in urination. He states this began around the time of starting prednisone. Patient denies burning or other symptoms of UTI.       Annetta Pena RN

## 2024-12-31 NOTE — TELEPHONE ENCOUNTER
Call to patient to advise to hold tocilizumab (Actemra) for 2 weeks until infection resolved per Dr. Rice. Patient verbalized understanding.     Patient also requested refill of Pepcid and Bactrim. Medications are listed as  however most recent note indicates patient to keep taking. Refills pended and sent to provider to review and sign if patient is to continue taking.     Annetta Pena RN

## 2025-01-01 RX ORDER — FAMOTIDINE 40 MG/1
40 TABLET, FILM COATED ORAL DAILY
Qty: 60 TABLET | Refills: 0 | Status: SHIPPED | OUTPATIENT
Start: 2025-01-01

## 2025-01-01 RX ORDER — SULFAMETHOXAZOLE AND TRIMETHOPRIM 400; 80 MG/1; MG/1
1 TABLET ORAL DAILY
Qty: 60 TABLET | Refills: 0 | Status: SHIPPED | OUTPATIENT
Start: 2025-01-01

## 2025-01-02 ENCOUNTER — TELEPHONE (OUTPATIENT)
Dept: PHARMACY | Facility: CLINIC | Age: 85
End: 2025-01-02

## 2025-01-02 ENCOUNTER — LAB (OUTPATIENT)
Dept: LAB | Facility: CLINIC | Age: 85
End: 2025-01-02
Payer: MEDICARE

## 2025-01-02 DIAGNOSIS — M31.8 SYSTEMIC VASCULITIS (H): ICD-10-CM

## 2025-01-02 DIAGNOSIS — M31.6 GCA (GIANT CELL ARTERITIS) (H): ICD-10-CM

## 2025-01-02 LAB
ALT SERPL W P-5'-P-CCNC: 30 U/L (ref 0–70)
AST SERPL W P-5'-P-CCNC: 45 U/L (ref 0–45)
CREAT SERPL-MCNC: 0.82 MG/DL (ref 0.67–1.17)
CRP SERPL-MCNC: <3 MG/L
EGFRCR SERPLBLD CKD-EPI 2021: 88 ML/MIN/1.73M2
ERYTHROCYTE [DISTWIDTH] IN BLOOD BY AUTOMATED COUNT: 16.8 % (ref 10–15)
HCT VFR BLD AUTO: 44.2 % (ref 40–53)
HGB BLD-MCNC: 14.3 G/DL (ref 13.3–17.7)
MCH RBC QN AUTO: 31.4 PG (ref 26.5–33)
MCHC RBC AUTO-ENTMCNC: 32.4 G/DL (ref 31.5–36.5)
MCV RBC AUTO: 97 FL (ref 78–100)
PLATELET # BLD AUTO: 174 10E3/UL (ref 150–450)
RBC # BLD AUTO: 4.56 10E6/UL (ref 4.4–5.9)
WBC # BLD AUTO: 37.9 10E3/UL (ref 4–11)

## 2025-01-02 NOTE — TELEPHONE ENCOUNTER
Pt is due for appointment with Estela GRANADOS     Call placed to pt to initiate scheduling on 1/2/25    Outcome: Unable to LVM, will send letter out

## 2025-01-06 ENCOUNTER — TELEPHONE (OUTPATIENT)
Dept: RHEUMATOLOGY | Facility: CLINIC | Age: 85
End: 2025-01-06
Payer: MEDICARE

## 2025-01-06 DIAGNOSIS — M31.6 GCA (GIANT CELL ARTERITIS) (H): ICD-10-CM

## 2025-01-06 DIAGNOSIS — M31.8 SYSTEMIC VASCULITIS (H): Primary | ICD-10-CM

## 2025-01-06 LAB
ELLIPTOCYTES BLD QL SMEAR: SLIGHT
PATH REV: ABNORMAL
PLAT MORPH BLD: ABNORMAL
RBC MORPH BLD: ABNORMAL
SMUDGE CELLS BLD QL SMEAR: PRESENT
TARGETS BLD QL SMEAR: SLIGHT
VARIANT LYMPHS BLD QL SMEAR: PRESENT

## 2025-01-06 RX ORDER — PREDNISONE 10 MG/1
10 TABLET ORAL DAILY
Qty: 30 TABLET | Refills: 0 | Status: SHIPPED | OUTPATIENT
Start: 2025-01-06 | End: 2025-02-05

## 2025-01-06 NOTE — TELEPHONE ENCOUNTER
Patient called writer and said he is currently on 20 mg prednisone and will end that dosage timeframe this weekend. Message routed to provider to request if patient should remain on 20 mg prednisone until appointment with provider on 1/15/24 or if he should lower dosage.     Patient also inquiring how much Tylenol he is able to take daily. He is having his root canal redo tomorrow due to infection and having some tooth pain. He wanted to make sure of what dosage would be recommended for him.     In addition, patient reports he still has some ankle swelling which has not resolved and he would like to discuss this further at upcoming appointment.    Annetta Pena RN

## 2025-01-06 NOTE — Clinical Note
Thanks Rachell for the note.  - Recommend to stay on prednisone 20 mg daily till his upcoming appointment  - Recommend to not take more than 3000 mg of tylenol in a day  - The leg swelling can be from prednisone, hopefully this will improve on follow up when the dose is lowered  Thank you

## 2025-01-06 NOTE — TELEPHONE ENCOUNTER
"Call to patient to relay informtion from Dr. Rice:    \"- Recommend to stay on prednisone 20 mg daily till his upcoming appointment  - Recommend to not take more than 3000 mg of tylenol in a day  - The leg swelling can be from prednisone, hopefully this will improve on follow up when the dose is lowered\"    Patient verbalized understanding and has no further questions at this time.     Annetta Pena RN    "

## 2025-01-07 LAB
BASOPHILS # BLD AUTO: 0.1 10E3/UL (ref 0–0.2)
BASOPHILS NFR BLD AUTO: 0 %
EOSINOPHIL # BLD AUTO: 0.2 10E3/UL (ref 0–0.7)
EOSINOPHIL NFR BLD AUTO: 0 %
ERYTHROCYTE [DISTWIDTH] IN BLOOD BY AUTOMATED COUNT: 16.8 % (ref 10–15)
HCT VFR BLD AUTO: 44.2 % (ref 40–53)
HGB BLD-MCNC: 14.3 G/DL (ref 13.3–17.7)
IMM GRANULOCYTES # BLD: 0.2 10E3/UL
IMM GRANULOCYTES NFR BLD: 1 %
LYMPHOCYTES # BLD AUTO: 25.9 10E3/UL (ref 0.8–5.3)
LYMPHOCYTES NFR BLD AUTO: 68 %
MCH RBC QN AUTO: 31.4 PG (ref 26.5–33)
MCHC RBC AUTO-ENTMCNC: 32.4 G/DL (ref 31.5–36.5)
MCV RBC AUTO: 97 FL (ref 78–100)
MONOCYTES # BLD AUTO: 1.8 10E3/UL (ref 0–1.3)
MONOCYTES NFR BLD AUTO: 5 %
NEUTROPHILS # BLD AUTO: 9.6 10E3/UL (ref 1.6–8.3)
NEUTROPHILS NFR BLD AUTO: 25 %
NRBC # BLD AUTO: 0 10E3/UL
NRBC BLD AUTO-RTO: 0 /100
PLATELET # BLD AUTO: 174 10E3/UL (ref 150–450)
RBC # BLD AUTO: 4.56 10E6/UL (ref 4.4–5.9)
WBC # BLD AUTO: 37.9 10E3/UL (ref 4–11)

## 2025-01-15 ENCOUNTER — VIRTUAL VISIT (OUTPATIENT)
Dept: PHARMACY | Facility: CLINIC | Age: 85
End: 2025-01-15
Attending: STUDENT IN AN ORGANIZED HEALTH CARE EDUCATION/TRAINING PROGRAM
Payer: COMMERCIAL

## 2025-01-15 ENCOUNTER — TELEPHONE (OUTPATIENT)
Dept: RHEUMATOLOGY | Facility: CLINIC | Age: 85
End: 2025-01-15
Payer: MEDICARE

## 2025-01-15 DIAGNOSIS — M31.6 GIANT CELL ARTERITIS (H): Primary | ICD-10-CM

## 2025-01-15 NOTE — TELEPHONE ENCOUNTER
[facility] Call Center    Phone Message    May a detailed message be left on voicemail: yes     Reason for Call: Other: Telephone Visit   Patient requesting a call back from the nurses to discuss an upcoming telephone visit. Please follow-up. Thank you.     Action Taken: Other: Rheum    Travel Screening: Not Applicable     Date of Service: 1/15/25

## 2025-01-15 NOTE — PROGRESS NOTES
HPI:     Patient was last seen by Dr. Garcia on 12/12/24 for biopsy proven GCA with bilateral ION.  He is here today for an updated refraction.     He reports that his vision is stable since his last visit.     Assessment and Plan:  1) Presbyopia  A:  Improves to 20/20- each eye with updated refraction.  P: Updated PAL SRx for ftw.  Answered patient questions regarding mild cataracts; minimally visually significant at this time.  Monitor annually.     2) AAION  A/P: Continue care with Dr. Garcia as directed.    I spent 20 minutes on chart review, examining the patient (excluding refraction), and counseling the patient.     Complete documentation of historical and exam elements from today's encounter can be found in the full encounter summary report (not reduplicated in this progress note). I personally obtained the chief complaint(s) and history of present illness. I confirmed and edited as necessary the review of systems, past medical/surgical history, family history, social history, and examination findings as document by others; and I examined the patient myself. I personally reviewed the relevant tests, images, and reports as documented above. I formulated and edited as necessary the assessment and plan and discussed the findings and management plan with the patient and family.     Samantha Dougherty, OD

## 2025-01-16 ENCOUNTER — OFFICE VISIT (OUTPATIENT)
Dept: OPHTHALMOLOGY | Facility: CLINIC | Age: 85
End: 2025-01-16
Payer: MEDICARE

## 2025-01-16 DIAGNOSIS — H52.03 HYPEROPIA WITH PRESBYOPIA OF BOTH EYES: Primary | ICD-10-CM

## 2025-01-16 DIAGNOSIS — H52.4 HYPEROPIA WITH PRESBYOPIA OF BOTH EYES: Primary | ICD-10-CM

## 2025-01-16 ASSESSMENT — TONOMETRY
OD_IOP_MMHG: 17
IOP_METHOD: ICARE
OS_IOP_MMHG: 12

## 2025-01-16 ASSESSMENT — REFRACTION_WEARINGRX
OD_ADD: +2.75
OS_AXIS: 090
OS_CYLINDER: +0.75
OD_CYLINDER: +0.75
OD_AXIS: 152
OD_SPHERE: +3.00
SPECS_TYPE: PAL
OS_SPHERE: +6.00
OS_ADD: +2.75

## 2025-01-16 ASSESSMENT — VISUAL ACUITY
OD_CC+: -3
METHOD: SNELLEN - LINEAR
CORRECTION_TYPE: GLASSES
OS_CC: 20/30
OS_CC+: -2
OD_CC: 20/25

## 2025-01-16 ASSESSMENT — REFRACTION_MANIFEST
OD_ADD: +2.75
OS_CYLINDER: +1.00
OS_SPHERE: +5.75
OD_SPHERE: +3.25
OD_AXIS: 165
OD_CYLINDER: +0.50
OS_ADD: +2.75
OS_AXIS: 103

## 2025-01-16 NOTE — NURSING NOTE
Chief Complaints and History of Present Illnesses   Patient presents with    Papilledema Follow Up     Papilledema     Chief Complaint(s) and History of Present Illness(es)       Papilledema Follow Up              Associated symptoms: Negative for dryness, eye pain, tearing, flashes and floaters    Treatments tried: no treatments    Pain scale: 0/10    Comments: Papilledema              Comments    Patient was last seen by Dr. Dougherty 06/26/23 for presbyopia.  More recently followed by Dr. 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    Pt states vision is the same.  No pain.  No flashes/floaters.  No ocular meds.  No DM.    JACY Samuel January 16, 2025 7:21 AM

## 2025-01-20 NOTE — PATIENT INSTRUCTIONS
"Recommendations from today's MTM visit:                                                       1. Please send your updated income information to Gracia (pharmacy liaison) so she can complete your appeal for [employer] patient assistance. This may take several weeks and if we submit too late we could have a gap in therapy.    2. Discuss concerns about edema and restarting Actemra with Dr. Rice on 1/22/25.    3. Start checking blood sugar and blood pressure as needed if you have signs or symptoms of high blood pressure or high blood sugar. Let your primary care doctor know you made this change.    Follow-up: Return in about 3 months (around 4/15/2025) for MTM Pharmacist Visit.    It was great speaking with you today.  I value your experience and would be very thankful for your time in providing feedback in our clinic survey. In the next few days, you may receive an email or text message from [employer] with a link to a survey related to your  clinical pharmacist.\"     To schedule another MTM appointment, please call the clinic directly or you may call the MTM scheduling line at [phone].    My Clinical Pharmacist's contact information:                                                      Please feel free to contact me with any questions or concerns you have.      Estela Rolon, PharmD  Medication Therapy Management Pharmacist  [facility]  Phone: [phone]     "

## 2025-01-20 NOTE — PROGRESS NOTES
Medication Therapy Management (MTM) Encounter    ASSESSMENT:                            Medication Adherence/Access: Discussed importance of sending in income verification to pharmacy liaison (Gracia) now as it may take several weeks to be verified and if we do not start this process soon, patient may run out of medication prior to this being completed. Patient prefers to meet with Dr. Rice on 1/22 to make sure he is staying on Actemra prior to completing this.    Giant Cell Arteritis: Patient tolerating current medications with good efficacy. Reviewed Actemra could be restarted 1/21/25 (2 weeks post root canal), patient prefers to wait to restart until after follow up rheumatology appointment. Discussed per Actemra package insert peripheral edema (<2%) is listed as a potential side effect. Could consider compression socks and/or a diuretic medication if edema becomes bothersome. Reviewed PCP had recommended daily blood sugar and blood pressure readings. Since patient readings have been at goal, reasonable to change to as needed monitoring if signs/symptoms of hypertension or hyperglycemia develop. Reviewed signs and symptoms of both hypertension and hyperglycemia today. Advised letting PCP know he is switching monitoring to as needed     PLAN:                            1. Please send your updated income information to Gracia (pharmacy liaison) so she can complete your appeal for [employer] patient assistance. This may take several weeks and if we submit too late we could have a gap in therapy.    2. Discuss concerns about edema and restarting Actemra with Dr. Rice on 1/22/25.    3. Start checking blood sugar and blood pressure as needed if you have signs or symptoms of high blood pressure or high blood sugar. Let your primary care doctor know you made this change.    Follow-up: Return in about 3 months (around 4/15/2025) for MTM Pharmacist Visit.    SUBJECTIVE/OBJECTIVE:                          Yury  Alec is a 84 year old male seen for a follow-up visit.       Reason for visit: Actemra follow up    Allergies/ADRs: Reviewed in chart  Past Medical History: Reviewed in chart  Tobacco: He reports that he has never smoked. He has never been exposed to tobacco smoke. He has never used smokeless tobacco.  Alcohol: not currently using    Medication Adherence/Access: Medication barriers: affording medications. Actemra approved through insurance for a high copay. [employer] approved a single fill for 2024 - denied for 2025 due to over income limit. Patient believes this denial is an error and plans to appeal. Needs to send updated income information to pharmacy liaison to complete appeal. Has not completed this yet.    Giant Cell Arteritis:   Actemra 162 mg weekly (not currently taking)  Prednisone 20 mg daily  Bactrim 400/80 mg daily  Famotidine 40 mg daily     Reports he has been taking medications as prescribed. Holding Actemra due to tooth infection and repeat root canal on 1/7/25. Notes ankle swelling has resolved off Actemra - would like to avoid restarting this as he reports ankle swelling was uncomfortable; made shoes and socks tight - no pitting edema. No new GCA symptoms noted. Would like to stop blood sugar monitoring and reduce blood pressure monitoring. Notes his readings are the same everyday and he does not feel it is useful anymore.     Fasting BG (mg/dL):    Does not have BP readings available today     Liver Function Studies -   Recent Labs   Lab Test 01/02/25  0742 11/04/24  1052 10/18/24  0704   PROTTOTAL  --   --  6.2*   ALBUMIN  --   --  3.5   BILITOTAL  --   --  0.3   ALKPHOS  --   --  69   AST 45   < > 32   ALT 30   < > 16    < > = values in this interval not displayed.      CBC RESULTS:   Recent Labs   Lab Test 01/02/25  0742   WBC 37.9*   RBC 4.56   HGB 14.3   HCT 44.2   MCV 97   MCH 31.4   MCHC 32.4   RDW 16.8*         Today's Vitals: There were no vitals taken for this  visit.  ----------------    I spent 30 minutes with this patient today. All changes were made via collaborative practice agreement with Roman Rice.     A summary of these recommendations was sent via [employer].    Danica MckeonD  Medication Therapy Management Pharmacist  [facility]  Phone: [phone]    Telemedicine Visit Details  The patient's medications can be safely assessed via a telemedicine encounter.  Type of service:  Telephone visit  Originating Location (pt. Location): Home    Distant Location (provider location):  Off-site  Start Time: 8:00 AM  End Time: 8:30 AM     Medication Therapy Recommendations  No medication therapy recommendations to display

## 2025-01-22 ENCOUNTER — VIRTUAL VISIT (OUTPATIENT)
Dept: RHEUMATOLOGY | Facility: CLINIC | Age: 85
End: 2025-01-22
Attending: STUDENT IN AN ORGANIZED HEALTH CARE EDUCATION/TRAINING PROGRAM
Payer: MEDICARE

## 2025-01-22 VITALS
DIASTOLIC BLOOD PRESSURE: 74 MMHG | BODY MASS INDEX: 19.08 KG/M2 | WEIGHT: 120 LBS | SYSTOLIC BLOOD PRESSURE: 132 MMHG | HEART RATE: 82 BPM

## 2025-01-22 DIAGNOSIS — M31.8 SYSTEMIC VASCULITIS (H): ICD-10-CM

## 2025-01-22 DIAGNOSIS — M31.6 GCA (GIANT CELL ARTERITIS) (H): ICD-10-CM

## 2025-01-22 PROCEDURE — 98015 SYNCH AUDIO-ONLY EST HIGH 40: CPT | Performed by: STUDENT IN AN ORGANIZED HEALTH CARE EDUCATION/TRAINING PROGRAM

## 2025-01-22 PROCEDURE — G2211 COMPLEX E/M VISIT ADD ON: HCPCS | Performed by: STUDENT IN AN ORGANIZED HEALTH CARE EDUCATION/TRAINING PROGRAM

## 2025-01-22 RX ORDER — SULFAMETHOXAZOLE AND TRIMETHOPRIM 400; 80 MG/1; MG/1
1 TABLET ORAL DAILY
Qty: 60 TABLET | Refills: 0 | Status: SHIPPED | OUTPATIENT
Start: 2025-01-22

## 2025-01-22 RX ORDER — PREDNISONE 5 MG/1
TABLET ORAL
Qty: 180 TABLET | Refills: 1 | Status: SHIPPED | OUTPATIENT
Start: 2025-03-05

## 2025-01-22 RX ORDER — FAMOTIDINE 40 MG/1
40 TABLET, FILM COATED ORAL DAILY
Qty: 60 TABLET | Refills: 0 | Status: SHIPPED | OUTPATIENT
Start: 2025-01-22

## 2025-01-22 RX ORDER — PREDNISONE 1 MG/1
TABLET ORAL
Qty: 180 TABLET | Refills: 1 | Status: SHIPPED | OUTPATIENT
Start: 2025-03-05

## 2025-01-22 RX ORDER — PREDNISONE 5 MG/1
TABLET ORAL
Qty: 105 TABLET | Refills: 0 | Status: SHIPPED | OUTPATIENT
Start: 2025-01-22 | End: 2025-03-05

## 2025-01-22 ASSESSMENT — PAIN SCALES - GENERAL: PAINLEVEL_OUTOF10: MILD PAIN (3)

## 2025-01-22 NOTE — PROGRESS NOTES
Virtual Visit Details    Type of service:  Telephone Visit   Phone call duration: 49 minutes   Originating Location (pt. Location): Home    Distant Location (provider location):  On-site  Telephone visit completed due to the patient did not have access to video, while the distant provider did.

## 2025-01-22 NOTE — LETTER
1/22/2025       RE: Bon Michael  [address] [facility] [address]     Dear Colleague,    Thank you for referring your patient, Bon Michale, to the LTAC, located within [facility] RHEUMATOLOGY at [facility]. Please see a copy of my visit note below.    Virtual Visit Details    Type of service:  Telephone Visit   Phone call duration: 49 minutes   Originating Location (pt. Location): Home    Distant Location (provider location):  On-site  Telephone visit completed due to the patient did not have access to video, while the distant provider did.    RHEUMATOLOGY OUTPATIENT CLINIC NOTE     Referring Provider:   Bib Alcantara MD    Lab review     ANCA immunofluorescence: Negative.  ANCA serology [facility]: Negative  Serum IgG 4 level: Normal     Temporal artery biopsy, 9/2020, Temporal artery, left, biopsy:   1. Granulomatous arteritis consistent with giant cell arteritis.   2. Chronic inflammation of the surrounding adventitia.      Imaging review    PET scan 1/10/2025 showed   1.  Decreased uptake within the vertebral arteries, while uptake is above the liver, grade 3, these are significantly decreased from prior June.  2.  Decreased uptake in the aorta and its major branches, grade 1.  3.  New FDG avid right middle lobe pulmonary nodule and right basilar atelectasis, with linear uptake within the left lower lobe infrahilar region, obscured by respiratory motion. These foci could represent inflammatory or infectious changes but are incompletely characterized.  Recommend dedicated full inspiration chest CT within 4-6 weeks for better characterization and assessment of lesion trajectory.  4.  Findings suggestive of new 10th rib nondisplaced fracture.      PET scan on 10/11 showed   1.  Findings consistent with active vasculitis (grade 3 uptake), with heterogeneous increased avidity throughout the major vessels, which  includes the thoracic and abdominal aorta,  with the most intense foci residing within the vertebral arteries, as well as the proximal upper extremity and throughout the lower extremity vasculature, as described above.     2.  Scattered foci of increased uptake also noted throughout the musculature, which may reflect foci of vascular inflammation not well characterized due to lack of IV contrast.  3.  Focal uptake within the left pelvis, which may correlate with adjacent diverticula, and therefore may represent early or developing diverticulitis. Recommend correlation patient's symptoms.    CTA chest, abdomen, pelvis 2024 focal dilatation of the distal celiac, distal renal, mild groundglass opacity in the right upper lobe     CT sinus 2024 deviated nasal septum without mucosal thickening or osteitis     MR orbits, 9/2020:   1.There is no definite abnormal contrast enhancement or mass. No evidence of leukemic infiltration.  2. Regarding the remainder of the brain, abnormal T2 hyperintense bone marrow signal with associated enhancement in the skull, likely representing leukemic infiltration.  3. Subcutaneous T2 hyperintensity with associated diffusion restriction and contrast enhancement over the left zygomatic region, question leukemic infiltration.     CT sinus, 10/2019:  Left maxillary sinusitis with small air-fluid level, new since 6/17/2015. In the appropriate clinical setting, this could reflect acute sinusitis       Subjective    Visit date November 6, 2024    Yury is a 83 year old male who presents today for follow up.    Since the last visit,    He was evaluated by oncology in December 2024 and he was felt to be stable     He was evaluated by Ophthalmology in December 2024 and he was felt to be stable     - Workup 1/02/2025 showed:    Hemoglobin overall stable  WBC  elevated  Platelets  within normal limits   ESR  within normal limits   CRP  within normal limits   AST  within normal limits   Creatinine  within normal limits     Today, Yury mentioned  the following:    He denies headache, jaw claudications, scalp tenderness, vision changes, shoulder/hip stiffness in the morning.   He denies arm or leg claudications with activities.  He denies fever, chills, unintentional weight loss.     He denies recent trauma or pain in the rib cage.     He has been having swelling in his ankles for 3 weeks and has been doing down. He did not have ankle swelling when he took actemra before.     He had root canal procedure around 1/7 and He has been holding Actemra since then. He is not taking antibiotics for his teeth.     ROS    Current Medications   Prednisone 20 mg daily for 3 weeks  Actemra injection once weekly, currently on hold for root canal   Bactrim once daily   Pepcid once daily     Past Medical history   Past Medical History:   Diagnosis Date     Agatston coronary artery calcium score greater than 400, 2023 but stress echo negative      CLL (chronic lymphocytic leukemia) (H)      Giant cell arteritis (H), Tempral artery BX proven and treated with steroids etc follows Rheum      Osteopenia        Objective  /74   Pulse 82   Wt 54.4 kg (120 lb)   BMI 19.08 kg/m        PHYSICAL EXAMINATION  Physical Exam  Phone visit     Assessment & Plan    # Giant cell arteritis, biopsy proven  # Recurrent sinus obstruction  # Chronic lymphocytic leukemia     # Screening for chronic infections  # Longitudinal care     Cabrera has been following with [facility] rheumatology for biopsy-proven giant cell arteritis.  Onset: 2020  Involvement:  Visual symptoms, bilateral ischemic optic neuropathy, temporal artery biopsy positive for GCA.   No headaches, scalp tenderness, PMR.  Comorbid conditions: CLL following with oncology     Relapse in 9/2024 with Large vessel vasculitis on PET, Asymptomatic      Treatment included: Prednisone, tocilizumab [[id number]].      # Giant cell arteritis, biopsy proven  # Large vessel vasculitis, Active   # Diverticular disease on PET scan, asymptomatic       Yury is presenting today for follow up.  He is tolerating prednisone and Actemra   He is asymptomatic from GCA standpoint.    He had follow up PET scan that showed improvement in the vasculitis. There was no new vascular uptake which is encouraging.   We discussed to pursue prednisone and Actemra as discussed along with aspirin daily along with bactrim and pepcid for GI prophylaxis.   He will need clearance from his dentist prior to restarting Actemra.      Plan:    - Continue prednisone with tapering as below.     - Restart Actemra once weekly injections if cleared from dentist     - If he develop recurrence of vision loss then emergent evaluation at the emergency room.      - Continue Bactrim daily     - Continue Pepcid daily     - Continue with aspirin 81 mg daily     Prednisone tapering schedule:    Take prednisone 15 mg daily for 2 week  Then prednisone 12.5 mg daily for 2 week  Then prednisone 10 mg daily for 2 week    Then prednisone 9 mg daily for 2 weeks  Then prednisone 8 mg daily for 2 weeks  Then prednisone 7 mg daily for 2 weeks  Then prednisone 6 mg daily for 2 weeks  Then prednisone 5 mg daily for 2 weeks till the next follow up.     If you experience any symptoms suggestive of flare including (recurrence of headache, scalp tenderness, jaw claudications, arm/leg claudications, unexplained fever, weight loss) while tapering the prednisone, then we would suggest not to taper prednisone further and increase prednisone back to the previous dose that was controlling your symptoms and reach out to Rheumatology office        # Recurrent sinus obstruction   -  Following with ENT outside      # Chronic lymphocytic leukemia   Follows with oncology.  WBC is elevated from prednisone.   Recommended to continue taking aspirin and follow up with his oncologist.     # FDG Avid right middle lobe nodule on PET scan  Recommended follow up with pulmonary      # History of Osteopenia   # Rib fracture on PET scan  He is  working with his primary care provider to get repeat DEXA scan.  I recommended to continue working with his primary care for repeat DEXA scan and treatment of Osteoporosis       # High risk medication requiring frequent lab tests for toxicity monitoring   Actemra   Labs reviewed on 1/2/2025, CBC,AST, Creatinine no signs of concerning toxicity   Standing orders in place     Risks and side effects including the risk of diverticulitis and perforation with tocilizumab as well as immunosuppression side effects.      # Screening for chronic infections  2024  Hepatitis B surface antigen: Negative  Hepatitis B core: Negative  Hepatitis B surface antibody: Negative   Hepatitis C antibody: Negative   2020  QuantiFERON gold: Negative    # Longitudinal care  The longitudinal plan of care for the diagnosis(es)/condition(s) as documented were addressed during this visit. Due to the added complexity in care, I will continue to support Yury in the subsequent management and with ongoing continuity of care.       53 minutes spent by me on the date of the encounter doing chart review, history and exam, documentation and further activities per the note      Return in about 9 weeks (around 3/26/2025).    Roman Rice MD  [facility] RHEUMATOLOGY      Again, thank you for allowing me to participate in the care of your patient.      Sincerely,    Roman Rice MD

## 2025-01-22 NOTE — PROGRESS NOTES
RHEUMATOLOGY OUTPATIENT CLINIC NOTE     Referring Provider:   Bib Alcantara MD    Lab review     ANCA immunofluorescence: Negative.  ANCA serology [facility]: Negative  Serum IgG 4 level: Normal     Temporal artery biopsy, 9/2020, Temporal artery, left, biopsy:   1. Granulomatous arteritis consistent with giant cell arteritis.   2. Chronic inflammation of the surrounding adventitia.      Imaging review    PET scan 1/10/2025 showed   1.  Decreased uptake within the vertebral arteries, while uptake is above the liver, grade 3, these are significantly decreased from prior June.  2.  Decreased uptake in the aorta and its major branches, grade 1.  3.  New FDG avid right middle lobe pulmonary nodule and right basilar atelectasis, with linear uptake within the left lower lobe infrahilar region, obscured by respiratory motion. These foci could represent inflammatory or infectious changes but are incompletely characterized.  Recommend dedicated full inspiration chest CT within 4-6 weeks for better characterization and assessment of lesion trajectory.  4.  Findings suggestive of new 10th rib nondisplaced fracture.      PET scan on 10/11 showed   1.  Findings consistent with active vasculitis (grade 3 uptake), with heterogeneous increased avidity throughout the major vessels, which  includes the thoracic and abdominal aorta, with the most intense foci residing within the vertebral arteries, as well as the proximal upper extremity and throughout the lower extremity vasculature, as described above.     2.  Scattered foci of increased uptake also noted throughout the musculature, which may reflect foci of vascular inflammation not well characterized due to lack of IV contrast.  3.  Focal uptake within the left pelvis, which may correlate with adjacent diverticula, and therefore may represent early or developing diverticulitis. Recommend correlation patient's symptoms.    CTA chest, abdomen, pelvis 2024 focal dilatation  of the distal celiac, distal renal, mild groundglass opacity in the right upper lobe     CT sinus 2024 deviated nasal septum without mucosal thickening or osteitis     MR orbits, 9/2020:   1.There is no definite abnormal contrast enhancement or mass. No evidence of leukemic infiltration.  2. Regarding the remainder of the brain, abnormal T2 hyperintense bone marrow signal with associated enhancement in the skull, likely representing leukemic infiltration.  3. Subcutaneous T2 hyperintensity with associated diffusion restriction and contrast enhancement over the left zygomatic region, question leukemic infiltration.     CT sinus, 10/2019:  Left maxillary sinusitis with small air-fluid level, new since 6/17/2015. In the appropriate clinical setting, this could reflect acute sinusitis       Subjective     Visit date November 6, 2024    Yury is a 83 year old male who presents today for follow up.    Since the last visit,    He was evaluated by oncology in December 2024 and he was felt to be stable     He was evaluated by Ophthalmology in December 2024 and he was felt to be stable     - Workup 1/02/2025 showed:    Hemoglobin overall stable  WBC  elevated  Platelets  within normal limits   ESR  within normal limits   CRP  within normal limits   AST  within normal limits   Creatinine  within normal limits     Today, Yury mentioned the following:    He denies headache, jaw claudications, scalp tenderness, vision changes, shoulder/hip stiffness in the morning.   He denies arm or leg claudications with activities.  He denies fever, chills, unintentional weight loss.     He denies recent trauma or pain in the rib cage.     He has been having swelling in his ankles for 3 weeks and has been doing down. He did not have ankle swelling when he took actemra before.     He had root canal procedure around 1/7 and He has been holding Actemra since then. He is not taking antibiotics for his teeth.     ROS    Current Medications    Prednisone 20 mg daily for 3 weeks  Actemra injection once weekly, currently on hold for root canal   Bactrim once daily   Pepcid once daily     Past Medical history   Past Medical History:   Diagnosis Date    Agatston coronary artery calcium score greater than 400, 2023 but stress echo negative     CLL (chronic lymphocytic leukemia) (H)     Giant cell arteritis (H), Tempral artery BX proven and treated with steroids etc follows Rheum     Osteopenia        Objective   /74   Pulse 82   Wt 54.4 kg (120 lb)   BMI 19.08 kg/m        PHYSICAL EXAMINATION  Physical Exam  Phone visit     Assessment & Plan     # Giant cell arteritis, biopsy proven  # Recurrent sinus obstruction  # Chronic lymphocytic leukemia     # Screening for chronic infections  # Longitudinal care     Cabrera has been following with [facility] rheumatology for biopsy-proven giant cell arteritis.  Onset: 2020  Involvement:  Visual symptoms, bilateral ischemic optic neuropathy, temporal artery biopsy positive for GCA.   No headaches, scalp tenderness, PMR.  Comorbid conditions: CLL following with oncology     Relapse in 9/2024 with Large vessel vasculitis on PET, Asymptomatic      Treatment included: Prednisone, tocilizumab [[id number]].      # Giant cell arteritis, biopsy proven  # Large vessel vasculitis, Active   # Diverticular disease on PET scan, asymptomatic      Yury is presenting today for follow up.  He is tolerating prednisone and Actemra   He is asymptomatic from GCA standpoint.    He had follow up PET scan that showed improvement in the vasculitis. There was no new vascular uptake which is encouraging.   We discussed to pursue prednisone and Actemra as discussed along with aspirin daily along with bactrim and pepcid for GI prophylaxis.   He will need clearance from his dentist prior to restarting Actemra.      Plan:    - Continue prednisone with tapering as below.     - Restart Actemra once weekly injections if cleared from dentist     - If  he develop recurrence of vision loss then emergent evaluation at the emergency room.      - Continue Bactrim daily     - Continue Pepcid daily     - Continue with aspirin 81 mg daily     Prednisone tapering schedule:    Take prednisone 15 mg daily for 2 week  Then prednisone 12.5 mg daily for 2 week  Then prednisone 10 mg daily for 2 week    Then prednisone 9 mg daily for 2 weeks  Then prednisone 8 mg daily for 2 weeks  Then prednisone 7 mg daily for 2 weeks  Then prednisone 6 mg daily for 2 weeks  Then prednisone 5 mg daily for 2 weeks till the next follow up.     If you experience any symptoms suggestive of flare including (recurrence of headache, scalp tenderness, jaw claudications, arm/leg claudications, unexplained fever, weight loss) while tapering the prednisone, then we would suggest not to taper prednisone further and increase prednisone back to the previous dose that was controlling your symptoms and reach out to Rheumatology office        # Recurrent sinus obstruction   -  Following with ENT outside      # Chronic lymphocytic leukemia   Follows with oncology.  WBC is elevated from prednisone.   Recommended to continue taking aspirin and follow up with his oncologist.     # FDG Avid right middle lobe nodule on PET scan  Recommended follow up with pulmonary      # History of Osteopenia   # Rib fracture on PET scan  He is working with his primary care provider to get repeat DEXA scan.  I recommended to continue working with his primary care for repeat DEXA scan and treatment of Osteoporosis       # High risk medication requiring frequent lab tests for toxicity monitoring   Actemra   Labs reviewed on 1/2/2025, CBC,AST, Creatinine no signs of concerning toxicity   Standing orders in place     Risks and side effects including the risk of diverticulitis and perforation with tocilizumab as well as immunosuppression side effects.      # Screening for chronic infections  2024  Hepatitis B surface antigen:  Negative  Hepatitis B core: Negative  Hepatitis B surface antibody: Negative   Hepatitis C antibody: Negative   2020  QuantiFERON gold: Negative    # Longitudinal care  The longitudinal plan of care for the diagnosis(es)/condition(s) as documented were addressed during this visit. Due to the added complexity in care, I will continue to support Yury in the subsequent management and with ongoing continuity of care.       53 minutes spent by me on the date of the encounter doing chart review, history and exam, documentation and further activities per the note      Return in about 9 weeks (around 3/26/2025).    Roman Rice MD  [facility] RHEUMATOLOGY

## 2025-01-22 NOTE — NURSING NOTE
Current patient location: [address] [facility] [address]    Is the patient currently in the state of [place]? YES    Visit mode: TELEPHONE    If the visit is dropped, the patient can be reconnected by:TELEPHONE VISIT: Phone number:   Telephone Information:   Mobile [phone]       Will anyone else be joining the visit? NO  (If patient encounters technical issues they should call [phone])    Are changes needed to the allergy or medication list? No    Patient denies any changes and states that all information remains accurate since last reviewed/verified.     Are refills needed on medications prescribed by this physician? NO    Rooming Documentation:  Questionnaire(s) completed    Reason for visit: RECHECK    SULMA StearnsF

## 2025-01-22 NOTE — PATIENT INSTRUCTIONS
Our plan for today is:    - Tests:    Labs with next appointment     - Medications:    - Restart Actemra once weekly injections if cleared from dentist.     Take prednisone 15 mg daily for 2 week  Then prednisone 12.5 mg daily for 2 week  Then prednisone 10 mg daily for 2 week    Then prednisone 9 mg daily for 2 weeks  Then prednisone 8 mg daily for 2 weeks  Then prednisone 7 mg daily for 2 weeks  Then prednisone 6 mg daily for 2 weeks  Then prednisone 5 mg daily for 2 weeks

## 2025-01-28 ENCOUNTER — TELEPHONE (OUTPATIENT)
Dept: RHEUMATOLOGY | Facility: CLINIC | Age: 85
End: 2025-01-28
Payer: MEDICARE

## 2025-01-28 ENCOUNTER — TELEPHONE (OUTPATIENT)
Dept: RHEUMATOLOGY | Facility: CLINIC | Age: 85
End: 2025-01-28

## 2025-01-28 NOTE — TELEPHONE ENCOUNTER
Patient called to update on dental procedure as he states that Dr. Rice wanted him to find out from endodontist if tooth was infected. Patient reports checked with endodontist to see if it was an infection and he was left a voicemail as follow up. Patient played voicemail for writer and the doctor did indicate that there was infection present before root canal took place. Patient said if there are further questions, the endodontist, Sebastián Montiel can be reached at [phone].    Patient is wondering when he can restart tocilizumab (Actemra). He said he does not have follow up with endodontist for 6 months.     Patient also has questions regarding PET scan. He reports Dr. Rice advised him to follow up with pulm. He said his pulmonologist told him he didn't need to be seen again and he is unsure of the reasoning he should follow up.     Patient also wondering about rib fracture seen in PET scan and if there is anything he needs to do about it.     Message routed to provider to advise.

## 2025-01-28 NOTE — TELEPHONE ENCOUNTER
Called pt and LVM asking him to call back to schedule appt for lab and follow up with Dr. Rice

## 2025-01-29 NOTE — TELEPHONE ENCOUNTER
I called his Endodontist and there was no evidence of abscess and there was no concerns for resuming Actemra based on Endodontist evaluation.   Okay to resume Actemra from Rheumatology standpoint.    Roman Rice MD

## 2025-01-30 NOTE — TELEPHONE ENCOUNTER
"Call to patient to relay information per Dr. Rice.       \"- He should follow up with pulmonary for right middle lobe nodule that was active on PET scan    - He should follow up with his primary care provider for the rib fracture and consideration for osteoporosis therapy. His primary care provider was in the process already of evaluating this with DEXA scan so suggest to continue following with his primary care provider in this regard.    -I called his Endodontist and there was no evidence of abscess and there was no concerns for resuming Actemra based on Endodontist evaluation. Okay to resume Actemra from Rheumatology standpoint.\"    Patient verbalized understanding. Patient also scheduled for 60 min follow up appointment per Dr. Rice. Patient provided with lab phone number to schedule lab appointment a1 week prior.     Annetta Pena RN    "

## 2025-02-28 ENCOUNTER — TELEPHONE (OUTPATIENT)
Dept: RHEUMATOLOGY | Facility: CLINIC | Age: 85
End: 2025-02-28
Payer: MEDICARE

## 2025-02-28 NOTE — TELEPHONE ENCOUNTER
Yury called to inform that he was charged for Tb antigen test code 54111 11/4/24. He said he contacted billing and he was told it was not covered and he would need to pay $236. He is going to contact his insurance to find out why it was denied and what is needed from provider to appeal charge. Message sent to provider to see if letter could be written for patient's insurance appeal. Patient reports will call writer with update on what he hears from insurance.     Annetta Pena RN

## 2025-02-28 NOTE — LETTER
2/28/2025       RE: Bon Michael  [address] [address]     To whom it may concern,    Yury has biopsy-proven giant cell arteritis with large vessel vasculitis requiring treatment with biologic agent [tocilizumab] and part of the evaluation prebiologic initiation is to screen for tuberculosis using QuantiFERON gold test which was performed on November 4, 2024.    I would recommend the test to be covered by his insurance as part of the recommended prebiologic screening prior to initiation of tocilizumab.    Please reach out if there are any questions or concerns    Sincerely,  Roman Rice MD      Medicare Claims Office   [employer]   [address]  [place] [address]

## 2025-03-03 DIAGNOSIS — M31.6 GCA (GIANT CELL ARTERITIS) (H): ICD-10-CM

## 2025-03-03 DIAGNOSIS — M31.8 SYSTEMIC VASCULITIS (H): ICD-10-CM

## 2025-03-03 NOTE — TELEPHONE ENCOUNTER
Yury called to say he spoke with medicare and they would like the appeal letter mailed to the following address:    Medicare Claims Office   [employer]   [address]  [facility] [place] [address]            Annetta Pena RN

## 2025-03-03 NOTE — TELEPHONE ENCOUNTER
Yury called requesting refill of Bactrim and Pepcid. Patient currently reports he is on 9 mg of prednisone in taper. Message routed to provider to review and sign if indicated.      famotidine (PEPCID)   Last Written Prescription Date:  1/22/25  Last Fill Quantity: 60,  # refills: 0   Last office visit: 10/25/2024 ; last virtual visit: 1/22/2025 with prescribing provider:  Dr. Rice   Future Office Visit:  3/21/25      sulfamethoxazole-trimethoprim (BACTRIM)   Last Written Prescription Date:  1/22/25  Last Fill Quantity: 60,  # refills: 0   Last office visit: 10/25/2024 ; last virtual visit: 1/22/2025 with prescribing provider:  Dr. Rice   Future Office Visit:  3/21/25        Annetta Pena RN

## 2025-03-04 DIAGNOSIS — M31.8 SYSTEMIC VASCULITIS (H): ICD-10-CM

## 2025-03-04 DIAGNOSIS — M31.6 GCA (GIANT CELL ARTERITIS) (H): ICD-10-CM

## 2025-03-04 RX ORDER — FAMOTIDINE 40 MG/1
40 TABLET, FILM COATED ORAL DAILY
Qty: 60 TABLET | Refills: 0 | OUTPATIENT
Start: 2025-03-04

## 2025-03-04 RX ORDER — FAMOTIDINE 40 MG/1
40 TABLET, FILM COATED ORAL DAILY
Qty: 60 TABLET | Refills: 0 | Status: SHIPPED | OUTPATIENT
Start: 2025-03-04

## 2025-03-04 RX ORDER — SULFAMETHOXAZOLE AND TRIMETHOPRIM 400; 80 MG/1; MG/1
1 TABLET ORAL DAILY
Qty: 60 TABLET | Refills: 0 | Status: SHIPPED | OUTPATIENT
Start: 2025-03-04

## 2025-03-18 ENCOUNTER — LAB (OUTPATIENT)
Dept: LAB | Facility: CLINIC | Age: 85
End: 2025-03-18
Payer: MEDICARE

## 2025-03-18 DIAGNOSIS — M31.6 GCA (GIANT CELL ARTERITIS) (H): ICD-10-CM

## 2025-03-18 LAB
BASOPHILS # BLD MANUAL: 0 10E3/UL (ref 0–0.2)
BASOPHILS NFR BLD MANUAL: 0 %
EOSINOPHIL # BLD MANUAL: 0 10E3/UL (ref 0–0.7)
EOSINOPHIL NFR BLD MANUAL: 0 %
ERYTHROCYTE [DISTWIDTH] IN BLOOD BY AUTOMATED COUNT: 13.1 % (ref 10–15)
HCT VFR BLD AUTO: 43 % (ref 40–53)
HGB BLD-MCNC: 13.9 G/DL (ref 13.3–17.7)
LYMPHOCYTES # BLD MANUAL: 19.8 10E3/UL (ref 0.8–5.3)
LYMPHOCYTES NFR BLD MANUAL: 81 %
MCH RBC QN AUTO: 32.8 PG (ref 26.5–33)
MCHC RBC AUTO-ENTMCNC: 32.3 G/DL (ref 31.5–36.5)
MCV RBC AUTO: 101 FL (ref 78–100)
MONOCYTES # BLD MANUAL: 1.7 10E3/UL (ref 0–1.3)
MONOCYTES NFR BLD MANUAL: 7 %
NEUTROPHILS # BLD MANUAL: 2.9 10E3/UL (ref 1.6–8.3)
NEUTROPHILS NFR BLD MANUAL: 12 %
PLAT MORPH BLD: ABNORMAL
PLATELET # BLD AUTO: 157 10E3/UL (ref 150–450)
RBC # BLD AUTO: 4.24 10E6/UL (ref 4.4–5.9)
RBC MORPH BLD: ABNORMAL
SMUDGE CELLS BLD QL SMEAR: PRESENT
VARIANT LYMPHS BLD QL SMEAR: PRESENT
WBC # BLD AUTO: 24.4 10E3/UL (ref 4–11)

## 2025-03-18 PROCEDURE — 36415 COLL VENOUS BLD VENIPUNCTURE: CPT

## 2025-03-18 PROCEDURE — 85027 COMPLETE CBC AUTOMATED: CPT

## 2025-03-18 PROCEDURE — 85007 BL SMEAR W/DIFF WBC COUNT: CPT

## 2025-03-21 ENCOUNTER — TELEPHONE (OUTPATIENT)
Dept: PULMONOLOGY | Facility: CLINIC | Age: 85
End: 2025-03-21

## 2025-03-21 NOTE — TELEPHONE ENCOUNTER
MARYANNE saravia to the patient regarding N95.    Tamie Guan LPN  Pulmonary Medicine:  [facility]  Phone: [phone] Fax: [phone]

## 2025-03-21 NOTE — TELEPHONE ENCOUNTER
[facility] Call [facility]    Phone Message    May a detailed message be left on voicemail: yes     Reason for Call: Pt is interested in taking a class where he would be surrounded by approximately 40 people for a minimum of 4 hours-would like to know if it would be harmful in any way for him to wear an N95 mask for the duration of that class?     Action Taken: Other: Pulm    Travel Screening: Not Applicable

## 2025-03-24 NOTE — TELEPHONE ENCOUNTER
Spoke with patient ad informed him that wearing an N95 to his upcoming class should cause no harm. Patient expressed understanding.    Tamie Guan LPN  Pulmonary Medicine:  [facility]  Phone: [phone] Fax: [phone]

## 2025-04-15 ENCOUNTER — TELEPHONE (OUTPATIENT)
Dept: RHEUMATOLOGY | Facility: CLINIC | Age: 85
End: 2025-04-15
Payer: MEDICARE

## 2025-04-15 DIAGNOSIS — M31.6 GCA (GIANT CELL ARTERITIS) (H): ICD-10-CM

## 2025-04-15 DIAGNOSIS — M31.8 SYSTEMIC VASCULITIS (H): ICD-10-CM

## 2025-04-15 NOTE — TELEPHONE ENCOUNTER
Patient called and reports he needed a refill of 1 mg prednisone tablets. Writer noted that a prescription for 140 1 mg tablets was sent to [facility] on 3/21. Patient reports he did not see that a script was sent over but that he will call [facility] and will notify if there is any problem with getting his refill.    Annetta Pena RN

## 2025-04-21 ENCOUNTER — VIRTUAL VISIT (OUTPATIENT)
Dept: PHARMACY | Facility: CLINIC | Age: 85
End: 2025-04-21
Attending: STUDENT IN AN ORGANIZED HEALTH CARE EDUCATION/TRAINING PROGRAM
Payer: MEDICARE

## 2025-04-21 DIAGNOSIS — M31.6 GIANT CELL ARTERITIS (H): Primary | ICD-10-CM

## 2025-04-22 NOTE — PROGRESS NOTES
"Medication Therapy Management (MTM) Encounter    ASSESSMENT:                            Medication Adherence/Access: {adherencechoices:213706}    ***:  ***      PLAN:                            ***    Follow-up: {followuptest2:754619}    SUBJECTIVE/OBJECTIVE:                          Yury Michael is a 84 year old male seen for {mtmvisit:393101}     Reason for visit: ***.    Allergies/ADRs: {1/2/3/4/5:792877}  Past Medical History: {1/2/3/4/5:234150}  Tobacco: He reports that he has never smoked. He has never been exposed to tobacco smoke. He has never used smokeless tobacco.  Alcohol: {ALCOHOL CONSUMPTION HX:680034}  {Social and Goals:284044}    Medication Adherence/Access: {fumedadherence:649916}    ***:   ***    {MTM SUBJECTIVE (Optional):713982}      Today's Vitals: There were no vitals taken for this visit.  ----------------  {INGE?:445923}    I spent {mtm total time 3:073117} with this patient today. { :998126}.     A summary of these recommendations {GIVEN/NOT GIVEN:066050}.    ***    Telemedicine Visit Details  The patient's medications can be safely assessed via a telemedicine encounter.  Type of service:  {telemedvisitmtm:126482::\"Telephone visit\"}  Originating Location (pt. Location): {video visit patient location:767285::\"Home\"}  {PROVIDER LOCATION On-site should be selected for visits conducted from your clinic location or adjoining [facility] hospital, academic office, or other nearby [facility] building. Off-site should be selected for all other provider locations, including home:947180}  Distant Location (provider location):  {virtual location provider:959871}  Start Time: {video/phone visit start time:152948}  End Time: {video/phone visit end time:152948}     Medication Therapy Recommendations  No medication therapy recommendations to display     " discussed needing a repeat DEXA scan and osteoporosis treatment at last rheumatology appointment. Notes he is very concerned about this as he thinks he needs more dental work and he read osteoporosis medications cannot be used during certain dental procedures.    Last lab monitoring completed: 3/10/25    Today's Vitals: There were no vitals taken for this visit.  ----------------    I spent 30 minutes with this patient today. All changes were made via collaborative practice agreement with Roman Rice.     A summary of these recommendations was sent via [employer].    Estela Rolon, PharmD  Medication Therapy Management Pharmacist  [facility]  Phone: [phone]    Telemedicine Visit Details  The patient's medications can be safely assessed via a telemedicine encounter.  Type of service:  Telephone visit  Originating Location (pt. Location): Home    Distant Location (provider location):  Off-site  Start Time: 2:00 PM  End Time: 2:30 PM     Medication Therapy Recommendations  No medication therapy recommendations to display

## 2025-04-29 ENCOUNTER — TELEPHONE (OUTPATIENT)
Dept: PULMONOLOGY | Facility: CLINIC | Age: 85
End: 2025-04-29

## 2025-04-29 DIAGNOSIS — M31.6 GIANT CELL ARTERITIS (H): ICD-10-CM

## 2025-04-29 DIAGNOSIS — R91.8 GROUND GLASS OPACITY PRESENT ON IMAGING OF LUNG: Primary | ICD-10-CM

## 2025-04-29 RX ORDER — TOCILIZUMAB 180 MG/ML
162 INJECTION, SOLUTION SUBCUTANEOUS WEEKLY
Qty: 3.6 ML | Refills: 5 | Status: SHIPPED | OUTPATIENT
Start: 2025-04-29

## 2025-04-29 NOTE — TELEPHONE ENCOUNTER
Ray for telephone visit. Message sent to scheduling to assist.    Tamie Guan LPN  Pulmonary Medicine:  [facility]  Phone: [phone] Fax: [phone]

## 2025-04-29 NOTE — TELEPHONE ENCOUNTER
[facility] Call Center    Phone Message    May a detailed message be left on voicemail: yes     Reason for Call: Other: Patient wondering if appointment on 6/16 could be switched to telephone only. Not virtual. Please advise at [phone].     Action Taken: Message routed to:  Other: PULM    Travel Screening: Not Applicable     Date of Service: 4/29/25

## 2025-04-29 NOTE — TELEPHONE ENCOUNTER
tocilizumab (ACTEMRA) 162 MG/0.9ML   Last Written Prescription Date:  10/30/24  Last Fill Quantity: 3.6 mL,  # refills: 5   Last office visit: 10/25/2024 ; last virtual visit: 3/21/2025 with prescribing provider:     Future Office Visit:  7/3/25    Requested Prescriptions   Pending Prescriptions Disp Refills    tocilizumab (ACTEMRA) 162 MG/0.9ML subcutaneous injection 3.6 mL 5     Sig: Inject 0.9 mLs (162 mg) subcutaneously once a week.       Anentta Pena, RN

## 2025-04-30 NOTE — PATIENT INSTRUCTIONS
"Recommendations from today's MTM visit:                                                       1. Continue Actemra 162 mg weekly and all other medications as prescribed.    2. Talk to your primary care doctor about getting a repeat bone scan (DEXA) to determine if you need treatment for osteoporosis.    Follow-up: Return in about 6 months (around 10/21/2025) for MTM Pharmacist Visit.    It was great speaking with you today.  I value your experience and would be very thankful for your time in providing feedback in our clinic survey. In the next few days, you may receive an email or text message from [employer] with a link to a survey related to your  clinical pharmacist.\"     To schedule another MTM appointment, please call the clinic directly or you may call the MTM scheduling line at [phone].    My Clinical Pharmacist's contact information:                                                      Please feel free to contact me with any questions or concerns you have.      Estela Rolon, PharmD  Medication Therapy Management Pharmacist  [facility]  Phone: [phone]     "

## 2025-05-05 DIAGNOSIS — M31.8 SYSTEMIC VASCULITIS (H): ICD-10-CM

## 2025-05-05 DIAGNOSIS — M31.6 GCA (GIANT CELL ARTERITIS) (H): ICD-10-CM

## 2025-05-05 NOTE — TELEPHONE ENCOUNTER
Patient called requesting refill of famotidine and wondering if he is to continue taking it once prednisone taper is completed. Order pended and sent to provider to review and sign if indicated     Last Written Prescription Date:  3/21/25  Last Fill Quantity: 60,  # refills: 0   Last office visit: 10/25/2024 ; last virtual visit: 3/21/2025 with prescribing provider:  Dr. Rice   Future Office Visit:  7/3/25      Requested Prescriptions   Pending Prescriptions Disp Refills    famotidine (PEPCID) 40 MG tablet 60 tablet 0     Sig: Take 1 tablet (40 mg) by mouth daily.       Annetta Pena RN

## 2025-05-06 RX ORDER — FAMOTIDINE 40 MG/1
40 TABLET, FILM COATED ORAL DAILY
Qty: 60 TABLET | Refills: 0 | Status: SHIPPED | OUTPATIENT
Start: 2025-05-06

## 2025-05-14 ENCOUNTER — OFFICE VISIT (OUTPATIENT)
Dept: OPHTHALMOLOGY | Facility: CLINIC | Age: 85
End: 2025-05-14
Attending: OPHTHALMOLOGY
Payer: MEDICARE

## 2025-05-14 DIAGNOSIS — H47.10 PAPILLEDEMA: ICD-10-CM

## 2025-05-14 DIAGNOSIS — H47.20 PARTIAL OPTIC ATROPHY: Primary | ICD-10-CM

## 2025-05-14 PROCEDURE — 92133 CPTRZD OPH DX IMG PST SGM ON: CPT | Performed by: OPHTHALMOLOGY

## 2025-05-14 PROCEDURE — 92083 EXTENDED VISUAL FIELD XM: CPT | Performed by: OPHTHALMOLOGY

## 2025-05-14 PROCEDURE — 92133 CPTRZD OPH DX IMG PST SGM ON: CPT | Mod: 26 | Performed by: OPHTHALMOLOGY

## 2025-05-14 PROCEDURE — G0463 HOSPITAL OUTPT CLINIC VISIT: HCPCS | Performed by: OPHTHALMOLOGY

## 2025-05-14 PROCEDURE — 92083 EXTENDED VISUAL FIELD XM: CPT | Mod: 26 | Performed by: OPHTHALMOLOGY

## 2025-05-14 PROCEDURE — 92014 COMPRE OPH EXAM EST PT 1/>: CPT | Performed by: OPHTHALMOLOGY

## 2025-05-14 RX ORDER — MAGNESIUM OXIDE 400 MG/1
TABLET ORAL
COMMUNITY

## 2025-05-14 ASSESSMENT — CUP TO DISC RATIO
OD_RATIO: 0.3
OS_RATIO: 0.3

## 2025-05-14 ASSESSMENT — TONOMETRY
OD_IOP_MMHG: 18
IOP_METHOD: ICARE
OS_IOP_MMHG: 15

## 2025-05-14 ASSESSMENT — VISUAL ACUITY
METHOD: SNELLEN - LINEAR
OD_CC+: +2
OD_CC: 20/25
CORRECTION_TYPE: GLASSES
OS_CC+: -2
OS_CC: 20/25

## 2025-05-14 ASSESSMENT — CONF VISUAL FIELD
OD_SUPERIOR_TEMPORAL_RESTRICTION: 0
METHOD: COUNTING FINGERS
OD_INFERIOR_TEMPORAL_RESTRICTION: 0
OD_SUPERIOR_NASAL_RESTRICTION: 0
OD_INFERIOR_NASAL_RESTRICTION: 0
OS_INFERIOR_TEMPORAL_RESTRICTION: 3
OD_NORMAL: 1

## 2025-05-14 ASSESSMENT — REFRACTION_WEARINGRX
OD_SPHERE: +3.25
OS_CYLINDER: +1.00
OD_ADD: +2.75
OD_CYLINDER: +0.50
OS_AXIS: 103
OD_AXIS: 165
OS_SPHERE: +5.75
SPECS_TYPE: PAL
OS_ADD: +2.75

## 2025-05-14 ASSESSMENT — SLIT LAMP EXAM - LIDS
COMMENTS: NORMAL
COMMENTS: NORMAL

## 2025-05-14 NOTE — PROGRESS NOTES
1. 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       Atypical presentation without elevated acute phase reactants, no PMR symptoms, predominantly peripheral field losses. He completed an oral prednisone taper in early 2021 and stopped methotrexate in mid-2022 (when Actemra in shortage) with no evidence of disease recurrence since. Patient also has CLL which is managed by oncology, monitored every 6 months.   Treated with Actemra which was stopped September 2023.    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  He was placed back on higher doses of prednisone, which he is now tapering and has restarted Actemra weekly.     Today patient has no new acute vision change complaints, still no signs of active giant cell arteritis  (no evidence of new vision loss or new optic nerve ischemia and no symptoms of giant cell arteritis recurrence).  He has had gradual subjective (not corroborated on testing) mild worsening of vision that I believe is cataract related but his cataracts are still not quite sufficiently dense or debilitating to warrant surgery at this time. He may need surgery however in the next 1-2 years.      He continues to follow with Rheumatology; currently on prednisone taper and has re-initiated actemra.     2.  Borderline visually significant cataracts - mildly symptomatic, discussed as contributing factor to mild vision changes.     3. 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 Present since at least 04/2022.      Follow-up in 6 months with neuro-ophthalmology. Notify us as soon as possible for any acute vision changes.     Historical data from initial visit (9/2020):     Bon Michael is a 79 year old male who presents to neuro-ophthalmology   clinic today for consultation from Jose Andino MD at [facility] for   visual disturbance of left  "eye. He describes it as \"his left eye not   getting enough light\". He describes noticing the change in his left eye's   temporal visual field, inferior > superior. He notices it more first thing   in the morning. These symptoms began on 8/22/2020 and he describes it as   worsening - he is unclear if it the deficit has gotten larger or darker   however. He first noticed it while driving at the periphery of his vision.      Patient saw Dr. Lewis on August 28.  He underwent esr / crp testing as   well as mri (see below).     Labs: 9/2/20- WBC- 50.2, ESR- 28, CRP < 5     On August 31 he developed symptoms of waviness in his peripheral vision in   the right eye.  He went to Dr. Andino on 9/4/2020 who wanted him to go to   the ED but he was unable.  Dr. Andino reviewed the patient's fluorescein   angiography and felt there was choroidal filling delay in the right eye   and pallid edema in the right eye concerning for giant cell arteritis.  We   discussed the case via phone.      PRIOR IMAGING:  PET body scan 10/11/24  IMPRESSION:   1.  Findings consistent with active vasculitis (grade 3 uptake), with  heterogeneous increased avidity throughout the major vessels, which  includes the thoracic and abdominal aorta, with the most intense foci  residing within the vertebral arteries, as well as the proximal upper  extremity and throughout the lower extremity vasculature, as described  above.  2.  Scattered foci of increased uptake also noted throughout the  musculature, which may reflect foci of vascular inflammation not well  characterized due to lack of IV contrast.  3.  Focal uptake within the left pelvis, which may correlate with  adjacent diverticula, and therefore may represent early or developing  diverticulitis. Recommend correlation patient's symptoms.  4.  Incidental CT findings, as above    BRAIN MRI WITH/WO GADOLINIUM, MRA OF THE [place] OF [place]ERNAL AND MRA OF THE   CAROTID ARTERIES - [facility] " (9/1/2020)  IMPRESSION:    1.  No acute intracranial abnormality.  2.  No evidence of an orbital abnormalities.  3.  Mild generalized cerebral atrophy.  4.  Unremarkable MRA of the [place] of [place].  5.  Unremarkable MRA of the cervical vessels.     On my re-review I disagreed with the radiology read.  There is evidence of   patchy enhancement diffusely within the bilateral orbits and along the   bilateral intraorbital optic nerve sheaths.  Will discuss with   neuro-radiology regarding finding and repeat scan.     No bisphosphonates in the past (no osteoporosis medications for 7-8 years   per patient).      Left temporal artery biopsy 9/9/20- positive for giant cell arteritis   Prednisone (started 9/4/2020)  Patient started Actemra 10/28/20.     Stopped Actemra last dose on 9/13/23 after a 3 year course.     Interval HPI, exam, and data since last visit:  Patient was last seen on 1/16/2025 with Dr. Dougherty. At the time, patient restarted Actemra once weekly after his PET/CT showed large vessel vasculitis and continued tapering his prednisone course. Patient is now on 4mg prednisone daily, continuing his taper, finishing in 6-7 weeks.    Today, patient reports no interval changes, besides some blurriness when reading small print on the computer.  He has no trouble reading books or viewing objects distance. He is not experiencing any double vision, or changes to night vision. He continues to see halos around lights when driving at night, but has no symptoms during the day.    Current Medications   Prednisone 4 mg daily   Actemra injection once weekly   Pepcid once daily     Exam today:  Visual acuity 20/25 +2 right eye 20/25 -2 left eye.  Color vision is 11/11 right eye, 11/11 left eye.  Pupils: round and reactive, no APD.  Intraocular pressure 18 right eye and 15 left eye.  Normal CVF right eye, Inferolateral defect left eye on CVF. Anterior segment exam normal.  Fundus exam normal.  Strabismus exam normal.    Tests  ordered and interpreted today:    OCT retinal nerve fiber layer 05/14/25:  Right eye: reliable, mean thickness 59- stable  Left eye: reliable, mean thickness 57 - stable    Automated 24-2 static perimetry: 05/14/25:  Right eye: Inferior arcuate defect- stable  Left eye: Mild Inferior arcuate defect temporally- stable         Zach Field MS4  [facility] [employer]    Complete documentation of historical and exam elements from today's encounter can be found in the full encounter summary report (not reduplicated in this progress note).  I personally re-obtained the chief complaint(s) and history of present illness.  I confirmed and edited as necessary the review of systems, past medical/surgical history, family history, social history, and examination findings as documented by others; and I examined the patient myself.  I personally reviewed the relevant tests, images, and reports as documented above.  I formulated and edited as necessary the assessment and plan and discussed the findings and management plan with the patient and family     A medical student was involved in the care of the patient. I was present with the medical student who participated in the service and in the documentation of the note. I have  verified the history and personally performed the physical exam and medical decision making. I extensively reviewed and edited when necessary the assessment and plan. I agree with the assessment and plan of care as documented in the note    Guille Garcia MD

## 2025-05-14 NOTE — NURSING NOTE
Chief Complaint(s) and History of Present Illness(es)       Follow Up    In both eyes.  Since onset it is stable.  Associated symptoms include Negative for eye pain and headache.  Pain was noted as 0/10. Additional comments: Patient was last seen by Dr. Garcia on 12/12/24 for biopsy proven GCA with bilateral ischemic optic neuropathy.  Yury reports vision stable overall but will sometimes notice better focusing ability while looking at the computer.  There is no eye pain.   ANNETTE Christiansen 5/14/2025 7:09 AM

## 2025-05-14 NOTE — LETTER
May 14, 2025    RE: Bon Michael  : 1940  MRN: [id number]    Dear Providers,    I saw our mutual patient, Bon Michael, in follow-up in my clinic recently.  After a thorough neuro-ophthalmic history and examination, I came to the following conclusions:     1. 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       Atypical presentation without elevated acute phase reactants, no PMR symptoms, predominantly peripheral field losses. He completed an oral prednisone taper in early  and stopped methotrexate in mid- (when Actemra in shortage) with no evidence of disease recurrence since. Patient also has CLL which is managed by oncology, monitored every 6 months.   Treated with Actemra which was stopped 2023.    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  He was placed back on higher doses of prednisone, which he is now tapering and has restarted Actemra weekly.     Today patient has no new acute vision change complaints, still no signs of active giant cell arteritis  (no evidence of new vision loss or new optic nerve ischemia and no symptoms of giant cell arteritis recurrence).  He has had gradual subjective (not corroborated on testing) mild worsening of vision that I believe is cataract related but his cataracts are still not quite sufficiently dense or debilitating to warrant surgery at this time. He may need surgery however in the next 1-2 years.      He continues to follow with Rheumatology; currently on prednisone taper and has re-initiated actemra.     2.  Borderline visually significant cataracts - mildly symptomatic, discussed as contributing factor to mild vision changes.     3. 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 Present since at least 2022.      Follow-up in 6 months with neuro-ophthalmology. Notify us as soon as  "possible for any acute vision changes.     Historical data from initial visit (9/2020):     Bon Michael is a 79 year old male who presents to neuro-ophthalmology   clinic today for consultation from Jose Andino MD at [facility] for   visual disturbance of left eye. He describes it as \"his left eye not   getting enough light\". He describes noticing the change in his left eye's   temporal visual field, inferior > superior. He notices it more first thing   in the morning. These symptoms began on 8/22/2020 and he describes it as   worsening - he is unclear if it the deficit has gotten larger or darker   however. He first noticed it while driving at the periphery of his vision.      Patient saw Dr. Lewis on August 28.  He underwent esr / crp testing as   well as mri (see below).     Labs: 9/2/20- WBC- 50.2, ESR- 28, CRP < 5     On August 31 he developed symptoms of waviness in his peripheral vision in   the right eye.  He went to Dr. Andino on 9/4/2020 who wanted him to go to   the ED but he was unable.  Dr. Andino reviewed the patient's fluorescein   angiography and felt there was choroidal filling delay in the right eye   and pallid edema in the right eye concerning for giant cell arteritis.  We   discussed the case via phone.      PRIOR IMAGING:  PET body scan 10/11/24  IMPRESSION:   1.  Findings consistent with active vasculitis (grade 3 uptake), with  heterogeneous increased avidity throughout the major vessels, which  includes the thoracic and abdominal aorta, with the most intense foci  residing within the vertebral arteries, as well as the proximal upper  extremity and throughout the lower extremity vasculature, as described  above.  2.  Scattered foci of increased uptake also noted throughout the  musculature, which may reflect foci of vascular inflammation not well  characterized due to lack of IV contrast.  3.  Focal uptake within the left pelvis, which may correlate with  adjacent diverticula, and " therefore may represent early or developing  diverticulitis. Recommend correlation patient's symptoms.  4.  Incidental CT findings, as above    BRAIN MRI WITH/WO GADOLINIUM, MRA OF THE [place] OF [place] AND MRA OF THE   CAROTID ARTERIES - [facility] (9/1/2020)  IMPRESSION:    1.  No acute intracranial abnormality.  2.  No evidence of an orbital abnormalities.  3.  Mild generalized cerebral atrophy.  4.  Unremarkable MRA of the [place] of [place].  5.  Unremarkable MRA of the cervical vessels.     On my re-review I disagreed with the radiology read.  There is evidence of   patchy enhancement diffusely within the bilateral orbits and along the   bilateral intraorbital optic nerve sheaths.  Will discuss with   neuro-radiology regarding finding and repeat scan.     No bisphosphonates in the past (no osteoporosis medications for 7-8 years   per patient).      Left temporal artery biopsy 9/9/20- positive for giant cell arteritis   Prednisone (started 9/4/2020)  Patient started Actemra 10/28/20.     Stopped Actemra last dose on 9/13/23 after a 3 year course.     Interval HPI, exam, and data since last visit:  Patient was last seen on 1/16/2025 with Dr. Dougherty. At the time, patient restarted Actemra once weekly after his PET/CT showed large vessel vasculitis and continued tapering his prednisone course. Patient is now on 4mg prednisone daily, continuing his taper, finishing in 6-7 weeks.    Today, patient reports no interval changes, besides some blurriness when reading small print on the computer.  He has no trouble reading books or viewing objects distance. He is not experiencing any double vision, or changes to night vision. He continues to see halos around lights when driving at night, but has no symptoms during the day.    Current Medications   Prednisone 4 mg daily   Actemra injection once weekly   Pepcid once daily     Exam today:  Visual acuity 20/25 +2 right eye 20/25 -2 left eye.  Color vision is 11/11  right eye, 11/11 left eye.  Pupils: round and reactive, no APD.  Intraocular pressure 18 right eye and 15 left eye.  Normal CVF right eye, Inferolateral defect left eye on CVF. Anterior segment exam normal.  Fundus exam normal.  Strabismus exam normal.    Tests ordered and interpreted today:    OCT retinal nerve fiber layer 05/14/25:  Right eye: reliable, mean thickness 59- stable  Left eye: reliable, mean thickness 57 - stable    Automated 24-2 static perimetry: 05/14/25:  Right eye: Inferior arcuate defect- stable  Left eye: Mild Inferior arcuate defect temporally- stable    For further exam details, please feel free to contact our office for additional records.  If you wish to contact me regarding this patient please email me at [email or web address] or give my clinic a call to arrange a phone conversation.    Sincerely,    Guille Garcia MD  , Neuro-Ophthalmology and Adult Strabismus Surgery  The Lucy Castano Chair in Neuro-Ophthalmology  Department of Ophthalmology and Visual Neurosciences  [facility]

## 2025-06-04 NOTE — TELEPHONE ENCOUNTER
Dr. Ordaz reviewed patient follow up appointment. Patient is following up to discuss PET scan that was ordered by rheumatology. Per Dr. Ordaz, patient to be seen by Daly Elliott in IP to discuss pulmonary nodules on PET. Patient will need a CT scan of his chest prior. Order has been entered. Message to be sent to scheduling in order to assist pt with scheduling appointment with Daly Elliott. There are several virtual openings on Thursday 06/26.    Tamie Guan LPN  Pulmonary Medicine:  [facility]  Phone: [phone] Fax: [phone]

## 2025-06-05 NOTE — TELEPHONE ENCOUNTER
Contacted patient to explain the change in provider. Patient expressed understanding and is agreeable.    Tamie Guan LPN  Pulmonary Medicine:  [facility]  Phone: [phone] Fax: [phone]

## 2025-06-05 NOTE — TELEPHONE ENCOUNTER
MCM sent to the patient informing him of the change in provider. Patient to cancel 06/16 visit with Dr. Ordaz and schedule CT chest and follow up with Daly Elliott. Message has been sent to scheduling to request assistance.    Tamie Guan LPN  Pulmonary Medicine:  [facility]  Phone: [phone] Fax: [phone]

## 2025-06-19 ENCOUNTER — ANCILLARY PROCEDURE (OUTPATIENT)
Dept: CT IMAGING | Facility: CLINIC | Age: 85
End: 2025-06-19
Attending: PHYSICIAN ASSISTANT
Payer: MEDICARE

## 2025-06-19 DIAGNOSIS — R91.8 GROUND GLASS OPACITY PRESENT ON IMAGING OF LUNG: ICD-10-CM

## 2025-06-19 PROCEDURE — 71250 CT THORAX DX C-: CPT | Mod: GC | Performed by: RADIOLOGY

## 2025-06-20 ENCOUNTER — TELEPHONE (OUTPATIENT)
Dept: RHEUMATOLOGY | Facility: CLINIC | Age: 85
End: 2025-06-20
Payer: MEDICARE

## 2025-06-20 DIAGNOSIS — M31.8 SYSTEMIC VASCULITIS (H): ICD-10-CM

## 2025-06-20 DIAGNOSIS — M31.6 GCA (GIANT CELL ARTERITIS) (H): Primary | ICD-10-CM

## 2025-06-20 DIAGNOSIS — M31.6 GIANT CELL ARTERITIS (H): ICD-10-CM

## 2025-06-20 NOTE — TELEPHONE ENCOUNTER
Patient called to notify writer that he has 1 week left of prednisone taper and will be done with it on 6/30. Patient also asking about alendronate and what it is for. Writer discussed briefly but noted that pharmacy had reached out to him by [employer] message to schedule appointment with MTM to discuss alendronate further. Patient provided with scheduling number to contact. Patient reports will call to set up. Writer also noted that patient has lab appointment on 6/26 before appointment on 7/3 with Dr. Rice. There are currently no standing orders. Orders pended and sent to provider to review and sign.

## 2025-06-21 ENCOUNTER — HEALTH MAINTENANCE LETTER (OUTPATIENT)
Age: 85
End: 2025-06-21

## 2025-06-21 RX ORDER — PREDNISONE 1 MG/1
TABLET ORAL
Qty: 60 TABLET | Refills: 0 | Status: SHIPPED | OUTPATIENT
Start: 2025-06-21

## 2025-06-24 ENCOUNTER — VIRTUAL VISIT (OUTPATIENT)
Dept: PHARMACY | Facility: CLINIC | Age: 85
End: 2025-06-24
Attending: STUDENT IN AN ORGANIZED HEALTH CARE EDUCATION/TRAINING PROGRAM
Payer: MEDICARE

## 2025-06-24 DIAGNOSIS — M31.6 GIANT CELL ARTERITIS (H): Primary | ICD-10-CM

## 2025-06-26 ENCOUNTER — LAB (OUTPATIENT)
Dept: LAB | Facility: CLINIC | Age: 85
End: 2025-06-26
Payer: MEDICARE

## 2025-06-26 DIAGNOSIS — M31.6 GCA (GIANT CELL ARTERITIS) (H): ICD-10-CM

## 2025-06-26 DIAGNOSIS — M31.6 GIANT CELL ARTERITIS (H): ICD-10-CM

## 2025-06-26 DIAGNOSIS — M31.8 SYSTEMIC VASCULITIS (H): ICD-10-CM

## 2025-06-26 LAB
ALT SERPL W P-5'-P-CCNC: 25 U/L (ref 0–70)
AST SERPL W P-5'-P-CCNC: 40 U/L (ref 0–45)
BASOPHILS # BLD AUTO: 0.1 10E3/UL (ref 0–0.2)
BASOPHILS NFR BLD AUTO: 0 %
CREAT SERPL-MCNC: 0.93 MG/DL (ref 0.67–1.17)
CRP SERPL-MCNC: <3 MG/L
EGFRCR SERPLBLD CKD-EPI 2021: 82 ML/MIN/1.73M2
ELLIPTOCYTES BLD QL SMEAR: SLIGHT
EOSINOPHIL # BLD AUTO: 0.1 10E3/UL (ref 0–0.7)
EOSINOPHIL NFR BLD AUTO: 1 %
ERYTHROCYTE [DISTWIDTH] IN BLOOD BY AUTOMATED COUNT: 11.6 % (ref 10–15)
HCT VFR BLD AUTO: 40.7 % (ref 40–53)
HGB BLD-MCNC: 13.6 G/DL (ref 13.3–17.7)
IMM GRANULOCYTES # BLD: 0 10E3/UL
IMM GRANULOCYTES NFR BLD: 0 %
LYMPHOCYTES # BLD AUTO: 12.4 10E3/UL (ref 0.8–5.3)
LYMPHOCYTES NFR BLD AUTO: 67 %
MCH RBC QN AUTO: 32.9 PG (ref 26.5–33)
MCHC RBC AUTO-ENTMCNC: 33.4 G/DL (ref 31.5–36.5)
MCV RBC AUTO: 98 FL (ref 78–100)
MONOCYTES # BLD AUTO: 2.2 10E3/UL (ref 0–1.3)
MONOCYTES NFR BLD AUTO: 12 %
NEUTROPHILS # BLD AUTO: 3.8 10E3/UL (ref 1.6–8.3)
NEUTROPHILS NFR BLD AUTO: 21 %
NRBC # BLD AUTO: 0 10E3/UL
NRBC BLD AUTO-RTO: 0 /100
PLAT MORPH BLD: ABNORMAL
PLATELET # BLD AUTO: 118 10E3/UL (ref 150–450)
RBC # BLD AUTO: 4.14 10E6/UL (ref 4.4–5.9)
RBC MORPH BLD: ABNORMAL
SMUDGE CELLS BLD QL SMEAR: PRESENT
VARIANT LYMPHS BLD QL SMEAR: PRESENT
WBC # BLD AUTO: 18.6 10E3/UL (ref 4–11)

## 2025-06-30 ENCOUNTER — VIRTUAL VISIT (OUTPATIENT)
Dept: PULMONOLOGY | Facility: CLINIC | Age: 85
End: 2025-06-30
Payer: MEDICARE

## 2025-06-30 DIAGNOSIS — R91.8 GROUND GLASS OPACITY PRESENT ON IMAGING OF LUNG: Primary | ICD-10-CM

## 2025-06-30 PROCEDURE — 1126F AMNT PAIN NOTED NONE PRSNT: CPT | Mod: 93 | Performed by: PHYSICIAN ASSISTANT

## 2025-06-30 PROCEDURE — 98013 SYNCH AUDIO-ONLY EST LOW 20: CPT | Performed by: PHYSICIAN ASSISTANT

## 2025-06-30 NOTE — PROGRESS NOTES
"Virtual Visit Details    Type of service:  Telephone Visit   Phone call duration: 18 minutes   Originating Location (pt. Location): Home    Distant Location (provider location):  On-site  Telephone visit completed due to the patient did not have access to video, while the distant provider did.    LUNG NODULE & INTERVENTIONAL PULMONARY CLINIC  [facility]     Bon Michael MRN# [id number]   Age: 84 year old YOB: 1940     Reason for Consultation: lung abnormality     Requesting Physician: Referred Self, MD  No address on file       Assessment and Plan:    1. RML nodule initially seen on PET 1/2025 is resolved on CT chest 6/19/2025. Otherwise stable sub-4mm lung nodules which are benign as they are unchanged from CT chest 4/2024, and do not require further follow up.       Follow up EMRE Elliott PA-C  Interventional and General Pulmonology  Department of Pulmonary, Allergy, Critical Care and Sleep Medicine   [facility]     25 minutes spent reviewing chart, reviewing test results, talking with and examining patient, formulating plan, and documentation on the day of the encounter.        History:     Bon Michael is a 84 year old male with who is here for lung abnormality .    Seen with Dr. Ordaz 11/2024 for GGOs with concern for pulmonary manifestation of vasculitis. He did not have any respiratory symptoms at the time. The GGO's resolved on follow up CT chest. No evidence of vasculitis involvement in the lung.     No shortness of breath. He has a little bit of a cough from postnasal drip. His main complaint today is \"stuffiness\". Uses flonase and tried an OTC antihistamine without resolution. He was using tap water sinus rinses but stopped. Seen by ENT within the past year without improvement.     - Personal hx of cancer: DLBCL, CLL  - Family hx of cancer: dad had ?colon cancer  - Exposure hx: Denies asbestos or radon exposure , no known TB " exposures  - Tobacco hx: never  - Images reviewed this visit: RML nodule   - My interpretation of the PFT's relevant for this visit includes: 7/2024 Normal     Other pertinent active medical problems include:   - giant cell arteritis         Past Medical History:      Past Medical History:   Diagnosis Date    Agatston coronary artery calcium score greater than 400, 2023 but stress echo negative     CLL (chronic lymphocytic leukemia) (H)     Giant cell arteritis (H), Tempral artery BX proven and treated with steroids etc follows Rheum     Osteopenia            Past Surgical History:      Past Surgical History:   Procedure Laterality Date    OTHER SURGICAL HISTORY      Hernia repair bilateral inguinal    OTHER SURGICAL HISTORY      nasal surgery for deviated septum          Social History:     Social History     Tobacco Use    Smoking status: Never     Passive exposure: Never    Smokeless tobacco: Never   Substance Use Topics    Alcohol use: Not Currently          Family History:     Family History   Problem Relation Age of Onset    Cerebrovascular Disease Brother     Glaucoma No family hx of     Macular Degeneration No family hx of            Allergies:    No Known Allergies       Medications:     Current Outpatient Medications   Medication Sig Dispense Refill    aspirin (ASA) 81 MG EC tablet Take 81 mg by mouth daily      atorvastatin (LIPITOR) 10 MG tablet Take 10 mg by mouth daily      B Complex Vitamins (VITAMIN B COMPLEX 100 IJ) Take 1 tablet by mouth daily      cetirizine (ZYRTEC) 10 MG tablet Take 10 mg by mouth daily as needed      Cholecalciferol (VITAMIN D3) 1000 units CAPS Take 1 capsule by mouth as needed      famotidine (PEPCID) 40 MG tablet Take 1 tablet (40 mg) by mouth daily. 60 tablet 0    fish oil-omega-3 fatty acids 1000 MG capsule Take 1,200 mg by mouth twice a week      fluticasone (FLONASE) 50 MCG/ACT nasal spray       magnesium 250 MG tablet 1 tablet daily       magnesium oxide (MAG-OX) 400 MG  tablet 1 tablet as needed Orally Once a day      predniSONE (DELTASONE) 1 MG tablet Complete prednisone taper as suggested; Take prednisone 4 mg daily for 2 weeks Then prednisone 3 mg daily for 2 weeks Then prednisone 2 mg daily for 2 weeks Then prednisone 1 mg daily for 2 weeks then stop (Patient not taking: Reported on 6/30/2025) 60 tablet 0    predniSONE (DELTASONE) 1 MG tablet Combine with prednisone 5 mg to reach the following: Take prednisone 9 mg daily for 2 weeks Then prednisone 8 mg daily for 2 weeks Then prednisone 7 mg daily for 2 weeks Then prednisone 6 mg daily for 2 weeks Then prednisone 5 mg daily for 2 weeks till the next follow up (Patient not taking: Reported on 6/30/2025) 180 tablet 1    predniSONE (DELTASONE) 5 MG tablet Combine with prednisone 1 mg to reach the following: Take prednisone 9 mg daily for 2 weeks Then prednisone 8 mg daily for 2 weeks Then prednisone 7 mg daily for 2 weeks Then prednisone 6 mg daily for 2 weeks Then prednisone 5 mg daily for 2 weeks till the next follow up (Patient not taking: Reported on 6/30/2025) 180 tablet 1    tocilizumab (ACTEMRA) 162 MG/0.9ML subcutaneous injection Inject 0.9 mLs (162 mg) subcutaneously once a week. 3.6 mL 5     No current facility-administered medications for this visit.            Physical Exam:   There were no vitals taken for this visit.  Wt Readings from Last 4 Encounters:   03/21/25 54.4 kg (120 lb)   01/22/25 54.4 kg (120 lb)   11/13/24 54.9 kg (121 lb)   11/06/24 53.5 kg (118 lb)     General: Well appearing  Lungs: Nonlabored breathing  Neuro: Answering questions appropriately  Psych: Normal affect       Imaging/Lab Data   All laboratory and imaging data reviewed.    No results found for this or any previous visit (from the past 24 hours).

## 2025-06-30 NOTE — PROGRESS NOTES
Medication Therapy Management (MTM) Encounter    ASSESSMENT:                            Medication Adherence/Access: No issues identified.    Giant Cell Arteritis: Patient tolerating and having good efficacy to current medications. Would benefit from continuing Actemra 162 mg weekly and all other medications as prescribed. Provided education on alendronate today including dosing, general administration, side effects (both common/serious), precautions, monitoring and time to efficacy. Spent significant time discussing mechanism of action and how calcium is added to the bone not blood when on this medication. Reviewed signs and symptoms of TMJ and advised patient discuss that he is taking alendronate with dentist prior to any procedure to review risk level.    PLAN:                            1. Continue Actemra 162 mg weekly and all other medications as prescribed.     Follow-up: Return in about 6 months (around 12/24/2025) for MTM Pharmacist Visit.    SUBJECTIVE/OBJECTIVE:                          Yury Michael is a 84 year old male seen for a follow-up visit.       Reason for visit: Questions about alendronate    Allergies/ADRs: Reviewed in chart  Past Medical History: Reviewed in chart  Tobacco: He reports that he has never smoked. He has never been exposed to tobacco smoke. He has never used smokeless tobacco.  Alcohol: not currently using    Medication Adherence/Access: Medication barriers: affording medications. Using [employer] patient assistance program to get Actemra at no cost.     Giant Cell Arteritis:   Actemra 162 mg weekly  Prednisone 2 mg daily  Famotidine 40 mg daily  Alendronate 70 mg weekly     Reports he has been taking medications as prescribed. No new GCA symptoms noted. No side effects noted. Completed DEXA scan via PCP on 3/18/25 - has osteopenia. Due to long term steroids in addition to this started alendronate. Has several questions and concerns with alendronate. Concerned that this medication  is increasing the calcium level in his blood and could worsen his blood calcium scores. Also concerned about possibility of TMJ - would like to know symptoms to watch for and what kinds of dental procedures will cause it.     Last lab monitoring completed: 3/10/25    Today's Vitals: There were no vitals taken for this visit.  ----------------    I spent 30 minutes with this patient today. All changes were made via collaborative practice agreement with Roman Rice.     A summary of these recommendations was sent via [employer].    Estela Rolon, PharmD  Medication Therapy Management Pharmacist  [facility]  Phone: [phone]    Telemedicine Visit Details  The patient's medications can be safely assessed via a telemedicine encounter.  Type of service:  Telephone visit  Originating Location (pt. Location): Home    Distant Location (provider location):  On-site  Start Time: 11:00 AM  End Time: 11:30 AM     Medication Therapy Recommendations  No medication therapy recommendations to display

## 2025-06-30 NOTE — NURSING NOTE
Current patient location: [address] [address]      Is the patient currently in the state of [place]? YES    Visit mode:TELEPHONE    If the visit is dropped, the patient can be reconnected by: TELEPHONE VISIT: Phone number:   Telephone Information:   Mobile [phone]       Will anyone else be joining the visit? NO  (If patient encounters technical issues they should call [phone]    How would you like to obtain your AVS? MyChart    Are changes needed to the allergy or medication list? Pt stated no changes to allergies and Pt stated no med changes    Are refills needed on medications prescribed by this physician? NO    Rooming Documentation:  Questionnaire(s) not pre-assigned    Reason for visit: RECHECK      Jazmin GOYAL

## 2025-07-01 RX ORDER — ALENDRONATE SODIUM 70 MG/1
70 TABLET ORAL
COMMUNITY
Start: 2025-04-07

## 2025-07-02 ENCOUNTER — APPOINTMENT (OUTPATIENT)
Dept: URBAN - METROPOLITAN AREA CLINIC 254 | Age: 85
Setting detail: DERMATOLOGY
End: 2025-07-03

## 2025-07-02 VITALS — WEIGHT: 121 LBS | HEIGHT: 67.5 IN

## 2025-07-02 VITALS — HEIGHT: 67.5 IN | WEIGHT: 121 LBS

## 2025-07-02 DIAGNOSIS — L82.0 INFLAMED SEBORRHEIC KERATOSIS: ICD-10-CM

## 2025-07-02 DIAGNOSIS — D49.2 NEOPLASM OF UNSPECIFIED BEHAVIOR OF BONE, SOFT TISSUE, AND SKIN: ICD-10-CM

## 2025-07-02 DIAGNOSIS — L72.0 EPIDERMAL CYST: ICD-10-CM

## 2025-07-02 DIAGNOSIS — L57.8 OTHER SKIN CHANGES DUE TO CHRONIC EXPOSURE TO NONIONIZING RADIATION: ICD-10-CM

## 2025-07-02 DIAGNOSIS — Z71.89 OTHER SPECIFIED COUNSELING: ICD-10-CM

## 2025-07-02 DIAGNOSIS — L81.4 OTHER MELANIN HYPERPIGMENTATION: ICD-10-CM

## 2025-07-02 DIAGNOSIS — L73.9 FOLLICULAR DISORDER, UNSPECIFIED: ICD-10-CM

## 2025-07-02 DIAGNOSIS — Z85.828 PERSONAL HISTORY OF OTHER MALIGNANT NEOPLASM OF SKIN: ICD-10-CM

## 2025-07-02 DIAGNOSIS — D22 MELANOCYTIC NEVI: ICD-10-CM

## 2025-07-02 DIAGNOSIS — L73.8 OTHER SPECIFIED FOLLICULAR DISORDERS: ICD-10-CM

## 2025-07-02 DIAGNOSIS — L82.1 OTHER SEBORRHEIC KERATOSIS: ICD-10-CM

## 2025-07-02 PROBLEM — D22.5 MELANOCYTIC NEVI OF TRUNK: Status: ACTIVE | Noted: 2025-07-02

## 2025-07-02 PROCEDURE — 99213 OFFICE O/P EST LOW 20 MIN: CPT | Mod: 25

## 2025-07-02 PROCEDURE — OTHER BIOPSY BY SHAVE METHOD: OTHER

## 2025-07-02 PROCEDURE — 11102 TANGNTL BX SKIN SINGLE LES: CPT | Mod: 59

## 2025-07-02 PROCEDURE — OTHER LIQUID NITROGEN: OTHER

## 2025-07-02 PROCEDURE — OTHER REASSURANCE: OTHER

## 2025-07-02 PROCEDURE — 17110 DESTRUCT B9 LESION 1-14: CPT

## 2025-07-02 PROCEDURE — OTHER MIPS QUALITY: OTHER

## 2025-07-02 PROCEDURE — OTHER COUNSELING: OTHER

## 2025-07-02 ASSESSMENT — LOCATION SIMPLE DESCRIPTION DERM
LOCATION SIMPLE: RIGHT SHOULDER
LOCATION SIMPLE: CHIN
LOCATION SIMPLE: CHEST
LOCATION SIMPLE: RIGHT NOSE
LOCATION SIMPLE: RIGHT FOREHEAD
LOCATION SIMPLE: NECK
LOCATION SIMPLE: RIGHT POPLITEAL SKIN
LOCATION SIMPLE: RIGHT CHEEK
LOCATION SIMPLE: RIGHT UPPER BACK
LOCATION SIMPLE: LEFT UPPER BACK
LOCATION SIMPLE: LEFT POPLITEAL SKIN
LOCATION SIMPLE: LEFT CHEEK
LOCATION SIMPLE: LEFT NOSE
LOCATION SIMPLE: LEFT ANTERIOR NECK
LOCATION SIMPLE: ABDOMEN

## 2025-07-02 ASSESSMENT — LOCATION DETAILED DESCRIPTION DERM
LOCATION DETAILED: RIGHT LATERAL ABDOMEN
LOCATION DETAILED: RIGHT INFERIOR MEDIAL MALAR CHEEK
LOCATION DETAILED: RIGHT NASAL ALA
LOCATION DETAILED: LEFT NASAL ALA
LOCATION DETAILED: LEFT POPLITEAL SKIN
LOCATION DETAILED: RIGHT MEDIAL SUPERIOR CHEST
LOCATION DETAILED: RIGHT MID-UPPER BACK
LOCATION DETAILED: RIGHT POPLITEAL SKIN
LOCATION DETAILED: RIGHT CHIN
LOCATION DETAILED: RIGHT CENTRAL POSTERIOR NECK
LOCATION DETAILED: RIGHT MEDIAL UPPER BACK
LOCATION DETAILED: LEFT CENTRAL LATERAL NECK
LOCATION DETAILED: LEFT SUPERIOR CENTRAL MALAR CHEEK
LOCATION DETAILED: LEFT CLAVICULAR NECK
LOCATION DETAILED: RIGHT SUPERIOR MEDIAL FOREHEAD
LOCATION DETAILED: LEFT SUPERIOR MEDIAL UPPER BACK
LOCATION DETAILED: RIGHT POSTERIOR SHOULDER

## 2025-07-02 ASSESSMENT — BSA RASH: BSA RASH: 5

## 2025-07-02 ASSESSMENT — LOCATION ZONE DERM
LOCATION ZONE: NECK
LOCATION ZONE: FACE
LOCATION ZONE: NOSE
LOCATION ZONE: TRUNK
LOCATION ZONE: ARM
LOCATION ZONE: LEG

## 2025-07-02 NOTE — PATIENT INSTRUCTIONS
"Recommendations from today's MTM visit:                                                       1. Continue Actemra 162 mg weekly and all other medications as prescribed.     Follow-up: Return in about 6 months (around 12/24/2025) for MTM Pharmacist Visit.    It was great speaking with you today.  I value your experience and would be very thankful for your time in providing feedback in our clinic survey. In the next few days, you may receive an email or text message from [facility] [email or web address] with a link to a survey related to your  clinical pharmacist.\"     To schedule another MTM appointment, please call the clinic directly or you may call the MTM scheduling line at [phone].    My Clinical Pharmacist's contact information:                                                      Please feel free to contact me with any questions or concerns you have.      Estela Rolon, PharmD  Medication Therapy Management Pharmacist  [facility]ph[facility]  Phone: [phone]     "

## 2025-07-03 ENCOUNTER — VIRTUAL VISIT (OUTPATIENT)
Dept: RHEUMATOLOGY | Facility: CLINIC | Age: 85
End: 2025-07-03
Attending: STUDENT IN AN ORGANIZED HEALTH CARE EDUCATION/TRAINING PROGRAM
Payer: MEDICARE

## 2025-07-03 VITALS — HEIGHT: 67 IN | WEIGHT: 121 LBS | BODY MASS INDEX: 18.99 KG/M2

## 2025-07-03 DIAGNOSIS — M31.6 GIANT CELL ARTERITIS (H): ICD-10-CM

## 2025-07-03 DIAGNOSIS — M31.8 SYSTEMIC VASCULITIS (H): ICD-10-CM

## 2025-07-03 DIAGNOSIS — C91.10 CHRONIC LYMPHOCYTIC LEUKEMIA (H): ICD-10-CM

## 2025-07-03 DIAGNOSIS — M85.80 OSTEOPENIA, UNSPECIFIED LOCATION: ICD-10-CM

## 2025-07-03 DIAGNOSIS — M31.6 GCA (GIANT CELL ARTERITIS) (H): Primary | ICD-10-CM

## 2025-07-03 DIAGNOSIS — Z79.899 HIGH RISK MEDICATION USE: ICD-10-CM

## 2025-07-03 NOTE — PROGRESS NOTES
Virtual Visit Details    Type of service:  Telephone Visit   Phone call duration: 53 minutes   Originating Location (pt. Location): Home    Distant Location (provider location):  On-site  Telephone visit completed due to the patient did not have access to video, while the distant provider did.    RHEUMATOLOGY OUTPATIENT CLINIC NOTE     Referring Provider:   Bib Alcantara MD    Lab review     ANCA immunofluorescence: Negative.  ANCA serology [facility]: Negative  Serum IgG 4 level: Normal     Temporal artery biopsy, 9/2020, Temporal artery, left, biopsy:   1. Granulomatous arteritis consistent with giant cell arteritis.   2. Chronic inflammation of the surrounding adventitia.      Imaging review    PET scan 1/10/2025 showed   1.  Decreased uptake within the vertebral arteries, while uptake is above the liver, grade 3, these are significantly decreased from prior June.  2.  Decreased uptake in the aorta and its major branches, grade 1.  3.  New FDG avid right middle lobe pulmonary nodule and right basilar atelectasis, with linear uptake within the left lower lobe infrahilar region, obscured by respiratory motion. These foci could represent inflammatory or infectious changes but are incompletely characterized.  Recommend dedicated full inspiration chest CT within 4-6 weeks for better characterization and assessment of lesion trajectory.  4.  Findings suggestive of new 10th rib nondisplaced fracture.      PET scan on 10/11 showed   1.  Findings consistent with active vasculitis (grade 3 uptake), with heterogeneous increased avidity throughout the major vessels, which  includes the thoracic and abdominal aorta, with the most intense foci residing within the vertebral arteries, as well as the proximal upper extremity and throughout the lower extremity vasculature, as described above.  2.  Scattered foci of increased uptake also noted throughout the musculature, which may reflect foci of vascular inflammation not  well characterized due to lack of IV contrast.  3.  Focal uptake within the left pelvis, which may correlate with adjacent diverticula, and therefore may represent early or developing diverticulitis. Recommend correlation patient's symptoms.    CTA chest, abdomen, pelvis 2024:  Focal dilatation of the distal celiac, distal renal, mild groundglass opacity in the right upper lobe     CT sinus 2024:  Deviated nasal septum without mucosal thickening or osteitis     MR orbits, 9/2020:   1.There is no definite abnormal contrast enhancement or mass. No evidence of leukemic infiltration.  2. Regarding the remainder of the brain, abnormal T2 hyperintense bone marrow signal with associated enhancement in the skull, likely representing leukemic infiltration.  3. Subcutaneous T2 hyperintensity with associated diffusion restriction and contrast enhancement over the left zygomatic region, question leukemic infiltration.     CT sinus, 10/2019:    Left maxillary sinusitis with small air-fluid level, new since 6/17/2015. In the appropriate clinical setting, this could reflect acute sinusitis       Subjective     Visit date  July 3, 2025    Yury is a 84 year old male who presents today for follow up.    Since the last visit,    - Workup 6/26/2025 showed:    Hemoglobin within normal limits   WBC  mildly elevated   Platelets  mildly low  CRP  within normal limits   AST  within normal limits   Creatinine  within normal limits     Today, Yury mentioned the following:    He denies headache, jaw claudications, scalp tenderness, vision changes,     He denies persistent shoulder/hip stiffness in the morning.     He denies arm or leg claudications with activities.    He denies fever, chills, unintentional weight loss.     ROS    Current Medications   Prednisone, none currently for few days     Actemra injection once weekly     Bactrim, not taking    Pepcid, not taking      Past Medical history   Past Medical History:   Diagnosis Date     "Agatston coronary artery calcium score greater than 400, 2023 but stress echo negative     CLL (chronic lymphocytic leukemia) (H)     Giant cell arteritis (H), Tempral artery BX proven and treated with steroids etc follows Rheum     Osteopenia        Objective   Ht 1.702 m (5' 7\")   Wt 54.9 kg (121 lb)   BMI 18.95 kg/m        PHYSICAL EXAMINATION  Physical Exam  Phone visit     Assessment & Plan     # Giant cell arteritis, biopsy proven  # 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  Onset: 2020  Involvement:  Visual symptoms, bilateral ischemic optic neuropathy, temporal artery biopsy positive for GCA.   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   No headaches, scalp tenderness, PMR.    Comorbid conditions: CLL following with oncology     Relapse in 9/2024 with Large vessel vasculitis on PET, Asymptomatic      Treatment included: Prednisone, tocilizumab [[id number]].  Restarted (12/2024-    # Giant cell arteritis, biopsy proven  # Large vessel vasculitis  # History of Diverticular disease on PET scan, asymptomatic      uYry is presenting today for follow up.    He is tolerating Actemra well     He is asymptomatic from GCA standpoint.    We discussed to continue Actemra once weekly injections for at least 12 months from his most recent restart (tentatively through December 2025) then can plan to space afterwards to once every 2 weeks     Plan:    - Continue Actemra once weekly injections     - If he develop recurrence of vision loss then emergent evaluation at the emergency room.      - Continue with aspirin 81 mg daily     If you experience any symptoms suggestive of flare including (recurrence of headache, scalp tenderness, jaw claudications, arm/leg claudications, unexplained fever, weight loss) while tapering " the prednisone, then we would suggest not to taper prednisone further and increase prednisone back to the previous dose that was controlling your symptoms and reach out to Rheumatology office      # Recurrent sinus obstruction   -  Following with ENT outside      # Chronic lymphocytic leukemia   Follows with oncology.  Recommended to continue taking aspirin and follow up with his oncologist.     # FDG Avid right middle lobe nodule on PET scan  Recommended follow up with pulmonary.  Following with pulmonary and repeat CT scan was negative was negative for nodule      # History of Osteopenia   # Rib fracture on PET scan  He is working with his primary care provider.   Repeat DEXA scan showed Osteopenia with elevated fracture risk   He has several questions regarding anti-resorptive therapy I recommended bone clinic endocrinology consultation, referral placed       # High risk medication requiring frequent lab tests for toxicity monitoring   Actemra   Labs reviewed on 6/26/2025, CBC,AST, Creatinine no signs of concerning toxicity   Standing orders in place     Risks and side effects including the risk of diverticulitis and perforation with tocilizumab as well as immunosuppression side effects.      # Screening for chronic infections  2024  Hepatitis B surface antigen: Negative  Hepatitis B core: Negative  Hepatitis B surface antibody: Negative   Hepatitis C antibody: Negative   2020  QuantiFERON gold: Negative    # Longitudinal care  The longitudinal plan of care for the diagnosis(es)/condition(s) as documented were addressed during this visit. Due to the added complexity in care, I will continue to support Yury in the subsequent management and with ongoing continuity of care.       53 minutes spent by me on the date of the encounter doing chart review, history and exam, documentation and further activities per the note      Return in about 3 months (around 10/3/2025) for Follow up.    Roman Rice MD  M  [facility]

## 2025-07-03 NOTE — NURSING NOTE
Current patient location: [address] [address]    Is the patient currently in the state of [place]? YES    Visit mode: TELEPHONE    If the visit is dropped, the patient can be reconnected by:TELEPHONE VISIT: Phone number:   Telephone Information:   Mobile [phone]       Will anyone else be joining the visit? NO  (If patient encounters technical issues they should call [phone]    Are changes needed to the allergy or medication list? No    Are refills needed on medications prescribed by this physician? NO    Rooming Documentation:  Not applicable    Reason for visit: RECHECK    Katelynn Garza VVF  No vitals

## 2025-07-03 NOTE — PATIENT INSTRUCTIONS
Our plan for today is:    - Tests:    - Labs 1 week before the next appointment     - Medications:     - Continue Actemra once weekly injections

## 2025-07-03 NOTE — LETTER
7/3/2025       RE: Bon Michael  1923 [facility] [address]     Dear Colleague,    Thank you for referring your patient, Bon Michael, to the [facility] RHEUMATOLOGY at [facility]. Please see a copy of my visit note below.    Virtual Visit Details    Type of service:  Telephone Visit   Phone call duration: 53 minutes   Originating Location (pt. Location): Home    Distant Location (provider location):  On-site  Telephone visit completed due to the patient did not have access to video, while the distant provider did.    RHEUMATOLOGY OUTPATIENT CLINIC NOTE     Referring Provider:   Bib Alcantara MD    Lab review     ANCA immunofluorescence: Negative.  ANCA serology [facility]: Negative  Serum IgG 4 level: Normal     Temporal artery biopsy, 9/2020, Temporal artery, left, biopsy:   1. Granulomatous arteritis consistent with giant cell arteritis.   2. Chronic inflammation of the surrounding adventitia.      Imaging review    PET scan 1/10/2025 showed   1.  Decreased uptake within the vertebral arteries, while uptake is above the liver, grade 3, these are significantly decreased from prior June.  2.  Decreased uptake in the aorta and its major branches, grade 1.  3.  New FDG avid right middle lobe pulmonary nodule and right basilar atelectasis, with linear uptake within the left lower lobe infrahilar region, obscured by respiratory motion. These foci could represent inflammatory or infectious changes but are incompletely characterized.  Recommend dedicated full inspiration chest CT within 4-6 weeks for better characterization and assessment of lesion trajectory.  4.  Findings suggestive of new 10th rib nondisplaced fracture.      PET scan on 10/11 showed   1.  Findings consistent with active vasculitis (grade 3 uptake), with heterogeneous increased avidity throughout the major vessels, which  includes the thoracic and abdominal aorta,  with the most intense foci residing within the vertebral arteries, as well as the proximal upper extremity and throughout the lower extremity vasculature, as described above.  2.  Scattered foci of increased uptake also noted throughout the musculature, which may reflect foci of vascular inflammation not well characterized due to lack of IV contrast.  3.  Focal uptake within the left pelvis, which may correlate with adjacent diverticula, and therefore may represent early or developing diverticulitis. Recommend correlation patient's symptoms.    CTA chest, abdomen, pelvis 2024:  Focal dilatation of the distal celiac, distal renal, mild groundglass opacity in the right upper lobe     CT sinus 2024:  Deviated nasal septum without mucosal thickening or osteitis     MR orbits, 9/2020:   1.There is no definite abnormal contrast enhancement or mass. No evidence of leukemic infiltration.  2. Regarding the remainder of the brain, abnormal T2 hyperintense bone marrow signal with associated enhancement in the skull, likely representing leukemic infiltration.  3. Subcutaneous T2 hyperintensity with associated diffusion restriction and contrast enhancement over the left zygomatic region, question leukemic infiltration.     CT sinus, 10/2019:    Left maxillary sinusitis with small air-fluid level, new since 6/17/2015. In the appropriate clinical setting, this could reflect acute sinusitis       Subjective    Visit date  July 3, 2025    Yury is a 84 year old male who presents today for follow up.    Since the last visit,    - Workup 6/26/2025 showed:    Hemoglobin within normal limits   WBC  mildly elevated   Platelets  mildly low  CRP  within normal limits   AST  within normal limits   Creatinine  within normal limits     Today, Yury mentioned the following:    He denies headache, jaw claudications, scalp tenderness, vision changes,     He denies persistent shoulder/hip stiffness in the morning.     He denies arm or leg  "claudications with activities.    He denies fever, chills, unintentional weight loss.     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, 2023 but stress echo negative      CLL (chronic lymphocytic leukemia) (H)      Giant cell arteritis (H), Tempral artery BX proven and treated with steroids etc follows Rheum      Osteopenia        Objective  Ht 1.702 m (5' 7\")   Wt 54.9 kg (121 lb)   BMI 18.95 kg/m        PHYSICAL EXAMINATION  Physical Exam  Phone visit     Assessment & Plan    # Giant cell arteritis, biopsy proven  # 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  Onset: 2020  Involvement:  Visual symptoms, bilateral ischemic optic neuropathy, temporal artery biopsy positive for GCA.   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   No headaches, scalp tenderness, PMR.    Comorbid conditions: CLL following with oncology     Relapse in 9/2024 with Large vessel vasculitis on PET, Asymptomatic      Treatment included: Prednisone, tocilizumab [[id number]].  Restarted (12/2024-    # Giant cell arteritis, biopsy proven  # Large vessel vasculitis  # History of Diverticular disease on PET scan, asymptomatic      Yury is presenting today for follow up.    He is tolerating Actemra well     He is asymptomatic from GCA standpoint.    We discussed to continue Actemra once weekly injections for at least 12 months from his most recent restart (tentatively through December 2025) then can plan to space afterwards to once every 2 weeks     Plan:    - Continue Actemra once weekly injections     - If he develop " recurrence of vision loss then emergent evaluation at the emergency room.      - Continue with aspirin 81 mg daily     If you experience any symptoms suggestive of flare including (recurrence of headache, scalp tenderness, jaw claudications, arm/leg claudications, unexplained fever, weight loss) while tapering the prednisone, then we would suggest not to taper prednisone further and increase prednisone back to the previous dose that was controlling your symptoms and reach out to Rheumatology office      # Recurrent sinus obstruction   -  Following with ENT outside      # Chronic lymphocytic leukemia   Follows with oncology.  Recommended to continue taking aspirin and follow up with his oncologist.     # FDG Avid right middle lobe nodule on PET scan  Recommended follow up with pulmonary.  Following with pulmonary and repeat CT scan was negative was negative for nodule      # History of Osteopenia   # Rib fracture on PET scan  He is working with his primary care provider.   Repeat DEXA scan showed Osteopenia with elevated fracture risk   He has several questions regarding anti-resorptive therapy I recommended bone clinic endocrinology consultation, referral placed       # High risk medication requiring frequent lab tests for toxicity monitoring   Actemra   Labs reviewed on 6/26/2025, CBC,AST, Creatinine no signs of concerning toxicity   Standing orders in place     Risks and side effects including the risk of diverticulitis and perforation with tocilizumab as well as immunosuppression side effects.      # Screening for chronic infections  2024  Hepatitis B surface antigen: Negative  Hepatitis B core: Negative  Hepatitis B surface antibody: Negative   Hepatitis C antibody: Negative   2020  QuantiFERON gold: Negative    # Longitudinal care  The longitudinal plan of care for the diagnosis(es)/condition(s) as documented were addressed during this visit. Due to the added complexity in care, I will continue to support Yury  in the subsequent management and with ongoing continuity of care.       53 minutes spent by me on the date of the encounter doing chart review, history and exam, documentation and further activities per the note      Return in about 3 months (around 10/3/2025) for Follow up.    Roman Rice MD  [facility] R[facility]    Again, thank you for allowing me to participate in the care of your patient.      Sincerely,    Roman Rice MD

## 2025-07-05 ENCOUNTER — PATIENT OUTREACH (OUTPATIENT)
Dept: CARE COORDINATION | Facility: CLINIC | Age: 85
End: 2025-07-05
Payer: MEDICARE

## 2025-07-07 ENCOUNTER — PATIENT OUTREACH (OUTPATIENT)
Dept: CARE COORDINATION | Facility: CLINIC | Age: 85
End: 2025-07-07
Payer: MEDICARE

## 2025-07-07 ENCOUNTER — TELEPHONE (OUTPATIENT)
Dept: RHEUMATOLOGY | Facility: CLINIC | Age: 85
End: 2025-07-07
Payer: MEDICARE

## 2025-07-14 ENCOUNTER — APPOINTMENT (OUTPATIENT)
Dept: URBAN - METROPOLITAN AREA CLINIC 254 | Age: 85
Setting detail: DERMATOLOGY
End: 2025-07-20

## 2025-07-14 DIAGNOSIS — L08.9 LOCAL INFECTION OF THE SKIN AND SUBCUTANEOUS TISSUE, UNSPECIFIED: ICD-10-CM

## 2025-07-14 PROCEDURE — OTHER COUNSELING: OTHER

## 2025-07-14 PROCEDURE — OTHER PRESCRIPTION MEDICATION MANAGEMENT: OTHER

## 2025-07-14 PROCEDURE — 99213 OFFICE O/P EST LOW 20 MIN: CPT

## 2025-07-14 PROCEDURE — OTHER PRESCRIPTION: OTHER

## 2025-07-14 PROCEDURE — OTHER ORDER TESTS: OTHER

## 2025-07-14 RX ORDER — DOXYCYCLINE 100 MG/1
100MG TABLET, FILM COATED ORAL BID
Qty: 14 | Refills: 0 | Status: ERX | COMMUNITY
Start: 2025-07-14

## 2025-07-14 RX ORDER — MUPIROCIN 20 MG/G
2% OINTMENT TOPICAL BID
Qty: 22 | Refills: 0 | Status: ERX | COMMUNITY
Start: 2025-07-14

## 2025-07-14 ASSESSMENT — LOCATION ZONE DERM: LOCATION ZONE: LEG

## 2025-07-14 ASSESSMENT — LOCATION DETAILED DESCRIPTION DERM
LOCATION DETAILED: LEFT KNEE
LOCATION DETAILED: LEFT ANTERIOR LATERAL DISTAL THIGH

## 2025-07-14 ASSESSMENT — LOCATION SIMPLE DESCRIPTION DERM
LOCATION SIMPLE: LEFT KNEE
LOCATION SIMPLE: LEFT THIGH

## 2025-08-04 ENCOUNTER — APPOINTMENT (OUTPATIENT)
Dept: URBAN - METROPOLITAN AREA CLINIC 254 | Age: 85
Setting detail: DERMATOLOGY
End: 2025-08-05

## 2025-08-04 DIAGNOSIS — L01.03 BULLOUS IMPETIGO: ICD-10-CM

## 2025-08-04 DIAGNOSIS — L82.0 INFLAMED SEBORRHEIC KERATOSIS: ICD-10-CM

## 2025-08-04 PROBLEM — C44.519 BASAL CELL CARCINOMA OF SKIN OF OTHER PART OF TRUNK: Status: ACTIVE | Noted: 2025-08-04

## 2025-08-04 PROCEDURE — 17261 DSTRJ MAL LES T/A/L .6-1.0CM: CPT

## 2025-08-04 PROCEDURE — OTHER COUNSELING: OTHER

## 2025-08-04 PROCEDURE — 17110 DESTRUCT B9 LESION 1-14: CPT | Mod: 59

## 2025-08-04 PROCEDURE — OTHER LIQUID NITROGEN: OTHER

## 2025-08-04 PROCEDURE — OTHER PRESCRIPTION MEDICATION MANAGEMENT: OTHER

## 2025-08-04 PROCEDURE — 99213 OFFICE O/P EST LOW 20 MIN: CPT | Mod: 25

## 2025-08-04 PROCEDURE — OTHER CURETTAGE AND DESTRUCTION: OTHER

## 2025-08-04 ASSESSMENT — LOCATION ZONE DERM
LOCATION ZONE: FACE
LOCATION ZONE: FEET
LOCATION ZONE: LEG

## 2025-08-04 ASSESSMENT — LOCATION DETAILED DESCRIPTION DERM
LOCATION DETAILED: LEFT CENTRAL ZYGOMA
LOCATION DETAILED: RIGHT DORSAL FOOT
LOCATION DETAILED: RIGHT ANTERIOR PROXIMAL THIGH

## 2025-08-04 ASSESSMENT — LOCATION SIMPLE DESCRIPTION DERM
LOCATION SIMPLE: RIGHT THIGH
LOCATION SIMPLE: RIGHT FOOT
LOCATION SIMPLE: LEFT ZYGOMA

## 2025-08-25 ENCOUNTER — APPOINTMENT (OUTPATIENT)
Dept: URBAN - METROPOLITAN AREA CLINIC 254 | Age: 85
Setting detail: DERMATOLOGY
End: 2025-08-26

## 2025-08-25 DIAGNOSIS — L01.03 BULLOUS IMPETIGO: ICD-10-CM

## 2025-08-25 DIAGNOSIS — Z85.828 PERSONAL HISTORY OF OTHER MALIGNANT NEOPLASM OF SKIN: ICD-10-CM

## 2025-08-25 PROCEDURE — OTHER DRESSING CHANGE: OTHER

## 2025-08-25 PROCEDURE — 99213 OFFICE O/P EST LOW 20 MIN: CPT

## 2025-08-25 PROCEDURE — OTHER COUNSELING: OTHER

## 2025-08-25 ASSESSMENT — LOCATION SIMPLE DESCRIPTION DERM
LOCATION SIMPLE: CHEST
LOCATION SIMPLE: RIGHT THIGH
LOCATION SIMPLE: RIGHT FOOT

## 2025-08-25 ASSESSMENT — LOCATION ZONE DERM
LOCATION ZONE: FEET
LOCATION ZONE: TRUNK
LOCATION ZONE: LEG

## 2025-08-25 ASSESSMENT — LOCATION DETAILED DESCRIPTION DERM
LOCATION DETAILED: RIGHT DORSAL FOOT
LOCATION DETAILED: RIGHT ANTERIOR PROXIMAL THIGH
LOCATION DETAILED: RIGHT MEDIAL SUPERIOR CHEST

## (undated) RX ORDER — LIDOCAINE HYDROCHLORIDE AND EPINEPHRINE 10; 10 MG/ML; UG/ML
INJECTION, SOLUTION INFILTRATION; PERINEURAL
Status: DISPENSED
Start: 2020-09-10